# Patient Record
Sex: FEMALE | Race: WHITE | NOT HISPANIC OR LATINO | Employment: OTHER | URBAN - METROPOLITAN AREA
[De-identification: names, ages, dates, MRNs, and addresses within clinical notes are randomized per-mention and may not be internally consistent; named-entity substitution may affect disease eponyms.]

---

## 2017-01-04 ENCOUNTER — ANTI-COAG VISIT (OUTPATIENT)
Dept: CARDIOLOGY | Facility: CLINIC | Age: 62
End: 2017-01-04

## 2017-01-04 DIAGNOSIS — Z95.811 LEFT VENTRICULAR ASSIST DEVICE PRESENT: ICD-10-CM

## 2017-01-04 LAB — INR PPP: 3.2

## 2017-01-04 NOTE — PROGRESS NOTES
Pt confirms no changes. Reports she has been eating stuffed cabbage the last few days. Will hold and lower.

## 2017-01-09 ENCOUNTER — ANTI-COAG VISIT (OUTPATIENT)
Dept: CARDIOLOGY | Facility: CLINIC | Age: 62
End: 2017-01-09

## 2017-01-09 DIAGNOSIS — Z95.811 LEFT VENTRICULAR ASSIST DEVICE PRESENT: ICD-10-CM

## 2017-01-09 LAB — INR PPP: 2.8

## 2017-01-10 NOTE — PROGRESS NOTES
S/w pt, she reports reduction in bleeding. Advised to call LVAD or go to ER for any bleeding that does not cease. INR with LVAD

## 2017-01-11 ENCOUNTER — OFFICE VISIT (OUTPATIENT)
Dept: TRANSPLANT | Facility: CLINIC | Age: 62
End: 2017-01-11
Payer: MEDICARE

## 2017-01-11 ENCOUNTER — CLINICAL SUPPORT (OUTPATIENT)
Dept: TRANSPLANT | Facility: CLINIC | Age: 62
End: 2017-01-11
Payer: MEDICARE

## 2017-01-11 ENCOUNTER — ANTI-COAG VISIT (OUTPATIENT)
Dept: CARDIOLOGY | Facility: CLINIC | Age: 62
End: 2017-01-11

## 2017-01-11 ENCOUNTER — HOSPITAL ENCOUNTER (OUTPATIENT)
Dept: CARDIOLOGY | Facility: CLINIC | Age: 62
Discharge: HOME | End: 2017-01-11
Payer: MEDICARE

## 2017-01-11 VITALS
HEIGHT: 60 IN | BODY MASS INDEX: 26.7 KG/M2 | TEMPERATURE: 99 F | WEIGHT: 136 LBS | HEART RATE: 91 BPM | SYSTOLIC BLOOD PRESSURE: 80 MMHG

## 2017-01-11 DIAGNOSIS — Z95.811 HEART REPLACED BY HEART ASSIST DEVICE: ICD-10-CM

## 2017-01-11 DIAGNOSIS — Z95.810 AUTOMATIC IMPLANTABLE CARDIOVERTER-DEFIBRILLATOR IN SITU: ICD-10-CM

## 2017-01-11 DIAGNOSIS — I10 ESSENTIAL HYPERTENSION: ICD-10-CM

## 2017-01-11 DIAGNOSIS — Z95.811 LEFT VENTRICULAR ASSIST DEVICE PRESENT: ICD-10-CM

## 2017-01-11 DIAGNOSIS — I42.0 DILATED CARDIOMYOPATHY: ICD-10-CM

## 2017-01-11 DIAGNOSIS — I42.9 CARDIOMYOPATHY: ICD-10-CM

## 2017-01-11 DIAGNOSIS — Z95.811 HEART REPLACED BY HEART ASSIST DEVICE: Primary | ICD-10-CM

## 2017-01-11 DIAGNOSIS — R11.0 NAUSEA: ICD-10-CM

## 2017-01-11 LAB
AORTIC VALVE REGURGITATION: ABNORMAL
DIASTOLIC DYSFUNCTION: YES
ESTIMATED PA SYSTOLIC PRESSURE: 36.64
RETIRED EF AND QEF - SEE NOTES: 15 (ref 55–65)
TRICUSPID VALVE REGURGITATION: ABNORMAL

## 2017-01-11 PROCEDURE — 99214 OFFICE O/P EST MOD 30 MIN: CPT | Mod: S$PBB,,, | Performed by: INTERNAL MEDICINE

## 2017-01-11 PROCEDURE — 99999 PR PBB SHADOW E&M-EST. PATIENT-LVL III: CPT | Mod: PBBFAC,,, | Performed by: INTERNAL MEDICINE

## 2017-01-11 PROCEDURE — 99213 OFFICE O/P EST LOW 20 MIN: CPT | Mod: PBBFAC | Performed by: INTERNAL MEDICINE

## 2017-01-11 PROCEDURE — 93306 TTE W/DOPPLER COMPLETE: CPT | Mod: 26,S$PBB,, | Performed by: INTERNAL MEDICINE

## 2017-01-11 PROCEDURE — 93750 INTERROGATION VAD IN PERSON: CPT | Mod: PBBFAC | Performed by: INTERNAL MEDICINE

## 2017-01-11 RX ORDER — ONDANSETRON 4 MG/1
4 TABLET, FILM COATED ORAL EVERY 8 HOURS PRN
Qty: 30 TABLET | Refills: 3 | Status: SHIPPED | OUTPATIENT
Start: 2017-01-11

## 2017-01-11 NOTE — PROGRESS NOTES
"Subjective:   Patient ID:  Randa Devine is a 61 y.o. female who presents for LVAD followup visit.      Implant Date: 5/9/12 and PFO closure  [id number], DT study     HVAD RPM 2700     INR goal: 2-3  NO Bridge with Heparin (4/22/16)  Antiplatelets: D/C 6/25/14, Coumadin only  LDH: 197/207    TXP MARIA L INTERROGATIONS 1/11/2017   Type Heartware   Flow 4.9   Speed 2700   Power (Kim) 4.1   Low Flow Alarm 2.5   High Power Alarm 6.0   Pulsatility Intermittent pulse       HPI   62 yo with chronic combined HF, ICM, s/p Heartware for DT complicated by ESRD who comes to rehab after May 2016 fall causing left arm wound. Here for her regular VAD visit. Doing very well. VAD speed is at 2700 rpm. No VAD alarms noted on interrogation occasional PI events . BP is 80 . DLES is a "1". INR is therapeutic at 3.0 . LDH is at baseline 208.     Review of Systems   Constitution: Negative. Negative for chills, decreased appetite, diaphoresis, fever, weakness, malaise/fatigue, night sweats, weight gain and weight loss.   Eyes: Negative.    Cardiovascular: Negative for chest pain, claudication, cyanosis, dyspnea on exertion, irregular heartbeat, leg swelling, near-syncope, orthopnea, palpitations, paroxysmal nocturnal dyspnea and syncope.   Respiratory: Negative for cough, hemoptysis and shortness of breath.    Endocrine: Negative.    Hematologic/Lymphatic: Negative.    Skin: Negative for color change, dry skin and nail changes.   Musculoskeletal: Negative.    Gastrointestinal: Negative.    Genitourinary: Negative.        Objective:   Blood pressure (!) 80/0, pulse 91, temperature 99.1 °F (37.3 °C), temperature source Oral, height 5' (1.524 m), weight 61.7 kg (136 lb).body mass index is 26.56 kg/(m^2).    Doppler: 80    Physical Exam   Constitutional: She is oriented to person, place, and time. Vital signs are normal. She appears well-developed and well-nourished.   HENT:   Head: Normocephalic.   Eyes: Pupils are equal, round, and reactive to " "light.   Neck: Neck supple. No JVD present.   Cardiovascular: Normal rate, regular rhythm, normal heart sounds and intact distal pulses.  PMI is not displaced.  Exam reveals no gallop and no friction rub.    No murmur heard.  Smooth VAD hum. DLES is "1"   Pulmonary/Chest: Effort normal and breath sounds normal. No respiratory distress. She has no wheezes. She has no rales.   Abdominal: Soft. Bowel sounds are normal. She exhibits no distension. There is no tenderness. There is no rebound.   Musculoskeletal: She exhibits no edema.   Neurological: She is alert and oriented to person, place, and time.   Nursing note and vitals reviewed.      Lab Results   Component Value Date    WBC 4.37 01/11/2017    HGB 10.9 (L) 01/11/2017    HCT 33.2 (L) 01/11/2017    MCV 97 01/11/2017     (L) 01/11/2017    CO2 30 (H) 01/11/2017    CREATININE 7.9 (H) 01/11/2017    CALCIUM 9.2 01/11/2017    ALBUMIN 3.4 (L) 01/11/2017    AST 19 01/11/2017    BNP 1143 (H) 01/11/2017    ALT 15 01/11/2017     01/11/2017       Lab Results   Component Value Date    INR 3.0 (H) 01/11/2017    INR 2.8 01/09/2017    INR 3.2 01/04/2017       BNP   Date Value Ref Range Status   01/11/2017 1143 (H) 0 - 99 pg/mL Final     Comment:     Values of less than 100 pg/ml are consistent with non-CHF populations.   11/16/2016 790 (H) 0 - 99 pg/mL Final     Comment:     Values of less than 100 pg/ml are consistent with non-CHF populations.   09/21/2016 1641 (H) 0 - 99 pg/mL Final     Comment:     Values of less than 100 pg/ml are consistent with non-CHF populations.       LD   Date Value Ref Range Status   01/11/2017 208 110 - 260 U/L Final     Comment:     Results are increased in hemolyzed samples.   11/16/2016 207 110 - 260 U/L Final     Comment:     Results are increased in hemolyzed samples.   09/21/2016 197 110 - 260 U/L Final     Comment:     Results are increased in hemolyzed samples.       Labs were reviewed with the patient.    No results found for " this or any previous visit.  No results found for this or any previous visit.    Assessment:      1. Heart replaced by heart assist device    2. Dilated cardiomyopathy    3. Essential hypertension    4. Nausea    5. Automatic implantable cardioverter-defibrillator in situ        Plan:   Patient is now NYHA class II. BP is controlled.  INR is therapeutic.  VAD interrogation was performed in clinic  Does not need VAD supplies.   Recommend 2 gram sodium restriction and 1500cc fluid restriction.  Encourage physical activity with graded exercise program.  Requested patient to weigh themselves daily, and to notify us if their weight increases by more than 3 lbs in 1 day or 5 lbs in 1 week.      Listed for transplant: No, DT    UNOS Patient Status  Functional Status: 90% - Able to carry on normal activity: minor symptoms of disease  Physical Capacity: No Limitations  Working for Income: No  If no, reason not working: Unknown    Tosin Fajardo MD

## 2017-01-11 NOTE — PROGRESS NOTES
Date of Implant with Heartware LVAD: 5/9/12    PATIENT ARRIVED IN CLINIC:  Ambulatory   Accompanied by: daughter    Vitals  Doppler: 80  Pulsatile: Yes  PAIN: chronic headaches on 0-10 pain scale,   Is patient currently on medications for pain? no   Weight (with controller and 2 batteries): refer to encounter    VAD Interrogation:  BERNARDO MARIA L INTERROGATIONS 1/11/2017   Type Heartware   Flow 4.9   Speed 2700   Power (Kim) 4.1   Low Flow Alarm 2.5   High Power Alarm 6.0   Pulsatility Intermittent pulse       HCT: 33.2  WAVEFORM: 3-8  Suction alarm: Off  Problems / Issues / Alarms with VAD if any: LFA noted 1/10/16 during HD   Any Equipment Issues: None noted  Patient states their batteries are lasting 4-6 hours. Rotating all batteries. Checking connections before and after changing battery.   Emergency Equipment With Patient: yes   VAD Binder With Patient: yes   Reviewed VAD Numbers In Binder: yes  VAD Sounds: Smooth  Manual & Visual Inspection Of Driveline: No kinks or tears noted  Checked Heartware driveline connector housing for separation, none noted.  Also checked for tight connection, which they are.  Educated pt regarding this and instructed  to check on this daily. Pt verbalized understanding and agreement.   Clamshell Repair: no  Patient wearing patient pack with waist strap:yes    LVAD Dressing/DLES:  Appearance Of Driveline: 1  Antibiotics: NO  Frequency of Dressing Changes: twice per week & soap and water dressing   Stabilization Device In Use: yes, presley securement device    Pt In Need Of Management Kits? no   It is medically necessary to have VAD management kits in order to prevent infection or to assist in the healing of an infected DLES.    Assessment:   Complaints/reason for visit today: routine  Complaints Of Nausea / Vomiting: occasional with HD   Appearance and Frequency Of Stools: normal and formed without blood & daily  Color Of Urine: clear/yellow  Patient is: coping okay   Sleep Habits: 4-6 hrs  /night  Sleep Aids: None noted   Showering: no  Activity/Exercise: p reports walking, gardening, being active   Driving: yes, Reminded to pull over should there be an alarm before looking down at controller.     Labs:    Chemistry        Component Value Date/Time     01/11/2017 1009    K 4.3 01/11/2017 1009    CL 95 01/11/2017 1009    CO2 30 (H) 01/11/2017 1009    BUN 56 (H) 01/11/2017 1009    CREATININE 7.9 (H) 01/11/2017 1009     01/11/2017 1009        Component Value Date/Time    CALCIUM 9.2 01/11/2017 1009    ALKPHOS 106 01/11/2017 1009    AST 19 01/11/2017 1009    ALT 15 01/11/2017 1009    BILITOT 0.5 01/11/2017 1009            Magnesium   Date Value Ref Range Status   01/11/2017 2.1 1.6 - 2.6 mg/dL Final       Lab Results   Component Value Date    WBC 4.37 01/11/2017    HGB 10.9 (L) 01/11/2017    HCT 33.2 (L) 01/11/2017    MCV 97 01/11/2017     (L) 01/11/2017       Lab Results   Component Value Date    INR 3.0 (H) 01/11/2017    INR 2.8 01/09/2017    INR 3.2 01/04/2017       BNP   Date Value Ref Range Status   01/11/2017 1143 (H) 0 - 99 pg/mL Final     Comment:     Values of less than 100 pg/ml are consistent with non-CHF populations.   11/16/2016 790 (H) 0 - 99 pg/mL Final     Comment:     Values of less than 100 pg/ml are consistent with non-CHF populations.   09/21/2016 1641 (H) 0 - 99 pg/mL Final     Comment:     Values of less than 100 pg/ml are consistent with non-CHF populations.       LD   Date Value Ref Range Status   01/11/2017 208 110 - 260 U/L Final     Comment:     Results are increased in hemolyzed samples.   11/16/2016 207 110 - 260 U/L Final     Comment:     Results are increased in hemolyzed samples.   09/21/2016 197 110 - 260 U/L Final     Comment:     Results are increased in hemolyzed samples.       Labs reviewed with patient: YES      Patient Satisfaction Survey completed per Patient: yes  (explained about signature and box to check)  Medication reconciliation: per  MA.  Purple card updated today: no  Coumadin Managed by: [facility],     Education: Reviewed driveline care, emergency procedures, how to change the controller, alarms with patient.      Plans/Needs: Pt doing well with no issues.  Pt had echo today which was reviewed by Dr. Fajardo.  No changes.  Pt to RTC in 2 months, please refer to MD.

## 2017-01-11 NOTE — LETTER
January 11, 2017        BABAR Currie  [address]  [address]  [place] [address]  Phone: [phone]  Fax: [phone]             [facility] [address]  [facility] [address]  Phone: [phone]   Patient: Randa Devine   MR Number: [id number]   YOB: 1955   Date of Visit: 1/11/2017       Dear Dr. BABAR Currie    Thank you for referring Randa Devine to me for evaluation. Attached you will find relevant portions of my assessment and plan of care.    If you have questions, please do not hesitate to call me. I look forward to following Randa Devine along with you.    Sincerely,    Tosin Fajardo MD    Enclosure    If you would like to receive this communication electronically, please contact [email or web address] or [phone] to request [employer] access.    [employer] is a tool which provides read-only access to select patient information with whom you have a relationship. Its easy to use and provides real time access to review your patients record including encounter summaries, notes, results, and demographic information.    If you feel you have received this communication in error or would no longer like to receive these types of communications, please e-mail [email or web address]

## 2017-01-11 NOTE — PROCEDURES
TXP MARIA L INTERROGATIONS 1/11/2017 9/21/2016 6/20/2016 3/16/2016 1/13/2016 11/18/2015 9/18/2015   Type Heartware Heartware Heartware Heartware Heartware HeartMate II;Heartware Heartware   Flow 4.9 5.1 6.0 4.2 4.7 5.6 5.4   Speed 2700 2700 2700 2700 2700 2700 2700   Power (Kim) 4.1 3.9 4.1 - 4.3 4.2 4.0   Low Flow Alarm 2.5 2.5 3.5 2.5 2.5 2.5 2.5   High Power Alarm 6.0 6.0 6.0 6 6.0 6 6.0   Pulsatility Intermittent pulse Pulse - - - - -   }

## 2017-01-11 NOTE — PROGRESS NOTES
SW followed up with pt and dtr today in LVAD clinic. Pt alert/oriented x4 with flat affect. Pt continues to go to dialysis 3x/week. Pt's daughter is her primary caregiver. Pt has HH 2x/week for INR and nursing checks. Pt uses a rollator walker for long distances but states she can walk.    Pt did inquire about a letter for the post office to state she needs her mail delivered directly to her door as the mailbox is too far from her apt. Pt daughter stated that pt's son lives with her and that he can get the mail. Also stated that pt is capable and daughter can assist as needed.    Pt coping adequately, no further concerns voiced.    SW provided general support and education. SW remains available and continues to follow.

## 2017-01-16 ENCOUNTER — ANTI-COAG VISIT (OUTPATIENT)
Dept: CARDIOLOGY | Facility: CLINIC | Age: 62
End: 2017-01-16

## 2017-01-16 DIAGNOSIS — Z95.811 LEFT VENTRICULAR ASSIST DEVICE PRESENT: ICD-10-CM

## 2017-01-16 LAB — INR PPP: 2.1

## 2017-01-19 ENCOUNTER — HOSPITAL ENCOUNTER (EMERGENCY)
Facility: HOSPITAL | Age: 62
Discharge: HOME | End: 2017-01-19
Attending: EMERGENCY MEDICINE
Payer: MEDICARE

## 2017-01-19 VITALS
HEART RATE: 71 BPM | RESPIRATION RATE: 17 BRPM | TEMPERATURE: 98 F | WEIGHT: 135.13 LBS | DIASTOLIC BLOOD PRESSURE: 80 MMHG | SYSTOLIC BLOOD PRESSURE: 100 MMHG | HEIGHT: 60 IN | BODY MASS INDEX: 26.53 KG/M2 | OXYGEN SATURATION: 100 %

## 2017-01-19 DIAGNOSIS — N18.6 ESRD (END STAGE RENAL DISEASE): ICD-10-CM

## 2017-01-19 DIAGNOSIS — Z95.811 LVAD (LEFT VENTRICULAR ASSIST DEVICE) PRESENT: ICD-10-CM

## 2017-01-19 DIAGNOSIS — R55 SYNCOPE: Primary | ICD-10-CM

## 2017-01-19 DIAGNOSIS — M25.551 RIGHT HIP PAIN: ICD-10-CM

## 2017-01-19 LAB
ALBUMIN SERPL BCP-MCNC: 3.8 G/DL
ALP SERPL-CCNC: 110 U/L
ALT SERPL W/O P-5'-P-CCNC: 20 U/L
ANION GAP SERPL CALC-SCNC: 15 MMOL/L
APTT BLDCRRT: 38.7 SEC
AST SERPL-CCNC: 31 U/L
BASOPHILS # BLD AUTO: 0.01 K/UL
BASOPHILS NFR BLD: 0.2 %
BILIRUB SERPL-MCNC: 0.5 MG/DL
BNP SERPL-MCNC: 174 PG/ML
BUN SERPL-MCNC: 18 MG/DL
CALCIUM SERPL-MCNC: 9 MG/DL
CHLORIDE SERPL-SCNC: 94 MMOL/L
CK MB SERPL-MCNC: 1.7 NG/ML
CK MB SERPL-RTO: 1.3 %
CK SERPL-CCNC: 133 U/L
CK SERPL-CCNC: 133 U/L
CO2 SERPL-SCNC: 27 MMOL/L
CREAT SERPL-MCNC: 3.5 MG/DL
DIFFERENTIAL METHOD: ABNORMAL
EOSINOPHIL # BLD AUTO: 0.1 K/UL
EOSINOPHIL NFR BLD: 1.8 %
ERYTHROCYTE [DISTWIDTH] IN BLOOD BY AUTOMATED COUNT: 15.8 %
EST. GFR  (AFRICAN AMERICAN): 16 ML/MIN/1.73 M^2
EST. GFR  (NON AFRICAN AMERICAN): 14 ML/MIN/1.73 M^2
GLUCOSE SERPL-MCNC: 74 MG/DL
HCT VFR BLD AUTO: 33.9 %
HGB BLD-MCNC: 11 G/DL
INR PPP: 2.5
LYMPHOCYTES # BLD AUTO: 0.7 K/UL
LYMPHOCYTES NFR BLD: 14 %
MCH RBC QN AUTO: 32.4 PG
MCHC RBC AUTO-ENTMCNC: 32.4 %
MCV RBC AUTO: 100 FL
MONOCYTES # BLD AUTO: 0.6 K/UL
MONOCYTES NFR BLD: 12.2 %
NEUTROPHILS # BLD AUTO: 3.6 K/UL
NEUTROPHILS NFR BLD: 71.8 %
PLATELET # BLD AUTO: 116 K/UL
PMV BLD AUTO: 9.8 FL
POTASSIUM SERPL-SCNC: 3.7 MMOL/L
PROT SERPL-MCNC: 7.8 G/DL
PROTHROMBIN TIME: 24.8 SEC
RBC # BLD AUTO: 3.4 M/UL
SODIUM SERPL-SCNC: 136 MMOL/L
TROPONIN I SERPL DL<=0.01 NG/ML-MCNC: 0.04 NG/ML
TSH SERPL DL<=0.005 MIU/L-ACNC: 2 UIU/ML
WBC # BLD AUTO: 5 K/UL

## 2017-01-19 PROCEDURE — 96374 THER/PROPH/DIAG INJ IV PUSH: CPT

## 2017-01-19 PROCEDURE — 93005 ELECTROCARDIOGRAM TRACING: CPT

## 2017-01-19 PROCEDURE — 63600175 PHARM REV CODE 636 W HCPCS: Performed by: EMERGENCY MEDICINE

## 2017-01-19 PROCEDURE — 25000003 PHARM REV CODE 250: Performed by: EMERGENCY MEDICINE

## 2017-01-19 PROCEDURE — 84443 ASSAY THYROID STIM HORMONE: CPT

## 2017-01-19 PROCEDURE — 85610 PROTHROMBIN TIME: CPT

## 2017-01-19 PROCEDURE — 84484 ASSAY OF TROPONIN QUANT: CPT

## 2017-01-19 PROCEDURE — 80053 COMPREHEN METABOLIC PANEL: CPT

## 2017-01-19 PROCEDURE — 85025 COMPLETE CBC W/AUTO DIFF WBC: CPT

## 2017-01-19 PROCEDURE — 85730 THROMBOPLASTIN TIME PARTIAL: CPT

## 2017-01-19 PROCEDURE — 82553 CREATINE MB FRACTION: CPT

## 2017-01-19 PROCEDURE — 96375 TX/PRO/DX INJ NEW DRUG ADDON: CPT

## 2017-01-19 PROCEDURE — 99285 EMERGENCY DEPT VISIT HI MDM: CPT | Mod: 25

## 2017-01-19 PROCEDURE — 83880 ASSAY OF NATRIURETIC PEPTIDE: CPT

## 2017-01-19 PROCEDURE — 96361 HYDRATE IV INFUSION ADD-ON: CPT

## 2017-01-19 RX ORDER — ONDANSETRON 2 MG/ML
4 INJECTION INTRAMUSCULAR; INTRAVENOUS
Status: COMPLETED | OUTPATIENT
Start: 2017-01-19 | End: 2017-01-19

## 2017-01-19 RX ORDER — KETOROLAC TROMETHAMINE 30 MG/ML
15 INJECTION, SOLUTION INTRAMUSCULAR; INTRAVENOUS
Status: COMPLETED | OUTPATIENT
Start: 2017-01-19 | End: 2017-01-19

## 2017-01-19 RX ADMIN — KETOROLAC TROMETHAMINE 15 MG: 30 INJECTION, SOLUTION INTRAMUSCULAR; INTRAVENOUS at 12:01

## 2017-01-19 RX ADMIN — ONDANSETRON 4 MG: 2 INJECTION INTRAMUSCULAR; INTRAVENOUS at 12:01

## 2017-01-19 RX ADMIN — SODIUM CHLORIDE 250 ML: 0.9 INJECTION, SOLUTION INTRAVENOUS at 12:01

## 2017-01-19 NOTE — ED PROVIDER NOTES
SCRIBE #1 NOTE: I, Beverly Ravi, am scribing for, and in the presence of, Noman Bae MD. I have scribed the entire note.      History      Chief Complaint   Patient presents with    Loss of Consciousness     pt reports syncopal episode after getting up from chair after dialysis       Review of patient's allergies indicates:   Allergen Reactions    Codeine Nausea And Vomiting    Demerol [meperidine] Nausea And Vomiting    Lortab [hydrocodone-acetaminophen] Nausea And Vomiting        HPI   HPI    1/19/2017, 11:13 AM   History obtained from the daughter and patient      History of Present Illness: Randa Devine is a 61 y.o. female patient with CKD and ERSD who presents to the Emergency Department for syncope which onset suddenly after standing up from chair after dialysis today. Symptoms have resolved and moderate in severity. Pt does not report any factors that exacerbate or improve the symptoms. Patient c/o nausea, HA, and R hip pain. Daughter reports that patient does not remember falling. Patient denies head trauma, CP, SOB, dizziness, fatigue, weakness, abd pain, emesis, hematuria, ecchymosis, and all other sxs at this time. Patient is on Coumadin. No further complaints or concerns at this time.       Arrival mode: EMS    PCP: Laura Garvin DO       Past Medical History:  Past Medical History   Diagnosis Date    Anticoagulant long-term use     Anxiety     Atrial tachycardia, paroxysmal 4/21/2013    Blood transfusion     Cardiomyopathy     CHF (congestive heart failure)     Chronic kidney disease     Coronary artery disease     End stage renal disease     Hypertension      pulmonary    ICD (implantable cardioverter-defibrillator) battery depletion 4/25/2014    Myocardial infarction     Presence of left ventricular assist device (LVAD)     Pulmonary hypertension     Stroke        Past Surgical History:  Past Surgical History   Procedure Laterality Date    Left ventricular assist  device  2012    Tonsillectomy      Cardiac surgery      Coronary artery bypass graft      Hip surgery       RTHA    Fracture surgery      Vascular surgery      Cardiac defibrillator placement       section      Colonoscopy N/A 2016     Procedure: COLONOSCOPY;  Surgeon: Mark Currie MD;  Location: [facility] ([address] [place] [address]);  Service: Endoscopy;  Laterality: N/A;         Family History:  Family History   Problem Relation Age of Onset    Arthritis Mother     Heart disease Father     Hypertension Father     Cancer Maternal Grandmother     Breast cancer Neg Hx     Colon cancer Neg Hx     Ovarian cancer Neg Hx        Social History:  Social History     Social History Main Topics    Smoking status: Former Smoker     Packs/day: 2.00     Years: 30.00     Quit date: 10/29/2009    Smokeless tobacco: Unknown    Alcohol use Yes      Comment: 1 glass of wine a month    Drug use: No    Sexual activity: No       ROS   Review of Systems   Constitutional: Negative for chills, diaphoresis and fever.   HENT: Negative for sore throat.    Respiratory: Negative for shortness of breath.    Cardiovascular: Negative for chest pain, palpitations and leg swelling.   Gastrointestinal: Positive for nausea. Negative for abdominal pain, diarrhea and vomiting.   Genitourinary: Negative for dysuria and hematuria.   Musculoskeletal: Negative for back pain.        (+) R hip pain   Skin: Negative for color change and rash.   Neurological: Positive for syncope and headaches. Negative for dizziness, weakness, light-headedness and numbness.   Hematological: Does not bruise/bleed easily.   All other systems reviewed and are negative.      Physical Exam       Initial Vitals   BP Pulse Resp Temp SpO2   17 1105 17 1105 17 1105 17 1105 17 1105   101/65 76 16 97.6 °F (36.4 °C) 96 %     Physical Exam  Nursing Notes and Vital Signs Reviewed.  Constitutional: Patient is in no acute distress.  Awake and alert. Well-developed and well-nourished.  Head: Atraumatic. Normocephalic.  Eyes: PERRL. EOM intact. Conjunctivae are not pale. No scleral icterus.  ENT: Mucous membranes are moist. Oropharynx is clear and symmetric.    Neck: Supple. Full ROM. No lymphadenopathy.  Cardiovascular: Regular rate. Regular rhythm. No murmurs, rubs, or gallops. Distal pulses are 2+ and symmetric. LVAD.  Pulmonary/Chest: No respiratory distress. Clear to auscultation bilaterally. No wheezing, rales, or rhonchi.  Abdominal: Soft and non-distended.  There is no tenderness.  No rebound, guarding, or rigidity.  Good bowel sounds.    Genitourinary: No CVA tenderness  Musculoskeletal: Moves all extremities. No obvious deformities. No edema. No calf tenderness. R hip tenderness to palpation.  Skin: Warm and dry.  Neurological:  Alert, awake, and appropriate.  Normal speech.  No acute focal neurological deficits are appreciated.  Psychiatric: Normal affect. Good eye contact. Appropriate in content.    ED Course    Procedures  ED Vital Signs:  Vitals:    01/19/17 1105 01/19/17 1223 01/19/17 1225 01/19/17 1229   BP: 101/65 (!) 84/62 108/81 111/78   Pulse: 76 70 80 78   Resp: 16 13 18 (!) 22   Temp: 97.6 °F (36.4 °C)      TempSrc: Axillary      SpO2: 96% 100% 100% 100%   Weight: 61.3 kg (135 lb 2.3 oz)      Height: 5' (1.524 m)       01/19/17 1241   BP: 100/80   Pulse: 71   Resp: 17   Temp:    TempSrc:    SpO2: 100%   Weight:    Height:        Abnormal Lab Results:  Labs Reviewed   CBC W/ AUTO DIFFERENTIAL - Abnormal; Notable for the following:        Result Value    RBC 3.40 (*)     Hemoglobin 11.0 (*)     Hematocrit 33.9 (*)      (*)     MCH 32.4 (*)     RDW 15.8 (*)     Platelets 116 (*)     Lymph # 0.7 (*)     Lymph% 14.0 (*)     All other components within normal limits   COMPREHENSIVE METABOLIC PANEL - Abnormal; Notable for the following:     Chloride 94 (*)     Creatinine 3.5 (*)     eGFR if  16 (*)     eGFR  if non  14 (*)     All other components within normal limits   TROPONIN I - Abnormal; Notable for the following:     Troponin I 0.043 (*)     All other components within normal limits   B-TYPE NATRIURETIC PEPTIDE - Abnormal; Notable for the following:      (*)     All other components within normal limits   APTT - Abnormal; Notable for the following:     aPTT 38.7 (*)     All other components within normal limits   PROTIME-INR - Abnormal; Notable for the following:     Prothrombin Time 24.8 (*)     INR 2.5 (*)     All other components within normal limits   TSH   CK-MB   CK        All Lab Results:  Results for orders placed or performed during the hospital encounter of 01/19/17   CBC auto differential   Result Value Ref Range    WBC 5.00 3.90 - 12.70 K/uL    RBC 3.40 (L) 4.00 - 5.40 M/uL    Hemoglobin 11.0 (L) 12.0 - 16.0 g/dL    Hematocrit 33.9 (L) 37.0 - 48.5 %     (H) 82 - 98 fL    MCH 32.4 (H) 27.0 - 31.0 pg    MCHC 32.4 32.0 - 36.0 %    RDW 15.8 (H) 11.5 - 14.5 %    Platelets 116 (L) 150 - 350 K/uL    MPV 9.8 9.2 - 12.9 fL    Gran # 3.6 1.8 - 7.7 K/uL    Lymph # 0.7 (L) 1.0 - 4.8 K/uL    Mono # 0.6 0.3 - 1.0 K/uL    Eos # 0.1 0.0 - 0.5 K/uL    Baso # 0.01 0.00 - 0.20 K/uL    Gran% 71.8 38.0 - 73.0 %    Lymph% 14.0 (L) 18.0 - 48.0 %    Mono% 12.2 4.0 - 15.0 %    Eosinophil% 1.8 0.0 - 8.0 %    Basophil% 0.2 0.0 - 1.9 %    Differential Method Automated    Comprehensive metabolic panel   Result Value Ref Range    Sodium 136 136 - 145 mmol/L    Potassium 3.7 3.5 - 5.1 mmol/L    Chloride 94 (L) 95 - 110 mmol/L    CO2 27 23 - 29 mmol/L    Glucose 74 70 - 110 mg/dL    BUN, Bld 18 6 - 20 mg/dL    Creatinine 3.5 (H) 0.5 - 1.4 mg/dL    Calcium 9.0 8.7 - 10.5 mg/dL    Total Protein 7.8 6.0 - 8.4 g/dL    Albumin 3.8 3.5 - 5.2 g/dL    Total Bilirubin 0.5 0.1 - 1.0 mg/dL    Alkaline Phosphatase 110 55 - 135 U/L    AST 31 10 - 40 U/L    ALT 20 10 - 44 U/L    Anion Gap 15 8 - 16 mmol/L    eGFR if   16 (A) >60 mL/min/1.73 m^2    eGFR if non African American 14 (A) >60 mL/min/1.73 m^2   Troponin I   Result Value Ref Range    Troponin I 0.043 (H) 0.000 - 0.026 ng/mL   TSH   Result Value Ref Range    TSH 1.996 0.400 - 4.000 uIU/mL   CK-MB   Result Value Ref Range     20 - 180 U/L    CPK MB 1.7 0.1 - 6.5 ng/mL    MB% 1.3 0.0 - 5.0 %   CK   Result Value Ref Range     20 - 180 U/L   Brain natriuretic peptide   Result Value Ref Range     (H) 0 - 99 pg/mL   APTT   Result Value Ref Range    aPTT 38.7 (H) 21.0 - 32.0 sec   Protime-INR   Result Value Ref Range    Prothrombin Time 24.8 (H) 9.0 - 12.5 sec    INR 2.5 (H) 0.8 - 1.2     *Note: Due to a large number of results and/or encounters for the requested time period, some results have not been displayed. A complete set of results can be found in Results Review.     Imaging Results:  Imaging Results         X-Ray Hip 2 View Right (Final result) Result time:  01/19/17 12:16:07    Final result by Gonzalez Gilmore III, MD (01/19/17 12:16:07)    Impression:     No acute bony abnormalities suggested.      Electronically signed by: GONZALEZ GILMORE MD  Date:     01/19/17  Time:    12:16     Narrative:    Right hip x-ray, 3 views including AP pelvis.    Clinical indication: Fall. Trauma to the pelvis.    Bony pelvis is grossly intact without fracture or diastases.    Right hip shows evidence of 3 screws within the femoral head/neck. No acute fracture. No dislocation.            X-Ray Chest 1 View (Final result) Result time:  01/19/17 12:12:35    Final result by Gonzalez Gilmore III, MD (01/19/17 12:12:35)    Impression:     Mild cardiomegaly. Slight vascular prominence/congestion without overt failure.      Electronically signed by: GONZALEZ GILMORE MD  Date:     01/19/17  Time:    12:12     Narrative:    Chest x-ray, single view.    Clinical indication: Syncope. Collapse.    Compared to May.    The heart size remains mildly  enlarged with postoperative changes along the sternum. Permanent pacing device/defibrillator/electronic devices again noted on the left. Peripheral lung zones are clear except for what may be a small band of atelectasis on the left. Slight vascular prominence without overt failure.            CT Head Without Contrast (Final result) Result time:  01/19/17 12:09:55    Final result by Gonzalez Gilmore III, MD (01/19/17 12:09:55)    Impression:     Stable CT brain without contrast. Atrophy. White matter changes. No bleed or other acute intracranial event suggested.    All CT scans at this facility use dose modulation, iterative reconstruction, and/or weight based dosing when appropriate to reduce radiation dose to as low as reasonably achievable.      Electronically signed by: GONZALEZ GILMORE MD  Date:     01/19/17  Time:    12:09     Narrative:    CT of the head without contrast.    Clinical indication: .Syncope.. Collapse.      Standard noncontrast CT scan of the brain. Compared to May 2016.    The brain and ventricles again show moderate generalized changes of atrophy, both supra-and infratentorial. There is no hemorrhage, mass lesion, hydrocephalus, or midline shift. There are changes of low attenuation throughout the periventricular and subcortical white matter with scattered lacunae. No acute/depressed skull fracture.. No detrimental change             The EKG was ordered, reviewed, and independently interpreted by the ED provider.  Interpretation time: 11:08  Rate: 73 BPM  Rhythm: Sinus rhythm with short IA with fusion complexes  Interpretation: Rightward axis. LVH with QRS widening. Nonspecific T wave abnormality. No STEMI.    The Emergency Provider reviewed the vital signs and test results, which are outlined above.    ED Discussion     12:48 PM: Dr. Bae discussed the pt's case with Rashida Woodson (LVAD coordinator) who agrees with plan. Does not feel like patient needs to be transferred. Recommends  discharging patient for f/u.    12:50 PM: Reassessed pt at this time.  Pt states her condition has improved at this time. Discussed with pt all pertinent ED information and results. Discussed pt dx and plan of tx. Gave pt all f/u and return to the ED instructions. All questions and concerns were addressed at this time. Pt expresses understanding of information and instructions, and is comfortable with plan to discharge. Pt is stable for discharge.    I discussed with patient and/or family/caretaker that evaluation in the ED does not suggest any emergent or life threatening medical conditions requiring immediate intervention beyond what was provided in the ED, and I believe patient is safe for discharge.  Regardless, an unremarkable evaluation in the ED does not preclude the development or presence of a serious of life threatening condition. As such, patient was instructed to return immediately for any worsening or change in current symptoms.    Patient's headache is either consistent with previous headache and/or lacks features concerning for emergent or life threatening condition.  I do not suspect SAH, meningitis, increased IC pressure, infectious, toxic, vascular, CNS, or other EMC.  I have discussed this at length with patient and/or family/caretaker.      ED Medication(s):  Medications   ondansetron injection 4 mg (4 mg Intravenous Given 1/19/17 1207)   sodium chloride 0.9% bolus 250 mL (0 mLs Intravenous Stopped 1/19/17 1306)   ketorolac injection 15 mg (15 mg Intravenous Given 1/19/17 1200)       Discharge Medication List as of 1/19/2017 12:54 PM          Follow-up Information     Follow up with Laura Garvin DO. Call in 1 day.    Specialty:  Internal Medicine    Contact information:    [id number] [place] [id number] [phone]          Follow up with [facility].    Specialty:  Emergency Medicine    Why:  If symptoms worsen    Contact information:    [address] [facility]  [facility] [id number] [phone]           Medical Decision Making    Medical Decision Making:   Clinical Tests:   Lab Tests: Ordered and Reviewed  Radiological Study: Ordered and Reviewed  Medical Tests: Ordered and Reviewed           Scribe Attestation:   Scribe #1: I performed the above scribed service and the documentation accurately describes the services I performed. I attest to the accuracy of the note.    Attending:   Physician Attestation Statement for Scribe #1: I, Noman Bae MD, personally performed the services described in this documentation, as scribed by Beverly Ravi, in my presence, and it is both accurate and complete.          Clinical Impression       ICD-10-CM ICD-9-CM   1. Syncope R55 780.2   2. Right hip pain M25.551 719.45   3. LVAD (left ventricular assist device) present Z95.811 V43.21   4. ESRD (end stage renal disease) N18.6 585.6       Disposition:   Disposition: Discharged  Condition: Stable         Noman Bae MD  01/19/17 2875

## 2017-01-19 NOTE — ED AVS SNAPSHOT
[facility] -   [id number] [place], [place] [address]on [place]ole SEGURA [address]               Randa Devine   2017 11:02 AM   ED    Description:  Female : 1955   Department:  [facility]           Your Care was Coordinated By:     Provider Role From To    Noman Bae MD Attending Provider 17 1112 --      Reason for Visit     Loss of Consciousness           Diagnoses this Visit        Comments    Syncope    -  Primary     Right hip pain         LVAD (left ventricular assist device) present         ESRD (end stage renal disease)           ED Disposition     None           To Do List           Follow-up Information     Follow up with Laura Garvin DO. Call in 1 day.    Specialty:  Internal Medicine    Contact information:    [address] [place] [address] DR Lisandra [place] [address]  [phone]          Follow up with [facility].    Specialty:  Emergency Medicine    Why:  If symptoms worsen    Contact information:    [address] [place] [id number]  [phone]      [facility]     [facility] -  Assistance  Registered nurses in the [facility] provide clinical advisement, health education, appointment booking, and other advisory services.  Call for this free service at [phone].             Medications           Message regarding Medications     Verify the changes and/or additions to your medication regime listed below are the same as discussed with your clinician today.  If any of these changes or additions are incorrect, please notify your healthcare provider.        These medications were administered today        Dose Freq    ondansetron injection 4 mg 4 mg ED 1 Time    Sig: Inject 4 mg into the vein ED 1 Time.    Class: Normal    Route: Intravenous    sodium chloride 0.9% bolus 250 mL 250 mL ED 1 Time    Sig: Inject 250 mLs into the vein ED 1 Time.    Class: Normal     Route: Intravenous    ketorolac injection 15 mg 15 mg ED 1 Time    Sig: Inject 15 mg into the vein ED 1 Time.    Class: Normal    Route: Intravenous           Verify that the below list of medications is an accurate representation of the medications you are currently taking.  If none reported, the list may be blank. If incorrect, please contact your healthcare provider. Carry this list with you in case of emergency.           Current Medications     acetaminophen (TYLENOL) 325 MG tablet Take 2 tablets (650 mg total) by mouth every 6 (six) hours as needed for Pain (mild pain).    CALCIUM CARBONATE (ANTACID CALCIUM ORAL) Take 1 tablet by mouth 4 (four) times daily.     cetirizine (ZYRTEC) 5 MG tablet Take 1 tablet (5 mg total) by mouth once daily.    escitalopram oxalate (LEXAPRO) 20 MG tablet Take 1 tablet (20 mg total) by mouth every evening.    GABAPENTIN (CMPD PAIN MANAGEMENT COMPOUND OINTMNENT THREE) Apply small amount to painful joints once or twice a day    gabapentin (NEURONTIN) 300 MG capsule Take 1 capsule (300 mg total) by mouth every evening.    hydrALAZINE (APRESOLINE) 25 MG tablet Take 1 tablet (25 mg total) by mouth every 8 (eight) hours.    ondansetron (ZOFRAN) 4 MG tablet Take 1 tablet (4 mg total) by mouth every 8 (eight) hours as needed for Nausea.    pantoprazole (PROTONIX) 40 MG tablet Take 1 tablet (40 mg total) by mouth once daily.    warfarin (COUMADIN) 2 MG tablet Take 1 tablet (2mg) by mouth daily, except 1 1/2 tablet (3mg) on Mondays, Wednesdays, and Fridays, Or as directed by [facility]           Clinical Reference Information           Your Vitals Were     BP Pulse Temp Resp Height Weight    100/80 71 97.6 °F (36.4 °C) (Axillary) 17 5' (1.524 m) 61.3 kg (135 lb 2.3 oz)    Last Period SpO2 BMI          (LMP Unknown) 100% 26.39 kg/m2        Allergies as of 1/19/2017        Reactions    Codeine Nausea And Vomiting    Demerol [Meperidine] Nausea And Vomiting    Lortab  [Hydrocodone-acetaminophen] Nausea And Vomiting      Immunizations Administered on Date of Encounter - 1/19/2017     None      ED Micro, Lab, POCT     Start Ordered       Status Ordering Provider    01/19/17 1126 01/19/17 1126  CBC auto differential  STAT      Final result     01/19/17 1126 01/19/17 1126  Comprehensive metabolic panel  STAT      Final result     01/19/17 1126 01/19/17 1126  Troponin I  STAT      Final result     01/19/17 1126 01/19/17 1126  TSH  STAT      In process     01/19/17 1126 01/19/17 1126  CK-MB  STAT      Final result     01/19/17 1126 01/19/17 1126  CK  STAT      Final result     01/19/17 1126 01/19/17 1126  Brain natriuretic peptide  Once      Final result     01/19/17 1126 01/19/17 1126  APTT  STAT      Final result     01/19/17 1126 01/19/17 1126  Protime-INR  STAT      Final result       ED Imaging Orders     Start Ordered       Status Ordering Provider    01/19/17 1202 01/19/17 1201    1 time imaging,   Status:  Canceled      Canceled     01/19/17 1155 01/19/17 1155  X-Ray Hip 2 View Right  1 time imaging      Final result     01/19/17 1126 01/19/17 1126  X-Ray Chest 1 View  1 time imaging      Final result     01/19/17 1126 01/19/17 1126  CT Head Without Contrast  1 time imaging      Final result         Discharge Instructions         Chronic Kidney Disease (CKD)    The role of the kidneys is to remove waste products and excess water from the blood.  When the kidneys do not function normally and waste products begin to build up in the blood, this is called chronic kidney disease (CKD). CKD is defined as the presence of kidney damage or a decrease in kidney function lasting 3 months or more. CKD allows excess water, waste, and toxic substances to build up in the body. This can eventually become life-threatening, requiring dialysis or a kidney transplant to stay alive. This most severe form is called end stage renal disease or ESRD.  Diabetes is the leading causes of chronic renal  failure. Other causes include high blood pressure, hardening of the arteries (atherosclerosis), lupus, inflammation of the blood vessels (vasculitis), prior viral and bacterial infections, and others. Certain over-the-counter pain medicines can cause renal failure when taken often over a long period of time. These include aspirin, ibuprofen, and related anti-inflammatory medicines called NSAIDs (nonsteroidal anti-inflammatory drugs).  Home care  The following guidelines will help you care for yourself at home:  1. If you have diabetes, talk to your doctor about the quality of your blood sugar control and any adjustments needed to your diet, lifestyle, or medicines.  2. If you have high blood pressure:  ¨ Take prescribed medicine to lower your blood pressure to the recommended goal of less than 130/80.  ¨ Take up a regular exercise program that you enjoy. Check with your doctor to be sure your planned exercise program is right for you.  ¨ Reduce your salt (sodium) intake. Your doctor can tell you how much salt per day is safe for you.  3. If you are overweight, talk to your doctor about a weight loss plan.  4. If you smoke, you must quit. Smoking worsens kidney disease. Talk to your doctor about ways to help you quit.  For more information, visit the following links:  ¨ www.smokefree.gov/sites/default/files/pdf/clearing-the-air-accessible.pdf  ¨ www.smokefree.gov  ¨ www.cancer.org/healthy/stayawayfromtobacco/guidetoquittingsmoking/  5. Most patients with chronic renal failure need to follow a special diet.  Be sure you understand yours. In general, you will need to restrict protein, salt, potassium, and phosphorus. You also need to limit fluid intake. A calcium supplement may be prescribed to protect your bones from osteoporosis.  6. CKD is a risk factor for heart disease. Talk to your doctor about any other risk factors you might have and what you can do to lessen them.  7. Talk to your doctor about any medicines  you are taking to determine if they need to be reduced or stopped.  8. Avoid the following over-the-counter medicines, or consult your doctor before using:  ¨ Aspirin and nonsteroidal anti-inflammatory drugs (NSAIDs) such as ibuprofen or naproxen (short-term use of acetaminophen for fever or pain is okay)  ¨ Laxatives and antacids containing magnesium or aluminum  ¨ Fleet or phospho soda enemas containing phosphorus  ¨ Certain stomach acid-blocking medicine such as cimetidine or ranitidine   ¨ Decongestants containing pseudoephedrine   ¨ Herbal supplements  Follow-up care  Follow up with your doctor or as advised by our staff. Contact one of the following for more information:  · American Association of Kidney Patients (042) 724-5022 www.aakp.org  · National Kidney Foundation (713) 927-8101 www.kidney.org  · American Kidney Fund (722) 205-8111 www.kidneyfund.org  · National Kidney Disease Education Program (153) 4KIDNWW www.nkdep.nih.gov  [NOTE: If an X-ray, EKG (cardiogram), or other diagnostic test was taken, another specialist will review it. You will be notified of any new findings that may affect your care.]  When to seek medical care  Get prompt medical attention or contact your doctor if any of the following occur:  · Nausea or vomiting  · Fever greater than 100.4°F (38°C)  · Severe weakness, dizziness, fainting, drowsiness, or confusion  · Chest pain or shortness of breath  · Unexpected weight gain or swelling in the legs, ankles, or around the eyes  · Heart beating fast, slow, or irregularly  · Decrease or absent urine output  © 6646-1525 [employer]. [address] [place] [address]. All rights reserved. This information is not intended as a substitute for professional medical care. Always follow your healthcare professional's instructions.          Causes of Syncope  Syncope (fainting) has many causes. Sometimes it is not serious. In other cases, syncope is a sign of a heart  problem. But treatment can help    When syncope is not serious  Your healthcare provider may call your problem vasovagal syncope, reflex syncope, or orthostatic hypotension. These types of syncope are generally not serious. They can be caused by:  · Strong feelings, such as anxiety or fear. A nerve signal may briefly change your heart rate and lower your blood pressure too much.  · Standing for too long. Standing may cause blood to pool in your legs. When this happens, your brain may not receive all the blood it needs.  · Standing up too quickly. Your blood pressure may not adjust fast enough to changes in posture and may drop too low. Certain medicines can also cause this problem. Examples of medicines that can cause a drop in blood pressure include diuretics, blood pressure medicines, and medicines for chest pain. Your pharmacist or healthcare provider can discuss these with you.  · Reaction to normal body functions. When you go to the bathroom, have gastrointestinal discomfort, nausea, or pain, your heart may have a natural reflex to slow down and lower blood pressure. This can result in syncope. This may also follow exercise, eating, laughter, weight lifting, or playing musical instruments like the trumpet or trombone.  When heart trouble causes syncope  A heart problem can decrease the amount of oxygen-rich blood that reaches the brain. Heart trouble can be serious and even life threatening if not treated:  · A slow heart rate. Electrical signals tell the chambers of the heart when to pump. But the signals may be slowed or blocked (heart block) as they travel on the hearts electrical pathways. This can be caused by aging, scarred heart tissue, or damage from heart disease. When the heart rate slows, not enough blood is pumped.  · A fast heart rate. Certain problems can make the heart race. For instance, after a heart attack, also known as acute myocardial infarction, or AMI, abnormal electrical signals may be  created. These signals can make the heart suddenly beat very fast. The heart pumps before the chambers can fill with blood. So less blood reaches the brain and other parts of the body. Illegal drugs, certain medicines, heart disease, or an inherited condition can also cause this.  · A heart valve problem. Blood travels through the chambers of the heart as it is pumped. Heart valves open and close to help move blood in the right direction. But a valve may not open or close fully, if its hardened or scarred. As a result, less blood is pumped through the heart to the brain and body. Most often, syncope occurs when a person's aortic valve is critically narrowed and he or she participates in  a strenuous activity.  · A heart muscle problem. Some people develop a thickened heart muscle that blocks blood flow out of the heart to the body. This is called hypertrophic cardiomyopathy. Being dehydrated and having hypertrophic cardiomyopathy can increase the risk for syncope.  Whatever the cause of syncope, it is important to be evaluated by your healthcare provider. You may need to be seen by a cardiologist, neurologist, or an ear, nose, and throat specialist. Do not drive, operate heavy machinery, or participate in activities in which you would be at risk for falls and injury if you have syncope and have not been evaluated.  © 8437-6923 [employer]. [address] [place] [address], [place] [address]. All rights reserved. This information is not intended as a substitute for professional medical care. Always follow your healthcare professional's instructions.          Fainting: Uncertain Cause  Fainting (syncope) is a temporary loss of consciousness. Its also called passing out. It occurs when blood flow to the brain is less than normal. Near-fainting (near-syncope) is very similar to fainting, but you dont fully pass out.  Common minor causes of fainting include:  · Sudden fear  · Pain  · Nausea  · Emotional  stress  · Overexertion  Suddenly standing up after sitting or lying for a long time can also cause fainting.  More serious causes of fainting include:  · Very slow or very fast heartbeat (arrhythmia)  · Other types of heart disease  · Dehydration  · Loss of blood loss  · Seizure  · Stroke  · Ruptured blood vessel in the brain  Taking too much high blood pressure medicine can also cause low blood pressure and fainting.  Your health care provider does not know the exact cause of your fainting. But the tests today did not show any of the serious causes of fainting. Sometimes you may need more tests to find out if you have a serious problem. Thats why its important to follow up with your provider as advised.  Home care  Follow these guidelines when caring for yourself at home:  · Rest today. You may go back to your normal activities when you are feeling back to normal. It is best to stay with someone who can check on you for the next 24 hours to watch for another episode of fainting.  · If you become lightheaded or dizzy, lie down right away. Or sit with your head between your knees.  · Because the provider doesnt know the exact cause of your fainting or near-fainting spell, its possible for you to have another spell without warning. Because of this, dont drive a car or operate dangerous equipment. Dont take a bath alone. Use a shower instead. Dont swim alone until your health care provider says that you are no longer in danger of having another fainting spell.  Follow-up care  Follow up with your health care provider, or as advised.  When to seek medical advice  Call your health care provider right away if any of these occur:  · Another fainting spell thats not explained by the common causes listed above  · Pain in your chest, arm, neck, jaw, back, or abdomen  · Shortness of breath  · Severe headache or seizure  · Blood in vomit or stools (black or red color)  · Unexpected vaginal bleeding  · Your heart beats  very rapidly, very slowly, or irregularly (palpitations)  Also call your provider if you have signs of stroke:  · Weakness in an arm or leg or on one side of the face  · Difficulty speaking or seeing  · Extreme drowsiness, confusion, dizziness, or fainting  © [id number] [employer]. [address] [place] [address]. All rights reserved. This information is not intended as a substitute for professional medical care. Always follow your healthcare professional's instructions.          Your Scheduled Appointments     Feb 23, 2017 12:30 PM CST   EKG with EKG, APPT   [facility] ([facility] )    [address]  [place] [address]   [phone]            Feb 23, 2017  1:00 PM CST   Debrillator Tune Up with PACEMAKER, ICD   [facility] ([facility] )    [address]  [place] [address]   [phone]            Feb 23, 2017  1:40 PM CST   Established Patient Visit with Arash Allen MD   [facility] ([facility] )    [address]  [place] [address]   [phone]            Mar 08, 2017 11:30 AM CST   VAD with Sera Collado MD   [facility] ([facility] )    [address]  [place] [id number] [phone]            Mar 08, 2017 11:30 AM CST   Nurse Visit with HEARTTRANSPLANT, LVADN   [facility] ([facility] )    [address]  [place] [id number] [phone]              Smoking Cessation     If you would like to quit smoking:   You may be eligible for free services if you are a [place] resident and started smoking cigarettes before September 1, 1988.  Call the Smoking Cessation Trust (SCT) toll free at (176) 714-2524 or (383) 317-1675.   Call 0-800-QUIT-NOW if you do not meet the above criteria.             [facility] complies with applicable Federal civil rights laws and does not discriminate on the basis of race, color, national origin, age,  disability, or sex.        Language Assistance Services     ATTENTION: Language assistance services are available, free of charge. Please call [phone].      ATENCIÓN: Si habla gordonañol, tiene a sanchez disposición servicios gratuitos de asistencia lingüística. Llame al [phone].     CHÚ Ý: N?u b?n nói Ti?ng Vi?t, có các d?ch v? h? tr? ngôn ng? mi?n phí dành cho b?n. G?i s? [phone].

## 2017-01-19 NOTE — DISCHARGE INSTRUCTIONS
Chronic Kidney Disease (CKD)    The role of the kidneys is to remove waste products and excess water from the blood.  When the kidneys do not function normally and waste products begin to build up in the blood, this is called chronic kidney disease (CKD). CKD is defined as the presence of kidney damage or a decrease in kidney function lasting 3 months or more. CKD allows excess water, waste, and toxic substances to build up in the body. This can eventually become life-threatening, requiring dialysis or a kidney transplant to stay alive. This most severe form is called end stage renal disease or ESRD.  Diabetes is the leading causes of chronic renal failure. Other causes include high blood pressure, hardening of the arteries (atherosclerosis), lupus, inflammation of the blood vessels (vasculitis), prior viral and bacterial infections, and others. Certain over-the-counter pain medicines can cause renal failure when taken often over a long period of time. These include aspirin, ibuprofen, and related anti-inflammatory medicines called NSAIDs (nonsteroidal anti-inflammatory drugs).  Home care  The following guidelines will help you care for yourself at home:  1. If you have diabetes, talk to your doctor about the quality of your blood sugar control and any adjustments needed to your diet, lifestyle, or medicines.  2. If you have high blood pressure:  ¨ Take prescribed medicine to lower your blood pressure to the recommended goal of less than 130/80.  ¨ Take up a regular exercise program that you enjoy. Check with your doctor to be sure your planned exercise program is right for you.  ¨ Reduce your salt (sodium) intake. Your doctor can tell you how much salt per day is safe for you.  3. If you are overweight, talk to your doctor about a weight loss plan.  4. If you smoke, you must quit. Smoking worsens kidney disease. Talk to your doctor about ways to help you quit.  For more information, visit the following  links:  ¨ www.[employer].gov/sites/default/files/pdf/clearing-the-air-accessible.pdf  ¨ www.smokefree.gov  ¨ www.cancer.org/healthy/stayawayfromtobacco/guidetoquittingsmoking/  5. Most patients with chronic renal failure need to follow a special diet.  Be sure you understand yours. In general, you will need to restrict protein, salt, potassium, and phosphorus. You also need to limit fluid intake. A calcium supplement may be prescribed to protect your bones from osteoporosis.  6. CKD is a risk factor for heart disease. Talk to your doctor about any other risk factors you might have and what you can do to lessen them.  7. Talk to your doctor about any medicines you are taking to determine if they need to be reduced or stopped.  8. Avoid the following over-the-counter medicines, or consult your doctor before using:  ¨ Aspirin and nonsteroidal anti-inflammatory drugs (NSAIDs) such as ibuprofen or naproxen (short-term use of acetaminophen for fever or pain is okay)  ¨ Laxatives and antacids containing magnesium or aluminum  ¨ Fleet or phospho soda enemas containing phosphorus  ¨ Certain stomach acid-blocking medicine such as cimetidine or ranitidine   ¨ Decongestants containing pseudoephedrine   ¨ Herbal supplements  Follow-up care  Follow up with your doctor or as advised by our staff. Contact one of the following for more information:  · American Association of Kidney Patients (745) 123-7100 www.aakp.org  · National Kidney Foundation (147) 631-1077 www.kidney.org  · American Kidney Fund (452) 059-1084 www.kidneyfund.org  · National Kidney Disease Education Program (413) 4EGQQWA www.nkdep.nih.gov  [NOTE: If an X-ray, EKG (cardiogram), or other diagnostic test was taken, another specialist will review it. You will be notified of any new findings that may affect your care.]  When to seek medical care  Get prompt medical attention or contact your doctor if any of the following occur:  · Nausea or vomiting  · Fever greater  than 100.4°F (38°C)  · Severe weakness, dizziness, fainting, drowsiness, or confusion  · Chest pain or shortness of breath  · Unexpected weight gain or swelling in the legs, ankles, or around the eyes  · Heart beating fast, slow, or irregularly  · Decrease or absent urine output  © [id number] [employer]. [address] [place] [address]. All rights reserved. This information is not intended as a substitute for professional medical care. Always follow your healthcare professional's instructions.          Causes of Syncope  Syncope (fainting) has many causes. Sometimes it is not serious. In other cases, syncope is a sign of a heart problem. But treatment can help    When syncope is not serious  Your healthcare provider may call your problem vasovagal syncope, reflex syncope, or orthostatic hypotension. These types of syncope are generally not serious. They can be caused by:  · Strong feelings, such as anxiety or fear. A nerve signal may briefly change your heart rate and lower your blood pressure too much.  · Standing for too long. Standing may cause blood to pool in your legs. When this happens, your brain may not receive all the blood it needs.  · Standing up too quickly. Your blood pressure may not adjust fast enough to changes in posture and may drop too low. Certain medicines can also cause this problem. Examples of medicines that can cause a drop in blood pressure include diuretics, blood pressure medicines, and medicines for chest pain. Your pharmacist or healthcare provider can discuss these with you.  · Reaction to normal body functions. When you go to the bathroom, have gastrointestinal discomfort, nausea, or pain, your heart may have a natural reflex to slow down and lower blood pressure. This can result in syncope. This may also follow exercise, eating, laughter, weight lifting, or playing musical instruments like the trumpet or trombone.  When heart trouble causes syncope  A heart  problem can decrease the amount of oxygen-rich blood that reaches the brain. Heart trouble can be serious and even life threatening if not treated:  · A slow heart rate. Electrical signals tell the chambers of the heart when to pump. But the signals may be slowed or blocked (heart block) as they travel on the hearts electrical pathways. This can be caused by aging, scarred heart tissue, or damage from heart disease. When the heart rate slows, not enough blood is pumped.  · A fast heart rate. Certain problems can make the heart race. For instance, after a heart attack, also known as acute myocardial infarction, or AMI, abnormal electrical signals may be created. These signals can make the heart suddenly beat very fast. The heart pumps before the chambers can fill with blood. So less blood reaches the brain and other parts of the body. Illegal drugs, certain medicines, heart disease, or an inherited condition can also cause this.  · A heart valve problem. Blood travels through the chambers of the heart as it is pumped. Heart valves open and close to help move blood in the right direction. But a valve may not open or close fully, if its hardened or scarred. As a result, less blood is pumped through the heart to the brain and body. Most often, syncope occurs when a person's aortic valve is critically narrowed and he or she participates in  a strenuous activity.  · A heart muscle problem. Some people develop a thickened heart muscle that blocks blood flow out of the heart to the body. This is called hypertrophic cardiomyopathy. Being dehydrated and having hypertrophic cardiomyopathy can increase the risk for syncope.  Whatever the cause of syncope, it is important to be evaluated by your healthcare provider. You may need to be seen by a cardiologist, neurologist, or an ear, nose, and throat specialist. Do not drive, operate heavy machinery, or participate in activities in which you would be at risk for falls and injury  if you have syncope and have not been evaluated.  © 5270-8210 [employer]. [address] [place] [address], [place] [address]. All rights reserved. This information is not intended as a substitute for professional medical care. Always follow your healthcare professional's instructions.          Fainting: Uncertain Cause  Fainting (syncope) is a temporary loss of consciousness. Its also called passing out. It occurs when blood flow to the brain is less than normal. Near-fainting (near-syncope) is very similar to fainting, but you dont fully pass out.  Common minor causes of fainting include:  · Sudden fear  · Pain  · Nausea  · Emotional stress  · Overexertion  Suddenly standing up after sitting or lying for a long time can also cause fainting.  More serious causes of fainting include:  · Very slow or very fast heartbeat (arrhythmia)  · Other types of heart disease  · Dehydration  · Loss of blood loss  · Seizure  · Stroke  · Ruptured blood vessel in the brain  Taking too much high blood pressure medicine can also cause low blood pressure and fainting.  Your health care provider does not know the exact cause of your fainting. But the tests today did not show any of the serious causes of fainting. Sometimes you may need more tests to find out if you have a serious problem. Thats why its important to follow up with your provider as advised.  Home care  Follow these guidelines when caring for yourself at home:  · Rest today. You may go back to your normal activities when you are feeling back to normal. It is best to stay with someone who can check on you for the next 24 hours to watch for another episode of fainting.  · If you become lightheaded or dizzy, lie down right away. Or sit with your head between your knees.  · Because the provider doesnt know the exact cause of your fainting or near-fainting spell, its possible for you to have another spell without warning. Because of this, dont drive a car or  operate dangerous equipment. Dont take a bath alone. Use a shower instead. Dont swim alone until your health care provider says that you are no longer in danger of having another fainting spell.  Follow-up care  Follow up with your health care provider, or as advised.  When to seek medical advice  Call your health care provider right away if any of these occur:  · Another fainting spell thats not explained by the common causes listed above  · Pain in your chest, arm, neck, jaw, back, or abdomen  · Shortness of breath  · Severe headache or seizure  · Blood in vomit or stools (black or red color)  · Unexpected vaginal bleeding  · Your heart beats very rapidly, very slowly, or irregularly (palpitations)  Also call your provider if you have signs of stroke:  · Weakness in an arm or leg or on one side of the face  · Difficulty speaking or seeing  · Extreme drowsiness, confusion, dizziness, or fainting  © 3324-0530 [employer]. [address] [place] [address], [place] [address]. All rights reserved. This information is not intended as a substitute for professional medical care. Always follow your healthcare professional's instructions.

## 2017-01-19 NOTE — ED TRIAGE NOTES
Pt was just finishing dialysis and passed out- denies hitting head. Pt is a [facility] patient- has had it for 5 years. Denies any chest pain or shortness of breath pt states that she fell on her right hip. Pt complaining of right hip pain- has an old fracture and surgery to her right hip. Pt also has bilateral foot drop- braces noted. Pt able to ambulate with the braces intact. Family at bedside.

## 2017-01-20 ENCOUNTER — ANTI-COAG VISIT (OUTPATIENT)
Dept: CARDIOLOGY | Facility: CLINIC | Age: 62
End: 2017-01-20

## 2017-01-20 DIAGNOSIS — Z95.811 LEFT VENTRICULAR ASSIST DEVICE PRESENT: ICD-10-CM

## 2017-01-23 ENCOUNTER — ANTI-COAG VISIT (OUTPATIENT)
Dept: CARDIOLOGY | Facility: CLINIC | Age: 62
End: 2017-01-23

## 2017-01-23 DIAGNOSIS — Z95.811 LEFT VENTRICULAR ASSIST DEVICE PRESENT: ICD-10-CM

## 2017-01-23 LAB — INR PPP: 2.8

## 2017-01-24 NOTE — PROGRESS NOTES
Pt was called and given lab result, reports no changes in meds., no change in diet/appetite, no bleeding/bruising, verified coumadin: 2mg-daily except 3mg.-Mon. & Thurs., Pt was advised to continue same dose of coumadin, have a small salad tonight and will have  nurse draw next INR this Thurs.-1/26/17, order was faxed to [facility]

## 2017-01-26 ENCOUNTER — ANTI-COAG VISIT (OUTPATIENT)
Dept: CARDIOLOGY | Facility: CLINIC | Age: 62
End: 2017-01-26

## 2017-01-26 DIAGNOSIS — Z79.01 LONG TERM CURRENT USE OF ANTICOAGULANT THERAPY: ICD-10-CM

## 2017-01-26 DIAGNOSIS — Z95.811 LEFT VENTRICULAR ASSIST DEVICE PRESENT: ICD-10-CM

## 2017-01-26 LAB — INR PPP: 2.5

## 2017-01-30 ENCOUNTER — HOSPITAL ENCOUNTER (INPATIENT)
Facility: HOSPITAL | Age: 62
LOS: 7 days | Discharge: HOME HEALTH | DRG: 064 | End: 2017-02-06
Attending: INTERNAL MEDICINE | Admitting: INTERNAL MEDICINE
Payer: MEDICARE

## 2017-01-30 ENCOUNTER — HOSPITAL ENCOUNTER (EMERGENCY)
Facility: HOSPITAL | Age: 62
Discharge: OTHER INSTITUTION | End: 2017-01-30
Attending: EMERGENCY MEDICINE
Payer: MEDICARE

## 2017-01-30 ENCOUNTER — TELEPHONE (OUTPATIENT)
Dept: TRANSPLANT | Facility: CLINIC | Age: 62
End: 2017-01-30

## 2017-01-30 VITALS
RESPIRATION RATE: 20 BRPM | TEMPERATURE: 98 F | OXYGEN SATURATION: 100 % | SYSTOLIC BLOOD PRESSURE: 106 MMHG | BODY MASS INDEX: 27.06 KG/M2 | HEART RATE: 82 BPM | DIASTOLIC BLOOD PRESSURE: 84 MMHG | WEIGHT: 137.81 LBS | HEIGHT: 60 IN

## 2017-01-30 DIAGNOSIS — N18.6 END STAGE RENAL DISEASE ON DIALYSIS: ICD-10-CM

## 2017-01-30 DIAGNOSIS — G93.6 CYTOTOXIC CEREBRAL EDEMA: ICD-10-CM

## 2017-01-30 DIAGNOSIS — Z95.811 PRESENCE OF LEFT VENTRICULAR ASSIST DEVICE (LVAD): ICD-10-CM

## 2017-01-30 DIAGNOSIS — I63.432 EMBOLIC STROKE INVOLVING LEFT POSTERIOR CEREBRAL ARTERY: ICD-10-CM

## 2017-01-30 DIAGNOSIS — I63.9 STROKE: ICD-10-CM

## 2017-01-30 DIAGNOSIS — Z79.01 CHRONIC ANTICOAGULATION: ICD-10-CM

## 2017-01-30 DIAGNOSIS — Z99.2 END STAGE RENAL DISEASE ON DIALYSIS: ICD-10-CM

## 2017-01-30 DIAGNOSIS — R79.89 ELEVATED TROPONIN: ICD-10-CM

## 2017-01-30 DIAGNOSIS — I63.9 ACUTE CEREBROVASCULAR ACCIDENT (CVA): Primary | ICD-10-CM

## 2017-01-30 DIAGNOSIS — I63.81: ICD-10-CM

## 2017-01-30 DIAGNOSIS — I48.0 PAROXYSMAL ATRIAL FIBRILLATION: ICD-10-CM

## 2017-01-30 DIAGNOSIS — Z95.811 LEFT VENTRICULAR ASSIST DEVICE PRESENT: ICD-10-CM

## 2017-01-30 DIAGNOSIS — I51.7 CARDIOMEGALY: ICD-10-CM

## 2017-01-30 DIAGNOSIS — Z95.811 HEART REPLACED BY HEART ASSIST DEVICE: Primary | ICD-10-CM

## 2017-01-30 DIAGNOSIS — Z79.01 LONG TERM CURRENT USE OF ANTICOAGULANT THERAPY: ICD-10-CM

## 2017-01-30 PROBLEM — G43.109 MIGRAINE WITH AURA AND WITHOUT STATUS MIGRAINOSUS, NOT INTRACTABLE: Status: ACTIVE | Noted: 2017-01-30

## 2017-01-30 LAB
ALBUMIN SERPL BCP-MCNC: 3.4 G/DL
ALBUMIN SERPL BCP-MCNC: 3.7 G/DL
ALP SERPL-CCNC: 112 U/L
ALP SERPL-CCNC: 117 U/L
ALT SERPL W/O P-5'-P-CCNC: 15 U/L
ALT SERPL W/O P-5'-P-CCNC: 17 U/L
ANION GAP SERPL CALC-SCNC: 18 MMOL/L
ANION GAP SERPL CALC-SCNC: 20 MMOL/L
APTT BLDCRRT: 36.6 SEC
APTT BLDCRRT: 37.2 SEC
AST SERPL-CCNC: 19 U/L
AST SERPL-CCNC: 25 U/L
BASOPHILS # BLD AUTO: 0.01 K/UL
BASOPHILS # BLD AUTO: 0.03 K/UL
BASOPHILS NFR BLD: 0.2 %
BASOPHILS NFR BLD: 0.6 %
BILIRUB SERPL-MCNC: 0.4 MG/DL
BILIRUB SERPL-MCNC: 0.5 MG/DL
BNP SERPL-MCNC: 747 PG/ML
BUN SERPL-MCNC: 58 MG/DL
BUN SERPL-MCNC: 69 MG/DL
CALCIUM SERPL-MCNC: 8.7 MG/DL
CALCIUM SERPL-MCNC: 9.7 MG/DL
CHLORIDE SERPL-SCNC: 94 MMOL/L
CHLORIDE SERPL-SCNC: 95 MMOL/L
CO2 SERPL-SCNC: 24 MMOL/L
CO2 SERPL-SCNC: 25 MMOL/L
CREAT SERPL-MCNC: 8.4 MG/DL
CREAT SERPL-MCNC: 8.5 MG/DL
DIFFERENTIAL METHOD: ABNORMAL
DIFFERENTIAL METHOD: ABNORMAL
EOSINOPHIL # BLD AUTO: 0.1 K/UL
EOSINOPHIL # BLD AUTO: 0.1 K/UL
EOSINOPHIL NFR BLD: 1.5 %
EOSINOPHIL NFR BLD: 2 %
ERYTHROCYTE [DISTWIDTH] IN BLOOD BY AUTOMATED COUNT: 15.3 %
ERYTHROCYTE [DISTWIDTH] IN BLOOD BY AUTOMATED COUNT: 15.6 %
EST. GFR  (AFRICAN AMERICAN): 5 ML/MIN/1.73 M^2
EST. GFR  (AFRICAN AMERICAN): 5.4 ML/MIN/1.73 M^2
EST. GFR  (NON AFRICAN AMERICAN): 4.6 ML/MIN/1.73 M^2
EST. GFR  (NON AFRICAN AMERICAN): 5 ML/MIN/1.73 M^2
GLUCOSE SERPL-MCNC: 125 MG/DL
GLUCOSE SERPL-MCNC: 75 MG/DL
HCT VFR BLD AUTO: 29.7 %
HCT VFR BLD AUTO: 33.1 %
HGB BLD-MCNC: 10.8 G/DL
HGB BLD-MCNC: 9.9 G/DL
INR PPP: 2.4
INR PPP: 2.4
LDH SERPL L TO P-CCNC: 220 U/L
LYMPHOCYTES # BLD AUTO: 0.8 K/UL
LYMPHOCYTES # BLD AUTO: 1.1 K/UL
LYMPHOCYTES NFR BLD: 15.4 %
LYMPHOCYTES NFR BLD: 18.1 %
MAGNESIUM SERPL-MCNC: 2.2 MG/DL
MAGNESIUM SERPL-MCNC: 2.5 MG/DL
MCH RBC QN AUTO: 32.1 PG
MCH RBC QN AUTO: 32.4 PG
MCHC RBC AUTO-ENTMCNC: 32.6 %
MCHC RBC AUTO-ENTMCNC: 33.3 %
MCV RBC AUTO: 96 FL
MCV RBC AUTO: 99 FL
MONOCYTES # BLD AUTO: 0.4 K/UL
MONOCYTES # BLD AUTO: 0.4 K/UL
MONOCYTES NFR BLD: 6.9 %
MONOCYTES NFR BLD: 8.2 %
NEUTROPHILS # BLD AUTO: 3.7 K/UL
NEUTROPHILS # BLD AUTO: 4.3 K/UL
NEUTROPHILS NFR BLD: 73.1 %
NEUTROPHILS NFR BLD: 73.8 %
PLATELET # BLD AUTO: 117 K/UL
PLATELET # BLD AUTO: 128 K/UL
PMV BLD AUTO: 9.8 FL
PMV BLD AUTO: 9.9 FL
POCT GLUCOSE: 102 MG/DL (ref 70–110)
POTASSIUM SERPL-SCNC: 4.1 MMOL/L
POTASSIUM SERPL-SCNC: 4.5 MMOL/L
PROT SERPL-MCNC: 7 G/DL
PROT SERPL-MCNC: 7.7 G/DL
PROTHROMBIN TIME: 23.6 SEC
PROTHROMBIN TIME: 24.5 SEC
RBC # BLD AUTO: 3.08 M/UL
RBC # BLD AUTO: 3.33 M/UL
SODIUM SERPL-SCNC: 137 MMOL/L
SODIUM SERPL-SCNC: 139 MMOL/L
TROPONIN I SERPL DL<=0.01 NG/ML-MCNC: 0.09 NG/ML
WBC # BLD AUTO: 5 K/UL
WBC # BLD AUTO: 5.9 K/UL

## 2017-01-30 PROCEDURE — 85610 PROTHROMBIN TIME: CPT | Mod: 91

## 2017-01-30 PROCEDURE — 85025 COMPLETE CBC W/AUTO DIFF WBC: CPT | Mod: 91

## 2017-01-30 PROCEDURE — 93010 ELECTROCARDIOGRAM REPORT: CPT | Mod: ,,, | Performed by: INTERNAL MEDICINE

## 2017-01-30 PROCEDURE — 20000000 HC ICU ROOM

## 2017-01-30 PROCEDURE — 99223 1ST HOSP IP/OBS HIGH 75: CPT | Mod: GC,,, | Performed by: PSYCHIATRY & NEUROLOGY

## 2017-01-30 PROCEDURE — 85730 THROMBOPLASTIN TIME PARTIAL: CPT | Mod: 91

## 2017-01-30 PROCEDURE — 82962 GLUCOSE BLOOD TEST: CPT

## 2017-01-30 PROCEDURE — 83735 ASSAY OF MAGNESIUM: CPT | Mod: 91

## 2017-01-30 PROCEDURE — 85610 PROTHROMBIN TIME: CPT

## 2017-01-30 PROCEDURE — 84484 ASSAY OF TROPONIN QUANT: CPT

## 2017-01-30 PROCEDURE — 93005 ELECTROCARDIOGRAM TRACING: CPT

## 2017-01-30 PROCEDURE — 80053 COMPREHEN METABOLIC PANEL: CPT | Mod: 91

## 2017-01-30 PROCEDURE — 85730 THROMBOPLASTIN TIME PARTIAL: CPT

## 2017-01-30 PROCEDURE — 85025 COMPLETE CBC W/AUTO DIFF WBC: CPT

## 2017-01-30 PROCEDURE — 80053 COMPREHEN METABOLIC PANEL: CPT

## 2017-01-30 PROCEDURE — 99285 EMERGENCY DEPT VISIT HI MDM: CPT | Mod: 25

## 2017-01-30 PROCEDURE — 27000248 HC VAD-ADDITIONAL DAY

## 2017-01-30 PROCEDURE — 25000003 PHARM REV CODE 250: Performed by: HOSPITALIST

## 2017-01-30 PROCEDURE — 83615 LACTATE (LD) (LDH) ENZYME: CPT

## 2017-01-30 PROCEDURE — 83735 ASSAY OF MAGNESIUM: CPT

## 2017-01-30 PROCEDURE — 83880 ASSAY OF NATRIURETIC PEPTIDE: CPT

## 2017-01-30 PROCEDURE — 25000003 PHARM REV CODE 250: Performed by: EMERGENCY MEDICINE

## 2017-01-30 RX ORDER — ONDANSETRON 4 MG/1
4 TABLET, FILM COATED ORAL EVERY 8 HOURS PRN
Status: DISCONTINUED | OUTPATIENT
Start: 2017-01-30 | End: 2017-02-06 | Stop reason: HOSPADM

## 2017-01-30 RX ORDER — PANTOPRAZOLE SODIUM 40 MG/1
40 TABLET, DELAYED RELEASE ORAL DAILY
Status: DISCONTINUED | OUTPATIENT
Start: 2017-01-31 | End: 2017-02-06 | Stop reason: HOSPADM

## 2017-01-30 RX ORDER — BUTALBITAL, ACETAMINOPHEN AND CAFFEINE 50; 325; 40 MG/1; MG/1; MG/1
1 TABLET ORAL
Status: COMPLETED | OUTPATIENT
Start: 2017-01-30 | End: 2017-01-30

## 2017-01-30 RX ORDER — GABAPENTIN 300 MG/1
300 CAPSULE ORAL NIGHTLY
Status: DISCONTINUED | OUTPATIENT
Start: 2017-01-30 | End: 2017-02-06 | Stop reason: HOSPADM

## 2017-01-30 RX ORDER — WARFARIN 1 MG/1
1 TABLET ORAL
Status: DISCONTINUED | OUTPATIENT
Start: 2017-01-31 | End: 2017-01-31

## 2017-01-30 RX ORDER — ESCITALOPRAM OXALATE 20 MG/1
20 TABLET ORAL NIGHTLY
Status: DISCONTINUED | OUTPATIENT
Start: 2017-01-30 | End: 2017-02-06 | Stop reason: HOSPADM

## 2017-01-30 RX ORDER — HYDRALAZINE HYDROCHLORIDE 25 MG/1
25 TABLET, FILM COATED ORAL EVERY 8 HOURS
Status: DISCONTINUED | OUTPATIENT
Start: 2017-01-30 | End: 2017-01-31

## 2017-01-30 RX ORDER — WARFARIN 1 MG/1
1 TABLET ORAL
Status: DISCONTINUED | OUTPATIENT
Start: 2017-02-05 | End: 2017-01-31

## 2017-01-30 RX ADMIN — ESCITALOPRAM 20 MG: 20 TABLET, FILM COATED ORAL at 10:01

## 2017-01-30 RX ADMIN — GABAPENTIN 300 MG: 300 CAPSULE ORAL at 10:01

## 2017-01-30 RX ADMIN — HYDRALAZINE HYDROCHLORIDE 25 MG: 25 TABLET, FILM COATED ORAL at 10:01

## 2017-01-30 RX ADMIN — BUTALBITAL, ACETAMINOPHEN, AND CAFFEINE 1 TABLET: 50; 325; 40 TABLET ORAL at 09:01

## 2017-01-30 NOTE — ED PROVIDER NOTES
SCRIBE #1 NOTE: I, Patrick Luque, am scribing for, and in the presence of, Zhane Antoine MD. I have scribed the entire note.      History      Chief Complaint   Patient presents with    Aphasia     slurred speech yesterday with right sided weakness       Review of patient's allergies indicates:   Allergen Reactions    Codeine Nausea And Vomiting    Demerol [meperidine] Nausea And Vomiting    Lortab [hydrocodone-acetaminophen] Nausea And Vomiting        HPI   HPI    1/30/2017, 9:49 AM   History obtained from the patient      History of Present Illness: Randa Devine is a 61 y.o. female patient with Hx of CVA, CAD, CHF, HTN, and MI presents to the Emergency Department for aphasia which onset gradually last PM, unknown exact onset time. Symptoms are constant and moderate in severity. Sx are exacerbated by nothing and relieved by nothing. Associated sxs include double vision, R sided weakness, and HA. Patient denies any fever, N/V/D, chills, abd pain, numbness, lightheadedness, dizziness, photophobia, neck stiffness, CP, SOB, palpitations and all other sxs at this time. Pt states he was last dialyzed yesterday with no complications. Pt is wheelchair bound. No further complaints or concerns at this time.     Arrival mode: Personal vehicle    PCP: Laura Garvin DO       Past Medical History:  Past Medical History   Diagnosis Date    Anticoagulant long-term use     Anxiety     Atrial tachycardia, paroxysmal 4/21/2013    Blood transfusion     Cardiomyopathy     CHF (congestive heart failure)     Chronic kidney disease     Coronary artery disease     End stage renal disease     Hypertension      pulmonary    ICD (implantable cardioverter-defibrillator) battery depletion 4/25/2014    Myocardial infarction     Presence of left ventricular assist device (LVAD)     Pulmonary hypertension     Stroke        Past Surgical History:  Past Surgical History   Procedure Laterality Date    Left ventricular  assist device  2012    Tonsillectomy      Cardiac surgery      Coronary artery bypass graft      Hip surgery       RTHA    Fracture surgery      Vascular surgery      Cardiac defibrillator placement       section      Colonoscopy N/A 2016     Procedure: COLONOSCOPY;  Surgeon: Mark Currie MD;  Location: [facility] ([address] [place] [address]);  Service: Endoscopy;  Laterality: N/A;         Family History:  Family History   Problem Relation Age of Onset    Arthritis Mother     Heart disease Father     Hypertension Father     Cancer Maternal Grandmother     Breast cancer Neg Hx     Colon cancer Neg Hx     Ovarian cancer Neg Hx        Social History:  Social History     Social History Main Topics    Smoking status: Former Smoker     Packs/day: 2.00     Years: 30.00     Quit date: 10/29/2009    Smokeless tobacco: Unknown    Alcohol use Yes      Comment: 1 glass of wine a month    Drug use: No    Sexual activity: No       ROS   Review of Systems   Constitutional: Negative for chills and fever.   HENT: Negative for congestion and sore throat.    Eyes: Positive for visual disturbance (double vision).   Respiratory: Negative for chest tightness and shortness of breath.    Cardiovascular: Negative for chest pain.   Gastrointestinal: Negative for abdominal pain, nausea and vomiting.   Musculoskeletal: Negative for back pain and neck pain.   Skin: Negative for rash.   Neurological: Positive for speech difficulty, weakness (R sided) and headaches. Negative for dizziness, light-headedness and numbness.   Psychiatric/Behavioral: Negative for agitation and confusion.   All other systems reviewed and are negative.      Physical Exam    Initial Vitals   BP Pulse Resp Temp SpO2   17 0859 17 0859 17 0859 17 0859 17 0859   107/74 84 18 97.9 °F (36.6 °C) 95 %      Physical Exam  Nursing Notes and Vital Signs Reviewed.  Constitutional: Patient is in no apparent distress. Awake and  alert. Well-developed and well-nourished.  Head: Atraumatic. Normocephalic.  Eyes: PERRL. EOM intact. Conjunctivae are not pale. No scleral icterus.  ENT: Mucous membranes are moist.    Neck: Supple. Full ROM. No JVD.  Cardiovascular: Regular rate. Regular rhythm. No murmurs, rubs, or gallops. Distal pulses are 2+ and symmetric.  Pulmonary/Chest: No respiratory distress. Clear to auscultation bilaterally. No wheezing, rales, or rhonchi.  Abdominal: Soft and non-distended.  There is no tenderness.  No rebound, guarding, or rigidity.  Good bowel sounds.    Musculoskeletal: Moves all extremities. No obvious deformities. No edema. No calf tenderness. Shunt noted to LUE with a good palpable thrill.  Skin: Warm and dry.  Neurological: Patient is awake and alert. Pupils ERRL and EOM normal. Pt has difficulty getting words out, no obvious facial droop appreciated. Strength is full bilaterally; it is equal and 5/5 in bilateral upper and lower extremities. There is no pronator drift of outstretched arms. Light touch sense is intact.   Psychiatric: Normal affect. Good eye contact. Appropriate in content.    ED Course    Critical Care  Date/Time: 1/30/2017 10:59 AM  Performed by: DWAIN SANCHEZ  Authorized by: DWAIN SANCHEZ   Direct patient critical care time: 15 minutes  Additional history critical care time: 10 minutes  Ordering / reviewing critical care time: 10 minutes  Documentation critical care time: 5 minutes  Consulting other physicians critical care time: 5 minutes  Total critical care time (exclusive of procedural time) : 45 minutes  Critical care time was exclusive of separately billable procedures and treating other patients and teaching time.  Critical care was necessary to treat or prevent imminent or life-threatening deterioration of the following conditions: Neuro.  Critical care was time spent personally by me on the following activities: blood draw for specimens, development of treatment plan  with patient or surrogate, discussions with consultants, interpretation of cardiac output measurements, evaluation of patient's response to treatment, examination of patient, obtaining history from patient or surrogate, ordering and performing treatments and interventions, ordering and review of laboratory studies, pulse oximetry, ordering and review of radiographic studies, re-evaluation of patient's condition and review of old charts.  Subsequent provider of critical care: I assumed direction of critical care for this patient from another provider of my specialty.        ED Vital Signs:  Vitals:    01/30/17 0859 01/30/17 1019 01/30/17 1029 01/30/17 1039   BP: 107/74 109/81 114/87 113/76   Pulse: 84 76 79 79   Resp: 18 15 17 17   Temp: 97.9 °F (36.6 °C)      TempSrc: Oral      SpO2: 95% 100% 100% 100%   Weight: 62.5 kg (137 lb 12.6 oz)      Height: 5' (1.524 m)       01/30/17 1049 01/30/17 1054 01/30/17 1234 01/30/17 1324   BP: 114/85 120/85 115/81 123/87   Pulse: 80 78 86 80   Resp: 18 19 20 15   Temp:       TempSrc:       SpO2: 100% 100% 100% 100%   Weight:       Height:        01/30/17 1531   BP: 100/76   Pulse: 85   Resp: 20   Temp:    TempSrc:    SpO2: 100%   Weight:    Height:        Abnormal Lab Results:  Labs Reviewed   CBC W/ AUTO DIFFERENTIAL - Abnormal; Notable for the following:        Result Value    RBC 3.33 (*)     Hemoglobin 10.8 (*)     Hematocrit 33.1 (*)     MCV 99 (*)     MCH 32.4 (*)     RDW 15.6 (*)     Platelets 128 (*)     Lymph # 0.8 (*)     Gran% 73.8 (*)     Lymph% 15.4 (*)     All other components within normal limits   COMPREHENSIVE METABOLIC PANEL - Abnormal; Notable for the following:     Glucose 125 (*)     BUN, Bld 58 (*)     Creatinine 8.4 (*)     Anion Gap 20 (*)     eGFR if  5 (*)     eGFR if non  5 (*)     All other components within normal limits   PROTIME-INR - Abnormal; Notable for the following:     Prothrombin Time 24.5 (*)     INR 2.4 (*)      All other components within normal limits   APTT - Abnormal; Notable for the following:     aPTT 37.2 (*)     All other components within normal limits   TROPONIN I - Abnormal; Notable for the following:     Troponin I 0.091 (*)     All other components within normal limits   MAGNESIUM   POCT GLUCOSE   POCT GLUCOSE MONITORING CONTINUOUS        All Lab Results:  Results for orders placed or performed during the hospital encounter of 01/30/17   CBC auto differential   Result Value Ref Range    WBC 5.00 3.90 - 12.70 K/uL    RBC 3.33 (L) 4.00 - 5.40 M/uL    Hemoglobin 10.8 (L) 12.0 - 16.0 g/dL    Hematocrit 33.1 (L) 37.0 - 48.5 %    MCV 99 (H) 82 - 98 fL    MCH 32.4 (H) 27.0 - 31.0 pg    MCHC 32.6 32.0 - 36.0 %    RDW 15.6 (H) 11.5 - 14.5 %    Platelets 128 (L) 150 - 350 K/uL    MPV 9.9 9.2 - 12.9 fL    Gran # 3.7 1.8 - 7.7 K/uL    Lymph # 0.8 (L) 1.0 - 4.8 K/uL    Mono # 0.4 0.3 - 1.0 K/uL    Eos # 0.1 0.0 - 0.5 K/uL    Baso # 0.03 0.00 - 0.20 K/uL    Gran% 73.8 (H) 38.0 - 73.0 %    Lymph% 15.4 (L) 18.0 - 48.0 %    Mono% 8.2 4.0 - 15.0 %    Eosinophil% 2.0 0.0 - 8.0 %    Basophil% 0.6 0.0 - 1.9 %    Differential Method Automated    Comprehensive metabolic panel   Result Value Ref Range    Sodium 139 136 - 145 mmol/L    Potassium 4.1 3.5 - 5.1 mmol/L    Chloride 95 95 - 110 mmol/L    CO2 24 23 - 29 mmol/L    Glucose 125 (H) 70 - 110 mg/dL    BUN, Bld 58 (H) 6 - 20 mg/dL    Creatinine 8.4 (H) 0.5 - 1.4 mg/dL    Calcium 9.7 8.7 - 10.5 mg/dL    Total Protein 7.7 6.0 - 8.4 g/dL    Albumin 3.7 3.5 - 5.2 g/dL    Total Bilirubin 0.5 0.1 - 1.0 mg/dL    Alkaline Phosphatase 112 55 - 135 U/L    AST 25 10 - 40 U/L    ALT 17 10 - 44 U/L    Anion Gap 20 (H) 8 - 16 mmol/L    eGFR if African American 5 (A) >60 mL/min/1.73 m^2    eGFR if non African American 5 (A) >60 mL/min/1.73 m^2   Protime-INR   Result Value Ref Range    Prothrombin Time 24.5 (H) 9.0 - 12.5 sec    INR 2.4 (H) 0.8 - 1.2   APTT   Result Value Ref Range    aPTT  37.2 (H) 21.0 - 32.0 sec   Magnesium   Result Value Ref Range    Magnesium 2.5 1.6 - 2.6 mg/dL   Troponin I   Result Value Ref Range    Troponin I 0.091 (H) 0.000 - 0.026 ng/mL   POCT glucose   Result Value Ref Range    POCT Glucose 102 70 - 110 mg/dL     *Note: Due to a large number of results and/or encounters for the requested time period, some results have not been displayed. A complete set of results can be found in Results Review.         Imaging Results:  Imaging Results         CT Head Without Contrast (Final result) Result time:  01/30/17 10:11:32    Final result by Ana Kothari MD (01/30/17 10:11:32)    Impression:      11 mm left thalamic lacunar infarct, new as compared to previous CT scan of 01/19/2017.  Otherwise stable.        All CT scans at this facility use dose modulation, iterative reconstruction and/or weight based dosing when appropriate to reduce radiation dose to as low as reasonably achievable.       Electronically signed by: ANA KOTHARI MD  Date:     01/30/17  Time:    10:11     Narrative:    CT HEAD WITHOUT CONTRAST     History:  aphasia.  TIA.    Technique:  Noncontrast CT of the brain. Comparison with 01/19/2017.    Findings:  The ventricles are dilated consistent with generalized atrophy. Associated age-related white matter degeneration is present.     There is a small chronic left basal ganglia lacunar infarct.  As compared to the previous study there has been development of a left thalamic lacunar infarct approximately 11 mm in size.    Intracranial vascular calcification.    No additional findings            X-Ray Chest AP Portable (Final result) Result time:  01/30/17 09:56:25    Final result by July Salomon MD (01/30/17 09:56:25)    Impression:      Persistent cardiomegaly and congestion of pulmonary vascularity.  There is no significant interval change.      Electronically signed by: JULY SALOMON MD  Date:     01/30/17  Time:    09:56     Narrative:    Exam:  XR CHEST AP PORTABLE,    Date:  01/30/17 09:37:19    History: Chest Pain    Comparison:  01/19/2017    Findings: There is a persistent pattern of cardiomegaly with sternal sutures present as well as pacers.  There is some persisting congestion pulmonary vascularity.  However no focal areas of consolidation, effusion, or pneumothorax is demonstrated.  There is atherosclerosis of the aorta.               The EKG was ordered, reviewed, and independently interpreted by the ED provider.  Interpretation time: 0911  Rate: 80 BPM  Rhythm: Electronic ventricular pacemaker  Interpretation: No STEMI.           The Emergency Provider reviewed the vital signs and test results, which are outlined above.    ED Discussion     10:28 AM: Dr. Zhane Antoine discussed the pt's case with the LVAD coordinator who recommends transfer to [facility] in [place] for LVAD services.    10:30 AM: Re-evaluated pt. Informed pt and family that there are no LVAD services available at this time. I have discussed test results, shared treatment plan, and the need for transfer with patient and family at bedside. Pt and family express understanding at this time and agree with all information. All questions answered. Pt and family have no further questions or concerns at this time. Pt is ready for transfer.    10:32 AM: Dr. Zhane Antoine discussed the pt's case with transfer center who will call back with an accepting physician.    10:49 AM: Pt is not a candidate for TPA at this time secondary to sxs being out of the time window , her INR is 2.4, and she is already on chronic anticoagulation    10:54 AM: Consult with transfer center concerning pt. There are no LVAD services which the patient requires, offered at [facility] at this time. Dr. Restrepo and requests transfer and expresses understanding and will accept transfer for neurosurgical services.  Accepting Facility/Service: [facility] [place]  Accepting Physician: Dr. Restrepo      ED  Medication(s):  Medications   butalbital-acetaminophen-caffeine -40 mg per tablet 1 tablet (1 tablet Oral Given 1/30/17 0943)       New Prescriptions    No medications on file             Medical Decision Making    Medical Decision Making:   Clinical Tests:   Lab Tests: Ordered and Reviewed  Radiological Study: Reviewed and Ordered  Medical Tests: Reviewed and Ordered           Scribe Attestation:   Scribe #1: I performed the above scribed service and the documentation accurately describes the services I performed. I attest to the accuracy of the note.    Attending:   Physician Attestation Statement for Scribe #1: I, Zhane Antoine MD, personally performed the services described in this documentation, as scribed by Patrick Luque, in my presence, and it is both accurate and complete.          Clinical Impression       ICD-10-CM ICD-9-CM   1. Acute cerebrovascular accident (CVA) I63.9 434.91   2. Chronic anticoagulation Z79.01 V58.61   3. Presence of left ventricular assist device (LVAD) Z95.811 V43.21   4. End stage renal disease on dialysis N18.6 585.6    Z99.2 V45.11   5. Elevated troponin R79.89 790.6       Disposition:   Disposition: Transferred  Condition: Stable         Zhane Antoine MD  01/30/17 8542

## 2017-01-30 NOTE — IP AVS SNAPSHOT
[facility]  [address]  [facility] [address]  Phone: [phone]           Patient Discharge Instructions     Our goal is to set you up for success. This packet includes information on your condition, medications, and your home care. It will help you to care for yourself so you don't get sicker and need to go back to the hospital.     Please ask your nurse if you have any questions.        There are many details to remember when preparing to leave the hospital. Here is what you will need to do:    1. Take your medicine. If you are prescribed medications, review your Medication List in the following pages. You may have new medications to  at the pharmacy and others that you'll need to stop taking. Review the instructions for how and when to take your medications. Talk with your doctor or nurses if you are unsure of what to do.     2. Go to your follow-up appointments. Specific follow-up information is listed in the following pages. Your may be contacted by a transition nurse or clinical provider about future appointments. Be sure we have all of the phone numbers to reach you, if needed. Please contact your provider's office if you are unable to make an appointment.     3. Watch for warning signs. Your doctor or nurse will give you detailed warning signs to watch for and when to call for assistance. These instructions may also include educational information about your condition. If you experience any of warning signs to your health, call your doctor.               [facility]  Unless otherwise directed by your provider, please contact [facility], our nurse care line that is available for 24/7 assistance.     [phone] (toll-free)    Registered nurses in the [facility] provide clinical advisement, health education, appointment booking, and other advisory services.                    ** Verify the list of medication(s) below is accurate and up  to date. Carry this with you in case of emergency. If your medications have changed, please notify your healthcare provider.             Medication List      START taking these medications        Additional Info    Begin Date AM Noon PM Bedtime    amlodipine 5 MG tablet   Commonly known as:  NORVASC   Quantity:  30 tablet   Refills:  11   Dose:  5 mg    Last time this was given:  5 mg on 2/6/2017  8:52 AM   Instructions:  Take 1 tablet (5 mg total) by mouth once daily.                               atorvastatin 40 MG tablet   Commonly known as:  LIPITOR   Quantity:  90 tablet   Refills:  3   Dose:  40 mg    Last time this was given:  40 mg on 2/6/2017  8:51 AM   Instructions:  Take 1 tablet (40 mg total) by mouth once daily.                               warfarin 2 MG tablet   Commonly known as:  COUMADIN   Quantity:  102 tablet   Refills:  3   Comments:    - Rx request was called in to Jaimee at [facility] ph. # [phone]    -    (90) day supply    - Dr. Charanjit Sawant    Last time this was given:  3 mg on 2/5/2017  5:00 PM   Instructions:  Current Dose ( 3 mg on Mon, Thu; 2 mg all other days) or as directed by coumadin clinic.     2mg everyday except on Monday and Thursday.   Mondays and Thursdays take 3mg                              CHANGE how you take these medications        Additional Info    Begin Date AM Noon PM Bedtime    hydrALAZINE 50 MG tablet   Commonly known as:  APRESOLINE   Quantity:  90 tablet   Refills:  11   Dose:  50 mg   What changed:    - medication strength  - how much to take    Last time this was given:  50 mg on 2/6/2017  5:26 AM   Instructions:  Take 1 tablet (50 mg total) by mouth every 8 (eight) hours.                                       CONTINUE taking these medications        Additional Info    Begin Date AM Noon PM Bedtime    acetaminophen 325 MG tablet   Commonly known as:  TYLENOL   Refills:  0   Dose:  650 mg    Last time this was given:  650 mg on 2/6/2017  8:53 AM    Instructions:  Take 2 tablets (650 mg total) by mouth every 6 (six) hours as needed for Pain (mild pain).                            ANTACID CALCIUM ORAL   Refills:  0   Dose:  1 tablet    Last time this was given:  1,000 mg on 2/6/2017 11:54 AM   Instructions:  Take 1 tablet by mouth 4 (four) times daily.                                        cetirizine 5 MG tablet   Commonly known as:  ZYRTEC   Quantity:  30 tablet   Refills:  0   Dose:  5 mg    Instructions:  Take 1 tablet (5 mg total) by mouth once daily.                               CMPD PAIN MANAGEMENT COMPOUND OINTMNENT THREE   Quantity:  30 g   Refills:  3    Instructions:  Apply small amount to painful joints once or twice a day                            escitalopram oxalate 20 MG tablet   Commonly known as:  LEXAPRO   Quantity:  90 tablet   Refills:  3   Dose:  20 mg    Last time this was given:  20 mg on 2/5/2017  9:11 PM   Instructions:  Take 1 tablet (20 mg total) by mouth every evening.                               gabapentin 300 MG capsule   Commonly known as:  NEURONTIN   Quantity:  120 capsule   Refills:  2   Dose:  300 mg    Last time this was given:  300 mg on 2/5/2017  9:11 PM   Instructions:  Take 1 capsule (300 mg total) by mouth every evening.                               ondansetron 4 MG tablet   Commonly known as:  ZOFRAN   Quantity:  30 tablet   Refills:  3   Dose:  4 mg    Last time this was given:  4 mg on 2/2/2017  2:13 AM   Instructions:  Take 1 tablet (4 mg total) by mouth every 8 (eight) hours as needed for Nausea.                            pantoprazole 40 MG tablet   Commonly known as:  PROTONIX   Quantity:  60 tablet   Refills:  10   Dose:  40 mg    Last time this was given:  40 mg on 2/6/2017  8:52 AM   Instructions:  Take 1 tablet (40 mg total) by mouth once daily.                                 Where to Get Your Medications      These medications were sent to [id number] - [place] - [address] [address]  [place] [address]     Phone:  [phone]     amlodipine 5 MG tablet    atorvastatin 40 MG tablet    escitalopram oxalate 20 MG tablet    hydrALAZINE 50 MG tablet         Information about where to get these medications is not yet available     ! Ask your nurse or doctor about these medications     warfarin 2 MG tablet                  Please bring to all follow up appointments:    1. A copy of your discharge instructions.  2. All medicines you are currently taking in their original bottles.  3. Identification and insurance card.    Please arrive 15 minutes ahead of scheduled appointment time.    Please call 24 hours in advance if you must reschedule your appointment and/or time.        Your Scheduled Appointments     Feb 23, 2017 12:30 PM CST   EKG with EKG, APPT   [facility] ([facility] )    [address]  [place] [address]   [phone]            Feb 23, 2017  1:00 PM CST   Debrillator Tune Up with PACEMAKER, ICD   [facility] - Arrhythmia ([facility] )    [address]  [place] [id number] [phone]            Feb 23, 2017  1:40 PM CST   Established Patient Visit with Arash Allen MD   [facility] - Arrhythmia ([facility] )    [address]  [place] [id number] [phone]            Mar 08, 2017 11:30 AM CST   VAD with Sera Collado MD   [facility] ([facility] )    [address]  [place] [id number] [phone]            Mar 08, 2017 11:30 AM CST   Nurse Visit with HEARTTRANSPLANT, LVADN   [facility] ([facility] )    [address]  [place] [id number] [phone]              Referrals     Future Orders    Ambulatory consult to Psychiatry     Ambulatory referral to Outpatient Case Management     Questions:    Does the patient have a chronic or uncontrolled disease process?:  Yes    Does the patient have a new diagnosis of a catastrophic or life altering illness/treatment?:   Yes    Does the patient have any psycho-social issues that may affect their ability to adhere to treatment plan?:  Yes    Does patient have any behaviors or circumstances that may impede ability to adhere to treatment plan?:  Yes    Is patient at risk for admission/readmission?:  Yes        Discharge Instructions     Future Orders    Activity as tolerated     Call MD for:  difficulty breathing or increased cough     Call MD for:  increased confusion or weakness     Call MD for:  persistent dizziness, light-headedness, or visual disturbances     Call MD for:  persistent nausea and vomiting or diarrhea     Call MD for:  redness, tenderness, or signs of infection (pain, swelling, redness, odor or green/yellow discharge around incision site)     Call MD for:  severe persistent headache     Call MD for:  severe uncontrolled pain     Call MD for:  temperature >100.4     Call MD for:  worsening rash     Diet general     Questions:    Total calories:      Fat restriction, if any:      Protein restriction, if any:      Na restriction, if any:  2gNa    Fluid restriction:  Fluid - 1500mL    Additional restrictions:          Primary Diagnosis     Your primary diagnosis was:  Stroke      Admission Information     Date & Time Provider Department [facility]    1/30/2017  7:56 PM Caitlyn Ackerman MD [facility] [id number]      Care Providers     Provider Role Specialty Primary office phone    Caitlyn Ackerman MD Attending Provider Cardiology [phone]    Sera Collado MD Team Attending  Cardiology [phone]    Tosin Fajardo MD Consulting Physician  Cardiology [phone]    Caitlyn Ackerman MD Team Attending  Cardiology [phone]    Charanjit Sawant MD Team Attending  Cardiology [phone]    Jaylan Menard MD Team Attending  Cardiology [phone]    Scooby Dickson MD Consulting Physician  Neurology [phone]    Matthew Scanlon MD Consulting Physician  Neuro Critical Care  [phone]    Amos Tamayo MD Consulting Physician  Neuro Critical Care [phone]    Daniel Cowan MD Consulting Physician  Interventional Neurology [phone]    Tello Garcia MD Consulting Physician  Nephrology [phone]      Your Vitals Were     BP Pulse Temp Resp Height Weight    72/0 (BP Method: Doppler) 84 98.1 °F (36.7 °C) (Oral) 18 5' (1.524 m) 62.1 kg (136 lb 14.5 oz)    Last Period SpO2 BMI          (LMP Unknown) 97% 26.74 kg/m2        Recent Lab Values        1/20/2012 3/29/2012                        5:20 AM  4:15 AM          A1C 6.4 (H) 6.4 (H)                     Pending Labs     Order Current Status    APTT In process    CBC auto differential In process    Magnesium In process    Phosphorus In process    Protime-INR In process      Allergies as of 2/6/2017        Reactions    Codeine Nausea And Vomiting    Demerol [Meperidine] Nausea And Vomiting    Lortab [Hydrocodone-acetaminophen] Nausea And Vomiting      Advance Directives     An advance directive is a document which, in the event you are no longer able to make decisions for yourself, tells your healthcare team what kind of treatment you do or do not want to receive, or who you would like to make those decisions for you.  If you do not currently have an advance directive, [facility] encourages you to create one.  For more information call:  [phone]), [phone] ([phone]),  or log on to [email or web address].        Smoking Cessation     If you would like to quit smoking:   You may be eligible for free services if you are a [place] resident and started smoking cigarettes before September 1, 1988.  Call the Smoking Cessation Trust (SCT) toll free at (695) 932-3410 or (820) 230-1217.   Call 5-607-QUIT-NOW if you do not meet the above criteria.            Language Assistance Services     ATTENTION: Language assistance services are available, free of charge. Please call [phone].       ATENCIÓN: Si deven akbar, tiene a sanchez disposición servicios gratuitos de asistencia lingüística. Alonso al [phone].     [facility] Ý: N?u b?n nói Ti?ng Vi?t, có các d?ch v? h? tr? ngôn ng? mi?n phí dành cho b?n. G?i s? [phone].        Stroke Education              Heart Failure Education       Heart Failure: Being Active  You have a condition called heart failure. Being active doesnt mean that you have to wear yourself out. Even a little movement each day helps to strengthen your heart. If you cant get out to exercise, you can do simple stretching and strengthening exercises at home. These are good ways to keep you well-conditioned and prevent you and your heart from becoming excessively weak.    Ideas to get you started  · Add a little movement to things you do now. Walk to mail letters. Park your car at the far end of the parking lot and walk to the store. Walk up a flight of stairs instead of taking the elevator.  · Choose activities you enjoy. You might walk, swim, or ride an exercise bike. Things like gardening and washing the car count, too. Other possibilities include: washing dishes, walking the dog, walking around the mall, and doing aerobic activities with friends.  · Join a group exercise program at a [facility] or [facility], a senior center, or a community center. Or look into a hospital cardiac rehabilitation program. Ask your doctor if you qualify.  Tips to keep you going  · Get up and get dressed each day. Go to a coffee shop and read a newspaper or go somewhere that you'll be in the presence of other active people. Youll feel more like being active.  · Make a plan. Choose one or more activities that you enjoy and that you can easily do. Then plan to do at least one each day. You might write your plan on a calendar.  · Go with a friend or a group if you like company. This can help you feel supported and stay motivated, too.  · Plan social events that you enjoy. This will keep you mentally engaged  as well as physically motivated to do things you find pleasure in.  For your safety  · Talk with your healthcare provider before starting an exercise program.  · Exercise indoors when its too hot or too cold outside, or when the air quality is poor. Try walking at a shopping mall.  · Wear socks and sturdy shoes to maintain your balance and prevent falls.  · Start slowly. Do a few minutes several times a day at first. Increase your time and speed little by little.  · Stop and rest whenever you feel tired or get short of breath.  · Dont push yourself on days when you dont feel well.  Date Last Reviewed: 3/20/2016  © 6631-7619 [employer]. [address] [place] [address], [place] [address]. All rights reserved. This information is not intended as a substitute for professional medical care. Always follow your healthcare professional's instructions.              Heart Failure: Evaluating Your Heart  You have a condition called heart failure. To evaluate your condition, your doctor will examine you, ask questions, and do some tests. Along with looking for signs of heart failure, the doctor looks for any other health problems that may have led to heart failure. The results of your evaluation will help your doctor form a treatment plan.  Health history and physical exam  Your visit will start with a health history. Tell the doctor about any symptoms youve noticed and about all medicines you take. Then youll have a physical exam. This includes listening to your heartbeat and breathing. Youll also be checked for swelling (edema) in your legs and neck. When you have fluid buildup or fluid in the lungs, it may be called congestive heart failure.  Diagnosing heart failure     During an echocardiogram, sound waves bounce off the heart. These are converted into a picture on the screen.   The following may be done to help your doctor form a diagnosis:  · X-rays show the size and shape of your heart. These pictures can  also show fluid in your lungs.  · An electrocardiogram (ECG or EKG) shows the pattern of your heartbeat. Small pads (electrodes) are placed on your chest, arms, and legs. Wires connect the pads to the ECG machine, which records your hearts electrical signals. This can give the doctor information about heart function.  · An echocardiogram uses ultrasound waves to show the structure and movement of your heart muscle. This shows how well the heart pumps. It also shows the thickness of the heart walls, and if the heart is enlarged. It is one of the most useful, non-invasive tests as it provides information about the heart's general function. This helps your doctor make treatment decisions.  · Lab tests evaluate small amounts of blood or urine for signs of problems. A BNP lab test can help diagnose and evaluate heart failure. BNP stands for B-type natriuretic peptide. The ventricles secrete more BNP when heart failure worsens. Lab tests can also provide information about metabolic dysfunction or heart dysfunction.  Your treatment plan  Based on the results of your evaluation and tests, your doctor will develop a treatment plan. This plan is designed to relieve some of your heart failure symptoms and help make you more comfortable. Your treatment plan may include:  · Medicine to help your heart work better and improve your quality of life  · Changes in what you eat and drink to help prevent fluid from backing up in your body  · Daily monitoring of your weight and heart failure symptoms to see how well your treatment plan is working  · Exercise to help you stay healthy  · Help with quitting smoking  · Emotional and psychological support to help adjust to the changes  · Referrals to other specialists to make sure you are being treated comprehensively  Date Last Reviewed: 3/21/2016  © 7279-7250 [employer]. [address] [place] [address], [place] [address]. All rights reserved. This information is not intended as a  substitute for professional medical care. Always follow your healthcare professional's instructions.              Heart Failure: Making Changes to Your Diet  You have a condition called heart failure. When you have heart failure, excess fluid is more likely to build up in your body because your heart isn't working well. This makes the heart work harder to pump blood. Fluid buildup causes symptoms such as shortness of breath and swelling (edema). This is often referred to as congestive heart failure or CHF. Controlling the amount of salt (sodium) you eat may help stop fluid from building up. Your doctor may also tell you to reduce the amount of fluid you drink.  Reading food labels    Your healthcare provider will tell you how much sodium you can eat each day. Read food labels to keep track. Keep in mind that certain foods are high in salt. These include canned, frozen, and processed foods. Check the amount of sodium in each serving. Watch out for high-sodium ingredients. These include MSG (monosodium glutamate), baking soda, and sodium phosphate.   Eating less salt  Give yourself time to get used to eating less salt. It may take a little while. Here are some tips to help:  · Take the saltshaker off the table. Replace it with salt-free herb mixes and spices.  · Eat fresh or plain frozen vegetables. These have much less salt than canned vegetables.  · Choose low-sodium snacks like sodium-free pretzels, crackers, or air-popped popcorn.  · Dont add salt to your food when youre cooking. Instead, season your foods with pepper, lemon, garlic, or onion.  · When you eat out, ask that your food be cooked without added salt.  · Avoid eating fried foods as these often have a great deal of salt.  If youre told to limit fluids  You may need to limit how much fluid you have to help prevent swelling. This includes anything that is liquid at room temperature, such as ice cream and soup. If your doctor tells you to limit fluid,  try these tips:  · Measure drinks in a measuring cup before you drink them. This will help you meet daily goals.  · Chill drinks to make them more refreshing.  · Suck on frozen lemon wedges to quench thirst.  · Only drink when youre thirsty.  · Chew sugarless gum or suck on hard candy to keep your mouth moist.  · Weigh yourself daily to know if your body's fluid content is rising.  My sodium goal  Your healthcare provider may give you a sodium goal to meet each day. This includes sodium found in food as well as salt that you add. My goal is to eat no more than ___________ mg of sodium per day.     When to call your doctor  Call your doctor right away if you have any symptoms of worsening heart failure. These can include:  · Sudden weight gain  · Increased swelling of your legs or ankles  · Trouble breathing when youre resting or at night  · Increase in the number of pillows you have to sleep on  · Chest pain, pressure, discomfort, or pain in the jaw, neck, or back   Date Last Reviewed: 3/21/2016  © 9349-0187 [employer]. [address] [place] [address]. All rights reserved. This information is not intended as a substitute for professional medical care. Always follow your healthcare professional's instructions.              Heart Failure: Medicines to Help Your Heart    You have a condition called heart failure (also known as congestive heart failure, or CHF). Your doctor will likely prescribe medicines for heart failure and any underlying health problems you have. Most heart failure patients take one or more types of medicinen. Your healthcare provider will work to find the combination of medicines that works best for you.  Heart failure medicines  Here are the most common heart failure medicines:  · ACE inhibitors lower blood pressure and decrease strain on the heart. This makes it easier for the heart to pump. Angiotensin receptor blockers have similar effects. These are prescribed for  some patients instead of ACE inhibitors.  · Beta-blockers relieve stress on the heart. They also improve symptoms. They may also improve the heart's pumping action over time.  · Diuretics (also called water pills) help rid your body of excess water. This can help rid your body of swelling (edema). Having less fluid to pump means your heart doesnt have to work as hard. Some diuretics make your body lose a mineral called potassium. Your doctor will tell you if you need to take supplements or eat more foods high in potassium.  · Digoxin helps your heart pump with more strength. This helps your heart pump more blood with each beat. So, more oxygen-rich blood travels to the rest of the body.  · Aldosterone antagonists help alter hormones and decrease strain on the heart.  · Hydralazine and nitrates are two separate medicines used together to treat heart failure. They may come in one combination pill. They lower blood pressure and decrease how hard the heart has to pump.  Medicines for related conditions  Controlling other heart problems helps keep heart failure under control, too. Depending on other heart problems you have, medicines may be prescribed to:  · Lower blood pressure (antihypertensives).  · Lower cholesterol levels (statins).  · Prevent blood clots (anticoagulants or aspirin).  · Keep the heartbeat steady (antiarrhythmics).  Date Last Reviewed: 3/5/2016  © 6252-7082 [employer]. [address] [place] [address], [place] [address]. All rights reserved. This information is not intended as a substitute for professional medical care. Always follow your healthcare professional's instructions.              Heart Failure: Procedures That May Help    The heart is a muscle that pumps oxygen-rich blood to all parts of the body. When you have heart failure, the heart is not able to pump as well as it should. Blood and fluid may back up into the lungs (congestive heart failure), and some parts of the body dont  get enough oxygen-rich blood to work normally. These problems lead to the symptoms of heart failure.     Certain procedures may help the heart pump better in some cases of heart failure. Some procedures are done to treat health problems that may have caused the heart failure such as coronary artery disease or heart rhythm problems. For more serious heart failure, other options are available.  Treating artery and valve problems  If you have coronary artery disease or valve disease, procedures may be done to improve blood flow. This helps the heart pump better, which can improve heart failure symptoms. First, your doctor may do a cardiac catheterization to help detect clogged blood vessels or valve damage. During this procedure, a  thin tube (catheter) in inserted into a blood vessel and guided to the heart. There a dye is injected and a special type of X-ray (angiogram) is taken of the blood vessels. Procedures to open a blocked artery or fix damaged valves can also be done using catheterization.  · Angioplasty uses a balloon-tipped instrument at the end of the catheter. The balloon is inflated to widen the narrowed artery. In many cases, a stent is expanded to further support the narrowed artery. A stent is a metal mesh tube.  · Valve surgery repairs or replacement of faulty valves can also be done during catheterization so blood can flow properly through the chambers of the heart.  Bypass surgery is another option to help treat blocked arteries. It uses a healthy blood vessel from elsewhere in the body. The healthy blood vessel is attached above and below the blocked area so that blood can flow around the blocked artery.  Treating heart rhythm problems  A device may be placed in the chest to help a weak heart maintain a healthy, heartbeat so the heart can pump more effectively:  · Pacemaker. A pacemaker is an implanted device that regulates your heartbeat electronically. It monitors your heart's rhythm and  generates a painless electric impulse that helps the heart beat in a regular rhythm. A pacemaker is programmed to meet your specific heart rhythm needs.  · Biventricular pacing/cardiac resynchronization therapy. A type of pacemaker that paces both pumping chambers of the heart at the same time to coordinate contractions and to improve the heart's function. Some people with heart failure are candidates for this therapy.  · Implantable cardioverter defibrillator. A device similar to a pacemaker that senses when the heart is beating too fast and delivers an electrical shock to convert the fast rhythm to a normal rhythm. This can be a life saving device.  In severe cases  In more serious cases of heart failure when other treatments no longer work, other options may include:  · Ventricular assist devices (VADs). These are mechanical devices used to take over the pumping function for one or both of the heart's ventricles, or pumping chambers. A VAD may be necessary when heart failure progresses to the point that medicines and other treatments no longer help. In some cases, a VAD may be used as a bridge to transplant.  · Heart transplant. This is replacing the diseased heart with a healthy one from a donor. This is an option for a few people who are very sick. A heart transplant is very serious and not an option for all patients. Your doctor can tell you more.  Date Last Reviewed: 3/20/2016  © 8339-1757 [employer]. [address] [place] [address], [place] [address]. All rights reserved. This information is not intended as a substitute for professional medical care. Always follow your healthcare professional's instructions.              Heart Failure: Tracking Your Weight  You have a condition called heart failure. When you have heart failure, a sudden weight gain or a steady rise in weight is a warning sign that your body is retaining too much water and salt. This could mean your heart failure is getting worse. If  left untreated, it can cause problems for your lungs and result in shortness of breath. Weighing yourself each day is the best way to know if youre retaining water. If your weight goes up quickly, call your doctor. You will be given instructions on how to get rid of the excess water. You will likely need medicines and to avoid salt. This will help your heart work better.  Call your doctor if you gain more than 2 pounds in 1 day, more than 5 pounds in 1 week, or whatever weight gain you were told to report by your doctor. This is often a sign of worsening heart failure and needs to be evaluated and treated. Your doctor will tell you what to do next.   Tips for weighing yourself    · Weigh yourself at the same time each morning, wearing the same clothes. Weigh yourself after urinating and before eating.  · Use the same scale each day. Make sure the numbers are easy to read. Put the scale on a flat, hard surface -- not on a rug or carpet.  · Do not stop weighing yourself. If you forget one day, weigh again the next morning.  How to use your weight chart  · Keep your weight chart near the scale. Write your weight on the chart as soon as you get off the scale.  · Fill in the month and the start date on the chart. Then write down your weight each day. Your chart will look like this:    · If you miss a day, leave the space blank. Weigh yourself the next day and write your weight in the next space.  · Take your weight chart with you when you go to see your doctor.  Date Last Reviewed: 3/20/2016  © 9723-8622 [employer]. [address] [place] [address], [place] [address]. All rights reserved. This information is not intended as a substitute for professional medical care. Always follow your healthcare professional's instructions.              Heart Failure: Warning Signs of a Flare-Up  You have a condition called heart failure. Once you have heart failure, flare-ups can happen. Below are signs that can mean your heart  failure is getting worse. If you notice any of these warning signs, call your healthcare provider.  Swelling    · Your feet, ankles, or lower legs get puffier.  · You notice skin changes on your lower legs.  · Your shoes feel too tight.  · Your clothes are tighter in the waist.  · You have trouble getting rings on or off your fingers.  Shortness of breath  · You have to breathe harder even when youre doing your normal activities or when youre resting.  · You are short of breath walking up stairs or even short distances.  · You wake up at night short of breath or coughing.  · You need to use more pillows or sit up to sleep.  · You wake up tired or restless.  Other warning signs  · You feel weaker, dizzy, or more tired.  · You have chest pain or changes in your heartbeat.  · You have a cough that wont go away.  · You cant remember things or dont feel like eating.  Tracking your weight  Gaining weight is often the first warning sign that heart failure is getting worse. Gaining even a few pounds can be a sign that your body is retaining excess water and salt. Weighing yourself each day in the morning after you urinate and before you eat, is the best way to know if you're retaining water. Get a scale that is easy to read and make sure you wear the same clothes and use the same scale every time you weigh. Your healthcare provider will show you how to track your weight. Call your doctor if you gain more than 2 pounds in 1 day, 5 pounds in 1 week, or whatever weight gain you were told to report by your doctor. This is often a sign of worsening heart failure and needs to be evaluated and treated before it compromises your breathing. Your doctor will tell you what to do next.    Date Last Reviewed: 3/15/2016  © 4502-4122 [employer]. [address] [place] [address], [place] [address]. All rights reserved. This information is not intended as a substitute for professional medical care. Always follow your healthcare  professional's instructions.              Coumadin Discharge Instructions                         Chronic Kindey Disease Education              [facility] complies with applicable Federal civil rights laws and does not discriminate on the basis of race, color, national origin, age, disability, or sex.

## 2017-01-30 NOTE — TELEPHONE ENCOUNTER
VAD coordinator on call paged by Dr. Zhane Antoine  Of [facility].  She verbalizes pt presented to ER with apahasia and has CT + for stroke, asking if pt can be admitted to [facility].  Explained to her given that she has an LVAD patient, pt will need to be transferred for admission to [facility].  Encouraged her to call transfer center to intiatiate MD to MD conversion for further direction of care.  She verbalizes thanks.  Dr. Restrepo notified and in agreement of plan.

## 2017-01-31 LAB
ANION GAP SERPL CALC-SCNC: 14 MMOL/L
APTT BLDCRRT: 37.4 SEC
BASOPHILS # BLD AUTO: 0.02 K/UL
BASOPHILS # BLD AUTO: 0.02 K/UL
BASOPHILS NFR BLD: 0.3 %
BASOPHILS NFR BLD: 0.4 %
BUN SERPL-MCNC: 70 MG/DL
CALCIUM SERPL-MCNC: 8.9 MG/DL
CHLORIDE SERPL-SCNC: 93 MMOL/L
CO2 SERPL-SCNC: 29 MMOL/L
CREAT SERPL-MCNC: 9.2 MG/DL
DIFFERENTIAL METHOD: ABNORMAL
DIFFERENTIAL METHOD: ABNORMAL
EOSINOPHIL # BLD AUTO: 0.1 K/UL
EOSINOPHIL # BLD AUTO: 0.1 K/UL
EOSINOPHIL NFR BLD: 2.2 %
EOSINOPHIL NFR BLD: 2.4 %
ERYTHROCYTE [DISTWIDTH] IN BLOOD BY AUTOMATED COUNT: 15.1 %
ERYTHROCYTE [DISTWIDTH] IN BLOOD BY AUTOMATED COUNT: 15.1 %
EST. GFR  (AFRICAN AMERICAN): 4.9 ML/MIN/1.73 M^2
EST. GFR  (NON AFRICAN AMERICAN): 4.2 ML/MIN/1.73 M^2
GLUCOSE SERPL-MCNC: 78 MG/DL
HCT VFR BLD AUTO: 29.2 %
HCT VFR BLD AUTO: 30.9 %
HGB BLD-MCNC: 10 G/DL
HGB BLD-MCNC: 10.3 G/DL
INR PPP: 2.5
LYMPHOCYTES # BLD AUTO: 0.7 K/UL
LYMPHOCYTES # BLD AUTO: 1.2 K/UL
LYMPHOCYTES NFR BLD: 14.5 %
LYMPHOCYTES NFR BLD: 21.1 %
MAGNESIUM SERPL-MCNC: 2.2 MG/DL
MCH RBC QN AUTO: 32.7 PG
MCH RBC QN AUTO: 32.8 PG
MCHC RBC AUTO-ENTMCNC: 33.3 %
MCHC RBC AUTO-ENTMCNC: 34.2 %
MCV RBC AUTO: 96 FL
MCV RBC AUTO: 98 FL
MONOCYTES # BLD AUTO: 0.4 K/UL
MONOCYTES # BLD AUTO: 0.4 K/UL
MONOCYTES NFR BLD: 7.2 %
MONOCYTES NFR BLD: 7.5 %
NEUTROPHILS # BLD AUTO: 3.8 K/UL
NEUTROPHILS # BLD AUTO: 4.1 K/UL
NEUTROPHILS NFR BLD: 68.7 %
NEUTROPHILS NFR BLD: 75.3 %
PHOSPHATE SERPL-MCNC: 5.3 MG/DL
PLATELET # BLD AUTO: 122 K/UL
PLATELET # BLD AUTO: 131 K/UL
PMV BLD AUTO: 9.8 FL
PMV BLD AUTO: 9.9 FL
POCT GLUCOSE: 86 MG/DL (ref 70–110)
POTASSIUM SERPL-SCNC: 4.6 MMOL/L
PROTHROMBIN TIME: 24.6 SEC
RBC # BLD AUTO: 3.05 M/UL
RBC # BLD AUTO: 3.15 M/UL
SODIUM SERPL-SCNC: 136 MMOL/L
WBC # BLD AUTO: 5.03 K/UL
WBC # BLD AUTO: 5.89 K/UL

## 2017-01-31 PROCEDURE — 27000248 HC VAD-ADDITIONAL DAY

## 2017-01-31 PROCEDURE — 63600175 PHARM REV CODE 636 W HCPCS: Performed by: HOSPITALIST

## 2017-01-31 PROCEDURE — 85025 COMPLETE CBC W/AUTO DIFF WBC: CPT | Mod: 91

## 2017-01-31 PROCEDURE — 99223 1ST HOSP IP/OBS HIGH 75: CPT | Mod: ,,, | Performed by: INTERNAL MEDICINE

## 2017-01-31 PROCEDURE — 80100014 HC HEMODIALYSIS 1:1

## 2017-01-31 PROCEDURE — 83735 ASSAY OF MAGNESIUM: CPT

## 2017-01-31 PROCEDURE — 85610 PROTHROMBIN TIME: CPT

## 2017-01-31 PROCEDURE — 85730 THROMBOPLASTIN TIME PARTIAL: CPT

## 2017-01-31 PROCEDURE — G8996 SWALLOW CURRENT STATUS: HCPCS | Mod: CH

## 2017-01-31 PROCEDURE — 93750 INTERROGATION VAD IN PERSON: CPT | Mod: ,,, | Performed by: INTERNAL MEDICINE

## 2017-01-31 PROCEDURE — 20000000 HC ICU ROOM

## 2017-01-31 PROCEDURE — 80048 BASIC METABOLIC PNL TOTAL CA: CPT

## 2017-01-31 PROCEDURE — 25000003 PHARM REV CODE 250: Performed by: PHYSICIAN ASSISTANT

## 2017-01-31 PROCEDURE — 63600175 PHARM REV CODE 636 W HCPCS: Performed by: INTERNAL MEDICINE

## 2017-01-31 PROCEDURE — 99233 SBSQ HOSP IP/OBS HIGH 50: CPT | Mod: GC,,, | Performed by: PSYCHIATRY & NEUROLOGY

## 2017-01-31 PROCEDURE — 92610 EVALUATE SWALLOWING FUNCTION: CPT

## 2017-01-31 PROCEDURE — G8997 SWALLOW GOAL STATUS: HCPCS | Mod: CH

## 2017-01-31 PROCEDURE — 84100 ASSAY OF PHOSPHORUS: CPT

## 2017-01-31 PROCEDURE — G8998 SWALLOW D/C STATUS: HCPCS | Mod: CH

## 2017-01-31 PROCEDURE — 25000003 PHARM REV CODE 250: Performed by: HOSPITALIST

## 2017-01-31 PROCEDURE — 25000003 PHARM REV CODE 250: Performed by: INTERNAL MEDICINE

## 2017-01-31 RX ORDER — HYDRALAZINE HYDROCHLORIDE 20 MG/ML
5 INJECTION INTRAMUSCULAR; INTRAVENOUS ONCE
Status: COMPLETED | OUTPATIENT
Start: 2017-01-31 | End: 2017-01-31

## 2017-01-31 RX ORDER — OXYMETAZOLINE HCL 0.05 %
2 SPRAY, NON-AEROSOL (ML) NASAL 2 TIMES DAILY
Status: DISPENSED | OUTPATIENT
Start: 2017-01-31 | End: 2017-02-03

## 2017-01-31 RX ORDER — HYDRALAZINE HYDROCHLORIDE 25 MG/1
25 TABLET, FILM COATED ORAL ONCE
Status: COMPLETED | OUTPATIENT
Start: 2017-01-31 | End: 2017-01-31

## 2017-01-31 RX ORDER — WARFARIN 1 MG/1
2 TABLET ORAL DAILY
Status: DISCONTINUED | OUTPATIENT
Start: 2017-01-31 | End: 2017-02-01

## 2017-01-31 RX ORDER — ATORVASTATIN CALCIUM 20 MG/1
40 TABLET, FILM COATED ORAL DAILY
Status: DISCONTINUED | OUTPATIENT
Start: 2017-01-31 | End: 2017-02-06 | Stop reason: HOSPADM

## 2017-01-31 RX ORDER — HYDRALAZINE HYDROCHLORIDE 25 MG/1
25 TABLET, FILM COATED ORAL EVERY 8 HOURS
Status: DISCONTINUED | OUTPATIENT
Start: 2017-01-31 | End: 2017-02-04

## 2017-01-31 RX ORDER — HYDRALAZINE HYDROCHLORIDE 50 MG/1
50 TABLET, FILM COATED ORAL EVERY 8 HOURS
Status: DISCONTINUED | OUTPATIENT
Start: 2017-01-31 | End: 2017-01-31

## 2017-01-31 RX ADMIN — HYDRALAZINE HYDROCHLORIDE 5 MG: 20 INJECTION INTRAMUSCULAR; INTRAVENOUS at 10:01

## 2017-01-31 RX ADMIN — HYDRALAZINE HYDROCHLORIDE 25 MG: 25 TABLET, FILM COATED ORAL at 01:01

## 2017-01-31 RX ADMIN — OXYMETAZOLINE HYDROCHLORIDE 2 SPRAY: 5 SPRAY NASAL at 02:01

## 2017-01-31 RX ADMIN — PANTOPRAZOLE SODIUM 40 MG: 40 TABLET, DELAYED RELEASE ORAL at 09:01

## 2017-01-31 RX ADMIN — ATORVASTATIN CALCIUM 40 MG: 20 TABLET, FILM COATED ORAL at 04:01

## 2017-01-31 RX ADMIN — HYDRALAZINE HYDROCHLORIDE 5 MG: 20 INJECTION INTRAMUSCULAR; INTRAVENOUS at 04:01

## 2017-01-31 RX ADMIN — HYDRALAZINE HYDROCHLORIDE 25 MG: 25 TABLET ORAL at 09:01

## 2017-01-31 RX ADMIN — GABAPENTIN 300 MG: 300 CAPSULE ORAL at 09:01

## 2017-01-31 RX ADMIN — HYDRALAZINE HYDROCHLORIDE 25 MG: 25 TABLET, FILM COATED ORAL at 06:01

## 2017-01-31 RX ADMIN — ESCITALOPRAM 20 MG: 20 TABLET, FILM COATED ORAL at 09:01

## 2017-01-31 RX ADMIN — ONDANSETRON 4 MG: 4 TABLET, FILM COATED ORAL at 06:01

## 2017-01-31 RX ADMIN — WARFARIN SODIUM 2 MG: 1 TABLET ORAL at 06:01

## 2017-01-31 NOTE — PT/OT/SLP EVAL
Speech Language Pathology  Evaluation/Discharge    Randa Devine   MRN: [id number]   Admitting Diagnosis: photophobia, diplopia, headache-stroke work-up    Diet recommendations: Solid Diet Level: Regular  Liquid Diet Level: Thin Feed only when awake/alert, HOB to 90 degrees, Small bites/sips and Meds whole 1 at a time    SLP Treatment Date: 17  Speech Start Time: 0810     Speech Stop Time: 0819     Speech Total (min): 9 min       TREATMENT BILLABLE MINUTES:  Eval Swallow and Oral Function 9    Diagnosis: stroke work-up  H/o stroke, LVAD    Past Medical History   Diagnosis Date    Anticoagulant long-term use     Anxiety     Atrial tachycardia, paroxysmal 2013    Blood transfusion     Cardiomyopathy     CHF (congestive heart failure)     Chronic kidney disease     Coronary artery disease     End stage renal disease     Hypertension      pulmonary    ICD (implantable cardioverter-defibrillator) battery depletion 2014    Myocardial infarction     Presence of left ventricular assist device (LVAD)     Pulmonary hypertension     Stroke      Past Surgical History   Procedure Laterality Date    Left ventricular assist device  2012    Tonsillectomy      Cardiac surgery      Coronary artery bypass graft      Hip surgery       RTHA    Fracture surgery      Vascular surgery      Cardiac defibrillator placement       section      Colonoscopy N/A 2016     Procedure: COLONOSCOPY;  Surgeon: Mark Currie MD;  Location: [facility] ([address] [place] [address]);  Service: Endoscopy;  Laterality: N/A;       Has the patient been evaluated by SLP for swallowing? : Yes  Keep patient NPO?: No   General Precautions: Standard, aspiration, fall    Current Respiratory Status: nasal cannula     Social Hx: Patient lives with son and his girlfriend, indpt with mobility, retired/disabled.    Prior diet: reg/thin.    Subjective:  Pt alert, sunglasses present 2/2 pain; tissue in L nare 2/2  nosebleed  Patient goals: to decrease pain.    Pain Rating: 10/10 (Pt reports this is baseline)        Location: head  Pain Addressed: Distraction, Cessation of Activity  Pain Rating Post-Intervention: 10/10    Objective:   Patient found with: blood pressure cuff, pulse ox (continuous), peripheral IV, telemetry, oxygen    Oral Musculature Evaluation  Oral Musculature: WFL  Dentition: present and adequate  Mucosal Quality: good  Mandibular Strength and Mobility: WFL  Oral Labial Strength and Mobility: WFL  Lingual Strength and Mobility: WFL  Velar Elevation: WFL  Buccal Strength and Mobility: WFL  Volitional Cough: adquate  Volitional Swallow: timely  Voice Prior to PO Intake: clear     Bedside Swallow Eval:  Consistencies Assessed: thin, puree, solids  Oral Phase: WNL  Pharyngeal Phase: no overt clinical  signs/symptoms of aspiration and no overt clinical signs/symptoms of pharyngeal dysphagia  Swallow response was timely with adequate hyolaryngeal rise.  No change in vocal quality, throat clear or cough noted.  Pt denied globus sensation.  Education provided on general swallow precautions and overt s/s aspiration,  Role of SLP also reviewed.  Pt denied any change in speech, language or cognition.  She was encouraged to request speech, language, cognitive evaluation if new deficits noted.  Pt verbalized understanding and agreement.  White board updated and nursing alerted re: results and recs.       FIM:  Social Interaction: 7 Complete [place]--The patient interacts appropriately with staff, other patients, and family members (e.g., controls temper, accepts criticism, is aware that words and actions have an impact on others), and does not require medication for                            Assessment:  Randa Devine is a 61 y.o. female with a medical diagnosis of stroke and presents with oropharyngeal phases of swallow are WFL.    Do you have any cultural, spiritual, Cheondoism conflicts, given your current  situation?: none reported     Discharge recommendations: Discharge Facility/Level Of Care Needs: home     Goals:   SLP Goals     Not on file      Multidisciplinary Problems (Resolved)        Problem: SLP Goal    Goal Priority Disciplines Outcome   SLP Goal   (Resolved)     SLP Outcome(s) achieved   Description:  Goals not initiated 2/2 no further ST indicated. MARIA L Garcia, ANGELO/SLP  1/31/2017                 Plan:   Patient to be seen     Plan of Care expires:    Plan of Care reviewed with: patient  SLP Follow-up?: No         SLP G-Codes  Functional Assessment Tool Used: noms  Score: 7  Functional Limitations: Swallowing  Swallow Current Status ():   Swallow Goal Status ():   Swallow Discharge Status ():     MARIA L Garcia, ANGELO-SLP  01/31/2017

## 2017-01-31 NOTE — PLAN OF CARE
Problem: SLP Goal  Goal: SLP Goal  Goals not initiated 2/2 no further ST indicated. MARIA L Garcia, CCC/SLP  1/31/2017  Outcome: Outcome(s) achieved Date Met:  01/31/17  Bedside swallow study completed.  Rec regular diet with thin liquids with standard aspiration precautions.  No further ST indicated for swallowing.  Pt reports at baseline for speech, language and cognition.  If concern for change in status, please order cognition eval and treat. MARIA L Garcia, ANGELO/SLP  1/31/2017

## 2017-01-31 NOTE — PROGRESS NOTES
Admit Note     Met with daughter, via phone, to assess patients needs due to pt having gone to a test.  Patient is a 61 y.o.  female, admitted stroke symptoms. Pt has an LVAD.     Patient admitted from ED on 1/30/2017 .  At this time, patient presents as not present.  At this time, patients caregiver presents as alert and oriented x 4, pleasant and communicative.      Household/Family Systems (as reported by patients caregiver)     Patient resides with patient's son, at:     [address]  [place] [address].      Pt cell: [phone]  Dtr Constance Lopez Cell: [phone]    Support system includes daughter and ex-.  Patient does not have dependents that are need of being cared for.     Patients primary caregiver is Ale, amanda daughter, phone number [phone].  Confirmed patients contact information is [phone] (home);   Telephone Information:   Mobile [phone]       During admission, patient's caregiver plans to stay at home.  Confirmed patient and patients caregivers do have access to reliable transportation.    Cognitive Status/Learning     Patients caregiver reports patients reading ability as 12th grade and states patient does have difficulty with seeing and memory. Pt is able to remember to take her medication per her daughter's report. Patients caregiver reports patient learns best by verbal instruction.   Needed: No.   Highest education level: Associate/Bachelor Degree    Vocation/Disability (as reported by patients caregiver)    Working for Income: No  If no, reason not working: Patient Choice - Retired  Patient is retired from working as a nurse and a .    Adherence     Patients caregiver reports patient has a medium level of adherence to patients health care regimen.  Adherence counseling and education provided.  Patient's caregiver verbalizes understanding.    Substance Use    Patients caregiver reports patients  substance usage as the following:    Tobacco: none, patient denies any use.  Alcohol: Pt drinks several glasses of wine ocassionally.  Illicit Drugs/Non-prescribed Medications: none, patient denies any use.  Patients caregiver states clear understanding of the potential impact of substance use.  Substance abstinence/cessation counseling, education and resources provided and reviewed.     Services Utilizing/ADLS (as reported by patients caregiver)    Infusion Service: Prior to admission, patient utilizing? no  Home Health: Prior to admission, patient utilizing? [facility], [phone], fx: [phone]  DME: Prior to admission, Rollator, walker, w/c, oxygen and portable, BSC and cane  Pulmonary/Cardiac Rehab: Prior to admission, no  Dialysis:  Prior to admission, yes, pt goes to [facility] [address] on /Sat  Transplant Specialty Pharmacy:  Prior to admission, no.    Prior to admission, patients caregiver reports patient was independent with ADLS and was not driving.  Patients caregiver reports patient is not able to care for self at this time due to compromised medical condition (as documented in medical record) and physical weakness..  Patients caregiver reports patient indicates a willingness to care for self once medically cleared to do so.    Insurance/Medications    Insured by   Payor/Plan Subscr  Sex Relation Sub. Ins. ID Effective Group Num   1. MEDICARE - MEMAGNO DAVIES 1955 Female  [id number] 10/1/13                                    [address]   2. Joint Loyalty CROSS * MAGNO JOLLY 1955 Female  [id number] 4/1/15 [id number]                                   [address] [address]      Primary Insurance (for UNOS reporting): Public Insurance - Medicare FFS (Fee For Service)  Secondary Insurance (for UNOS reporting): Private Insurance    Patients caregiver reports patient is able to obtain and afford medications at this time and at time of discharge.    Living Will/Healthcare Power of      Patients caregiver reports patient has a LW and/or HCPA.     Coping/Mental Health (as reported by patients caregiver)    Patient is coping adequately with the aid of  family members.  Patients caregiver is coping adequately with the aid of  family members.      Discharge Planning (as reported by patients caregiver)    At time of discharge, patient plans to return to patient's home under the care of self and family.  Patients daughter will transport patient.  Per rounds today, expected discharge date has not been medically determined at this time. Patients caretaker verbalizes understanding and is involved in treatment planning and discharge process.    Additional Concerns    Patient's caretaker denies additional needs and/or concerns at this time. SW continuing to follow for education, support, resources and dc needs as appropriate.

## 2017-01-31 NOTE — ASSESSMENT & PLAN NOTE
R>>L ZAVALETA, nausea, photophobia, blurred vision.    Not a candidate for prophylactic meds or for triptans (abortives).    Consider judicious use of fioricet.      DDx includes RCVS, given concomitant stroke.  Will manage separately for now; could consider cerebral angiogram in the future (gold standard) for diagnosis, but no acute need for intervention at this time.

## 2017-01-31 NOTE — PROGRESS NOTES
Hospital Day: 2    Subjective:  Interval History: C/o HA and nose bleed this am. Still has double vision but is improving    Scheduled Meds:   escitalopram oxalate  20 mg Oral QHS    gabapentin  300 mg Oral QHS    hydrALAZINE  25 mg Oral Q8H    pantoprazole  40 mg Oral Daily    [START ON 2/1/2017] warfarin  1.5 mg Oral Every Mon, Wed, Fri    warfarin  1 mg Oral Every Tues, Thurs, Sat    [START ON 2/5/2017] warfarin  1 mg Oral Every Sun     Continuous Infusions:   PRN Meds:ondansetron    Objective:  Vital signs in last 24 hours:   Temp:  [97.7 °F (36.5 °C)-98.3 °F (36.8 °C)] 97.7 °F (36.5 °C)  Pulse:  [75-86] 76  Resp:  [13-22] 15  SpO2:  [93 %-100 %] 96 %  BP: ()/() 110/79    Intake/Output last 3 shifts:  I/O last 3 completed shifts:  In: 180 [P.O.:180]  Out: 375 [Urine:375]  Intake/Output this shift:       Physical Exam:  Visit Vitals    BP 98/73    Pulse 80    Temp 97.8 °F (36.6 °C) (Oral)    Resp 15    Ht 5' (1.524 m)    Wt 64.5 kg (142 lb 3.2 oz)    LMP  (LMP Unknown)    SpO2 95%    Breastfeeding No    BMI 27.77 kg/m2     General appearance: alert, appears stated age and cooperative  Neck: no JVD and supple, symmetrical, trachea midline  Lungs: clear to auscultation bilaterally  Heart: regular rate and rhythm, S1, S2 normal, no murmur, click, rub or gallop  Abdomen: soft, non-tender; bowel sounds normal; no masses,  no organomegaly  Extremities: extremities normal, atraumatic, no cyanosis or edema  Neurologic: Grossly normal      Lab Review  Lab Results   Component Value Date     01/31/2017    K 4.6 01/31/2017    CL 93 (L) 01/31/2017    CO2 29 01/31/2017    BUN 70 (H) 01/31/2017    CREATININE 9.2 (H) 01/31/2017    CALCIUM 8.9 01/31/2017     Lab Results   Component Value Date    WBC 5.03 01/31/2017    HGB 10.3 (L) 01/31/2017    HCT 30.9 (L) 01/31/2017    HCT 21 (L) 08/10/2013    MCV 98 01/31/2017     (L) 01/31/2017        Assessment/Plan:  60 y/o WF with PMHx of ICM, s/p  Heartware LVAD as destination therapy, pAT, ESRD on T/Th/S, history of GIBs last 4/2016transferred from OSH with Acute CVA.    Acute CVA, ischemic, likely cardioembolic  -CT at OSH showed L thalamic lacunar infarct.   -[facility] Neuro and Neuro Critical care consulted.  -Will repeat CT head without contrast this morning due to HA  -HTN: discussed with [facility] neuro and OK to keep MAP around 80.  -INR theraputic- need to discuss if OK continuing coumadin  -Statin  -PT/OT/ST    S/p HW LVAD, speed 2700  -INR 2.5 today. Will discuss AC in setting of acute CVA as above  -HTN: cont hydralazine. 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

## 2017-01-31 NOTE — ASSESSMENT & PLAN NOTE
Likely cardioembolic in etiology.  LVAD present.  INR 2.4 on admission.    # Would recommend to continue anticoagulation with therapeutic INR   # Continue to manage epistaxis with afrin, if necessary may also consider embolization if significant bleeding    Antithrombotics for secondary stroke prevention: Anticoagulants:  Warfarin INR adjusted target  Statins for secondary stroke prevention and hyperlipidemia, if present: Atorvastatin- 40 mg oral daily.  .8 in 11/2016  Aggressive risk factor modification: Diet, Exercise, Obesity, Atrial Fibrillation and Carotid Artery disease  Rehab Efforts: Physical Therapy, Occupational Therapy, Speech and Language Pathology and Physical Medicine and Rehabilitation  VTE Prphylaxis: Coumadin, BP Parameters: SBP <180 (from a stroke perspective, as she is 48h out)  Nursing Orders: Neuro checks- q4h, Antiembolic stockings, Swallowing evaluation by Nursing, Out of bed BID, Avoid Torres catheter, Pneumatic compression device, Stroke Education, Blood glucose target 100-130, Up with assistance

## 2017-01-31 NOTE — NURSING
Spoke to Dr. Miranda regarding pt's previous imaging and plan of care. Dr. Miranda states that imaging completed at previous hospital shows an acute CVA, but no new CT is needed at this time. ROSA MARIA.

## 2017-01-31 NOTE — PROGRESS NOTES
To 3068 set up dialysis machine and water. tx initiated via left a/v graft with good flow. Target 3 hours with 2 liters UF.

## 2017-01-31 NOTE — PT/OT/SLP PROGRESS
Physical Therapy      Randa Sybil  MRN: [id number]    Patient not seen today secondary to (pt just returning from CT upon PT arrival. Along with RN, it was deemed best for OT/PT to return d/t awaiting to determine if pt's embolic stroke converted to hemorrhage. Im pm, pt on dialysis).   Will follow-up and attempt to complete PT eval as appropriate.    Leticia Day, PT

## 2017-01-31 NOTE — PROGRESS NOTES
[facility]  Vascular Neurology  Comprehensive Stroke Center  Progress Note    Ineterval Hx  NAEON. Pt had epistaxis which was stopped by nasal packing and afrin spray        Past Medical History   Diagnosis Date    Anticoagulant long-term use     Anxiety     Atrial tachycardia, paroxysmal 2013    Blood transfusion     Cardiomyopathy     CHF (congestive heart failure)     Chronic kidney disease     Coronary artery disease     End stage renal disease     Hypertension      pulmonary    ICD (implantable cardioverter-defibrillator) battery depletion 2014    Myocardial infarction     Presence of left ventricular assist device (LVAD)     Pulmonary hypertension     Stroke      Past Surgical History   Procedure Laterality Date    Left ventricular assist device  2012    Tonsillectomy      Cardiac surgery      Coronary artery bypass graft      Hip surgery       RTHA    Fracture surgery      Vascular surgery      Cardiac defibrillator placement       section      Colonoscopy N/A 2016     Procedure: COLONOSCOPY;  Surgeon: Mark Currie MD;  Location: [facility] ([address] [place] [address]);  Service: Endoscopy;  Laterality: N/A;     Family History   Problem Relation Age of Onset    Arthritis Mother     Heart disease Father     Hypertension Father     Cancer Maternal Grandmother     Breast cancer Neg Hx     Colon cancer Neg Hx     Ovarian cancer Neg Hx      Social History   Substance Use Topics    Smoking status: Former Smoker     Packs/day: 2.00     Years: 30.00     Quit date: 10/29/2009    Smokeless tobacco: None    Alcohol use Yes      Comment: 1 glass of wine a month     Review of patient's allergies indicates:   Allergen Reactions    Codeine Nausea And Vomiting    Demerol [meperidine] Nausea And Vomiting    Lortab [hydrocodone-acetaminophen] Nausea And Vomiting     Medications: I have reviewed the current medication administration  record.    Prescriptions Prior to Admission   Medication Sig Dispense Refill Last Dose    acetaminophen (TYLENOL) 325 MG tablet Take 2 tablets (650 mg total) by mouth every 6 (six) hours as needed for Pain (mild pain).  0 1/29/2017    CALCIUM CARBONATE (ANTACID CALCIUM ORAL) Take 1 tablet by mouth 4 (four) times daily.    1/29/2017    cetirizine (ZYRTEC) 5 MG tablet Take 1 tablet (5 mg total) by mouth once daily. 30 tablet 0 Unknown    escitalopram oxalate (LEXAPRO) 20 MG tablet Take 1 tablet (20 mg total) by mouth every evening. 90 tablet 3 1/29/2017    GABAPENTIN (CMPD PAIN MANAGEMENT COMPOUND OINTMNENT THREE) Apply small amount to painful joints once or twice a day 30 g 3 Unknown    gabapentin (NEURONTIN) 300 MG capsule Take 1 capsule (300 mg total) by mouth every evening. 120 capsule 2 1/29/2017    hydrALAZINE (APRESOLINE) 25 MG tablet Take 1 tablet (25 mg total) by mouth every 8 (eight) hours. 90 tablet 6 1/30/2017    ondansetron (ZOFRAN) 4 MG tablet Take 1 tablet (4 mg total) by mouth every 8 (eight) hours as needed for Nausea. 30 tablet 3 Unknown    pantoprazole (PROTONIX) 40 MG tablet Take 1 tablet (40 mg total) by mouth once daily. 60 tablet 10 1/30/2017    warfarin (COUMADIN) 2 MG tablet Take 1 tablet (2mg) by mouth daily, except 1 1/2 tablet (3mg) on Mondays, Wednesdays, and Fridays, Or as directed by [facility]madin Clinic 40 tablet 4 1/29/2017       Review of Systems   Eyes: Positive for photophobia and visual disturbance.   Cardiovascular: Positive for chest pain (occasional) and palpitations (occasional).   Gastrointestinal: Positive for nausea. Negative for constipation and diarrhea.   Genitourinary: Negative for dysuria.   Neurological: Positive for syncope, speech difficulty, weakness, numbness and headaches.   Hematological: Bruises/bleeds easily.   Psychiatric/Behavioral: Negative for confusion. The patient is not nervous/anxious.      Objective:     Vital Signs (Most Recent):  Temp: 97.8 °F  (36.6 °C) (01/31/17 1100)  Pulse: 80 (01/31/17 1230)  Resp: 15 (01/31/17 1230)  BP: 98/73 (01/31/17 1230)  SpO2: 95 % (01/31/17 1230)    Vital Signs Range (Last 24H):  Temp:  [97.6 °F (36.4 °C)-98.3 °F (36.8 °C)]   Pulse:  [71-85]   Resp:  [13-25]   BP: ()/(0-100)   SpO2:  [93 %-100 %]     Physical Exam   Constitutional: She is oriented to person, place, and time. She appears well-developed and well-nourished. No distress.   Hat and sunglasses in place   HENT:   Head: Normocephalic and atraumatic.   Eyes: EOM are normal.   Pulmonary/Chest: Effort normal.   Musculoskeletal: Normal range of motion.   Neurological: She is alert and oriented to person, place, and time. GCS eye subscore is 4 - spontaneous. GCS verbal subscore is 5 - oriented. GCS motor subscore is 6 - obeys commands.   Skin: Skin is warm and dry. She is not diaphoretic.   Psychiatric: She has a normal mood and affect. Her behavior is normal. Judgment and thought content normal.   Nursing note and vitals reviewed.      Neurological Exam:   LOC: alert and follows requests  Language: No aphasia  Speech: No dysarthria  Memory: Recent memory intact, Remote memory intact, Age correct, Month correct  EOM (CN III, IV, VI): Full/intact  Facial Sensation (CN V): Symmetric  Facial Movement (CN VII): symmetric facial expression  Hearing (CN VIII): intact bilaterally  Tongue (CN XII): to midline  Cerebellar*: Tremulous throughout, worse distally, and with movement.  Seen most prominantly in the LUE.    Sensation: mild numbness to distal RLE, which patient states is chronic    NIH Stroke Scale:  Interval: baseline (upon arrival/admit)  Level of Consciousness: 0 - alert  LOC Questions: 0 - answers both correctly  LOC Commands: 0 - performs both correctly  Best Gaze: 0 - normal  Visual: 0 - no visual loss (difficulty seeing fingers, L>R, LUQ>LLQ, but able to accurately state when hand is moving in all four quadrants)  Facial Palsy: 0 - normal  Motor Left Arm: 0 -  no drift  Motor Right Arm: 0 - no drift  Motor Left Le - no drift  Motor Right Le - no drift  Limb Ataxia: 2 - present in two limbs (LUE and RLE -- but tremulousness throughout)  Sensory: 0 - normal  Best Language: 0 - no aphasia  Dysarthria: 0 - normal articulation  Extinction and Inattention: 0 - no neglect  NIH Stroke Scale Total: 2  Sincere Coma Scale:  Best Eye Response: 4 - spontaneous  Best Motor Response: 6 - obeys commands  Best Verbal Response: 5 - oriented  Sincere Coma Scale Total: 15  Modified Hoke Scale:   Timeline: Prior to symptoms onset  Modified Viridiana Score: 4 - moderately severe disability        Laboratory:  CMP:     Recent Labs  Lab 17  2000 17   CALCIUM 8.7 8.9   ALBUMIN 3.4*  --    PROT 7.0  --     136   K 4.5 4.6   CO2 25 29   CL 94* 93*   BUN 69* 70*   CREATININE 8.5* 9.2*   ALKPHOS 117  --    ALT 15  --    AST 19  --    BILITOT 0.4  --      BMP:     Recent Labs  Lab 17      K 4.6   CL 93*   CO2 29   BUN 70*   CREATININE 9.2*   CALCIUM 8.9     CBC:     Recent Labs  Lab 17  1055   WBC 5.03   RBC 3.15*   HGB 10.3*   HCT 30.9*   *   MCV 98   MCH 32.7*   MCHC 33.3     Lipid Panel: No results for input(s): CHOL, LDLCALC, HDL, TRIG in the last 168 hours.  Coagulation:     Recent Labs  Lab 17   INR 2.5*   APTT 37.4*     Hgb A1C: No results for input(s): HGBA1C in the last 168 hours.  TSH: No results for input(s): TSH in the last 168 hours.    Diagnostic Results:  CTH 17   11 mm left thalamic lacunar infarct, new as compared to previous CT scan of 2017.  Otherwise stable.      Cardiac Evaluation:   TTE 15%, LA moderately enlarged, diastolic dysfunction  EKG/Telemetry: ventricularly paced    Assessment/Plan:     60 yo F with h/o CAD, MI, s/p LVAD, CKD, prior stroke (R weakness, R paresthesias, dysarthria), and ESRD on dialysis who presented with dysarthria and LOC. 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  .8 in 11/2016  Aggressive risk factor modification: Diet, Exercise, Obesity, Atrial Fibrillation and Carotid Artery disease  Rehab Efforts: Physical Therapy, Occupational Therapy, Speech and Language Pathology and Physical Medicine and Rehabilitation  VTE Prphylaxis: Coumadin, BP Parameters: SBP <180 (from a stroke perspective, as she is 48h out)  Nursing Orders: Neuro checks- q4h, Antiembolic stockings, Swallowing evaluation by Nursing, Out of bed BID, Avoid Torres catheter, Pneumatic compression device, Stroke Education, Blood glucose target 100-130, Up with assistance      Paroxysmal atrial fibrillation/ LVAD  Recommend to continue Coumadin    Discussed with Dr. Cowan. Staff addendum to follow     SONAL Aguilar  Comprehensive Stroke Center  Department of Vascular Neurology   [facility]

## 2017-01-31 NOTE — PLAN OF CARE
"Problem: Patient Care Overview  Goal: Plan of Care Review  Outcome: Ongoing (interventions implemented as appropriate)    01/31/17 0305   Coping/Psychosocial   Plan Of Care Reviewed With patient       Past Medical History   Diagnosis Date    Anticoagulant long-term use      Anxiety      Atrial tachycardia, paroxysmal 4/21/2013    Blood transfusion      Cardiomyopathy      CHF (congestive heart failure)      Chronic kidney disease      Coronary artery disease      End stage renal disease      Hypertension         pulmonary    ICD (implantable cardioverter-defibrillator) battery depletion 4/25/2014    Myocardial infarction      Presence of left ventricular assist device (LVAD)      Pulmonary hypertension      Stroke          Pt arrived to [facility] 3068 this shift via EMS from . No acute events overnight. VSS. AAOx4. Pt states she has been experiencing photophobia and diplopia since Saturday, pt currently wearing sunglasses for comfort. SR 70-80s, BP elevated and tx with PRN meds. Doppler pressures 90-100s. Afebrile. Heartware in place, dressing changed overnight. Site appears CDI with some pink areas secondary to driveline pulling and twisting. No alarms overnight. 2L NC, O2 sats >92%. Pt states she wears 2L NC at home as well. Pt remains NPO except for medications. No BM overnight. Pt states she completes HD on Tuesday, Thursday, and Saturdays. During dialysis, she states her BP "bottoms out" and she occasionally has LVAD low flow alarms. Pt continues to produce clear, yellow urine. Skin intact. Pain controlled. POC discussed with the patient. All questions and concerns addressed. WCTM.          "

## 2017-01-31 NOTE — NURSING
"1010-left unit for CT in ED - pot tele and port o2 inplace.  This RN reamained at pt's side - transported in unit bed.  Pt tolerated well.  Returned to unit on same manner at 1045.  Pt remained stable.  Lee montejo "same" HA 12/10.  L nares cont to bleed.  Karen LAM, notified and  Stat cbc ordered.  (PRN hydralazine was given prior to leaving for CT and BP was monitored throughout the trip.  BP remained stable.  "

## 2017-01-31 NOTE — PROCEDURES
TXP LVAD INTERROGATIONS 1/31/2017 1/31/2017 1/31/2017 1/31/2017 1/31/2017 1/31/2017 1/31/2017   Type Heartware Heartware Heartware Heartware Heartware Heartware Heartware   Flow 4.9 5.1 4.8 4.1 4.0 4.2 4.9   Speed 2700 2700 2700 2700 2700 2700 2700   Power (Kim) 4.0 4.3 4 3.8 3.8 3.9 4.1   Pulsatility Pulse Pulse Pulse Pulse Pulse Pulse Pulse     HCT= 30.9  Upon entering room, flow and watt waveform flat with negative deflections noted.  HD in progress, removed 1.5 L.  Speed decreased to 2600 with no change, decreased to 2500 with no change and then to 2400 with no change.   Notified Karen, HD stopped.  Pt rinsed back.  Flow waveforms returned to normal, 4-7, no negative deflections. Speed slowly increased to 2600 and remains at 2600 rpms.

## 2017-01-31 NOTE — CONSULTS
[facility]  Vascular Neurology  Comprehensive Stroke Center  Consult Note      Consults  Subjective:     History of Present Illness:  Ms. Devine is a 60 yo F with a h/o CAD, MI, s/p LVAD, CKD, prior stroke (R weakness, R paresthesias, dysarthria), and ESRD on dialysis who presented with dysarthria and LOC.  Patient reports that she was at dinner two nights ago when she developed difficulty speaking and then lost consciousness.  She next remembers waking up when her family was taking her home.  Since she woke up, she has been seeing double (one on top of the other), in addition to chronic photophobia, which she states has been present for about a year. Furthermore, over the last two days, she has been experiencing worsened R sided weakness, numbness, and dysarthria, all of which were associated with her first stroke; she believes that these symptoms are now back to baseline.  Her only complaints at this time are double vision, photophobia, headache (R>>L), and nausea.           Past Medical History   Diagnosis Date    Anticoagulant long-term use     Anxiety     Atrial tachycardia, paroxysmal 2013    Blood transfusion     Cardiomyopathy     CHF (congestive heart failure)     Chronic kidney disease     Coronary artery disease     End stage renal disease     Hypertension      pulmonary    ICD (implantable cardioverter-defibrillator) battery depletion 2014    Myocardial infarction     Presence of left ventricular assist device (LVAD)     Pulmonary hypertension     Stroke      Past Surgical History   Procedure Laterality Date    Left ventricular assist device  2012    Tonsillectomy      Cardiac surgery      Coronary artery bypass graft      Hip surgery       RTHA    Fracture surgery      Vascular surgery      Cardiac defibrillator placement       section      Colonoscopy N/A 2016     Procedure: COLONOSCOPY;  Surgeon: Mark Currie MD;   Location: [facility] ([address] [place] [address]);  Service: Endoscopy;  Laterality: N/A;     Family History   Problem Relation Age of Onset    Arthritis Mother     Heart disease Father     Hypertension Father     Cancer Maternal Grandmother     Breast cancer Neg Hx     Colon cancer Neg Hx     Ovarian cancer Neg Hx      Social History   Substance Use Topics    Smoking status: Former Smoker     Packs/day: 2.00     Years: 30.00     Quit date: 10/29/2009    Smokeless tobacco: None    Alcohol use Yes      Comment: 1 glass of wine a month     Review of patient's allergies indicates:   Allergen Reactions    Codeine Nausea And Vomiting    Demerol [meperidine] Nausea And Vomiting    Lortab [hydrocodone-acetaminophen] Nausea And Vomiting     Medications: I have reviewed the current medication administration record.    Prescriptions Prior to Admission   Medication Sig Dispense Refill Last Dose    acetaminophen (TYLENOL) 325 MG tablet Take 2 tablets (650 mg total) by mouth every 6 (six) hours as needed for Pain (mild pain).  0 1/29/2017    CALCIUM CARBONATE (ANTACID CALCIUM ORAL) Take 1 tablet by mouth 4 (four) times daily.    1/29/2017    cetirizine (ZYRTEC) 5 MG tablet Take 1 tablet (5 mg total) by mouth once daily. 30 tablet 0 Unknown    escitalopram oxalate (LEXAPRO) 20 MG tablet Take 1 tablet (20 mg total) by mouth every evening. 90 tablet 3 1/29/2017    GABAPENTIN (CMPD PAIN MANAGEMENT COMPOUND OINTMNENT THREE) Apply small amount to painful joints once or twice a day 30 g 3 Unknown    gabapentin (NEURONTIN) 300 MG capsule Take 1 capsule (300 mg total) by mouth every evening. 120 capsule 2 1/29/2017    hydrALAZINE (APRESOLINE) 25 MG tablet Take 1 tablet (25 mg total) by mouth every 8 (eight) hours. 90 tablet 6 1/30/2017    ondansetron (ZOFRAN) 4 MG tablet Take 1 tablet (4 mg total) by mouth every 8 (eight) hours as needed for Nausea. 30 tablet 3 Unknown    pantoprazole (PROTONIX) 40 MG tablet Take 1 tablet (40 mg total)  by mouth once daily. 60 tablet 10 1/30/2017    warfarin (COUMADIN) 2 MG tablet Take 1 tablet (2mg) by mouth daily, except 1 1/2 tablet (3mg) on Mondays, Wednesdays, and Fridays, Or as directed by Coumadin Clinic 40 tablet 4 1/29/2017       Review of Systems   Eyes: Positive for photophobia and visual disturbance.   Cardiovascular: Positive for chest pain (occasional) and palpitations (occasional).   Gastrointestinal: Positive for nausea. Negative for constipation and diarrhea.   Genitourinary: Negative for dysuria.   Neurological: Positive for syncope, speech difficulty, weakness, numbness and headaches.   Hematological: Bruises/bleeds easily.   Psychiatric/Behavioral: Negative for confusion. The patient is not nervous/anxious.      Objective:     Vital Signs (Most Recent):  Temp: 98.3 °F (36.8 °C) (01/30/17 2000)  Pulse: 77 (01/30/17 2100)  Resp: (!) 22 (01/30/17 2100)  BP: (!) 137/98 (01/30/17 2100)  SpO2: 100 % (01/30/17 2100)    Vital Signs Range (Last 24H):  Temp:  [97.9 °F (36.6 °C)-98.3 °F (36.8 °C)]   Pulse:  [76-86]   Resp:  [15-22]   BP: ()/(74-98)   SpO2:  [95 %-100 %]     Physical Exam   Constitutional: She is oriented to person, place, and time. She appears well-developed and well-nourished. No distress.   Hat and sunglasses in place   HENT:   Head: Normocephalic and atraumatic.   Eyes: EOM are normal.   Pulmonary/Chest: Effort normal.   Musculoskeletal: Normal range of motion.   Neurological: She is alert and oriented to person, place, and time. GCS eye subscore is 4 - spontaneous. GCS verbal subscore is 5 - oriented. GCS motor subscore is 6 - obeys commands.   Skin: Skin is warm and dry. She is not diaphoretic.   Psychiatric: She has a normal mood and affect. Her behavior is normal. Judgment and thought content normal.   Nursing note and vitals reviewed.      Neurological Exam:   LOC: alert and follows requests  Language: No aphasia  Speech: No dysarthria  Memory: Recent memory intact, Remote  memory intact, Age correct, Month correct  EOM (CN III, IV, VI): Full/intact  Facial Sensation (CN V): Symmetric  Facial Movement (CN VII): symmetric facial expression  Hearing (CN VIII): intact bilaterally  Tongue (CN XII): to midline  Cerebellar*: Tremulous throughout, worse distally, and with movement.  Seen most prominantly in the LUE.    Sensation: mild numbness to distal RLE, which patient states is chronic    NIH Stroke Scale:  Interval: baseline (upon arrival/admit)  Level of Consciousness: 0 - alert  LOC Questions: 0 - answers both correctly  LOC Commands: 0 - performs both correctly  Best Gaze: 0 - normal  Visual: 0 - no visual loss (difficulty seeing fingers, L>R, LUQ>LLQ, but able to accurately state when hand is moving in all four quadrants)  Facial Palsy: 0 - normal  Motor Left Arm: 0 - no drift  Motor Right Arm: 0 - no drift  Motor Left Le - no drift  Motor Right Le - no drift  Limb Ataxia: 2 - present in two limbs (LUE and RLE -- but tremulousness throughout)  Sensory: 0 - normal  Best Language: 0 - no aphasia  Dysarthria: 0 - normal articulation  Extinction and Inattention: 0 - no neglect  NIH Stroke Scale Total: 2  Sincere Coma Scale:  Best Eye Response: 4 - spontaneous  Best Motor Response: 6 - obeys commands  Best Verbal Response: 5 - oriented  Sincere Coma Scale Total: 15  Modified Viridiana Scale:   Timeline: Prior to symptoms onset  Modified Falls Score: 4 - moderately severe disability        Laboratory:  CMP:   Recent Labs  Lab 17   CALCIUM 8.7   ALBUMIN 3.4*   PROT 7.0      K 4.5   CO2 25   CL 94*   BUN 69*   CREATININE 8.5*   ALKPHOS 117   ALT 15   AST 19   BILITOT 0.4     BMP:   Recent Labs  Lab 17      K 4.5   CL 94*   CO2 25   BUN 69*   CREATININE 8.5*   CALCIUM 8.7     CBC:   Recent Labs  Lab 17   WBC 5.90   RBC 3.08*   HGB 9.9*   HCT 29.7*   *   MCV 96   MCH 32.1*   MCHC 33.3     Lipid Panel: No results for input(s): CHOL,  LDLCALC, HDL, TRIG in the last 168 hours.  Coagulation:   Recent Labs  Lab 01/30/17 2000   INR 2.4*   APTT 36.6*     Hgb A1C: No results for input(s): HGBA1C in the last 168 hours.  TSH: No results for input(s): TSH in the last 168 hours.    Diagnostic Results:  CTH 1/20/17   11 mm left thalamic lacunar infarct, new as compared to previous CT scan of 01/19/2017.  Otherwise stable.      Cardiac Evaluation:   TTE 15%, LA moderately enlarged, diastolic dysfunction  EKG/Telemetry: ventricularly paced    Assessment/Plan:     Patient is a 61 y.o. 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  LVAD present.  INR 2.4 on admission.    Antithrombotics for secondary stroke prevention: Anticoagulants:  Warfarin INR adjusted target    Statins for secondary stroke prevention and hyperlipidemia, if present: Atorvastatin- 40 mg oral daily.  .8 in 11/2016    Aggressive risk factor modification: Diet, Exercise, Obesity, Atrial Fibrillation and Carotid Artery disease    Rehab Efforts: Physical Therapy, Occupational Therapy, Speech and Language Pathology and Physical Medicine and Rehabilitation    Diagnostics: Ordered/Pending Trans-thoracic cardiac echo to assess cardiac function/status, TSH to assess thyroid function.  Could consider CTA Head and Neck prior to dialysis (if any residual kidney function) to assess vasculature.    VTE Prophylaxis: Coumadin, BP Parameters: SBP <180 (from a stroke perspective, as she is 48h out)    Nursing Orders: Neuro checks- q4h, Antiembolic stockings, Swallowing evaluation by  Nursing, Out of bed BID, Avoid Torres catheter, Pneumatic compression device, Stroke Education, Blood glucose target 100-130, Up with assistance    Migraine with aura and without status migrainosus, not intractable  R>>L HA, nausea, photophobia, blurred vision.    Not a candidate for prophylactic meds or for triptans (abortives).    Consider judicious use of fioricet.      DDx includes RCVS, given concomitant stroke.  Will manage separately for now; could consider cerebral angiogram in the future (gold standard) for diagnosis, but no acute need for intervention at this time.      Thrombolysis Candidate? No  1. Contraindications: Outside of treament window  2. Warnings: Prior anticoagulant use prior to admission (even if INR < 1.7) (3-4.5 hours contraindication)     Interventional Revascularization Candidate?  No; No large vessel occlusion and No; No significant neurological deficit    Research Candidate? No    Milagros Miranda MD  Comprehensive Stroke Center  Department of Vascular Neurology   [facility]

## 2017-01-31 NOTE — PT/OT/SLP PROGRESS
Occupational Therapy      Randa Sybil  MRN: [id number]    Patient not seen today secondary to  (pt just returning from CT upon OT arrival. Along with RN, it was deemed best for OT/PT to return d/t awaiting to determine if pt's embolic stroke converted to hemorrhage. Im pm, pt on dialysis). Will follow-up 2/1/17.    AMINA Napier  1/31/2017

## 2017-01-31 NOTE — SUBJECTIVE & OBJECTIVE
Past Medical History   Diagnosis Date    Anticoagulant long-term use     Anxiety     Atrial tachycardia, paroxysmal 2013    Blood transfusion     Cardiomyopathy     CHF (congestive heart failure)     Chronic kidney disease     Coronary artery disease     End stage renal disease     Hypertension      pulmonary    ICD (implantable cardioverter-defibrillator) battery depletion 2014    Myocardial infarction     Presence of left ventricular assist device (LVAD)     Pulmonary hypertension     Stroke      Past Surgical History   Procedure Laterality Date    Left ventricular assist device  2012    Tonsillectomy      Cardiac surgery      Coronary artery bypass graft      Hip surgery       RTHA    Fracture surgery      Vascular surgery      Cardiac defibrillator placement       section      Colonoscopy N/A 2016     Procedure: COLONOSCOPY;  Surgeon: Mark Currie MD;  Location: [facility] ([address] [place] [address]);  Service: Endoscopy;  Laterality: N/A;     Family History   Problem Relation Age of Onset    Arthritis Mother     Heart disease Father     Hypertension Father     Cancer Maternal Grandmother     Breast cancer Neg Hx     Colon cancer Neg Hx     Ovarian cancer Neg Hx      Social History   Substance Use Topics    Smoking status: Former Smoker     Packs/day: 2.00     Years: 30.00     Quit date: 10/29/2009    Smokeless tobacco: None    Alcohol use Yes      Comment: 1 glass of wine a month     Review of patient's allergies indicates:   Allergen Reactions    Codeine Nausea And Vomiting    Demerol [meperidine] Nausea And Vomiting    Lortab [hydrocodone-acetaminophen] Nausea And Vomiting     Medications: I have reviewed the current medication administration record.    Prescriptions Prior to Admission   Medication Sig Dispense Refill Last Dose    acetaminophen (TYLENOL) 325 MG tablet Take 2 tablets (650 mg total) by mouth every 6 (six) hours as needed for Pain  (mild pain).  0 1/29/2017    CALCIUM CARBONATE (ANTACID CALCIUM ORAL) Take 1 tablet by mouth 4 (four) times daily.    1/29/2017    cetirizine (ZYRTEC) 5 MG tablet Take 1 tablet (5 mg total) by mouth once daily. 30 tablet 0 Unknown    escitalopram oxalate (LEXAPRO) 20 MG tablet Take 1 tablet (20 mg total) by mouth every evening. 90 tablet 3 1/29/2017    GABAPENTIN (CMPD PAIN MANAGEMENT COMPOUND OINTMNENT THREE) Apply small amount to painful joints once or twice a day 30 g 3 Unknown    gabapentin (NEURONTIN) 300 MG capsule Take 1 capsule (300 mg total) by mouth every evening. 120 capsule 2 1/29/2017    hydrALAZINE (APRESOLINE) 25 MG tablet Take 1 tablet (25 mg total) by mouth every 8 (eight) hours. 90 tablet 6 1/30/2017    ondansetron (ZOFRAN) 4 MG tablet Take 1 tablet (4 mg total) by mouth every 8 (eight) hours as needed for Nausea. 30 tablet 3 Unknown    pantoprazole (PROTONIX) 40 MG tablet Take 1 tablet (40 mg total) by mouth once daily. 60 tablet 10 1/30/2017    warfarin (COUMADIN) 2 MG tablet Take 1 tablet (2mg) by mouth daily, except 1 1/2 tablet (3mg) on Mondays, Wednesdays, and Fridays, Or as directed by Coumadin Clinic 40 tablet 4 1/29/2017       Review of Systems   Eyes: Positive for photophobia and visual disturbance.   Cardiovascular: Positive for chest pain (occasional) and palpitations (occasional).   Gastrointestinal: Positive for nausea. Negative for constipation and diarrhea.   Genitourinary: Negative for dysuria.   Neurological: Positive for syncope, speech difficulty, weakness, numbness and headaches.   Hematological: Bruises/bleeds easily.   Psychiatric/Behavioral: Negative for confusion. The patient is not nervous/anxious.      Objective:     Vital Signs (Most Recent):  Temp: 98.3 °F (36.8 °C) (01/30/17 2000)  Pulse: 77 (01/30/17 2100)  Resp: (!) 22 (01/30/17 2100)  BP: (!) 137/98 (01/30/17 2100)  SpO2: 100 % (01/30/17 2100)    Vital Signs Range (Last 24H):  Temp:  [97.9 °F (36.6 °C)-98.3  °F (36.8 °C)]   Pulse:  [76-86]   Resp:  [15-22]   BP: ()/(74-98)   SpO2:  [95 %-100 %]     Physical Exam   Constitutional: She is oriented to person, place, and time. She appears well-developed and well-nourished. No distress.   Hat and sunglasses in place   HENT:   Head: Normocephalic and atraumatic.   Eyes: EOM are normal.   Pulmonary/Chest: Effort normal.   Musculoskeletal: Normal range of motion.   Neurological: She is alert and oriented to person, place, and time. GCS eye subscore is 4 - spontaneous. GCS verbal subscore is 5 - oriented. GCS motor subscore is 6 - obeys commands.   Skin: Skin is warm and dry. She is not diaphoretic.   Psychiatric: She has a normal mood and affect. Her behavior is normal. Judgment and thought content normal.   Nursing note and vitals reviewed.      Neurological Exam:   LOC: alert and follows requests  Language: No aphasia  Speech: No dysarthria  Memory: Recent memory intact, Remote memory intact, Age correct, Month correct  EOM (CN III, IV, VI): Full/intact  Facial Sensation (CN V): Symmetric  Facial Movement (CN VII): symmetric facial expression  Hearing (CN VIII): intact bilaterally  Tongue (CN XII): to midline  Cerebellar*: Tremulous throughout, worse distally, and with movement.  Seen most prominantly in the LUE.    Sensation: mild numbness to distal RLE, which patient states is chronic    NIH Stroke Scale:  Interval: baseline (upon arrival/admit)  Level of Consciousness: 0 - alert  LOC Questions: 0 - answers both correctly  LOC Commands: 0 - performs both correctly  Best Gaze: 0 - normal  Visual: 0 - no visual loss (difficulty seeing fingers, L>R, LUQ>LLQ, but able to accurately state when hand is moving in all four quadrants)  Facial Palsy: 0 - normal  Motor Left Arm: 0 - no drift  Motor Right Arm: 0 - no drift  Motor Left Le - no drift  Motor Right Le - no drift  Limb Ataxia: 2 - present in two limbs (LUE and RLE -- but tremulousness throughout)  Sensory: 0 -  normal  Best Language: 0 - no aphasia  Dysarthria: 0 - normal articulation  Extinction and Inattention: 0 - no neglect  NIH Stroke Scale Total: 2  Burt Coma Scale:  Best Eye Response: 4 - spontaneous  Best Motor Response: 6 - obeys commands  Best Verbal Response: 5 - oriented  Burt Coma Scale Total: 15  Modified [place] Scale:   Timeline: Prior to symptoms onset  Modified [place] Score: 4 - moderately severe disability        Laboratory:  CMP:   Recent Labs  Lab 01/30/17 2000   CALCIUM 8.7   ALBUMIN 3.4*   PROT 7.0      K 4.5   CO2 25   CL 94*   BUN 69*   CREATININE 8.5*   ALKPHOS 117   ALT 15   AST 19   BILITOT 0.4     BMP:   Recent Labs  Lab 01/30/17 2000      K 4.5   CL 94*   CO2 25   BUN 69*   CREATININE 8.5*   CALCIUM 8.7     CBC:   Recent Labs  Lab 01/30/17 2000   WBC 5.90   RBC 3.08*   HGB 9.9*   HCT 29.7*   *   MCV 96   MCH 32.1*   MCHC 33.3     Lipid Panel: No results for input(s): CHOL, LDLCALC, HDL, TRIG in the last 168 hours.  Coagulation:   Recent Labs  Lab 01/30/17 2000   INR 2.4*   APTT 36.6*     Hgb A1C: No results for input(s): HGBA1C in the last 168 hours.  TSH: No results for input(s): TSH in the last 168 hours.    Diagnostic Results:  CTH 1/20/17   11 mm left thalamic lacunar infarct, new as compared to previous CT scan of 01/19/2017.  Otherwise stable.      Cardiac Evaluation:   TTE 15%, LA moderately enlarged, diastolic dysfunction  EKG/Telemetry: ventricularly paced

## 2017-01-31 NOTE — PROGRESS NOTES
New admit to MICU.  Chart, notes, labs reviewed from eICU.  Dx: acute CVA not a tpa candiadate ( out of range timing), LVAD/CABG/AICD status on anticoagulation (INR 2.4).  Consult from Neuro requested from ED.  CT no hemorrhage, new left thalamic infarct.  Vitals stable.

## 2017-01-31 NOTE — CONSULTS
Consult Note  Nephrology    Consult Requested By: Miller Restrepo Jr.,*  Reason for Consult: ESRD (TTS)    SUBJECTIVE:     History of Present Illness:  Patient is a 61 y.o. female with Hx of ICM with EF of 5% now s/p LVAD, ESRD (TTS), MI, multiple strokes.  On , patient reports she developed an acute onset of double vision and photophobia that was associated with dysarthria and loss on consciousness while she was at dinner with her family.  On Monday, she reported that she was having an increase in R side weakness, numbness, and dysarthria. CT scan revealed L thalamic lacunar infarct with no edema or bleeding present, possibly cardioembolic in etiology. She continues with photophobia, headache, and nausea.  Her last HD treatment was on Saturday, which   She reports 2.3L were removed without problems. She is oxygenating well on 2L NC, but reports some symptoms of SOB.      Past Medical History   Diagnosis Date    Anticoagulant long-term use     Anxiety     Atrial tachycardia, paroxysmal 2013    Blood transfusion     Cardiomyopathy     CHF (congestive heart failure)     Chronic kidney disease     Coronary artery disease     End stage renal disease     Hypertension      pulmonary    ICD (implantable cardioverter-defibrillator) battery depletion 2014    Myocardial infarction     Presence of left ventricular assist device (LVAD)     Pulmonary hypertension     Stroke      Past Surgical History   Procedure Laterality Date    Left ventricular assist device  2012    Tonsillectomy      Cardiac surgery      Coronary artery bypass graft      Hip surgery       RTHA    Fracture surgery      Vascular surgery      Cardiac defibrillator placement       section      Colonoscopy N/A 2016     Procedure: COLONOSCOPY;  Surgeon: Mark Currie MD;  Location: [facility] ([address] [place] [address]);  Service: Endoscopy;  Laterality: N/A;     Family History   Problem Relation Age of Onset     Arthritis Mother     Heart disease Father     Hypertension Father     Cancer Maternal Grandmother     Breast cancer Neg Hx     Colon cancer Neg Hx     Ovarian cancer Neg Hx      Social History   Substance Use Topics    Smoking status: Former Smoker     Packs/day: 2.00     Years: 30.00     Quit date: 10/29/2009    Smokeless tobacco: None    Alcohol use Yes      Comment: 1 glass of wine a month       Review of patient's allergies indicates:   Allergen Reactions    Codeine Nausea And Vomiting    Demerol [meperidine] Nausea And Vomiting    Lortab [hydrocodone-acetaminophen] Nausea And Vomiting        Review of Systems:  Constitutional: Negative for fever, + appetite change  Eyes: + photophobia and visual disturbance  Respiratory: Negative for cough, chest tightness, + SOB  Cardiovascular: Negative for chest pain, palpitations and leg swelling.  Gastrointestinal: + nausea, negative vomiting, negative constipation  Genitourinary: Negative for dysuria, + decreased urinary volume  Skin: Negative for pallor, rash and wound.  Neurological: + headaches, + R sided weakness  Psychiatric/Behavioral: Negative for confusion, sleep disturbance and dysphoric mood. The patient is not nervous/anxious.        OBJECTIVE:     Vital Signs (Most Recent)  Temp: 97.8 °F (36.6 °C) (01/31/17 1100)  Pulse: 82 (01/31/17 1300)  Resp: 18 (01/31/17 1300)  BP: (!) 80/0 (01/31/17 1330)  SpO2: 97 % (01/31/17 1300)    Vital Signs Range (Last 24H):  Temp:  [97.6 °F (36.4 °C)-98.3 °F (36.8 °C)]   Pulse:  [71-85]   Resp:  [13-25]   BP: ()/(0-100)   SpO2:  [93 %-100 %]     Physical Exam:  General: WF appears ill.  AOx4.  NAD.  Respiratory: Clear to auscultation bilaterally, respirations unlabored, no rales/rhonchi/wheezing  Cardiovacular: LVAD hum  Gastrointestinal: Soft, non-tender, bowel sounds normal  Extremities: No clubbing or cyanosis of bilateral upper extremities; no lower extremity edema bilaterally  Skin: warm and dry; no rash on  exposed skin        Laboratory:  CBC:   Recent Labs  Lab 01/31/17  1055   WBC 5.03   RBC 3.15*   HGB 10.3*   HCT 30.9*   *   MCV 98   MCH 32.7*   MCHC 33.3     BMP:   Recent Labs  Lab 01/31/17  0214   GLU 78   CL 93*   CO2 29   BUN 70*   CREATININE 9.2*   CALCIUM 8.9   MG 2.2           ASSESSMENT/PLAN:     60 Y/O F with PMHx of ICM with EF of 5% s/p Heartware as destination therapy, pAT, and ESRD on T/R/S, history of GIB last 4/2016 requiring blood transfusion, colonoscopy revealed AVM in caecum S/P APC with no recurrent GIB. Presenting with acute ischemic thalamic stroke manifested with double vision and photophobia.    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         Plan:   ESRD TTS  -Dialyzes in [place] at [facility].  -Last HD on Saturday without problems  -electrolytes stable today, oxygenating well on 2L NC.  Reports slight increase in SOB  -will provide 3 hour HD treatment for metabolic clearance and volume management  -UF 2L as tolerated  -will keep patient on her TTS schedule.    Anemia of ESRD  -H/H 10.3/30.9  -goal in range for ESRD patient  -will continue to monitor closely.      Bone Mineral Metabolism  -Phos 5.3 pre-dialysis  -normally takes tums 1 with meals TID  -CA WNL  -will monitor phos closely, may need to start Tums if remains hyperphosphatemic.      JESSENIA Donaldson, JUAN MP-BC  Nephrology  Pager:  [phone]

## 2017-01-31 NOTE — ASSESSMENT & PLAN NOTE
Likely cardioembolic in etiology.  LVAD present.  INR 2.4 on admission.    Antithrombotics for secondary stroke prevention: Anticoagulants:  Warfarin INR adjusted target    Statins for secondary stroke prevention and hyperlipidemia, if present: Atorvastatin- 40 mg oral daily.  .8 in 11/2016    Aggressive risk factor modification: Diet, Exercise, Obesity, Atrial Fibrillation and Carotid Artery disease    Rehab Efforts: Physical Therapy, Occupational Therapy, Speech and Language Pathology and Physical Medicine and Rehabilitation    Diagnostics: Ordered/Pending Trans-thoracic cardiac echo to assess cardiac function/status, TSH to assess thyroid function    VTE Prophylaxis: Coumadin, BP Parameters: SBP <180 (from a stroke perspective, as she is 48h out)    Nursing Orders: Neuro checks- q4h, Antiembolic stockings, Swallowing evaluation by Nursing, Out of bed BID, Avoid Torres catheter, Pneumatic compression device, Stroke Education, Blood glucose target 100-130, Up with assistance

## 2017-01-31 NOTE — PROGRESS NOTES
HD tx d/c'ed early.net UF 1200 ml. . Pt asymptomatic but unable to get b/p. LVAD wave form non pulatile. Ruthy at bedside HTS aware d/c tx. Blood returned needles pulled hemostasis obtained sites dressed.  LVAD wave form returned b/p obtained.

## 2017-01-31 NOTE — NURSING
Dr. Hernandez called and informed that doppler pressure is 105. MAP currently 114 via cuff. Ordered hydralazine PO 25mg once. WCTM.

## 2017-01-31 NOTE — NURSING
Pt found to be bleeding from nose, humidification added to oxygen. Pt states this happens at home. Kleenex given to pt. Bleeding controlled with Kleenex. WCTM.

## 2017-01-31 NOTE — H&P
History & Physical  Cardiology    Chief Complaint: Double     History of Present Illness:   60 Y/O F with PMHx of ICM with EF of 5% s/p Heartware as destination therapy, pAT, and ESRD on T/R/S, history of GIB last 2016 requiring blood transfusion, colonoscopy revealed AVM in caecum S/P APC with no recurrent GIB, uses oxygen at night. Yesterday patient developed acute onset of double vision and photophobia associated with transient aphasia and transient loss of conciousness. She didn't seek medical attention until today, she went to [facility] where her CT showed thalamic infarct that is new compared to prior imaging in . Her INR was 2.4 so she was not a candidate for tpa. Patient was transferred for higher level of care. On presentation    EKG: pending    Cardiac Marker:     Recent Labs  Lab 17  0923   TROPONINI 0.091*       Review of patient's allergies indicates:   Allergen Reactions    Codeine Nausea And Vomiting    Demerol [meperidine] Nausea And Vomiting    Lortab [hydrocodone-acetaminophen] Nausea And Vomiting     Past Medical History   Diagnosis Date    Anticoagulant long-term use     Anxiety     Atrial tachycardia, paroxysmal 2013    Blood transfusion     Cardiomyopathy     CHF (congestive heart failure)     Chronic kidney disease     Coronary artery disease     End stage renal disease     Hypertension      pulmonary    ICD (implantable cardioverter-defibrillator) battery depletion 2014    Myocardial infarction     Presence of left ventricular assist device (LVAD)     Pulmonary hypertension     Stroke    .  Past Surgical History   Procedure Laterality Date    Left ventricular assist device  2012    Tonsillectomy      Cardiac surgery      Coronary artery bypass graft      Hip surgery       RTHA    Fracture surgery      Vascular surgery      Cardiac defibrillator placement       section      Colonoscopy N/A 2016     Procedure:  COLONOSCOPY;  Surgeon: Mark Currie MD;  Location: [address]  Service: Endoscopy;  Laterality: N/A;     Family History   Problem Relation Age of Onset    Arthritis Mother     Heart disease Father     Hypertension Father     Cancer Maternal Grandmother     Breast cancer Neg Hx     Colon cancer Neg Hx     Ovarian cancer Neg Hx      Social History   Substance Use Topics    Smoking status: Former Smoker     Packs/day: 2.00     Years: 30.00     Quit date: 10/29/2009    Smokeless tobacco: Not on file    Alcohol use Yes      Comment: 1 glass of wine a month      PTA Medications   Medication Sig    acetaminophen (TYLENOL) 325 MG tablet Take 2 tablets (650 mg total) by mouth every 6 (six) hours as needed for Pain (mild pain).    CALCIUM CARBONATE (ANTACID CALCIUM ORAL) Take 1 tablet by mouth 4 (four) times daily.     cetirizine (ZYRTEC) 5 MG tablet Take 1 tablet (5 mg total) by mouth once daily.    escitalopram oxalate (LEXAPRO) 20 MG tablet Take 1 tablet (20 mg total) by mouth every evening.    GABAPENTIN (CMPD PAIN MANAGEMENT COMPOUND OINTMNENT THREE) Apply small amount to painful joints once or twice a day    gabapentin (NEURONTIN) 300 MG capsule Take 1 capsule (300 mg total) by mouth every evening.    hydrALAZINE (APRESOLINE) 25 MG tablet Take 1 tablet (25 mg total) by mouth every 8 (eight) hours.    ondansetron (ZOFRAN) 4 MG tablet Take 1 tablet (4 mg total) by mouth every 8 (eight) hours as needed for Nausea.    pantoprazole (PROTONIX) 40 MG tablet Take 1 tablet (40 mg total) by mouth once daily.    warfarin (COUMADIN) 2 MG tablet Take 1 tablet (2mg) by mouth daily, except 1 1/2 tablet (3mg) on Mondays, Wednesdays, and Fridays, Or as directed by Coumadin Clinic     Review of Systems:  Constitutional: negative for chills, fevers and night sweats  Ears, nose, mouth, throat, and face: negative for nasal congestion, sore throat and tinnitus  Respiratory: negative for cough, dyspnea on  exertion, pleurisy/chest pain, sputum and wheezing  Cardiovascular: See HPI  Gastrointestinal: negative  Genitourinary:negative for dysuria, frequency, hematuria, hesitancy and nocturia  Hematologic/lymphatic: negative for bleeding and easy bruising  Musculoskeletal:negative for muscle weakness and myalgias  Neurological: see HPI  Behavioral/Psych: negative for anxiety and bad mood      Vital Signs (Most Recent)       Vital Signs Range (Last 24H):  Temp:  [97.9 °F (36.6 °C)-98 °F (36.7 °C)]   Pulse:  [76-86]   Resp:  [15-21]   BP: ()/(74-87)   SpO2:  [95 %-100 %]     I&O: No intake or output data in the 24 hours ending 01/30/17 1958    Physical Exam:  General: Patient in no acute distress or discomfort  HEENT: No JVD, moist mucous membranes  Cardiac: LVAD Hum  Chest: CTABL, no wheezing or rales  Abd:Soft NTND  Ext: No Edema No swelling  Neuro: A and O X 3, non focal      Laboratory:  Cardiac Biomarker:     Recent Labs  Lab 01/30/17  0923   TROPONINI 0.091*       CBC with Diff:     Recent Labs  Lab 01/30/17  0923   WBC 5.00   HGB 10.8*   HCT 33.1*   *   LYMPH 15.4*  0.8*   MONO 8.2  0.4   EOSINOPHIL 2.0       COAG:    Recent Labs  Lab 01/26/17 01/30/17  0923   APTT  --  37.2*   INR 2.5 2.4*       CMP:   Recent Labs  Lab 01/30/17  0923   *   CALCIUM 9.7   ALBUMIN 3.7   PROT 7.7      K 4.1   CO2 24   CL 95   BUN 58*   CREATININE 8.4*   ALKPHOS 112   ALT 17   AST 25   BILITOT 0.5   MG 2.5     CrCl cannot be calculated (Unknown ideal weight.).    Diagnostic Results:   CT   11 mm left thalamic lacunar infarct, new as compared to previous CT scan of 01/19/2017.  Otherwise stable.        All CT scans at this facility use dose modulation, iterative reconstruction and/or weight based dosing when appropriate to reduce radiation dose to as low as reasonably achievable.   Active Problems:   Present on Admission:   Stroke      ASSESSMENT/PLAN:   62 Y/O F with PMHx of ICM with EF of 5% s/p Heartware as  destination therapy, pAT, and ESRD on T/R/S, history of GIB last 4/2016 requiring blood transfusion, colonoscopy revealed AVM in caecum S/P APC with no recurrent GIB. Presenting with acute ischemic thalamic stroke manifested with double vision and photophobia.    -Acute CVA-  - Appreciate Vascular Neurology recs. Will continue with anticoagulation. No need to repeat CT scan tonight.  - Neurovascular checks every hour.  - Consult neuro critical care in the AM.     -S/p Heartware (2700)  - LVAD order set  - INR Daily  - Coumadin to keep INR 2-3   - MAP/Doppler goal of 60-80     -ESRD - T/R/Sat dialysis  - Nephrology consult for dialysis as needed    S and S  PT/OT  Full Code    Gabbi Hernandez MD

## 2017-01-31 NOTE — SUBJECTIVE & OBJECTIVE
Past Medical History   Diagnosis Date    Anticoagulant long-term use     Anxiety     Atrial tachycardia, paroxysmal 2013    Blood transfusion     Cardiomyopathy     CHF (congestive heart failure)     Chronic kidney disease     Coronary artery disease     End stage renal disease     Hypertension      pulmonary    ICD (implantable cardioverter-defibrillator) battery depletion 2014    Myocardial infarction     Presence of left ventricular assist device (LVAD)     Pulmonary hypertension     Stroke      Past Surgical History   Procedure Laterality Date    Left ventricular assist device  2012    Tonsillectomy      Cardiac surgery      Coronary artery bypass graft      Hip surgery       RTHA    Fracture surgery      Vascular surgery      Cardiac defibrillator placement       section      Colonoscopy N/A 2016     Procedure: COLONOSCOPY;  Surgeon: Mark Currie MD;  Location: [facility] ([address] [place] [address]);  Service: Endoscopy;  Laterality: N/A;     Family History   Problem Relation Age of Onset    Arthritis Mother     Heart disease Father     Hypertension Father     Cancer Maternal Grandmother     Breast cancer Neg Hx     Colon cancer Neg Hx     Ovarian cancer Neg Hx      Social History   Substance Use Topics    Smoking status: Former Smoker     Packs/day: 2.00     Years: 30.00     Quit date: 10/29/2009    Smokeless tobacco: None    Alcohol use Yes      Comment: 1 glass of wine a month     Review of patient's allergies indicates:   Allergen Reactions    Codeine Nausea And Vomiting    Demerol [meperidine] Nausea And Vomiting    Lortab [hydrocodone-acetaminophen] Nausea And Vomiting     Medications: I have reviewed the current medication administration record.    Prescriptions Prior to Admission   Medication Sig Dispense Refill Last Dose    acetaminophen (TYLENOL) 325 MG tablet Take 2 tablets (650 mg total) by mouth every 6 (six) hours as needed for Pain  (mild pain).  0 1/29/2017    CALCIUM CARBONATE (ANTACID CALCIUM ORAL) Take 1 tablet by mouth 4 (four) times daily.    1/29/2017    cetirizine (ZYRTEC) 5 MG tablet Take 1 tablet (5 mg total) by mouth once daily. 30 tablet 0 Unknown    escitalopram oxalate (LEXAPRO) 20 MG tablet Take 1 tablet (20 mg total) by mouth every evening. 90 tablet 3 1/29/2017    GABAPENTIN (CMPD PAIN MANAGEMENT COMPOUND OINTMNENT THREE) Apply small amount to painful joints once or twice a day 30 g 3 Unknown    gabapentin (NEURONTIN) 300 MG capsule Take 1 capsule (300 mg total) by mouth every evening. 120 capsule 2 1/29/2017    hydrALAZINE (APRESOLINE) 25 MG tablet Take 1 tablet (25 mg total) by mouth every 8 (eight) hours. 90 tablet 6 1/30/2017    ondansetron (ZOFRAN) 4 MG tablet Take 1 tablet (4 mg total) by mouth every 8 (eight) hours as needed for Nausea. 30 tablet 3 Unknown    pantoprazole (PROTONIX) 40 MG tablet Take 1 tablet (40 mg total) by mouth once daily. 60 tablet 10 1/30/2017    warfarin (COUMADIN) 2 MG tablet Take 1 tablet (2mg) by mouth daily, except 1 1/2 tablet (3mg) on Mondays, Wednesdays, and Fridays, Or as directed by [facility] 40 tablet 4 1/29/2017       Review of Systems   Eyes: Positive for photophobia and visual disturbance.   Cardiovascular: Positive for chest pain (occasional) and palpitations (occasional).   Gastrointestinal: Positive for nausea. Negative for constipation and diarrhea.   Genitourinary: Negative for dysuria.   Neurological: Positive for syncope, speech difficulty, weakness, numbness and headaches.   Hematological: Bruises/bleeds easily.   Psychiatric/Behavioral: Negative for confusion. The patient is not nervous/anxious.      Objective:     Vital Signs (Most Recent):  Temp: 97.8 °F (36.6 °C) (01/31/17 1100)  Pulse: 80 (01/31/17 1230)  Resp: 15 (01/31/17 1230)  BP: 98/73 (01/31/17 1230)  SpO2: 95 % (01/31/17 1230)    Vital Signs Range (Last 24H):  Temp:  [97.6 °F (36.4 °C)-98.3 °F (36.8  °C)]   Pulse:  [71-85]   Resp:  [13-25]   BP: ()/(0-100)   SpO2:  [93 %-100 %]     Physical Exam   Constitutional: She is oriented to person, place, and time. She appears well-developed and well-nourished. No distress.   Hat and sunglasses in place   HENT:   Head: Normocephalic and atraumatic.   Eyes: EOM are normal.   Pulmonary/Chest: Effort normal.   Musculoskeletal: Normal range of motion.   Neurological: She is alert and oriented to person, place, and time. GCS eye subscore is 4 - spontaneous. GCS verbal subscore is 5 - oriented. GCS motor subscore is 6 - obeys commands.   Skin: Skin is warm and dry. She is not diaphoretic.   Psychiatric: She has a normal mood and affect. Her behavior is normal. Judgment and thought content normal.   Nursing note and vitals reviewed.      Neurological Exam:   LOC: alert and follows requests  Language: No aphasia  Speech: No dysarthria  Memory: Recent memory intact, Remote memory intact, Age correct, Month correct  EOM (CN III, IV, VI): Full/intact  Facial Sensation (CN V): Symmetric  Facial Movement (CN VII): symmetric facial expression  Hearing (CN VIII): intact bilaterally  Tongue (CN XII): to midline  Cerebellar*: Tremulous throughout, worse distally, and with movement.  Seen most prominantly in the LUE.    Sensation: mild numbness to distal RLE, which patient states is chronic    NIH Stroke Scale:  Interval: baseline (upon arrival/admit)  Level of Consciousness: 0 - alert  LOC Questions: 0 - answers both correctly  LOC Commands: 0 - performs both correctly  Best Gaze: 0 - normal  Visual: 0 - no visual loss (difficulty seeing fingers, L>R, LUQ>LLQ, but able to accurately state when hand is moving in all four quadrants)  Facial Palsy: 0 - normal  Motor Left Arm: 0 - no drift  Motor Right Arm: 0 - no drift  Motor Left Le - no drift  Motor Right Le - no drift  Limb Ataxia: 2 - present in two limbs (LUE and RLE -- but tremulousness throughout)  Sensory: 0 -  normal  Best Language: 0 - no aphasia  Dysarthria: 0 - normal articulation  Extinction and Inattention: 0 - no neglect  NIH Stroke Scale Total: 2  [place] Coma Scale:  Best Eye Response: 4 - spontaneous  Best Motor Response: 6 - obeys commands  Best Verbal Response: 5 - oriented  [place] Coma Scale Total: 15  Modified [place] Scale:   Timeline: Prior to symptoms onset  Modified [place] Score: 4 - moderately severe disability        Laboratory:  CMP:     Recent Labs  Lab 01/30/17 2000 01/31/17 0214   CALCIUM 8.7 8.9   ALBUMIN 3.4*  --    PROT 7.0  --     136   K 4.5 4.6   CO2 25 29   CL 94* 93*   BUN 69* 70*   CREATININE 8.5* 9.2*   ALKPHOS 117  --    ALT 15  --    AST 19  --    BILITOT 0.4  --      BMP:     Recent Labs  Lab 01/31/17 0214      K 4.6   CL 93*   CO2 29   BUN 70*   CREATININE 9.2*   CALCIUM 8.9     CBC:     Recent Labs  Lab 01/31/17  1055   WBC 5.03   RBC 3.15*   HGB 10.3*   HCT 30.9*   *   MCV 98   MCH 32.7*   MCHC 33.3     Lipid Panel: No results for input(s): CHOL, LDLCALC, HDL, TRIG in the last 168 hours.  Coagulation:     Recent Labs  Lab 01/31/17 0214   INR 2.5*   APTT 37.4*     Hgb A1C: No results for input(s): HGBA1C in the last 168 hours.  TSH: No results for input(s): TSH in the last 168 hours.    Diagnostic Results:  CTH 1/20/17   11 mm left thalamic lacunar infarct, new as compared to previous CT scan of 01/19/2017.  Otherwise stable.      Cardiac Evaluation:   TTE 15%, LA moderately enlarged, diastolic dysfunction  EKG/Telemetry: ventricularly paced

## 2017-01-31 NOTE — NURSING
"Pt arrived to ICU via EMS. Pt AA. Pt connected to monitor, VSS. Pt currently wearing beaver and sunglasses stating "my eyes are very sensitive." Lighting decreased for comfort. VAD dressing dated 1/26, pt states that she changes her dressing daily using soap and water technique. [place] is not attached to pt and edges of dressing are lifted, secured until dressing change can be completed tonight. Charge nurse and team notified of pt's arrival. WCTM.  "

## 2017-01-31 NOTE — CONSULTS
Please see my consult note from today at 10:08pm.    Milagros Miranda MD  [facility]  PGY-2

## 2017-02-01 PROBLEM — G93.6 CYTOTOXIC CEREBRAL EDEMA: Status: ACTIVE | Noted: 2017-02-01

## 2017-02-01 PROBLEM — I63.432 EMBOLIC STROKE INVOLVING LEFT POSTERIOR CEREBRAL ARTERY: Status: ACTIVE | Noted: 2017-01-30

## 2017-02-01 LAB
ALBUMIN SERPL BCP-MCNC: 3.2 G/DL
ALBUMIN SERPL BCP-MCNC: 3.3 G/DL
ALP SERPL-CCNC: 119 U/L
ALT SERPL W/O P-5'-P-CCNC: 12 U/L
ANION GAP SERPL CALC-SCNC: 12 MMOL/L
ANION GAP SERPL CALC-SCNC: 14 MMOL/L
AORTIC VALVE REGURGITATION: ABNORMAL
APTT BLDCRRT: 28.9 SEC
AST SERPL-CCNC: 19 U/L
BASOPHILS # BLD AUTO: 0.02 K/UL
BASOPHILS NFR BLD: 0.4 %
BILIRUB DIRECT SERPL-MCNC: 0.1 MG/DL
BILIRUB SERPL-MCNC: 0.3 MG/DL
BUN SERPL-MCNC: 55 MG/DL
BUN SERPL-MCNC: 67 MG/DL
CALCIUM SERPL-MCNC: 8.7 MG/DL
CALCIUM SERPL-MCNC: 9 MG/DL
CHLORIDE SERPL-SCNC: 103 MMOL/L
CHLORIDE SERPL-SCNC: 104 MMOL/L
CO2 SERPL-SCNC: 19 MMOL/L
CO2 SERPL-SCNC: 21 MMOL/L
CREAT SERPL-MCNC: 7.1 MG/DL
CREAT SERPL-MCNC: 7.9 MG/DL
CRP SERPL-MCNC: 12.7 MG/L
DIFFERENTIAL METHOD: ABNORMAL
EOSINOPHIL # BLD AUTO: 0.1 K/UL
EOSINOPHIL NFR BLD: 1.8 %
ERYTHROCYTE [DISTWIDTH] IN BLOOD BY AUTOMATED COUNT: 15.4 %
EST. GFR  (AFRICAN AMERICAN): 5.9 ML/MIN/1.73 M^2
EST. GFR  (AFRICAN AMERICAN): 6.7 ML/MIN/1.73 M^2
EST. GFR  (NON AFRICAN AMERICAN): 5.1 ML/MIN/1.73 M^2
EST. GFR  (NON AFRICAN AMERICAN): 5.8 ML/MIN/1.73 M^2
GLUCOSE SERPL-MCNC: 100 MG/DL
GLUCOSE SERPL-MCNC: 102 MG/DL
HCT VFR BLD AUTO: 30.7 %
HGB BLD-MCNC: 9.8 G/DL
INR PPP: 2.9
LDH SERPL L TO P-CCNC: 202 U/L
LYMPHOCYTES # BLD AUTO: 0.9 K/UL
LYMPHOCYTES NFR BLD: 16.4 %
MAGNESIUM SERPL-MCNC: 2.4 MG/DL
MCH RBC QN AUTO: 32.8 PG
MCHC RBC AUTO-ENTMCNC: 31.9 %
MCV RBC AUTO: 103 FL
MONOCYTES # BLD AUTO: 0.4 K/UL
MONOCYTES NFR BLD: 7.4 %
NEUTROPHILS # BLD AUTO: 4.2 K/UL
NEUTROPHILS NFR BLD: 73.8 %
PHOSPHATE SERPL-MCNC: 5.2 MG/DL
PHOSPHATE SERPL-MCNC: 6 MG/DL
PLATELET # BLD AUTO: 121 K/UL
PMV BLD AUTO: 9.9 FL
POTASSIUM SERPL-SCNC: 4.3 MMOL/L
POTASSIUM SERPL-SCNC: 5.7 MMOL/L
PREALB SERPL-MCNC: 28 MG/DL
PROT SERPL-MCNC: 7.2 G/DL
PROTHROMBIN TIME: 28.6 SEC
RBC # BLD AUTO: 2.99 M/UL
RETIRED EF AND QEF - SEE NOTES: 10 (ref 55–65)
SODIUM SERPL-SCNC: 135 MMOL/L
SODIUM SERPL-SCNC: 138 MMOL/L
WBC # BLD AUTO: 5.68 K/UL

## 2017-02-01 PROCEDURE — 97116 GAIT TRAINING THERAPY: CPT

## 2017-02-01 PROCEDURE — 25000003 PHARM REV CODE 250: Performed by: HOSPITALIST

## 2017-02-01 PROCEDURE — 99233 SBSQ HOSP IP/OBS HIGH 50: CPT | Mod: ,,, | Performed by: PSYCHIATRY & NEUROLOGY

## 2017-02-01 PROCEDURE — 84134 ASSAY OF PREALBUMIN: CPT

## 2017-02-01 PROCEDURE — 99233 SBSQ HOSP IP/OBS HIGH 50: CPT | Mod: ,,, | Performed by: INTERNAL MEDICINE

## 2017-02-01 PROCEDURE — 86140 C-REACTIVE PROTEIN: CPT

## 2017-02-01 PROCEDURE — 27000221 HC OXYGEN, UP TO 24 HOURS

## 2017-02-01 PROCEDURE — 93306 TTE W/DOPPLER COMPLETE: CPT | Mod: 26,,, | Performed by: INTERNAL MEDICINE

## 2017-02-01 PROCEDURE — 25000003 PHARM REV CODE 250: Performed by: INTERNAL MEDICINE

## 2017-02-01 PROCEDURE — 93750 INTERROGATION VAD IN PERSON: CPT | Mod: ,,, | Performed by: INTERNAL MEDICINE

## 2017-02-01 PROCEDURE — 85730 THROMBOPLASTIN TIME PARTIAL: CPT

## 2017-02-01 PROCEDURE — 80076 HEPATIC FUNCTION PANEL: CPT

## 2017-02-01 PROCEDURE — 97165 OT EVAL LOW COMPLEX 30 MIN: CPT

## 2017-02-01 PROCEDURE — 97530 THERAPEUTIC ACTIVITIES: CPT

## 2017-02-01 PROCEDURE — 20000000 HC ICU ROOM

## 2017-02-01 PROCEDURE — 83735 ASSAY OF MAGNESIUM: CPT

## 2017-02-01 PROCEDURE — 84100 ASSAY OF PHOSPHORUS: CPT

## 2017-02-01 PROCEDURE — 25000003 PHARM REV CODE 250: Performed by: NURSE PRACTITIONER

## 2017-02-01 PROCEDURE — 63600175 PHARM REV CODE 636 W HCPCS: Performed by: INTERNAL MEDICINE

## 2017-02-01 PROCEDURE — 85025 COMPLETE CBC W/AUTO DIFF WBC: CPT

## 2017-02-01 PROCEDURE — 93750 INTERROGATION VAD IN PERSON: CPT | Performed by: HOSPITALIST

## 2017-02-01 PROCEDURE — 97162 PT EVAL MOD COMPLEX 30 MIN: CPT

## 2017-02-01 PROCEDURE — 99233 SBSQ HOSP IP/OBS HIGH 50: CPT | Mod: ,,, | Performed by: NURSE PRACTITIONER

## 2017-02-01 PROCEDURE — 93306 TTE W/DOPPLER COMPLETE: CPT

## 2017-02-01 PROCEDURE — 27000248 HC VAD-ADDITIONAL DAY

## 2017-02-01 PROCEDURE — 80069 RENAL FUNCTION PANEL: CPT

## 2017-02-01 PROCEDURE — 97535 SELF CARE MNGMENT TRAINING: CPT

## 2017-02-01 PROCEDURE — 80048 BASIC METABOLIC PNL TOTAL CA: CPT

## 2017-02-01 PROCEDURE — 25000003 PHARM REV CODE 250: Performed by: PHYSICIAN ASSISTANT

## 2017-02-01 PROCEDURE — 83615 LACTATE (LD) (LDH) ENZYME: CPT

## 2017-02-01 PROCEDURE — 85610 PROTHROMBIN TIME: CPT

## 2017-02-01 RX ORDER — ACETAMINOPHEN 325 MG/1
650 TABLET ORAL EVERY 8 HOURS PRN
Status: DISCONTINUED | OUTPATIENT
Start: 2017-02-01 | End: 2017-02-03

## 2017-02-01 RX ORDER — HYDRALAZINE HYDROCHLORIDE 20 MG/ML
5 INJECTION INTRAMUSCULAR; INTRAVENOUS ONCE
Status: COMPLETED | OUTPATIENT
Start: 2017-02-01 | End: 2017-02-01

## 2017-02-01 RX ORDER — WARFARIN 1 MG/1
1 TABLET ORAL DAILY
Status: DISCONTINUED | OUTPATIENT
Start: 2017-02-01 | End: 2017-02-03

## 2017-02-01 RX ORDER — CALCIUM CARBONATE 200(500)MG
500 TABLET,CHEWABLE ORAL
Status: DISCONTINUED | OUTPATIENT
Start: 2017-02-01 | End: 2017-02-02

## 2017-02-01 RX ADMIN — ATORVASTATIN CALCIUM 40 MG: 20 TABLET, FILM COATED ORAL at 10:02

## 2017-02-01 RX ADMIN — PANTOPRAZOLE SODIUM 40 MG: 40 TABLET, DELAYED RELEASE ORAL at 10:02

## 2017-02-01 RX ADMIN — HYDRALAZINE HYDROCHLORIDE 25 MG: 25 TABLET ORAL at 09:02

## 2017-02-01 RX ADMIN — GABAPENTIN 300 MG: 300 CAPSULE ORAL at 09:02

## 2017-02-01 RX ADMIN — HYDRALAZINE HYDROCHLORIDE 5 MG: 20 INJECTION INTRAMUSCULAR; INTRAVENOUS at 02:02

## 2017-02-01 RX ADMIN — Medication 1 SPRAY: at 09:02

## 2017-02-01 RX ADMIN — Medication 1 SPRAY: at 10:02

## 2017-02-01 RX ADMIN — CALCIUM CARBONATE (ANTACID) CHEW TAB 500 MG 500 MG: 500 CHEW TAB at 12:02

## 2017-02-01 RX ADMIN — ACETAMINOPHEN 650 MG: 325 TABLET, FILM COATED ORAL at 10:02

## 2017-02-01 RX ADMIN — SODIUM POLYSTYRENE SULFONATE 30 G: 15 SUSPENSION ORAL; RECTAL at 04:02

## 2017-02-01 RX ADMIN — HYDRALAZINE HYDROCHLORIDE 25 MG: 25 TABLET ORAL at 06:02

## 2017-02-01 RX ADMIN — CALCIUM CARBONATE (ANTACID) CHEW TAB 500 MG 500 MG: 500 CHEW TAB at 05:02

## 2017-02-01 RX ADMIN — HYDRALAZINE HYDROCHLORIDE 25 MG: 25 TABLET ORAL at 04:02

## 2017-02-01 RX ADMIN — WARFARIN SODIUM 1 MG: 1 TABLET ORAL at 05:02

## 2017-02-01 RX ADMIN — ESCITALOPRAM 20 MG: 20 TABLET, FILM COATED ORAL at 09:02

## 2017-02-01 NOTE — PT/OT/SLP EVAL
Occupational Therapy  Evaluation    Randa Devine   MRN: [id number]   Admitting Diagnosis: Embolic stroke involving left posterior cerebral artery    OT Date of Treatment: 17   OT Start Time: 09  OT Stop Time: 8  OT Total Time (min): 31 min    Billable Minutes:  Evaluation 15  Self Care/Home Management 16    Diagnosis: Embolic stroke involving left posterior cerebral artery     Past Medical History   Diagnosis Date    Anticoagulant long-term use     Anxiety     Atrial tachycardia, paroxysmal 2013    Blood transfusion     Cardiomyopathy     CHF (congestive heart failure)     Chronic kidney disease     Coronary artery disease     End stage renal disease     Hypertension      pulmonary    ICD (implantable cardioverter-defibrillator) battery depletion 2014    Myocardial infarction     Presence of left ventricular assist device (LVAD)     Pulmonary hypertension     Stroke       Past Surgical History   Procedure Laterality Date    Left ventricular assist device  2012    Tonsillectomy      Cardiac surgery      Coronary artery bypass graft      Hip surgery       RTHA    Fracture surgery      Vascular surgery      Cardiac defibrillator placement       section      Colonoscopy N/A 2016     Procedure: COLONOSCOPY;  Surgeon: Mark Currie MD;  Location: [address]);  Service: Endoscopy;  Laterality: N/A;       Referring physician: MD Regan  Date referred to OT: 2017    General Precautions: Standard, fall, aspiration    Do you have any cultural, spiritual, Hinduism conflicts, given your current situation?: none     Patient History:  Living Environment  Lives With: alone  Living Arrangements: apartment  Transportation Available: family or friend will provide  Living Environment Comment:  (Pt's son and his girlfriend live with her in a H with no PAPITO. PTA pt was independent with all ADLs and functional mobility using SPC.  DME: Rollator, SPC,  "w/c, BSC.  Pt has tub shower. )  Equipment Currently Used at Home: rollator, cane, straight    Prior level of function:   Bed Mobility/Transfers: independent  Grooming: independent  Bathing: independent  Upper Body Dressing: independent  Lower Body Dressing: independent  Toileting: independent  Home Management Skills: needs device  Homemaking Responsibilities: Yes  Driving License: No  Mode of Transportation: Family     Dominant hand: right    Subjective:  Communicated with RN prior to session.  Pt agreeable to therapy  Chief Complaint: double vision (images stacked vertically)  Patient/Family stated goals: }"To get out of here so I can see my cat!"    Pain Ratin/10 (initially pt reported no pain.  When asked about her headache pt reported /10)           Pain Addressed: Distraction  Pain Rating Post-Intervention: 7/10    Objective:  Patient found with: blood pressure cuff, pulse ox (continuous), peripheral IV, telemetry, oxygen    Cognitive Exam:  Oriented to: Person and Situation  Follows Commands/attention: Follows one-step commands  Communication: clear/fluent  Memory:  No Deficits noted  Safety awareness/insight to disability: intact  Coping skills/emotional control: Appropriate to situation    Visual/perceptual:  Impaired  diplopia resolved with covering R eye    Physical Exam:  Postural examination/scapula alignment: Rounded shoulder  Skin integrity: Visible skin intact  Edema: None noted     Sensation:   Intact    Upper Extremity Range of Motion:  Right Upper Extremity: WFL  Left Upper Extremity: WFL    Upper Extremity Strength:  Right Upper Extremity: WFL grossly 4/5  Left Upper Extremity: WFL grossly 4/5   Strength: WFL   strength feels equal bilaterally     Fine motor coordination:   Intact    Gross motor coordination: WFL    Functional Mobility:  Bed Mobility:  Rolling/Turning Right: Stand by assistance  Scooting/Bridging: Contact Guard Assistance  Supine to Sit: Minimum " Assistance    Transfers:  Sit <> Stand Assistance: Stand By Assistance  Sit <> Stand Assistive Device: No Assistive Device  Toilet Transfer Technique: Other (see comments) (ambulated to commode)  Toilet Transfer Assistance: Contact Guard Assistance  Toilet Transfer Assistive Device: Rolling Walker    Functional Ambulation: min A with RW, pt had a few LOB within room and with turning around.    Activities of Daily Living:  Feeding Level of Assistance: Activity did not occur  Feeding adaptive equipment: none  UE Dressing Level of Assistance: Maximum assistance (for gown however lines limiting)  UE adaptive equipment: none  LE Dressing Level of Assistance: Moderate assistance (set up for socks sitting up in bed.  Mod A for donning R shoe with AFOs in them and for tying shoes)  LE adaptive equipment: none however discussed the benefits of a long handled shoe horn with pt to prevent fall forward when she is struggling with the back of her shoe.   Grooming Position: Standing at sink  Grooming Level of Assistance: Stand by assistance  Toileting Where Assessed: Toilet  Toileting Level of Assistance: Contact guard (for transfer and clothing managment)    Self care management training: Pt donned socks sitting up in bed.  She sat EOB and performed LB dressing.  Pt donned pants with SBA for balance/safety secondary to high position of bed.  Educated on compensatory strategies to prevent fall and make task more efficient such as crossing legs and/or utilizing long handled shoe horn. Pt transferred her VAD to battery with independence and not evidence of confusion or fine motor incoordination. Pt ambulated to sink using RW with min A-CGA.  She had 1 LOB from bed to sink in which she was able to self correct but required assist of therapist for safety.  Pt washed hands with good standing balance and was able to functionally reach for soap and to the side for paper towels without LOB.  Pt ambulated in the hallway with nursing  "present.  She was able to ambulate back to room and to the bathroom.  Pt was able to manage clothing without need for assist.  Pt left with PT and nursing to complete toileting tasks.    Balance:   Static Sit: GOOD-: Takes MODERATE challenges from all directions but inconsistently  Dynamic Sit: GOOD-: Maintains balance through MODERATE excursions of active trunk movement,     Static Stand: 0: Needs MAXIMAL assist to maintain   Dynamic stand: 0: N/A    AM-PAC 6 CLICK ADL  How much help from another person does this patient currently need?  1 = Unable, Total/Dependent Assistance  2 = A lot, Maximum/Moderate Assistance  3 = A little, Minimum/Contact Guard/Supervision  4 = None, Modified [place]/Independent    Putting on and taking off regular lower body clothing? : 2  Bathing (including washing, rinsing, drying)?: 2  Toileting, which includes using toilet, bedpan, or urinal? : 3  Putting on and taking off regular upper body clothing?: 2  Taking care of personal grooming such as brushing teeth?: 3  Eating meals?: 3  Total Score: 15    AM-PAC Raw Score CMS "G-Code Modifier Level of Impairment Assistance   6 % Total / Unable   7 - 9 CM 80 - 100% Maximal Assist   10 - 14 CL 60 - 80% Moderate Assist   15 - 19 CK 40 - 60% Moderate Assist   20 - 22 CJ 20 - 40% Minimal Assist   23 CI 1-20% SBA / CGA   24 CH 0% Independent/ Mod I       Patient left on toilet with PT and nurse present.    Assessment:  Randa Devine is a 61 y.o. female with a medical diagnosis of Embolic stroke involving left posterior cerebral artery and presents with decline in ADLs and functional mobility. Pt would benefit from skilled OT services in order to maximize independence with ADLs and facilitate safe discharge.    Pt presented with a low complexity OT evaluation.  Pt required a brief an expanded review of medical history and occupational profile.  Pt demod 3-5 performance deficits (balance, mobility, endurance as well as interruption of " ability to engage in routines such as homemaking and cooking) resulting in limitations and engagement restrictions.  Clinical decision making required low analytical complexity with multiple treatment options.  Pt with possible comorbidities and required minimal to moderate modification of task/assistance with assessment.      Rehab identified problem list/impairments: Rehab identified problem list/impairments: weakness, impaired sensation, impaired endurance, impaired self care skills, impaired functional mobilty, gait instability, impaired balance, pain, decreased safety awareness    Rehab potential is good.    Activity tolerance: Good    Discharge recommendations: Discharge Facility/Level Of Care Needs: home health OT     Barriers to discharge: Barriers to Discharge: None (other than pt is not at her baseline for ADLs and mobility.)    Equipment recommendations: other (see comments) (TBD in next level of care)     GOALS:   Occupational Therapy Goals        Problem: Occupational Therapy Goal    Goal Priority Disciplines Outcome Interventions   Occupational Therapy Goal     OT, PT/OT Ongoing (interventions implemented as appropriate)    Description:  Goals to be met by: 2/11/2017    Patient will increase functional independence with ADLs by performing:    Feeding with [place].  UE Dressing with Modified [place].  LE Dressing with Modified [place].  Grooming while standing with Modified [place].  Toileting from bedside commode with Modified [place] for hygiene and clothing management.   Bathing from edge of bed with Minimal Assistance.                PLAN:  Patient to be seen 4 x/week to address the above listed problems via self-care/home management, therapeutic activities, therapeutic exercises, neuromuscular re-education  Plan of Care expires: 3/1/2017  Plan of Care reviewed with: patient         AMINA Gunderson  02/01/2017

## 2017-02-01 NOTE — PT/OT/SLP EVAL
Physical Therapy  Evaluation    Randa Devine   MRN: [id number]   Admitting Diagnosis: Embolic stroke involving left posterior cerebral artery    PT Received On: 17  PT Start Time: 0957     PT Stop Time: 1037    PT Total Time (min): 40 min       Billable Minutes:  Evaluation 20, Gait [id number] and Therapeutic Activity 10    Diagnosis: Embolic stroke involving left posterior cerebral artery  Hx of LVAD, previous CVA with R hemiparesis    Past Medical History   Diagnosis Date    Anticoagulant long-term use     Anxiety     Atrial tachycardia, paroxysmal 2013    Blood transfusion     Cardiomyopathy     CHF (congestive heart failure)     Chronic kidney disease     Coronary artery disease     End stage renal disease     Hypertension      pulmonary    ICD (implantable cardioverter-defibrillator) battery depletion 2014    Myocardial infarction     Presence of left ventricular assist device (LVAD)     Pulmonary hypertension     Stroke       Past Surgical History   Procedure Laterality Date    Left ventricular assist device  2012    Tonsillectomy      Cardiac surgery      Coronary artery bypass graft      Hip surgery       RTHA    Fracture surgery      Vascular surgery      Cardiac defibrillator placement       section      Colonoscopy N/A 2016     Procedure: COLONOSCOPY;  Surgeon: Mark Currie MD;  Location: [facility] ([address] [place] [address]);  Service: Endoscopy;  Laterality: N/A;       Referring physician: Mary  Date referred to PT: 17    General Precautions: Standard, fall, aspiration  Orthopedic Precautions: N/A   Braces: N/A       Do you have any cultural, spiritual, Mu-ism conflicts, given your current situation?: none stated    Patient History:  Lives With: child(edilson), adult  Living Arrangements: house  Home Layout: Able to live on 1st floor  Transportation Available: family or friend will provide  Living Environment Comment: Pt lives with son and his  girlfriend in a 1 story house, no steps to enter. Prior to admission, pt utilized SPC for mobility, Ind with ADLs. Pt does not drive. Independent with LVAD management.  Equipment Currently Used at Home: cane, straight, rollator, wheelchair, bedside commode      Previous Level of Function:  Ambulation Skills: needs device  Transfer Skills: needs device  ADL Skills: needs device    Subjective:  Communicated with RN prior to session.  Pt willing to participate with PT. Eager to D/C home. Reports double vision and photophobia, wearing sunglasses in a dark room.   Chief Complaint: Headache  Patient goals: go home    Pain Ratin/10   Location: head  Pain Addressed: Distraction       Objective:   Patient found with: blood pressure cuff, pulse ox (continuous), peripheral IV, telemetry, oxygen     Cognitive Exam:  Oriented to: Person, Place, Time and Situation    Follows Commands/attention: Follows multistep  commands  Communication: clear/fluent  Safety awareness/insight to disability: intact    Physical Exam:  Postural examination/scapula alignment: Rounded shoulder    Skin integrity: Visible skin intact  Edema: None noted     Sensation:   Intact      Lower Extremity Range of Motion:  Right Lower Extremity: WFL  Left Lower Extremity: WFL    Lower Extremity Strength:  Right Lower Extremity: Grossly assessed: 3+/5; DF 1+/5  Left Lower Extremity: WFL except DF 2+/5      Gross motor coordination: Impaired    Functional Mobility:  Bed Mobility:  Supine to Sit: Stand by Assistance  Sit to Supine:  (Not tested - pt up in chair)    Transfers:  Sit <> Stand Assistance: Stand By Assistance  Sit <> Stand Assistive Device: Rolling Walker  Toilet Transfer Technique: Stand Pivot  Toilet Transfer Assistance: Contact Guard Assistance  Toilet Transfer Assistive Device: Rolling Walker    Gait:   Gait Distance: ~70 ft x 2 bouts with prolonged standing rest break between trials. + ~10 ft to/from bathroom.  Assistance 1: Contact Guard  Assistance  Gait Assistive Device: Rolling walker  Gait Pattern: swing-through gait  Gait Deviation(s): decreased shanti, increased time in double stance, decreased step length, increased stride width, decreased toe-to-floor clearance, decreased weight-shifting ability (Pt on portable monitor with RN to follow. B AFOs and shoes donned. )      Balance:   Static Sit: FAIR+: Able to take MINIMAL challenges from all directions  Dynamic Sit: FAIR+: Maintains balance through MINIMAL excursions of active trunk motion  Static Stand: FAIR: Maintains without assist but unable to take challenges  Dynamic stand: FAIR: Needs CONTACT GUARD during gait    Therapeutic Activities and Exercises:  Pt sat EOB x ~10 min for assessment and donning pants, B AFOs and shoes. Pt Independently transferred LVAD from wall power to batteries.  Pt with +LOB posteriorly in standing without UE support, attempting to adjust pants.  Pt ambulated in hallway x ~70 ft x 2 with RW, CGA, one standing rest break.   Post gait, pt requesting to use commode. Amb to restroom, CGA for toilet transfer, mod I for clothing management and pericare.   Pt left up in chair post-tx. Assisted with doffing pants per RN request.     AM-PAC 6 CLICK MOBILITY  How much help from another person does this patient currently need?   1 = Unable, Total/Dependent Assistance  2 = A lot, Maximum/Moderate Assistance  3 = A little, Minimum/Contact Guard/Supervision  4 = None, Modified [place]/Independent    Turning over in bed (including adjusting bedclothes, sheets and blankets)?: 3  Sitting down on and standing up from a chair with arms (e.g., wheelchair, bedside commode, etc.): 3  Moving from lying on back to sitting on the side of the bed?: 3  Moving to and from a bed to a chair (including a wheelchair)?: 3  Need to walk in hospital room?: 3  Climbing 3-5 steps with a railing?: 1  Total Score: 16     AM-PAC Raw Score CMS G-Code Modifier Level of Impairment Assistance   6 CN  100% Total / Unable   7 - 9 CM 80 - 100% Maximal Assist   10 - 14 CL 60 - 80% Moderate Assist   15 - 19 CK 40 - 60% Moderate Assist   20 - 22 CJ 20 - 40% Minimal Assist   23 CI 1-20% SBA / CGA   24 CH 0% Independent/ Mod I     Patient left up in chair with all lines intact, call button in reach and RN notified.    Assessment:   Randa Devine is a 61 y.o. female with a medical diagnosis of Embolic stroke involving left posterior cerebral artery and presents with deficits in functional mobility, balance and gait. Pt with hx of CVA with residual R hemiparesis, ambulating with SPC at baseline. Pt would benefit from PT services to address deficits in function. Will continue to progress as tolerated.    Rehab identified problem list/impairments: Rehab identified problem list/impairments: weakness, impaired endurance, impaired self care skills, impaired functional mobilty, gait instability, impaired balance, decreased coordination, decreased upper extremity function, decreased lower extremity function, pain    Rehab potential is good.    Activity tolerance: Fair    Discharge recommendations: Discharge Facility/Level Of Care Needs: home health PT     Barriers to discharge: Barriers to Discharge: None    Equipment recommendations: Equipment Needed After Discharge:  (Pt owns appropriate DME)     GOALS:   Physical Therapy Goals        Problem: Physical Therapy Goal    Goal Priority Disciplines Outcome Goal Variances Interventions   Physical Therapy Goal     PT/OT, PT Ongoing (interventions implemented as appropriate)     Description:  Goals to be met by: 17     Patient will increase functional independence with mobility by performin. Supine to sit with Modified [place]  2. Sit to supine with Modified [place]  3. Sit to stand transfer with Modified [place]  4. Gait  x 150 feet with Modified [place] using Rolling Walker.   5. Lower extremity exercise program x15 reps, with independence                 PLAN:    Patient to be seen 5 x/week to address the above listed problems via gait training, therapeutic activities, therapeutic exercises, neuromuscular re-education  Plan of Care expires: 03/01/17  Plan of Care reviewed with: patient          Leticia A Barb, PT  02/01/2017

## 2017-02-01 NOTE — SUBJECTIVE & OBJECTIVE
Interval Hx  NAEON. Epistaxis controlled with afrin. No new episodes of bleeding. Was started on warfarin with current INR 2.9    Past Medical History   Diagnosis Date    Anticoagulant long-term use     Anxiety     Atrial tachycardia, paroxysmal 2013    Blood transfusion     Cardiomyopathy     CHF (congestive heart failure)     Chronic kidney disease     Coronary artery disease     End stage renal disease     Hypertension      pulmonary    ICD (implantable cardioverter-defibrillator) battery depletion 2014    Myocardial infarction     Presence of left ventricular assist device (LVAD)     Pulmonary hypertension     Stroke      Past Surgical History   Procedure Laterality Date    Left ventricular assist device  2012    Tonsillectomy      Cardiac surgery      Coronary artery bypass graft      Hip surgery       RTHA    Fracture surgery      Vascular surgery      Cardiac defibrillator placement       section      Colonoscopy N/A 2016     Procedure: COLONOSCOPY;  Surgeon: Mark Currie MD;  Location: [facility] ([address] [place] [address]);  Service: Endoscopy;  Laterality: N/A;     Family History   Problem Relation Age of Onset    Arthritis Mother     Heart disease Father     Hypertension Father     Cancer Maternal Grandmother     Breast cancer Neg Hx     Colon cancer Neg Hx     Ovarian cancer Neg Hx      Social History   Substance Use Topics    Smoking status: Former Smoker     Packs/day: 2.00     Years: 30.00     Quit date: 10/29/2009    Smokeless tobacco: None    Alcohol use Yes      Comment: 1 glass of wine a month     Review of patient's allergies indicates:   Allergen Reactions    Codeine Nausea And Vomiting    Demerol [meperidine] Nausea And Vomiting    Lortab [hydrocodone-acetaminophen] Nausea And Vomiting     Medications: I have reviewed the current medication administration record.    Prescriptions Prior to Admission   Medication Sig Dispense Refill Last  Dose    acetaminophen (TYLENOL) 325 MG tablet Take 2 tablets (650 mg total) by mouth every 6 (six) hours as needed for Pain (mild pain).  0 1/29/2017    CALCIUM CARBONATE (ANTACID CALCIUM ORAL) Take 1 tablet by mouth 4 (four) times daily.    1/29/2017    cetirizine (ZYRTEC) 5 MG tablet Take 1 tablet (5 mg total) by mouth once daily. 30 tablet 0 Unknown    escitalopram oxalate (LEXAPRO) 20 MG tablet Take 1 tablet (20 mg total) by mouth every evening. 90 tablet 3 1/29/2017    GABAPENTIN (CMPD PAIN MANAGEMENT COMPOUND OINTMNENT THREE) Apply small amount to painful joints once or twice a day 30 g 3 Unknown    gabapentin (NEURONTIN) 300 MG capsule Take 1 capsule (300 mg total) by mouth every evening. 120 capsule 2 1/29/2017    hydrALAZINE (APRESOLINE) 25 MG tablet Take 1 tablet (25 mg total) by mouth every 8 (eight) hours. 90 tablet 6 1/30/2017    ondansetron (ZOFRAN) 4 MG tablet Take 1 tablet (4 mg total) by mouth every 8 (eight) hours as needed for Nausea. 30 tablet 3 Unknown    pantoprazole (PROTONIX) 40 MG tablet Take 1 tablet (40 mg total) by mouth once daily. 60 tablet 10 1/30/2017    warfarin (COUMADIN) 2 MG tablet Take 1 tablet (2mg) by mouth daily, except 1 1/2 tablet (3mg) on Mondays, Wednesdays, and Fridays, Or as directed by [facility] 40 tablet 4 1/29/2017       Review of Systems   Eyes: Positive for photophobia and visual disturbance.   Cardiovascular: Positive for palpitations (occasional). Negative for chest pain (occasional).   Gastrointestinal: Positive for nausea. Negative for constipation and diarrhea.   Genitourinary: Negative for dysuria.   Neurological: Positive for weakness and headaches. Negative for syncope, speech difficulty and numbness.   Hematological: Bruises/bleeds easily.   Psychiatric/Behavioral: Negative for confusion. The patient is not nervous/anxious.      Objective:     Vital Signs (Most Recent):  Temp: 98.2 °F (36.8 °C) (02/01/17 1100)  Pulse: 73 (02/01/17  1300)  Resp: 12 (02/01/17 1300)  BP: (!) 96/51 (02/01/17 1300)  SpO2: 98 % (02/01/17 1300)    Vital Signs Range (Last 24H):  Temp:  [98 °F (36.7 °C)-99.1 °F (37.3 °C)]   Pulse:  [70-91]   Resp:  [12-27]   BP: ()/(0-76)   SpO2:  [91 %-100 %]     Physical Exam   Constitutional: She is oriented to person, place, and time. She appears well-developed and well-nourished. No distress.   Hat and sunglasses in place   HENT:   Head: Normocephalic and atraumatic.   Eyes: EOM are normal.   Pulmonary/Chest: Effort normal.   Musculoskeletal: Normal range of motion.   Neurological: She is alert and oriented to person, place, and time. GCS eye subscore is 4 - spontaneous. GCS verbal subscore is 5 - oriented. GCS motor subscore is 6 - obeys commands.   Skin: Skin is warm and dry. She is not diaphoretic.   Psychiatric: She has a normal mood and affect. Her behavior is normal. Judgment and thought content normal.   Nursing note and vitals reviewed.      Neurological Exam:   LOC: alert and follows requests  Language: No aphasia  Speech: No dysarthria  Memory: Recent memory intact, Remote memory intact, Age correct, Month correct  EOM (CN III, IV, VI): Full/intact  Facial Sensation (CN V): Symmetric  Facial Movement (CN VII): symmetric facial expression  Hearing (CN VIII): intact bilaterally  Tongue (CN XII): to midline  Cerebellar*: Tremulous throughout, worse distally, and with movement.  Seen most prominantly in the LUE.    Sensation: mild numbness to distal RLE, which patient states is chronic    NIH Stroke Scale:  Interval: baseline (upon arrival/admit)  Level of Consciousness: 0 - alert  LOC Questions: 0 - answers both correctly  LOC Commands: 0 - performs both correctly  Best Gaze: 0 - normal  Visual: 0 - no visual loss (difficulty seeing fingers, L>R, LUQ>LLQ, but able to accurately state when hand is moving in all four quadrants)  Facial Palsy: 0 - normal  Motor Left Arm: 0 - no drift  Motor Right Arm: 0 - no drift  Motor  Left Le - no drift  Motor Right Le - no drift  Limb Ataxia: 2 - present in two limbs (LUE and RLE -- but tremulousness throughout)  Sensory: 0 - normal  Best Language: 0 - no aphasia  Dysarthria: 0 - normal articulation  Extinction and Inattention: 0 - no neglect  NIH Stroke Scale Total: 2  Vassar Coma Scale:  Best Eye Response: 4 - spontaneous  Best Motor Response: 6 - obeys commands  Best Verbal Response: 5 - oriented  Sincere Coma Scale Total: 15  Modified [place] Scale:   Timeline: Prior to symptoms onset  Modified [place] Score: 4 - moderately severe disability        Laboratory:  CMP:     Recent Labs  Lab 17  0208 17  1351   CALCIUM 9.0 8.7   ALBUMIN 3.3* 3.2*   PROT 7.2  --    * 138   K 5.7* 4.3   CO2 19* 21*    103   BUN 55* 67*   CREATININE 7.1* 7.9*   ALKPHOS 119  --    ALT 12  --    AST 19  --    BILITOT 0.3  --      BMP:     Recent Labs  Lab 17  1351      K 4.3      CO2 21*   BUN 67*   CREATININE 7.9*   CALCIUM 8.7     CBC:     Recent Labs  Lab 17  0208   WBC 5.68   RBC 2.99*   HGB 9.8*   HCT 30.7*   *   *   MCH 32.8*   MCHC 31.9*     Lipid Panel: No results for input(s): CHOL, LDLCALC, HDL, TRIG in the last 168 hours.  Coagulation:     Recent Labs  Lab 17  0208   INR 2.9*   APTT 28.9     Hgb A1C: No results for input(s): HGBA1C in the last 168 hours.  TSH: No results for input(s): TSH in the last 168 hours.    Diagnostic Results:  CTH 17   11 mm left thalamic lacunar infarct, new as compared to previous CT scan of 2017.  Otherwise stable.      Cardiac Evaluation:   TTE 15%, LA moderately enlarged, diastolic dysfunction  EKG/Telemetry: ventricularly paced

## 2017-02-01 NOTE — PROGRESS NOTES
Hospital Day: 3    Subjective:  Interval History: Pt still c/o HA this am. Vision continues to improve    Scheduled Meds:   atorvastatin  40 mg Oral Daily    escitalopram oxalate  20 mg Oral QHS    gabapentin  300 mg Oral QHS    hydrALAZINE  25 mg Oral Q8H    oxymetazoline  2 spray Each Nare BID    pantoprazole  40 mg Oral Daily    warfarin  2 mg Oral Daily     Continuous Infusions:   PRN Meds:ondansetron, sodium chloride    Objective:  Vital signs in last 24 hours:   Temp:  [97.8 °F (36.6 °C)-99.1 °F (37.3 °C)] 98.2 °F (36.8 °C)  Pulse:  [70-88] 73  Resp:  [13-27] 14  SpO2:  [91 %-100 %] 97 %  BP: ()/(0-83) 108/65    Intake/Output last 3 shifts:  I/O last 3 completed shifts:  In: 1040 [P.O.:540; Other:500]  Out: 2290 [Urine:550; Other:1740]  Intake/Output this shift:       Physical Exam:  Visit Vitals    /65 (BP Location: Right arm, Patient Position: Lying, BP Method: Automatic)    Pulse 73    Temp 98.2 °F (36.8 °C) (Oral)    Resp 14    Ht 5' (1.524 m)    Wt 66.9 kg (147 lb 7.8 oz)    LMP  (LMP Unknown)    SpO2 97%    Breastfeeding No    BMI 28.8 kg/m2     General appearance: alert, appears stated age and cooperative  Neck: no JVD and supple, symmetrical, trachea midline  Lungs: clear to auscultation bilaterally  Heart: regular rate and rhythm, normal VAD hum  Abdomen: soft, non-tender; bowel sounds normal; no masses,  no organomegaly  Extremities: extremities normal, atraumatic, no cyanosis or edema  Neurologic: Grossly normal      Lab Review  Lab Results   Component Value Date     (L) 02/01/2017    K 5.7 (H) 02/01/2017     02/01/2017    CO2 19 (L) 02/01/2017    BUN 55 (H) 02/01/2017    CREATININE 7.1 (H) 02/01/2017    CALCIUM 9.0 02/01/2017     Lab Results   Component Value Date    WBC 5.68 02/01/2017    HGB 9.8 (L) 02/01/2017    HCT 30.7 (L) 02/01/2017    HCT 21 (L) 08/10/2013     (H) 02/01/2017     (L) 02/01/2017        Assessment/Plan:  62 y/o WF with PMx  of ICM, s/p Heartware LVAD as destination therapy, pAT, ESRD on T/Th/S, history of GIBs last 4/2016 transferred from OSH with Acute CVA.    Acute CVA, ischemic, likely cardioembolic  -CT at OSH 1/30/17 showed L thalamic lacunar infarct. Repeat CT head yesterday stable  -[facility] following  -HTN: discussed with [facility] and OK to keep MAP around 80. Cont hydralazine  -INR theraputic- cont coumadin  -Statin  -PT/OT/ST    S/p HW LVAD, speed decreased to 2600 yesterday due to low flows/suck down during HD  -INR 2.9 today. Goal INR 2-3. Cont coumadin  -HTN: cont hydralazine. Goal MAP around 80  -LDH stable, 200s    ESRD on HD T/TH/Sat  -Nephrology consult  -Pt with low flows/suck down yesterday during HD so had to stop early    Epistaxis- resolved  -Spoke with ENT yesterday- recommended Afrin and apply pressure for 10 minutes and if bleeding did not resolve then call them but bleeding resolved    Active Hospital Problems    Diagnosis  POA    *Acute ischemic vertebrobasilar artery thalamic stroke involving left-sided vessel [I63.212, I63.22]  Yes    Migraine with aura and without status migrainosus, not intractable [G43.109]  Unknown    Paroxysmal atrial fibrillation [I48.0]  Yes    Chronic anticoagulation [Z79.01]  Not Applicable    Left ventricular assist device present [Z95.811]  Not Applicable    End stage renal disease on dialysis [N18.6, Z99.2]  Not Applicable      Resolved Hospital Problems    Diagnosis Date Resolved POA   No resolved problems to display.

## 2017-02-01 NOTE — PLAN OF CARE
Problem: Patient Care Overview  Goal: Plan of Care Review  Outcome: Ongoing (interventions implemented as appropriate)  [facility] No acute events overnight. VSS. AAOx4. SR/Paced rhythm 70-80s, BP stable. x1 dose hydralazine PRN overnight for MAP >80. Afebrile. Heartware to R abdomen, dressing changed overnight. No alarms overnight. 2L NC, O2 sats > 92%. Pt wears 2L O2 at home. Cardiac diet, tolerating well. No BM this shift. Oliguric, voids per bedpan, HD pt on Tu, Th, Sat. Skin intact. Pain controlled. PIVs. POC discussed with pt. All questions and concerns addressed. WCTM.

## 2017-02-01 NOTE — PLAN OF CARE
Problem: Occupational Therapy Goal  Goal: Occupational Therapy Goal  Goals to be met by: 2/11/2017    Patient will increase functional independence with ADLs by performing:    Feeding with [place].  UE Dressing with Modified [place].  LE Dressing with Modified [place].  Grooming while standing with Modified [place].  Toileting from bedside commode with Modified [place] for hygiene and clothing management.   Bathing from edge of bed with Minimal Assistance.  Outcome: Ongoing (interventions implemented as appropriate)  Goals remain appropriate, continue with OT POC.

## 2017-02-01 NOTE — PROCEDURES
TXP LVAD INTERROGATIONS 2/1/2017 2/1/2017 2/1/2017 2/1/2017 2/1/2017 2/1/2017 2/1/2017   Type Heartware Heartware Heartware Heartware Heartware Heartware Heartware   Flow 4.7 4.9 4.9 4.8 5.1 4.9 4.7   Speed 2600 2600 2600 2600 2600 2600 2600   Power (Kim) 3.7 3.7 3.8 3.7 3.9 3.8 3.7   Low Flow Alarm - - - - - 2.5 -   High Power Alarm - - - - - 6 -   Pulsatility - - Pulse Pulse Pulse Pulse Pulse       10:10 pt was walking schaffer with Pt so came back at 1420  HCT 30.7  Waveform 2-6, no negative deflections noted  No alarms

## 2017-02-01 NOTE — PROGRESS NOTES
Progress Note  Nephrology    Admit Date: 1/30/2017   LOS: 2 days     SUBJECTIVE:     Follow-up For:  ESRD (MWF)  Interval hx:  HD treatment yesterday had to be discontinued early due to drops in LVAD pressures and inability to obtain doppler blood pressures.  Net fluid removal of 1.2L obtained.  This morning, K elevated at 5.7, treated with kayexalate by primary team.  Repeat K this afternoon of 4.3.      Scheduled Meds:   atorvastatin  40 mg Oral Daily    calcium carbonate  500 mg Oral TID WM    escitalopram oxalate  20 mg Oral QHS    gabapentin  300 mg Oral QHS    hydrALAZINE  25 mg Oral Q8H    oxymetazoline  2 spray Each Nare BID    pantoprazole  40 mg Oral Daily    warfarin  1 mg Oral Daily     Continuous Infusions:   PRN Meds:acetaminophen, ondansetron, sodium chloride    Review of patient's allergies indicates:   Allergen Reactions    Codeine Nausea And Vomiting    Demerol [meperidine] Nausea And Vomiting    Lortab [hydrocodone-acetaminophen] Nausea And Vomiting       Review of Systems  Constitutional: Negative for fever, + appetite change  Eyes: + photophobia and visual disturbance  Respiratory: Negative for cough, chest tightness, + SOB  Cardiovascular: Negative for chest pain, palpitations and leg swelling.  Gastrointestinal: + nausea, negative vomiting, negative constipation  Genitourinary: Negative for dysuria, + decreased urinary volume  Skin: Negative for pallor, rash and wound.  Neurological: + headaches, + R sided weakness  Psychiatric/Behavioral: Negative for confusion, sleep disturbance and dysphoric mood. The patient is not nervous/anxious    OBJECTIVE:     Vital Signs (Most Recent)  Temp: 98.2 °F (36.8 °C) (02/01/17 1100)  Pulse: 73 (02/01/17 1300)  Resp: 12 (02/01/17 1300)  BP: (!) 96/51 (02/01/17 1300)  SpO2: 98 % (02/01/17 1300)    Vital Signs Range (Last 24H):  Temp:  [98 °F (36.7 °C)-99.1 °F (37.3 °C)]   Pulse:  [70-91]   Resp:  [12-27]   BP: ()/(0-76)   SpO2:  [91 %-100 %]      I & O (Last 24H):  Intake/Output Summary (Last 24 hours) at 02/01/17 1520  Last data filed at 02/01/17 1200   Gross per 24 hour   Intake              360 ml   Output              226 ml   Net              134 ml     Physical Exam:  General: WF appears ill. AOx4. NAD.  Respiratory: Clear to auscultation bilaterally, respirations unlabored, no rales/rhonchi/wheezing  Cardiovacular: LVAD hum  Gastrointestinal: Soft, non-tender, bowel sounds normal  Extremities: No clubbing or cyanosis of bilateral upper extremities; no lower extremity edema bilaterally  Skin: warm and dry; no rash on exposed skin    Laboratory:  CBC:   Recent Labs  Lab 02/01/17  0208   WBC 5.68   RBC 2.99*   HGB 9.8*   HCT 30.7*   *   *   MCH 32.8*   MCHC 31.9*     BMP:   Recent Labs  Lab 02/01/17  0208 02/01/17  1351    102   * 138   K 5.7* 4.3    103   CO2 19* 21*   BUN 55* 67*   CREATININE 7.1* 7.9*   CALCIUM 9.0 8.7   MG 2.4  --          ASSESSMENT/PLAN:     60 Y/O F with PMHx of ICM with EF of 5% s/p Heartware as destination therapy, pAT, and ESRD on T/R/S, history of GIB last 4/2016 requiring blood transfusion, colonoscopy revealed AVM in caecum S/P APC with no recurrent GIB. Presenting with acute ischemic thalamic stroke manifested with double vision and photophobia.    Plan:  ESRD MWF  -HD treatment yesterday terminated early due to drops in LVAD flows and inability to obtain doppler BPs  -hyperkalemic this morning, treated with kayexalate.  Repeat K this afternoon of 4.3  -no need for dialysis today.  -will plan for HD treatment tomorrow.    Anemia of ESRD  -H/H 9.8/30 (decreased)  -will start epo 3000 units IV q TTS with HD    Bone mineral metabolism  -hyperphosphatemia of 6.0  -will start calcium carbonate 1 tab TID with meals    JESSENIA Donaldson, JUAN MP-BC  Nephrology  Pager:  [phone]

## 2017-02-01 NOTE — ASSESSMENT & PLAN NOTE
Cardioembolic in nature     #Agree with anti-coagulation   Will follow along and continue to monitor for any changes     Antithrombotics for secondary stroke prevention: Anticoagulants:  Warfarin 2 mg   Statins for secondary stroke prevention and hyperlipidemia, if present: Atorvastatin- 40 mg oral daily.  .8 in 11/2016  Aggressive risk factor modification: Diet, Exercise, Obesity, Atrial Fibrillation and Carotid Artery disease  Rehab Efforts: Physical Therapy, Occupational Therapy, Speech and Language Pathology and Physical Medicine and Rehabilitation  VTE Prphylaxis: Coumadin, BP Parameters: SBP <180 (from a stroke perspective, as she is 48h out)  Nursing Orders: Neuro checks- q4h, Antiembolic stockings, Swallowing evaluation by Nursing, Out of bed BID, Avoid Torres catheter, Pneumatic compression device, Stroke Education, Blood glucose target 100-130, Up with assistance

## 2017-02-01 NOTE — PROGRESS NOTES
[facility]  Vascular Neurology  Comprehensive Stroke Center  Progress Note          Interval Hx  NAEON. Epistaxis controlled with afrin. No new episodes of bleeding. Was started on warfarin with current INR 2.9    Past Medical History   Diagnosis Date    Anticoagulant long-term use     Anxiety     Atrial tachycardia, paroxysmal 2013    Blood transfusion     Cardiomyopathy     CHF (congestive heart failure)     Chronic kidney disease     Coronary artery disease     End stage renal disease     Hypertension      pulmonary    ICD (implantable cardioverter-defibrillator) battery depletion 2014    Myocardial infarction     Presence of left ventricular assist device (LVAD)     Pulmonary hypertension     Stroke      Past Surgical History   Procedure Laterality Date    Left ventricular assist device  2012    Tonsillectomy      Cardiac surgery      Coronary artery bypass graft      Hip surgery       RTHA    Fracture surgery      Vascular surgery      Cardiac defibrillator placement       section      Colonoscopy N/A 2016     Procedure: COLONOSCOPY;  Surgeon: Mark Currie MD;  Location: [facility] ([address] [place] [address]);  Service: Endoscopy;  Laterality: N/A;     Family History   Problem Relation Age of Onset    Arthritis Mother     Heart disease Father     Hypertension Father     Cancer Maternal Grandmother     Breast cancer Neg Hx     Colon cancer Neg Hx     Ovarian cancer Neg Hx      Social History   Substance Use Topics    Smoking status: Former Smoker     Packs/day: 2.00     Years: 30.00     Quit date: 10/29/2009    Smokeless tobacco: None    Alcohol use Yes      Comment: 1 glass of wine a month     Review of patient's allergies indicates:   Allergen Reactions    Codeine Nausea And Vomiting    Demerol [meperidine] Nausea And Vomiting    Lortab [hydrocodone-acetaminophen] Nausea And Vomiting     Medications: I have reviewed the current  medication administration record.    Prescriptions Prior to Admission   Medication Sig Dispense Refill Last Dose    acetaminophen (TYLENOL) 325 MG tablet Take 2 tablets (650 mg total) by mouth every 6 (six) hours as needed for Pain (mild pain).  0 1/29/2017    CALCIUM CARBONATE (ANTACID CALCIUM ORAL) Take 1 tablet by mouth 4 (four) times daily.    1/29/2017    cetirizine (ZYRTEC) 5 MG tablet Take 1 tablet (5 mg total) by mouth once daily. 30 tablet 0 Unknown    escitalopram oxalate (LEXAPRO) 20 MG tablet Take 1 tablet (20 mg total) by mouth every evening. 90 tablet 3 1/29/2017    GABAPENTIN (CMPD PAIN MANAGEMENT COMPOUND OINTMNENT THREE) Apply small amount to painful joints once or twice a day 30 g 3 Unknown    gabapentin (NEURONTIN) 300 MG capsule Take 1 capsule (300 mg total) by mouth every evening. 120 capsule 2 1/29/2017    hydrALAZINE (APRESOLINE) 25 MG tablet Take 1 tablet (25 mg total) by mouth every 8 (eight) hours. 90 tablet 6 1/30/2017    ondansetron (ZOFRAN) 4 MG tablet Take 1 tablet (4 mg total) by mouth every 8 (eight) hours as needed for Nausea. 30 tablet 3 Unknown    pantoprazole (PROTONIX) 40 MG tablet Take 1 tablet (40 mg total) by mouth once daily. 60 tablet 10 1/30/2017    warfarin (COUMADIN) 2 MG tablet Take 1 tablet (2mg) by mouth daily, except 1 1/2 tablet (3mg) on Mondays, Wednesdays, and Fridays, Or as directed by [facility] 40 tablet 4 1/29/2017       Review of Systems   Eyes: Positive for photophobia and visual disturbance.   Cardiovascular: Positive for palpitations (occasional). Negative for chest pain (occasional).   Gastrointestinal: Positive for nausea. Negative for constipation and diarrhea.   Genitourinary: Negative for dysuria.   Neurological: Positive for weakness and headaches. Negative for syncope, speech difficulty and numbness.   Hematological: Bruises/bleeds easily.   Psychiatric/Behavioral: Negative for confusion. The patient is not nervous/anxious.       Objective:     Vital Signs (Most Recent):  Temp: 98.2 °F (36.8 °C) (02/01/17 1100)  Pulse: 73 (02/01/17 1300)  Resp: 12 (02/01/17 1300)  BP: (!) 96/51 (02/01/17 1300)  SpO2: 98 % (02/01/17 1300)    Vital Signs Range (Last 24H):  Temp:  [98 °F (36.7 °C)-99.1 °F (37.3 °C)]   Pulse:  [70-91]   Resp:  [12-27]   BP: ()/(0-76)   SpO2:  [91 %-100 %]     Physical Exam   Constitutional: She is oriented to person, place, and time. She appears well-developed and well-nourished. No distress.   Hat and sunglasses in place   HENT:   Head: Normocephalic and atraumatic.   Eyes: EOM are normal.   Pulmonary/Chest: Effort normal.   Musculoskeletal: Normal range of motion.   Neurological: She is alert and oriented to person, place, and time. GCS eye subscore is 4 - spontaneous. GCS verbal subscore is 5 - oriented. GCS motor subscore is 6 - obeys commands.   Skin: Skin is warm and dry. She is not diaphoretic.   Psychiatric: She has a normal mood and affect. Her behavior is normal. Judgment and thought content normal.   Nursing note and vitals reviewed.      Neurological Exam:   LOC: alert and follows requests  Language: No aphasia  Speech: No dysarthria  Memory: Recent memory intact, Remote memory intact, Age correct, Month correct  EOM (CN III, IV, VI): Full/intact  Facial Sensation (CN V): Symmetric  Facial Movement (CN VII): symmetric facial expression  Hearing (CN VIII): intact bilaterally  Tongue (CN XII): to midline  Cerebellar*: Tremulous throughout, worse distally, and with movement.  Seen most prominantly in the LUE.    Sensation: mild numbness to distal RLE, which patient states is chronic    NIH Stroke Scale:  Interval: baseline (upon arrival/admit)  Level of Consciousness: 0 - alert  LOC Questions: 0 - answers both correctly  LOC Commands: 0 - performs both correctly  Best Gaze: 0 - normal  Visual: 0 - no visual loss (difficulty seeing fingers, L>R, LUQ>LLQ, but able to accurately state when hand is moving in all  four quadrants)  Facial Palsy: 0 - normal  Motor Left Arm: 0 - no drift  Motor Right Arm: 0 - no drift  Motor Left Le - no drift  Motor Right Le - no drift  Limb Ataxia: 2 - present in two limbs (LUE and RLE -- but tremulousness throughout)  Sensory: 0 - normal  Best Language: 0 - no aphasia  Dysarthria: 0 - normal articulation  Extinction and Inattention: 0 - no neglect  NIH Stroke Scale Total: 2  Sincere Coma Scale:  Best Eye Response: 4 - spontaneous  Best Motor Response: 6 - obeys commands  Best Verbal Response: 5 - oriented  Ronald Coma Scale Total: 15  Modified Craig Scale:   Timeline: Prior to symptoms onset  Modified [place]iana Score: 4 - moderately severe disability        Laboratory:  CMP:     Recent Labs  Lab 17  1351   CALCIUM 9.0 8.7   ALBUMIN 3.3* 3.2*   PROT 7.2  --    * 138   K 5.7* 4.3   CO2 19* 21*    103   BUN 55* 67*   CREATININE 7.1* 7.9*   ALKPHOS 119  --    ALT 12  --    AST 19  --    BILITOT 0.3  --      BMP:     Recent Labs  Lab 17  1351      K 4.3      CO2 21*   BUN 67*   CREATININE 7.9*   CALCIUM 8.7     CBC:     Recent Labs  Lab 17   WBC 5.68   RBC 2.99*   HGB 9.8*   HCT 30.7*   *   *   MCH 32.8*   MCHC 31.9*     Lipid Panel: No results for input(s): CHOL, LDLCALC, HDL, TRIG in the last 168 hours.  Coagulation:     Recent Labs  Lab 17   INR 2.9*   APTT 28.9     Hgb A1C: No results for input(s): HGBA1C in the last 168 hours.  TSH: No results for input(s): TSH in the last 168 hours.    Diagnostic Results:  CTH 17   11 mm left thalamic lacunar infarct, new as compared to previous CT scan of 2017.  Otherwise stable.      Cardiac Evaluation:   TTE 15%, LA moderately enlarged, diastolic dysfunction  EKG/Telemetry: ventricularly paced    Assessment/Plan:     NAEON. Patient had some nasal bleeds and high blood pressure which was treated with PRN antihypertensives     * Embolic stroke  involving left posterior cerebral artery    -- Cardioembolic in nature considering hx of pAf  -- Agree with anti-coagulation   -- Will follow along and continue to monitor for any changes     Antithrombotics for secondary stroke prevention: Anticoagulants:  Warfarin 2 mg   Statins for secondary stroke prevention and hyperlipidemia, if present: Atorvastatin- 40 mg oral daily.  .8 in 11/2016  Aggressive risk factor modification: Diet, Exercise, Obesity, Atrial Fibrillation and Carotid Artery disease  Rehab Efforts: Physical Therapy, Occupational Therapy, Speech and Language Pathology and Physical Medicine and Rehabilitation  VTE Prphylaxis: Coumadin, BP Parameters: SBP <180 (from a stroke perspective, as she is 48h out)  Nursing Orders: Neuro checks- q4h, Antiembolic stockings, Swallowing evaluation by Nursing, Out of bed BID, Avoid Torres catheter, Pneumatic compression device, Stroke Education, Blood glucose target 100-130, Up with assistance      Paroxysmal atrial fibrillation  Agree with Warfarin     Discussed with Dr. Cowan. Staff addendum to follow     SONAL Aguilar  Comprehensive Stroke Center  Department of Vascular Neurology   [facility]

## 2017-02-02 LAB
ANION GAP SERPL CALC-SCNC: 14 MMOL/L
APTT BLDCRRT: 33.2 SEC
BASOPHILS # BLD AUTO: 0.01 K/UL
BASOPHILS NFR BLD: 0.2 %
BUN SERPL-MCNC: 81 MG/DL
CALCIUM SERPL-MCNC: 8.6 MG/DL
CHLORIDE SERPL-SCNC: 105 MMOL/L
CO2 SERPL-SCNC: 19 MMOL/L
CREAT SERPL-MCNC: 9.1 MG/DL
DIFFERENTIAL METHOD: ABNORMAL
EOSINOPHIL # BLD AUTO: 0.1 K/UL
EOSINOPHIL NFR BLD: 1.9 %
ERYTHROCYTE [DISTWIDTH] IN BLOOD BY AUTOMATED COUNT: 15.3 %
EST. GFR  (AFRICAN AMERICAN): 4.9 ML/MIN/1.73 M^2
EST. GFR  (NON AFRICAN AMERICAN): 4.3 ML/MIN/1.73 M^2
GLUCOSE SERPL-MCNC: 83 MG/DL
HCT VFR BLD AUTO: 28.6 %
HGB BLD-MCNC: 9.3 G/DL
INR PPP: 2.9
LDH SERPL L TO P-CCNC: 197 U/L
LYMPHOCYTES # BLD AUTO: 0.6 K/UL
LYMPHOCYTES NFR BLD: 14.1 %
MAGNESIUM SERPL-MCNC: 2.3 MG/DL
MCH RBC QN AUTO: 32.5 PG
MCHC RBC AUTO-ENTMCNC: 32.5 %
MCV RBC AUTO: 100 FL
MONOCYTES # BLD AUTO: 0.4 K/UL
MONOCYTES NFR BLD: 9.3 %
NEUTROPHILS # BLD AUTO: 3.1 K/UL
NEUTROPHILS NFR BLD: 74.3 %
PHOSPHATE SERPL-MCNC: 7.1 MG/DL
PLATELET # BLD AUTO: 104 K/UL
PMV BLD AUTO: 9.9 FL
POTASSIUM SERPL-SCNC: 4.9 MMOL/L
PROTHROMBIN TIME: 29.1 SEC
RBC # BLD AUTO: 2.86 M/UL
SODIUM SERPL-SCNC: 138 MMOL/L
WBC # BLD AUTO: 4.19 K/UL

## 2017-02-02 PROCEDURE — 27000248 HC VAD-ADDITIONAL DAY

## 2017-02-02 PROCEDURE — 83735 ASSAY OF MAGNESIUM: CPT

## 2017-02-02 PROCEDURE — 93750 INTERROGATION VAD IN PERSON: CPT | Mod: ,,, | Performed by: INTERNAL MEDICINE

## 2017-02-02 PROCEDURE — 99233 SBSQ HOSP IP/OBS HIGH 50: CPT | Mod: ,,, | Performed by: NURSE PRACTITIONER

## 2017-02-02 PROCEDURE — 80100014 HC HEMODIALYSIS 1:1

## 2017-02-02 PROCEDURE — 85610 PROTHROMBIN TIME: CPT

## 2017-02-02 PROCEDURE — 63600175 PHARM REV CODE 636 W HCPCS: Performed by: INTERNAL MEDICINE

## 2017-02-02 PROCEDURE — 85025 COMPLETE CBC W/AUTO DIFF WBC: CPT

## 2017-02-02 PROCEDURE — 25000003 PHARM REV CODE 250: Performed by: INTERNAL MEDICINE

## 2017-02-02 PROCEDURE — 83615 LACTATE (LD) (LDH) ENZYME: CPT

## 2017-02-02 PROCEDURE — 85730 THROMBOPLASTIN TIME PARTIAL: CPT

## 2017-02-02 PROCEDURE — 20000000 HC ICU ROOM

## 2017-02-02 PROCEDURE — 97116 GAIT TRAINING THERAPY: CPT

## 2017-02-02 PROCEDURE — 99233 SBSQ HOSP IP/OBS HIGH 50: CPT | Mod: GC,,, | Performed by: PSYCHIATRY & NEUROLOGY

## 2017-02-02 PROCEDURE — 25000003 PHARM REV CODE 250: Performed by: NURSE PRACTITIONER

## 2017-02-02 PROCEDURE — 97535 SELF CARE MNGMENT TRAINING: CPT

## 2017-02-02 PROCEDURE — 97110 THERAPEUTIC EXERCISES: CPT

## 2017-02-02 PROCEDURE — 99233 SBSQ HOSP IP/OBS HIGH 50: CPT | Mod: ,,, | Performed by: INTERNAL MEDICINE

## 2017-02-02 PROCEDURE — 25000003 PHARM REV CODE 250: Performed by: HOSPITALIST

## 2017-02-02 PROCEDURE — 93750 INTERROGATION VAD IN PERSON: CPT | Performed by: HOSPITALIST

## 2017-02-02 PROCEDURE — 27000221 HC OXYGEN, UP TO 24 HOURS

## 2017-02-02 PROCEDURE — 97530 THERAPEUTIC ACTIVITIES: CPT

## 2017-02-02 PROCEDURE — 25000003 PHARM REV CODE 250: Performed by: PHYSICIAN ASSISTANT

## 2017-02-02 PROCEDURE — 84100 ASSAY OF PHOSPHORUS: CPT

## 2017-02-02 PROCEDURE — 80048 BASIC METABOLIC PNL TOTAL CA: CPT

## 2017-02-02 RX ORDER — WARFARIN 2 MG/1
2 TABLET ORAL DAILY
Qty: 102 TABLET | Refills: 3 | Status: SHIPPED | OUTPATIENT
Start: 2017-02-02 | End: 2017-02-06

## 2017-02-02 RX ORDER — CALCIUM CARBONATE 200(500)MG
1000 TABLET,CHEWABLE ORAL
Status: DISCONTINUED | OUTPATIENT
Start: 2017-02-02 | End: 2017-02-06 | Stop reason: HOSPADM

## 2017-02-02 RX ORDER — HYDRALAZINE HYDROCHLORIDE 20 MG/ML
5 INJECTION INTRAMUSCULAR; INTRAVENOUS ONCE
Status: COMPLETED | OUTPATIENT
Start: 2017-02-02 | End: 2017-02-02

## 2017-02-02 RX ADMIN — ACETAMINOPHEN 650 MG: 325 TABLET, FILM COATED ORAL at 07:02

## 2017-02-02 RX ADMIN — ONDANSETRON 4 MG: 4 TABLET, FILM COATED ORAL at 02:02

## 2017-02-02 RX ADMIN — ACETAMINOPHEN 650 MG: 325 TABLET, FILM COATED ORAL at 02:02

## 2017-02-02 RX ADMIN — CALCIUM CARBONATE (ANTACID) CHEW TAB 500 MG 1000 MG: 500 CHEW TAB at 02:02

## 2017-02-02 RX ADMIN — ACETAMINOPHEN 650 MG: 325 TABLET, FILM COATED ORAL at 10:02

## 2017-02-02 RX ADMIN — HYDRALAZINE HYDROCHLORIDE 5 MG: 20 INJECTION INTRAMUSCULAR; INTRAVENOUS at 04:02

## 2017-02-02 RX ADMIN — HYDRALAZINE HYDROCHLORIDE 25 MG: 25 TABLET ORAL at 10:02

## 2017-02-02 RX ADMIN — CALCIUM CARBONATE (ANTACID) CHEW TAB 500 MG 500 MG: 500 CHEW TAB at 09:02

## 2017-02-02 RX ADMIN — OXYMETAZOLINE HYDROCHLORIDE 2 SPRAY: 5 SPRAY NASAL at 10:02

## 2017-02-02 RX ADMIN — WARFARIN SODIUM 1 MG: 1 TABLET ORAL at 05:02

## 2017-02-02 RX ADMIN — GABAPENTIN 300 MG: 300 CAPSULE ORAL at 09:02

## 2017-02-02 RX ADMIN — HYDRALAZINE HYDROCHLORIDE 25 MG: 25 TABLET ORAL at 02:02

## 2017-02-02 RX ADMIN — ATORVASTATIN CALCIUM 40 MG: 20 TABLET, FILM COATED ORAL at 10:02

## 2017-02-02 RX ADMIN — HYDRALAZINE HYDROCHLORIDE 25 MG: 25 TABLET ORAL at 06:02

## 2017-02-02 RX ADMIN — OXYMETAZOLINE HYDROCHLORIDE 2 SPRAY: 5 SPRAY NASAL at 08:02

## 2017-02-02 RX ADMIN — OXYMETAZOLINE HYDROCHLORIDE 2 SPRAY: 5 SPRAY NASAL at 06:02

## 2017-02-02 RX ADMIN — PANTOPRAZOLE SODIUM 40 MG: 40 TABLET, DELAYED RELEASE ORAL at 10:02

## 2017-02-02 RX ADMIN — CALCIUM CARBONATE (ANTACID) CHEW TAB 500 MG 1000 MG: 500 CHEW TAB at 05:02

## 2017-02-02 RX ADMIN — ESCITALOPRAM 20 MG: 20 TABLET, FILM COATED ORAL at 08:02

## 2017-02-02 NOTE — PLAN OF CARE
Problem: Patient Care Overview  Goal: Plan of Care Review  Outcome: Ongoing (interventions implemented as appropriate)  Pt remains in ICU 3068. No acute events overnight. VSS. AAOx4, continues to have intermittent diplopia and photophobia. Pt wearing sunglasses for comfort. Paced rhythm 60-80, BP stable with x1 PRN dose of hydralazine for MAP >80. Afebrile. Heartware in place to R abdomen, no alarms overnight. Dressing changed this shift. 2L NC, which is also what pt wears at home. O2 sats > 92%. Cardiac diet, tolerating well. No BM overnight. Voids per bedpan. HD due today. Skin intact with bruising. Pain controlled. POC discussed with pt. All questions and concerns addressed. WCTM.

## 2017-02-02 NOTE — PT/OT/SLP PROGRESS
"Occupational Therapy  Treatment    Randa Devine   MRN: [id number]   Admitting Diagnosis: Embolic stroke involving left posterior cerebral artery    OT Date of Treatment: 17   OT Start Time: 1024  OT Stop Time: 1056  OT Total Time (min): 32 min    Billable Minutes:  Self Care/Home Management 12 and Therapeutic Exercise 8    General Precautions: Standard, fall  Orthopedic Precautions:    Braces:      Do you have any cultural, spiritual, Buddhist conflicts, given your current situation?: none    Subjective:  Communicated with RN prior to session.  No complaints; "I want to walk more."    Pain Ratin/10              Pain Rating Post-Intervention: 0/10    Objective:  Patient found with: blood pressure cuff, telemetry, pulse ox (continuous) (LVAD)     Functional Mobility:  Bed Mobility:  Rolling/Turning Right: Stand by assistance  Scooting/Bridging: Stand by Assistance  Supine to Sit: Stand by Assistance    Transfers:   Sit <> Stand Assistance: Stand By Assistance  Sit <> Stand Assistive Device: No Assistive Device  Bed <> Chair Technique: Stand Pivot  Bed <> Chair Transfer Assistance: Contact Guard Assistance  Bed <> Chair Assistive Device: Rolling Walker  Toilet Transfer Assistance: Contact Guard Assistance  Toilet Transfer Assistive Device: Rolling Walker    Functional Ambulation: SBA to/from sink using RW    Activities of Daily Living:  Feeding Level of Assistance: Set-up Assistance  Feeding adaptive equipment:   UE Dressing Level of Assistance: Set-up Assistance  UE adaptive equipment:   LE Dressing Level of Assistance: Minimum assistance (donning/doffing socks and B shoes with AFO)  LE adaptive equipment:   Grooming Position: Standing at sink  Grooming Level of Assistance: Stand by assistance  Toileting Where Assessed: Toilet  Toileting Level of Assistance: Stand by assistance          Therapeutic Activities and Exercises:  Pt performed B UE AROM ex's x 10 reps in all planes.     AM-PAC 6 CLICK ADL   How " much help from another person does this patient currently need?   1 = Unable, Total/Dependent Assistance  2 = A lot, Maximum/Moderate Assistance  3 = A little, Minimum/Contact Guard/Supervision  4 = None, Modified [place]/Independent    Putting on and taking off regular lower body clothing? : 3  Bathing (including washing, rinsing, drying)?: 3  Toileting, which includes using toilet, bedpan, or urinal? : 3  Putting on and taking off regular upper body clothing?: 3  Taking care of personal grooming such as brushing teeth?: 3  Eating meals?: 4  Total Score: 19     AM-PAC Raw Score CMS G-Code Modifier Level of Impairment Assistance   6 % Total / Unable   7 - 9 CM 80 - 100% Maximal Assist   10 - 14 CL 60 - 80% Moderate Assist   15 - 19 CK 40 - 60% Moderate Assist   20 - 22 CJ 20 - 40% Minimal Assist   23 CI 1-20% SBA / CGA   24 CH 0% Independent/ Mod I     Patient left up in chair with all lines intact, call button in reach and RN notified    ASSESSMENT:  Randa Devine is a 61 y.o. female with a medical diagnosis of Embolic stroke involving left posterior cerebral artery and presents with decreased vision and generalized weakness affecting ADL (I).    Rehab identified problem list/impairments: Rehab identified problem list/impairments: weakness, impaired endurance, impaired self care skills, impaired functional mobilty, visual deficits, impaired balance, gait instability, impaired cardiopulmonary response to activity    Rehab potential is good.    Activity tolerance: Good    Discharge recommendations: Discharge Facility/Level Of Care Needs: home health OT     Barriers to discharge: Barriers to Discharge: None    Equipment recommendations:  (DME needs in place)     GOALS:   Occupational Therapy Goals        Problem: Occupational Therapy Goal    Goal Priority Disciplines Outcome Interventions   Occupational Therapy Goal     OT, PT/OT Ongoing (interventions implemented as appropriate)    Description:  Goals  to be met by: 2/11/2017    Patient will increase functional independence with ADLs by performing:    Feeding with [place].  UE Dressing with Modified [place].  LE Dressing with Modified [place].  Grooming while standing with Modified [place].  Toileting from bedside commode with Modified [place] for hygiene and clothing management.   Bathing from edge of bed with Minimal Assistance.                Plan:  Patient to be seen 5 x/week to address the above listed problems via self-care/home management, therapeutic activities, therapeutic exercises, neuromuscular re-education  Plan of Care expires:    Plan of Care reviewed with: patient         AMINA Napier  02/02/2017

## 2017-02-02 NOTE — ASSESSMENT & PLAN NOTE
Cardioembolic in nature     Antithrombotics for secondary stroke prevention: Anticoagulants:  Warfarin 2 mg   Statins for secondary stroke prevention and hyperlipidemia, if present: Atorvastatin- 40 mg oral daily.  .8 in 11/2016  Aggressive risk factor modification: Diet, Exercise, Obesity, Atrial Fibrillation and Carotid Artery disease  Rehab Efforts: Physical Therapy, Occupational Therapy, Speech and Language Pathology and Physical Medicine and Rehabilitation  VTE Prphylaxis: Coumadin, BP Parameters: SBP <180 (from a stroke perspective, as she is 48h out)  Nursing Orders: Neuro checks- q4h, Antiembolic stockings, Swallowing evaluation by Nursing, Out of bed BID, Avoid Torres catheter, Pneumatic compression device, Stroke Education, Blood glucose target 100-130, Up with assistance

## 2017-02-02 NOTE — PLAN OF CARE
Problem: Occupational Therapy Goal  Goal: Occupational Therapy Goal  Goals to be met by: 2/11/2017    Patient will increase functional independence with ADLs by performing:    Feeding with [place].  UE Dressing with Modified [place].  LE Dressing with Modified [place].  Grooming while standing with Modified [place].  Toileting from bedside commode with Modified [place] for hygiene and clothing management.   Bathing from edge of bed with Minimal Assistance.   Outcome: Ongoing (interventions implemented as appropriate)  The above goals remain appropriate. AMINA Napier 2/2/2017 1

## 2017-02-02 NOTE — PROGRESS NOTES
[facility]  Vascular Neurology  Comprehensive Stroke Center  Progress Note          Interval Hx  NAEON.  Patient neurologically stable and going for dialysis today.      Past Medical History   Diagnosis Date    Anticoagulant long-term use     Anxiety     Atrial tachycardia, paroxysmal 2013    Blood transfusion     Cardiomyopathy     CHF (congestive heart failure)     Chronic kidney disease     Coronary artery disease     End stage renal disease     Hypertension      pulmonary    ICD (implantable cardioverter-defibrillator) battery depletion 2014    Myocardial infarction     Presence of left ventricular assist device (LVAD)     Pulmonary hypertension     Stroke      Past Surgical History   Procedure Laterality Date    Left ventricular assist device  2012    Tonsillectomy      Cardiac surgery      Coronary artery bypass graft      Hip surgery       RTHA    Fracture surgery      Vascular surgery      Cardiac defibrillator placement       section      Colonoscopy N/A 2016     Procedure: COLONOSCOPY;  Surgeon: Mark Currie MD;  Location: [facility] ([address] [place] [address]);  Service: Endoscopy;  Laterality: N/A;     Family History   Problem Relation Age of Onset    Arthritis Mother     Heart disease Father     Hypertension Father     Cancer Maternal Grandmother     Breast cancer Neg Hx     Colon cancer Neg Hx     Ovarian cancer Neg Hx      Social History   Substance Use Topics    Smoking status: Former Smoker     Packs/day: 2.00     Years: 30.00     Quit date: 10/29/2009    Smokeless tobacco: None    Alcohol use Yes      Comment: 1 glass of wine a month     Review of patient's allergies indicates:   Allergen Reactions    Codeine Nausea And Vomiting    Demerol [meperidine] Nausea And Vomiting    Lortab [hydrocodone-acetaminophen] Nausea And Vomiting     Medications: I have reviewed the current medication administration record.    Prescriptions  Prior to Admission   Medication Sig Dispense Refill Last Dose    acetaminophen (TYLENOL) 325 MG tablet Take 2 tablets (650 mg total) by mouth every 6 (six) hours as needed for Pain (mild pain).  0 1/29/2017    CALCIUM CARBONATE (ANTACID CALCIUM ORAL) Take 1 tablet by mouth 4 (four) times daily.    1/29/2017    cetirizine (ZYRTEC) 5 MG tablet Take 1 tablet (5 mg total) by mouth once daily. 30 tablet 0 Unknown    escitalopram oxalate (LEXAPRO) 20 MG tablet Take 1 tablet (20 mg total) by mouth every evening. 90 tablet 3 1/29/2017    GABAPENTIN (CMPD PAIN MANAGEMENT COMPOUND OINTMNENT THREE) Apply small amount to painful joints once or twice a day 30 g 3 Unknown    gabapentin (NEURONTIN) 300 MG capsule Take 1 capsule (300 mg total) by mouth every evening. 120 capsule 2 1/29/2017    hydrALAZINE (APRESOLINE) 25 MG tablet Take 1 tablet (25 mg total) by mouth every 8 (eight) hours. 90 tablet 6 1/30/2017    ondansetron (ZOFRAN) 4 MG tablet Take 1 tablet (4 mg total) by mouth every 8 (eight) hours as needed for Nausea. 30 tablet 3 Unknown    pantoprazole (PROTONIX) 40 MG tablet Take 1 tablet (40 mg total) by mouth once daily. 60 tablet 10 1/30/2017    warfarin (COUMADIN) 2 MG tablet Take 1 tablet (2mg) by mouth daily, except 1 1/2 tablet (3mg) on Mondays, Wednesdays, and Fridays, Or as directed by Coumadin Clinic 40 tablet 4 1/29/2017       Review of Systems   Eyes: Positive for photophobia and visual disturbance.   Cardiovascular: Positive for palpitations (occasional).   Gastrointestinal: Negative for diarrhea and vomiting.   Genitourinary: Negative for dysuria.   Neurological: Positive for weakness and headaches. Negative for syncope, speech difficulty and numbness.   Hematological: Bruises/bleeds easily.     Objective:     Vital Signs (Most Recent):  Temp: 97.8 °F (36.6 °C) (02/02/17 1014)  Pulse: 74 (02/02/17 1000)  Resp: (!) 21 (02/02/17 1000)  BP: 134/85 (02/02/17 1000)  SpO2: 96 % (02/02/17 1000)    Vital  Signs Range (Last 24H):  Temp:  [97.7 °F (36.5 °C)-98.3 °F (36.8 °C)]   Pulse:  [69-88]   Resp:  [11-29]   BP: ()/(51-85)   SpO2:  [96 %-100 %]     Physical Exam   Constitutional: She appears well-developed and well-nourished. No distress.   Hat and sunglasses in place   Eyes: EOM are normal.   Pulmonary/Chest: Effort normal. No respiratory distress.   Musculoskeletal: Normal range of motion.   Neurological: GCS eye subscore is 4 - spontaneous. GCS verbal subscore is 5 - oriented. GCS motor subscore is 6 - obeys commands.   Skin: She is not diaphoretic.   Nursing note and vitals reviewed.      Neurological Exam:   LOC: alert and follows requests  Language: No aphasia  Speech: No dysarthria  Memory: Recent memory intact, Remote memory intact, Age correct, Month correct  EOM (CN III, IV, VI): Full/intact  Facial Sensation (CN V): Symmetric  Facial Movement (CN VII): symmetric facial expression  Hearing (CN VIII): intact bilaterally  Tongue (CN XII): to midline  Cerebellar*: Tremulous throughout, worse distally, and with movement.  Seen most prominantly in the LUE.    Sensation: mild numbness to distal RLE, which patient states is chronic    NIH Stroke Scale:  Interval: baseline (upon arrival/admit)  Level of Consciousness: 0 - alert  LOC Questions: 0 - answers both correctly  LOC Commands: 0 - performs both correctly  Best Gaze: 0 - normal  Visual: 0 - no visual loss (difficulty seeing fingers, L>R, LUQ>LLQ, but able to accurately state when hand is moving in all four quadrants)  Facial Palsy: 0 - normal  Motor Left Arm: 0 - no drift  Motor Right Arm: 0 - no drift  Motor Left Le - no drift  Motor Right Le - no drift  Limb Ataxia: 1 - present in one limb (RLE, tremor in LUE )  Sensory: 0 - normal  Best Language: 0 - no aphasia  Dysarthria: 0 - normal articulation  Extinction and Inattention: 0 - no neglect  NIH Stroke Scale Total: 1  [place] Coma Scale:  Best Eye Response: 4 - spontaneous  Best Motor  Response: 6 - obeys commands  Best Verbal Response: 5 - oriented  [place] Coma Scale Total: 15      Laboratory:  CMP:     Recent Labs  Lab 02/01/17  1351 02/02/17  0421   CALCIUM 8.7 8.6*   ALBUMIN 3.2*  --     138   K 4.3 4.9   CO2 21* 19*    105   BUN 67* 81*   CREATININE 7.9* 9.1*     BMP:     Recent Labs  Lab 02/02/17  0421      K 4.9      CO2 19*   BUN 81*   CREATININE 9.1*   CALCIUM 8.6*     CBC:     Recent Labs  Lab 02/02/17 0421   WBC 4.19   RBC 2.86*   HGB 9.3*   HCT 28.6*   *   *   MCH 32.5*   MCHC 32.5     Lipid Panel: No results for input(s): CHOL, LDLCALC, HDL, TRIG in the last 168 hours.  Coagulation:     Recent Labs  Lab 02/02/17 0421   INR 2.9*   APTT 33.2*     Hgb A1C: No results for input(s): HGBA1C in the last 168 hours.  TSH: No results for input(s): TSH in the last 168 hours.    Diagnostic Results:  CT Head 01/30/17  - Hypodensity in left thalamus   -cytotoxic cerebral edema         Assessment/Plan:     Patient is a 62 yo F with a h/o paroxysmal atrial fibrillation, CAD, MI, s/p LVAD, prior stroke (R weakness, R paresthesias, dysarthria), and ESRD on dialysis who presented with dysarthria and LOC.  Found to have left thalamic stroke. Etiology cardioembolic.  Now on coumadin 2mg qd.      Episode of epistaxis treated with afrin          * Embolic stroke involving left posterior cerebral artery  Cardioembolic in nature     Antithrombotics for secondary stroke prevention: Anticoagulants:  Warfarin 2 mg     Statins for secondary stroke prevention and hyperlipidemia, if present: Atorvastatin- 40 mg oral daily.  .8 in 11/2016    Aggressive risk factor modification: Diet, Exercise, Obesity, Atrial Fibrillation and Carotid Artery disease    Rehab Efforts: Physical Therapy, Occupational Therapy, Speech and Language Pathology and Physical Medicine and Rehabilitation    VTE Prphylaxis: Coumadin    BP Parameters: SBP <160 (from a stroke perspective, as she is  48h out)    Disposition: home health PT         Cytotoxic cerebral edema  Evident on images     End stage renal disease on dialysis  Risk factor for stroke   On epoetin   Management per primary team/nephrology    Left ventricular assist device present  On Coumadin  As above    Chronic anticoagulation  On Coumadin for LVAD  INR 2.4 on admit  Not a candidate for tPA (also, outside of time window)      Paroxysmal atrial fibrillation  Risk factor for stroke   Continue coumadin 2mg      Migraine with aura and without status migrainosus, not intractable  R>>L HA, nausea, photophobia, blurred vision.  Follow up in headache clinic           Chantel Seymour PA-C  Comprehensive Stroke Center  Department of Vascular Neurology   [facility]

## 2017-02-02 NOTE — PROGRESS NOTES
Hospital Day: 4    Subjective:  Interval History: Still has a HA but better with Tylenol    Scheduled Meds:   atorvastatin  40 mg Oral Daily    calcium carbonate  500 mg Oral TID WM    epoetin adelina (PROCRIT) injection  3,000 Units Intravenous Every Tues, Thurs, Sat    escitalopram oxalate  20 mg Oral QHS    gabapentin  300 mg Oral QHS    hydrALAZINE  25 mg Oral Q8H    oxymetazoline  2 spray Each Nare BID    pantoprazole  40 mg Oral Daily    warfarin  1 mg Oral Daily     Continuous Infusions:   PRN Meds:acetaminophen, ondansetron, sodium chloride    Objective:  Vital signs in last 24 hours:   Temp:  [97.7 °F (36.5 °C)-98.3 °F (36.8 °C)] 97.7 °F (36.5 °C)  Pulse:  [69-81] 71  Resp:  [11-29] 11  SpO2:  [98 %-100 %] 100 %  BP: ()/(51-79) 104/74    Intake/Output last 3 shifts:  I/O last 3 completed shifts:  In: 540 [P.O.:540]  Out: 251 [Urine:250; Stool:1]  Intake/Output this shift:       Physical Exam:  Visit Vitals    /74 (BP Location: Right arm, Patient Position: Lying, BP Method: Automatic)    Pulse 71    Temp 97.7 °F (36.5 °C) (Axillary)    Resp 11    Ht 5' (1.524 m)    Wt 65.7 kg (144 lb 13.5 oz)    LMP  (LMP Unknown)    SpO2 100%    Breastfeeding No    BMI 28.29 kg/m2     General appearance: alert, appears stated age and cooperative  Neck: no JVD and supple, symmetrical, trachea midline  Lungs: clear to auscultation bilaterally  Heart: regular rate and rhythm, normal VAD hum  Abdomen: soft, non-tender; bowel sounds normal; no masses,  no organomegaly  Extremities: extremities normal, atraumatic, no cyanosis or edema  Neurologic: Grossly normal      Lab Review  Lab Results   Component Value Date     02/02/2017    K 4.9 02/02/2017     02/02/2017    CO2 19 (L) 02/02/2017    BUN 81 (H) 02/02/2017    CREATININE 9.1 (H) 02/02/2017    CALCIUM 8.6 (L) 02/02/2017     Lab Results   Component Value Date    WBC 4.19 02/02/2017    HGB 9.3 (L) 02/02/2017    HCT 28.6 (L) 02/02/2017     HCT 21 (L) 08/10/2013     (H) 02/02/2017     (L) 02/02/2017        Assessment/Plan:  62 y/o WF with PMHx of ICM, s/p Heartware LVAD as destination therapy, pAT, ESRD on T/Th/S, history of GIBs last 4/2016 transferred from OSH with Acute CVA.    Acute CVA, ischemic, likely cardioembolic  -CT at OSH 1/30/17 showed L thalamic lacunar infarct. Repeat CT head 1/31/17 stable  -[facility] Neuro following  -HTN: discussed with [facility] neuro and OK to keep MAP around 80. Cont hydralazine  -INR theraputic- cont coumadin  -Statin  -PT/OT/ST    S/p HW LVAD, speed decreased to 2600 yesterday due to low flows/suck down during HD  -INR 2.9 today. Goal INR 2-3. Cont coumadin  -HTN: cont hydralazine. Goal MAP around 80  -LDH stable, 200s    ESRD on HD T/TH/Sat  -Nephrology consult  -Pt with hypotension/low flows/suck down yesterday during HD Tuesday so had to stop early    Epistaxis- resolved  -Spoke with ENT- recommended Afrin PRN and apply pressure for 10 minutes     Active Hospital Problems    Diagnosis  POA    *Embolic stroke involving left posterior cerebral artery [I63.432]  Yes    Cytotoxic cerebral edema [G93.6]  Yes    Migraine with aura and without status migrainosus, not intractable [G43.109]  Yes    Paroxysmal atrial fibrillation [I48.0]  Yes    Chronic anticoagulation [Z79.01]  Not Applicable    Left ventricular assist device present [Z95.811]  Not Applicable    End stage renal disease on dialysis [N18.6, Z99.2]  Not Applicable      Resolved Hospital Problems    Diagnosis Date Resolved POA   No resolved problems to display.

## 2017-02-02 NOTE — ASSESSMENT & PLAN NOTE
Cardioembolic in nature     Antithrombotics for secondary stroke prevention: Anticoagulants:  Warfarin 2 mg     Statins for secondary stroke prevention and hyperlipidemia, if present: Atorvastatin- 40 mg oral daily.  .8 in 11/2016    Aggressive risk factor modification: Diet, Exercise, Obesity, Atrial Fibrillation and Carotid Artery disease    Rehab Efforts: Physical Therapy, Occupational Therapy, Speech and Language Pathology and Physical Medicine and Rehabilitation    VTE Prphylaxis: Coumadin, BP Parameters: SBP <180 (from a stroke perspective, as she is 48h out)    Disposition: home health PT

## 2017-02-02 NOTE — PROGRESS NOTES
Progress Note  Nephrology    Admit Date: 1/30/2017   LOS: 3 days     SUBJECTIVE:     Follow-up For:  ESRD (MWF)  Interval hx:  ORTEGA.  She voices no complaints this morning and oxygenating well on RA.  HDS.  Last HD on Tuesday.      Scheduled Meds:   atorvastatin  40 mg Oral Daily    calcium carbonate  500 mg Oral TID WM    epoetin adelina (PROCRIT) injection  3,000 Units Intravenous Every Tues, Thurs, Sat    escitalopram oxalate  20 mg Oral QHS    gabapentin  300 mg Oral QHS    hydrALAZINE  25 mg Oral Q8H    oxymetazoline  2 spray Each Nare BID    pantoprazole  40 mg Oral Daily    warfarin  1 mg Oral Daily     Continuous Infusions:   PRN Meds:acetaminophen, ondansetron, sodium chloride    Review of patient's allergies indicates:   Allergen Reactions    Codeine Nausea And Vomiting    Demerol [meperidine] Nausea And Vomiting    Lortab [hydrocodone-acetaminophen] Nausea And Vomiting       Review of Systems  Constitutional: Negative for fever, + appetite change  Eyes: + photophobia and visual disturbance  Respiratory: Negative for cough, chest tightness, SOB  Cardiovascular: Negative for chest pain, palpitations and leg swelling.  Gastrointestinal: + nausea, negative vomiting, negative constipation  Genitourinary: Negative for dysuria, + decreased urinary volume  Skin: Negative for pallor, rash and wound.  Neurological: + headaches, + R sided weakness  Psychiatric/Behavioral: Negative for confusion, sleep disturbance and dysphoric mood. The patient is not nervous/anxious    OBJECTIVE:     Vital Signs (Most Recent)  Temp: 97.7 °F (36.5 °C) (02/02/17 1100)  Pulse: 73 (02/02/17 1200)  Resp: 20 (02/02/17 1200)  BP: 111/75 (02/02/17 1200)  SpO2: 97 % (02/02/17 1200)    Vital Signs Range (Last 24H):  Temp:  [97.7 °F (36.5 °C)-98.3 °F (36.8 °C)]   Pulse:  [69-88]   Resp:  [11-29]   BP: ()/(57-88)   SpO2:  [94 %-100 %]     I & O (Last 24H):    Intake/Output Summary (Last 24 hours) at 02/02/17 1346  Last data  filed at 02/02/17 1200   Gross per 24 hour   Intake              440 ml   Output              300 ml   Net              140 ml     Physical Exam:  General: WF appears ill. AOx4. NAD.  Respiratory: Clear to auscultation bilaterally, respirations unlabored, no rales/rhonchi/wheezing  Cardiovacular: LVAD hum  Gastrointestinal: Soft, non-tender, bowel sounds normal  Extremities: No clubbing or cyanosis of bilateral upper extremities; no lower extremity edema bilaterally  Skin: warm and dry; no rash on exposed skin    Laboratory:  CBC:     Recent Labs  Lab 02/02/17  0421   WBC 4.19   RBC 2.86*   HGB 9.3*   HCT 28.6*   *   *   MCH 32.5*   MCHC 32.5     BMP:     Recent Labs  Lab 02/02/17 0421   GLU 83      K 4.9      CO2 19*   BUN 81*   CREATININE 9.1*   CALCIUM 8.6*   MG 2.3         ASSESSMENT/PLAN:     60 Y/O F with PMHx of ICM with EF of 5% s/p Heartware as destination therapy, pAT, and ESRD on T/R/S, history of GIB last 4/2016 requiring blood transfusion, colonoscopy revealed AVM in caecum S/P APC with no recurrent GIB. Presenting with acute ischemic thalamic stroke manifested with double vision and photophobia.    Plan:  ESRD MWF  -Last HD on Tuesday, cut short due to hypotension  -electrolytes stable this morning  -will provide 3 hour HD treatment today for metabolic clearance  - ml as tolerated  -will increase BFR to 400 today for better clearance.    Anemia of ESRD  -H/H9.3/28.6 (decreased)  -will start epo 3000 units IV q TTS with HD    Bone mineral metabolism  -hyperphosphatemia of 7.1 (worsening)  -will increase calcium carbonate 2 tab TID with meals    JESSENIA Donaldson, JUAN MP-BC  Nephrology  Pager:  [phone]

## 2017-02-02 NOTE — PROCEDURES
TXP LVAD INTERROGATIONS 2/2/2017 2/2/2017 2/2/2017 2/2/2017 2/2/2017 2/2/2017 2/2/2017   Type Heartware Heartware Heartware Heartware Heartware Heartware Heartware   Flow 5.1 5.3 5.2 5.4 4.8 4.9 4.7   Speed 2600 2600 2600 2600 2600 2600 2600   Power (Kim) 3.7 3.7 3.7 3.7 3.7 3.7 3.7   Low Flow Alarm 2.5 - - - - - -   High Power Alarm 6.0 - - - - - -   Pulsatility Pulse - - - - - -     VAD sounds smooth  HCT= 28.6  Waveform 3-8, no negative deflections noted  Pulsatile  No alarms

## 2017-02-02 NOTE — PLAN OF CARE
Problem: Physical Therapy Goal  Goal: Physical Therapy Goal  Goals to be met by: 17     Patient will increase functional independence with mobility by performin. Supine to sit with Modified [place]  2. Sit to supine with Modified [place]  3. Sit to stand transfer with Modified [place]  4. Gait x 150 feet with Modified [place] using Rolling Walker.   5. Lower extremity exercise program x15 reps, with independence   Outcome: Ongoing (interventions implemented as appropriate)  Pt progressing well with PT. Current goals remain appropriate. Will continue to progress as tolerated.

## 2017-02-02 NOTE — PT/OT/SLP PROGRESS
Physical Therapy  Treatment    Randa Devine   MRN: [id number]   Admitting Diagnosis: Embolic stroke involving left posterior cerebral artery    PT Received On: 17  PT Start Time: 1024     PT Stop Time: 1059    PT Total Time (min): 35 min       Billable Minutes:  Gait Training8, Therapeutic Activity 8 and Therapeutic Exercise 8   Co-tx with OT    Treatment Type: Treatment  PT/PTA: PT             General Precautions: Standard, fall  Orthopedic Precautions: N/A   Braces: N/A    Do you have any cultural, spiritual, Sikh conflicts, given your current situation?: none stated    Subjective:  Communicated with RN prior to session.  Pt willing to participate with PT. Eager to ambulate in hallway. Reports improved sensitivity to light.     Pain Ratin/10  Pain Rating Post-Intervention: 0/10    Objective:   Patient found with: blood pressure cuff, pulse ox (continuous), peripheral IV, telemetry, oxygen; LVAD    Functional Mobility:  Bed Mobility:   Supine to Sit: Stand by Assistance  Sit to Supine:  (Not tested - pt up in chair)    Transfers:  Sit <> Stand Assistance: Stand By Assistance  Sit <> Stand Assistive Device: Rolling Walker    Gait:   Gait Distance: ~70 ft + 100 ft, standing rest break between bouts.  Assistance 1: Contact Guard Assistance  Gait Assistive Device: Rolling walker  Gait Pattern: swing-through gait  Gait Deviation(s): decreased shanti, decreased velocity of limb motion, decreased step length, increased stride width, decreased weight-shifting ability (Pt on portable monitor throughout with RN to follow. )      Balance:   Static Sit: GOOD-: Takes MODERATE challenges from all directions but inconsistently  Dynamic Sit: GOOD-: Maintains balance through MODERATE excursions of active trunk movement,     Static Stand: FAIR: Maintains without assist but unable to take challenges  Dynamic stand: FAIR: Needs CONTACT GUARD during gait     Therapeutic Activities and Exercises:  Pt Independently  transferred LVAD to batteries.  Pt donned B socks, AFOs and shoes sitting EOB.   Pt amb ~70 ft +100 ft with RW, CGA, one standing rest break.   Sitting LE therex consisting of: resisted hip add, hip flex, LAQs x 10 reps.       AM-PAC 6 CLICK MOBILITY  How much help from another person does this patient currently need?   1 = Unable, Total/Dependent Assistance  2 = A lot, Maximum/Moderate Assistance  3 = A little, Minimum/Contact Guard/Supervision  4 = None, Modified [place]/Independent    Turning over in bed (including adjusting bedclothes, sheets and blankets)?: 3  Sitting down on and standing up from a chair with arms (e.g., wheelchair, bedside commode, etc.): 3  Moving from lying on back to sitting on the side of the bed?: 3  Moving to and from a bed to a chair (including a wheelchair)?: 3  Need to walk in hospital room?: 3  Climbing 3-5 steps with a railing?: 2  Total Score: 17    AM-PAC Raw Score CMS G-Code Modifier Level of Impairment Assistance   6 % Total / Unable   7 - 9 CM 80 - 100% Maximal Assist   10 - 14 CL 60 - 80% Moderate Assist   15 - 19 CK 40 - 60% Moderate Assist   20 - 22 CJ 20 - 40% Minimal Assist   23 CI 1-20% SBA / CGA   24 CH 0% Independent/ Mod I     Patient left up in chair with all lines intact, call button in reach and RN notified.    Assessment:  Randa Devine is a 61 y.o. female with a medical diagnosis of Embolic stroke involving left posterior cerebral artery . Pt progressing well with PT. Pt continues to be motivated for participation with PT and return to prior level of function. Pt would benefit from continued PT services to address deficits in function. Will continue to progress as tolerated.     Rehab identified problem list/impairments: Rehab identified problem list/impairments: weakness, impaired endurance, impaired self care skills, impaired functional mobilty, gait instability, impaired balance, decreased coordination, decreased lower extremity function    Rehab  potential is good.    Activity tolerance: Fair    Discharge recommendations: Discharge Facility/Level Of Care Needs: home health PT     Barriers to discharge: Barriers to Discharge: None    Equipment recommendations: Equipment Needed After Discharge: none (Pt owns appropriate DME.)     GOALS:   Physical Therapy Goals        Problem: Physical Therapy Goal    Goal Priority Disciplines Outcome Goal Variances Interventions   Physical Therapy Goal     PT/OT, PT Ongoing (interventions implemented as appropriate)     Description:  Goals to be met by: 17     Patient will increase functional independence with mobility by performin. Supine to sit with Modified [place]  2. Sit to supine with Modified [place]  3. Sit to stand transfer with Modified [place]  4. Gait  x 150 feet with Modified [place] using Rolling Walker.   5. Lower extremity exercise program x15 reps, with independence                PLAN:    Patient to be seen 5 x/week  to address the above listed problems via gait training, therapeutic activities, therapeutic exercises, neuromuscular re-education  Plan of Care expires: 17  Plan of Care reviewed with: patient         Leticia JOSE Day, PT  2017

## 2017-02-03 ENCOUNTER — OUTPATIENT CASE MANAGEMENT (OUTPATIENT)
Dept: ADMINISTRATIVE | Facility: OTHER | Age: 62
End: 2017-02-03

## 2017-02-03 LAB
ALBUMIN SERPL BCP-MCNC: 3.3 G/DL
ALP SERPL-CCNC: 111 U/L
ALT SERPL W/O P-5'-P-CCNC: 13 U/L
ANION GAP SERPL CALC-SCNC: 11 MMOL/L
APTT BLDCRRT: 35.9 SEC
AST SERPL-CCNC: 19 U/L
BASOPHILS # BLD AUTO: 0.02 K/UL
BASOPHILS NFR BLD: 0.5 %
BILIRUB DIRECT SERPL-MCNC: 0.2 MG/DL
BILIRUB SERPL-MCNC: 0.4 MG/DL
BNP SERPL-MCNC: 768 PG/ML
BUN SERPL-MCNC: 36 MG/DL
CALCIUM SERPL-MCNC: 9 MG/DL
CHLORIDE SERPL-SCNC: 102 MMOL/L
CO2 SERPL-SCNC: 25 MMOL/L
CREAT SERPL-MCNC: 5 MG/DL
CRP SERPL-MCNC: 5.8 MG/L
DIFFERENTIAL METHOD: ABNORMAL
EOSINOPHIL # BLD AUTO: 0.1 K/UL
EOSINOPHIL NFR BLD: 2.4 %
ERYTHROCYTE [DISTWIDTH] IN BLOOD BY AUTOMATED COUNT: 15 %
EST. GFR  (AFRICAN AMERICAN): 10.2 ML/MIN/1.73 M^2
EST. GFR  (NON AFRICAN AMERICAN): 8.8 ML/MIN/1.73 M^2
GLUCOSE SERPL-MCNC: 79 MG/DL
HCT VFR BLD AUTO: 28 %
HGB BLD-MCNC: 9.2 G/DL
INR PPP: 2.4
LDH SERPL L TO P-CCNC: 190 U/L
LYMPHOCYTES # BLD AUTO: 0.6 K/UL
LYMPHOCYTES NFR BLD: 14.9 %
MAGNESIUM SERPL-MCNC: 1.9 MG/DL
MCH RBC QN AUTO: 32.3 PG
MCHC RBC AUTO-ENTMCNC: 32.9 %
MCV RBC AUTO: 98 FL
MONOCYTES # BLD AUTO: 0.4 K/UL
MONOCYTES NFR BLD: 10 %
NEUTROPHILS # BLD AUTO: 3 K/UL
NEUTROPHILS NFR BLD: 72 %
PHOSPHATE SERPL-MCNC: 3.9 MG/DL
PLATELET # BLD AUTO: 101 K/UL
PMV BLD AUTO: 10.2 FL
POTASSIUM SERPL-SCNC: 4.1 MMOL/L
PREALB SERPL-MCNC: 28 MG/DL
PROT SERPL-MCNC: 6.9 G/DL
PROTHROMBIN TIME: 24.1 SEC
RBC # BLD AUTO: 2.85 M/UL
SODIUM SERPL-SCNC: 138 MMOL/L
WBC # BLD AUTO: 4.1 K/UL

## 2017-02-03 PROCEDURE — 25000003 PHARM REV CODE 250: Performed by: PHYSICIAN ASSISTANT

## 2017-02-03 PROCEDURE — 83880 ASSAY OF NATRIURETIC PEPTIDE: CPT

## 2017-02-03 PROCEDURE — 80076 HEPATIC FUNCTION PANEL: CPT

## 2017-02-03 PROCEDURE — 25000003 PHARM REV CODE 250: Performed by: NURSE PRACTITIONER

## 2017-02-03 PROCEDURE — 84134 ASSAY OF PREALBUMIN: CPT

## 2017-02-03 PROCEDURE — 97530 THERAPEUTIC ACTIVITIES: CPT

## 2017-02-03 PROCEDURE — 84100 ASSAY OF PHOSPHORUS: CPT

## 2017-02-03 PROCEDURE — 85610 PROTHROMBIN TIME: CPT

## 2017-02-03 PROCEDURE — 83735 ASSAY OF MAGNESIUM: CPT

## 2017-02-03 PROCEDURE — 25000003 PHARM REV CODE 250: Performed by: HOSPITALIST

## 2017-02-03 PROCEDURE — 27000248 HC VAD-ADDITIONAL DAY

## 2017-02-03 PROCEDURE — 86140 C-REACTIVE PROTEIN: CPT

## 2017-02-03 PROCEDURE — 97116 GAIT TRAINING THERAPY: CPT

## 2017-02-03 PROCEDURE — 93750 INTERROGATION VAD IN PERSON: CPT | Performed by: HOSPITALIST

## 2017-02-03 PROCEDURE — 85025 COMPLETE CBC W/AUTO DIFF WBC: CPT

## 2017-02-03 PROCEDURE — 83615 LACTATE (LD) (LDH) ENZYME: CPT

## 2017-02-03 PROCEDURE — 97112 NEUROMUSCULAR REEDUCATION: CPT

## 2017-02-03 PROCEDURE — 93750 INTERROGATION VAD IN PERSON: CPT | Mod: ,,, | Performed by: INTERNAL MEDICINE

## 2017-02-03 PROCEDURE — 80048 BASIC METABOLIC PNL TOTAL CA: CPT

## 2017-02-03 PROCEDURE — 99233 SBSQ HOSP IP/OBS HIGH 50: CPT | Mod: GC,,, | Performed by: NURSE PRACTITIONER

## 2017-02-03 PROCEDURE — 20600001 HC STEP DOWN PRIVATE ROOM

## 2017-02-03 PROCEDURE — 85730 THROMBOPLASTIN TIME PARTIAL: CPT

## 2017-02-03 PROCEDURE — 97110 THERAPEUTIC EXERCISES: CPT

## 2017-02-03 PROCEDURE — 25000003 PHARM REV CODE 250: Performed by: INTERNAL MEDICINE

## 2017-02-03 RX ORDER — WARFARIN 2 MG/1
2 TABLET ORAL DAILY
Status: DISCONTINUED | OUTPATIENT
Start: 2017-02-03 | End: 2017-02-04

## 2017-02-03 RX ORDER — ACETAMINOPHEN 325 MG/1
650 TABLET ORAL EVERY 6 HOURS PRN
Status: DISCONTINUED | OUTPATIENT
Start: 2017-02-03 | End: 2017-02-06 | Stop reason: HOSPADM

## 2017-02-03 RX ADMIN — CALCIUM CARBONATE (ANTACID) CHEW TAB 500 MG 1000 MG: 500 CHEW TAB at 12:02

## 2017-02-03 RX ADMIN — HYDRALAZINE HYDROCHLORIDE 25 MG: 25 TABLET ORAL at 08:02

## 2017-02-03 RX ADMIN — CALCIUM CARBONATE (ANTACID) CHEW TAB 500 MG 1000 MG: 500 CHEW TAB at 05:02

## 2017-02-03 RX ADMIN — WARFARIN SODIUM 2 MG: 2 TABLET ORAL at 05:02

## 2017-02-03 RX ADMIN — HYDRALAZINE HYDROCHLORIDE 25 MG: 25 TABLET ORAL at 05:02

## 2017-02-03 RX ADMIN — GABAPENTIN 300 MG: 300 CAPSULE ORAL at 08:02

## 2017-02-03 RX ADMIN — ATORVASTATIN CALCIUM 40 MG: 20 TABLET, FILM COATED ORAL at 08:02

## 2017-02-03 RX ADMIN — ACETAMINOPHEN 650 MG: 325 TABLET, FILM COATED ORAL at 06:02

## 2017-02-03 RX ADMIN — ESCITALOPRAM 20 MG: 20 TABLET, FILM COATED ORAL at 08:02

## 2017-02-03 RX ADMIN — HYDRALAZINE HYDROCHLORIDE 25 MG: 25 TABLET ORAL at 01:02

## 2017-02-03 RX ADMIN — CALCIUM CARBONATE (ANTACID) CHEW TAB 500 MG 1000 MG: 500 CHEW TAB at 08:02

## 2017-02-03 RX ADMIN — ACETAMINOPHEN 650 MG: 325 TABLET, FILM COATED ORAL at 09:02

## 2017-02-03 RX ADMIN — PANTOPRAZOLE SODIUM 40 MG: 40 TABLET, DELAYED RELEASE ORAL at 08:02

## 2017-02-03 RX ADMIN — ACETAMINOPHEN 650 MG: 325 TABLET, FILM COATED ORAL at 01:02

## 2017-02-03 NOTE — PLAN OF CARE
Problem: Physical Therapy Goal  Goal: Physical Therapy Goal  Goals to be met by: 17     Patient will increase functional independence with mobility by performin. Supine to sit with Modified [place]  2. Sit to supine with Modified [place]  3. Sit to stand transfer with Modified [place]  4. Gait x 150 feet with Modified [place] using Rolling Walker.   5. Lower extremity exercise program x15 reps, with independence   Outcome: Ongoing (interventions implemented as appropriate)  Pt progressing well with PT. Current goals remain appropriate. Will continue to progress as tolerated.

## 2017-02-03 NOTE — PROCEDURES
Patient AAO with no family at bedside. VAD interrogation completed this AM in the event changes needed to be made. Will continue to monitor for further issues.     Pulsatile: Yes   VAD Sounds: Smooth  Problems / Issues / Alarms with VAD if any: None noted  HCT: 28  Waveforms: 4-8 with no negative deflections noted.      VAD Interrogation:  TXP LVAD INTERROGATIONS 2/3/2017 2/3/2017 2/3/2017 2/3/2017 2/3/2017 2/3/2017 2/3/2017   Type Heartware Heartware Heartware Heartware Heartware Heartware Heartware   Flow 4.7 5 4.8 4.7 5.2 4.9 4.7   Speed 2600 2600 2600 2600 2600 2600 2600   Power (Kim) 3.6 3.5 3.6 3.6 3.7 3.5 3.6   Low Flow Alarm 2.5 - - - - - -   High Power Alarm 6.0 - - - - - -   Pulsatility Pulse Pulse Pulse Pulse Pulse Pulse Pulse   }

## 2017-02-03 NOTE — PROGRESS NOTES
Tolerated 3 hour dialysis tx well. VS stable throughout. Ultrafiltrated 0.5 liter of fluid. Hemostasis achieved within 5 mins for each needle stick. Light pr dsg applied to sticks. Left upper arm graft with good thrill and bruit.

## 2017-02-03 NOTE — PLAN OF CARE
Problem: Patient Care Overview  Goal: Plan of Care Review  Outcome: Ongoing (interventions implemented as appropriate)  No acute events occurred throughout shift.  Pt received dialysis which about 500cc's were taken off.  Pt tolerated well.  Heartware in place with no alarms overnight.  Dressing changed.  Pt's doppler 78-82.  Pt continues to have photophobia and diplopia.  VSS throughout shift.  Plan of care discussed with pt and all questions and concerns answered.  Full assessment under the flowsheet.  Will continue to monitor.  Pt continues

## 2017-02-03 NOTE — PHYSICIAN QUERY
"PT Name: Randa Devine  MR #: [id number]  Physician Query Form - Heart  Condition Clarification   Reviewer  Shantel Yap RN CDS  Ext.  [phone]     This form is a permanent document in the medical record.     Query Date: February 3, 2017  By submitting this query, we are merely seeking further clarification of documentation. Please utilize your independent clinical judgment when addressing the question(s) below.  (The Medical record reflects the following:)   Indicators     Supporting Clinical Findings Location in Medical Record   X BNP = 747- 768 Labs 1/30 - 2/3   x EF = Eccentric LVH with severely depressed left ventricular systolic function (EF 10-15%).    Mildly depressed right ventricular systolic function .   2D Echo 2/1    CXR findings:      "Ascites" documented     x "SOB" or "BOST[place]" documented Reports slight increase in SOB Neph. MD CN 1/31    "Hypoxia" documented     x CHF, HFpEF documented CHF (congestive heart failure H & P 1/30    "Edema" documented      Diuretics/Meds      Treatment:      Other:        Provider, please specify diagnosis or diagnoses associated with above clinical findings.    [  ] Acute Systolic Heart Failure ( EF < 40)*  [  ] Acute on Chronic Systolic Heart Failure ( EF < 40)*  [ x ] Chronic Systolic Heart Failure ( EF < 40)*  [  ] Acute Diastolic Heart Failure ( EF > 40)*  [  ] Acute on Chronic Diastolic Heart Failure( EF > 40)*  [  ] Chronic Diastolic Heart Failure ( EF > 40)*  [  ] Acute Combined Systolic and Diastolic Heart Failure  [  ] Acute on Chronic Combined Systolic and Diastolic Heart Failure  [  ] Chronic Combined Systolic and Diastolic Heart Failure  *American Heart Association  [  ] Other Cardiac Diagnosis (Specify) ___________________________________  [  ] Clinically undetermined    Please document in your progress notes daily for the duration of treatment, until resolved, and include in your discharge summary.                                                           "

## 2017-02-03 NOTE — PT/OT/SLP PROGRESS
"Physical Therapy  Treatment    Randa Devine   MRN: [id number]   Admitting Diagnosis: Embolic stroke involving left posterior cerebral artery    PT Received On: 17  PT Start Time: 1040     PT Stop Time: 1125    PT Total Time (min): 45 min       Billable Minutes:  Gait [id number], Therapeutic Activity 10, Therapeutic Exercise 10 and Neuromuscular Re-education 10    Treatment Type: Treatment  PT/PTA: PT             General Precautions: Standard, fall, LVAD  Orthopedic Precautions: N/A   Braces: N/A    Do you have any cultural, spiritual, Taoist conflicts, given your current situation?: none stated    Subjective:  Communicated with RN prior to session.  Pt eager to participate with PT. "Can we walk today". "I'm ready to go home"    Pain Ratin/10   Pain Rating Post-Intervention: 0/10    Objective:   Patient found with: blood pressure cuff, peripheral IV, pulse ox (continuous), telemetry    Functional Mobility:  Bed Mobility:   Supine to Sit: Supervision  Sit to Supine:  (Not tested - pt up in chair)    Transfers:  Sit <> Stand Assistance: Stand By Assistance  Sit <> Stand Assistive Device: Rolling Walker  Bed <> Chair Technique: Stand Pivot  Bed <> Chair Assistance: Contact Guard Assistance  Bed <> Chair Assistive Device: No Assistive Device    Gait:   Gait Distance: ~270 ft  Assistance 1: Stand by Assistance, Contact Guard Assistance  Gait Assistive Device: No device, Rolling walker  Gait Pattern: reciprocal  Gait Deviation(s): decreased shanti, decreased velocity of limb motion, decreased step length, decreased weight-shifting ability (Pt on portable monitor throughout. ~last 30 ft with no AD, CGA/HHA. D/C'd AD due to instability of RW. SBA with use of RW.  )      Balance:   Static Sit: GOOD-: Takes MODERATE challenges from all directions but inconsistently  Dynamic Sit: GOOD-: Maintains balance through MODERATE excursions of active trunk movement,     Static Stand: FAIR: Maintains without assist but " unable to take challenges  Dynamic stand: FAIR: Needs CONTACT GUARD during gait     Therapeutic Activities and Exercises:  Pt Independently transferred LVAD to batteries.   Stand pivot transfer performed to chair for lower surface height to perform LB dressing. Pt donned B socks, AFOs, and shoes seated in chair with set up assistance.   Pt amb ~270 ft with RW and without AD.   Standing therex consisting of: marching, squats, hip abd x 10 reps with UE support on RW.   Standing balance tasks consisting of min perturbations at trunk and hips with shoulder width TIFFANIE, no UE support. Pt requiring CGA/min A due to impaired balance. Pt amb ~4 ft fwd/bkwd without UE support, min A.       AM-PAC 6 CLICK MOBILITY  How much help from another person does this patient currently need?   1 = Unable, Total/Dependent Assistance  2 = A lot, Maximum/Moderate Assistance  3 = A little, Minimum/Contact Guard/Supervision  4 = None, Modified [place]/Independent    Turning over in bed (including adjusting bedclothes, sheets and blankets)?: 3  Sitting down on and standing up from a chair with arms (e.g., wheelchair, bedside commode, etc.): 3  Moving from lying on back to sitting on the side of the bed?: 3  Moving to and from a bed to a chair (including a wheelchair)?: 3  Need to walk in hospital room?: 3  Climbing 3-5 steps with a railing?: 2  Total Score: 17    AM-PAC Raw Score CMS G-Code Modifier Level of Impairment Assistance   6 % Total / Unable   7 - 9 CM 80 - 100% Maximal Assist   10 - 14 CL 60 - 80% Moderate Assist   15 - 19 CK 40 - 60% Moderate Assist   20 - 22 CJ 20 - 40% Minimal Assist   23 CI 1-20% SBA / CGA   24 CH 0% Independent/ Mod I     Patient left up in chair with all lines intact, call button in reach and RN notified.    Assessment:  Randa Devine is a 61 y.o. female with a medical diagnosis of Embolic stroke involving left posterior cerebral artery . Pt progressing well with PT. Motivated for participation  with PT and return to PLOF. Pt demonstrating mild gait instability. Recommend continued use of RW. Pt would benefit from PT services to address deficits in function. Will continue to progress as tolerated. Initiate stair training as appropriate.    Rehab identified problem list/impairments: Rehab identified problem list/impairments: weakness, impaired endurance, impaired self care skills, impaired functional mobilty, gait instability, impaired balance, decreased coordination, decreased lower extremity function    Rehab potential is good.    Activity tolerance: Good    Discharge recommendations: Discharge Facility/Level Of Care Needs: home health PT     Barriers to discharge: Barriers to Discharge: None    Equipment recommendations: Equipment Needed After Discharge:  (Pt owns appropriate DME)     GOALS:   Physical Therapy Goals        Problem: Physical Therapy Goal    Goal Priority Disciplines Outcome Goal Variances Interventions   Physical Therapy Goal     PT/OT, PT Ongoing (interventions implemented as appropriate)     Description:  Goals to be met by: 17     Patient will increase functional independence with mobility by performin. Supine to sit with Modified [place]  2. Sit to supine with Modified [place]  3. Sit to stand transfer with Modified [place]  4. Gait  x 150 feet with Modified [place] using Rolling Walker.   5. Lower extremity exercise program x15 reps, with independence                PLAN:    Patient to be seen 5 x/week  to address the above listed problems via gait training, therapeutic activities, therapeutic exercises, neuromuscular re-education  Plan of Care expires: 17  Plan of Care reviewed with: patient         Leticia JOSE Day, PT  2017

## 2017-02-03 NOTE — PHYSICIAN QUERY
"PT Name: Randa Devine  MR #: [id number]  Physician Query Form - Heart  Condition Clarification   Reviewer  Ext     This form is a permanent document in the medical record.     Query Date: February 3, 2017  By submitting this query, we are merely seeking further clarification of documentation. Please utilize your independent clinical judgment when addressing the question(s) below.  (The Medical record reflects the following:)   Indicators     Supporting Clinical Findings Location in Medical Record   X BNP = 747- 768 Labs 1/30 - 2/3   x EF = Eccentric LVH with severely depressed left ventricular systolic function (EF 10-15%).    Mildly depressed right ventricular systolic function .   2D Echo 2/1    CXR findings:      "Ascites" documented      "SOB" or "BOSTON" documented Reports slight increase in SOB Neph. MD CN 1/31    "Hypoxia" documented      CHF, HFpEF documented CHF (congestive heart failure H & P 1/30    "Edema" documented      Diuretics/Meds      Treatment:      Other:        Provider, please specify diagnosis or diagnoses associated with above clinical findings.    [  ] Acute Systolic Heart Failure ( EF < 40)*  [  ] Acute on Chronic Systolic Heart Failure ( EF < 40)*  [  ] Chronic Systolic Heart Failure ( EF < 40)*  [  ] Acute Diastolic Heart Failure ( EF > 40)*  [  ] Acute on Chronic Diastolic Heart Failure( EF > 40)*  [  ] Chronic Diastolic Heart Failure ( EF > 40)*  [  ] Acute Combined Systolic and Diastolic Heart Failure  [  ] Acute on Chronic Combined Systolic and Diastolic Heart Failure  [  ] Chronic Combined Systolic and Diastolic Heart Failure  *American Heart Association  [  ] Other Cardiac Diagnosis (Specify) ___________________________________  [  ] Clinically undetermined    Please document in your progress notes daily for the duration of treatment, until resolved, and include in your discharge summary.                                                                                                "

## 2017-02-03 NOTE — PROGRESS NOTES
Update:    SW to pt's room for update. Pt aaox4, calm, and pleasant. Pt reports in agreement with plan to D/C on Monday with [facility]. Pt reports coping adequately at this time and looking forward to returning home soon. SW providing ongoing psychosocial counseling and support, education, assistance, resources, and discharge planning as indicated. SW continuing to follow and remains available.

## 2017-02-03 NOTE — PROGRESS NOTES
Hospital Day: 5    Subjective:  Interval History: HA is better. Still has photophobia    Scheduled Meds:   atorvastatin  40 mg Oral Daily    calcium carbonate  1,000 mg Oral TID WM    escitalopram oxalate  20 mg Oral QHS    gabapentin  300 mg Oral QHS    hydrALAZINE  25 mg Oral Q8H    oxymetazoline  2 spray Each Nare BID    pantoprazole  40 mg Oral Daily    warfarin  1 mg Oral Daily     Continuous Infusions:   PRN Meds:acetaminophen, ondansetron, sodium chloride    Objective:  Vital signs in last 24 hours:   Temp:  [97.7 °F (36.5 °C)-97.9 °F (36.6 °C)] 97.9 °F (36.6 °C)  Pulse:  [69-95] 72  Resp:  [11-31] 19  SpO2:  [91 %-100 %] 99 %  BP: ()/(0-88) 115/74    Intake/Output last 3 shifts:  I/O last 3 completed shifts:  In: 970 [P.O.:470; Other:500]  Out: 1300 [Urine:300; Other:1000]  Intake/Output this shift:       Physical Exam:  Visit Vitals    /74 (BP Location: Right arm, Patient Position: Lying, BP Method: Automatic)    Pulse 72    Temp 97.9 °F (36.6 °C) (Oral)    Resp 19    Ht 5' (1.524 m)    Wt 65.7 kg (144 lb 13.5 oz)    LMP  (LMP Unknown)    SpO2 99%    Breastfeeding No    BMI 28.29 kg/m2     General appearance: alert, appears stated age and cooperative  Neck: no JVD and supple, symmetrical, trachea midline  Lungs: clear to auscultation bilaterally  Heart: regular rate and rhythm, normal VAD hum  Abdomen: soft, non-tender; bowel sounds normal; no masses,  no organomegaly  Extremities: extremities normal, atraumatic, no cyanosis or edema  Neurologic: Grossly normal      Lab Review  Lab Results   Component Value Date     02/03/2017    K 4.1 02/03/2017     02/03/2017    CO2 25 02/03/2017    BUN 36 (H) 02/03/2017    CREATININE 5.0 (H) 02/03/2017    CALCIUM 9.0 02/03/2017     Lab Results   Component Value Date    WBC 4.10 02/03/2017    HGB 9.2 (L) 02/03/2017    HCT 28.0 (L) 02/03/2017    HCT 21 (L) 08/10/2013    MCV 98 02/03/2017     (L) 02/03/2017         Assessment/Plan:  60 y/o WF with PMHx of ICM, s/p Heartware LVAD as destination therapy, pAT, ESRD on T/Th/S, history of GIBs last 4/2016 transferred from OSH with Acute CVA.    Acute CVA, ischemic, likely cardioembolic involving L posterior cerebral artery  -CT at OSH 1/30/17 showed L thalamic lacunar infarct. Repeat CT head 1/31/17 stable  -[facility] Neuro following  -HTN: discussed with [facility] neuro and OK to keep MAP around 80. Cont hydralazine  -INR theraputic- cont coumadin  -Statin  -PT/OT/ST    S/p HW LVAD, speed decreased to 2600 1/31/17 due to low flows/suck down during HD  -INR 2.4 today. Goal INR 2-3. Cont coumadin  -HTN: cont hydralazine. Goal MAP around 80  -LDH stable, 200s    ESRD on HD T/TH/Sat  -Nephrology following  -Pt with hypotension/low flows/suck down during HD 1/31 so had to stop early. Tolerated HD yesterday at 2600.    Epistaxis- resolved  -Spoke with ENT- recommended Afrin PRN and apply pressure for 10 minutes     Transfer to floor today.    Active Hospital Problems    Diagnosis  POA    *Embolic stroke involving left posterior cerebral artery [I63.432]  Yes    Cytotoxic cerebral edema [G93.6]  Yes    Migraine with aura and without status migrainosus, not intractable [G43.109]  Yes    Paroxysmal atrial fibrillation [I48.0]  Yes    Chronic anticoagulation [Z79.01]  Not Applicable    Left ventricular assist device present [Z95.811]  Not Applicable    End stage renal disease on dialysis [N18.6, Z99.2]  Not Applicable      Resolved Hospital Problems    Diagnosis Date Resolved POA   No resolved problems to display.

## 2017-02-03 NOTE — PROGRESS NOTES
Progress Note  Nephrology    Admit Date: 1/30/2017   LOS: 4 days     SUBJECTIVE:     Follow-up For:  ESRD (MWF)  Interval hx:  3 hour HD treatment completed overnight and tolerated well.  This morning she has no complaints and oxygenating well on 2L NC.  Achieved good metabolic clearance and increased blood flows during HD.  Phos level improved down to 3.9.    Scheduled Meds:   atorvastatin  40 mg Oral Daily    calcium carbonate  1,000 mg Oral TID WM    escitalopram oxalate  20 mg Oral QHS    gabapentin  300 mg Oral QHS    hydrALAZINE  25 mg Oral Q8H    pantoprazole  40 mg Oral Daily    warfarin  1 mg Oral Daily     Continuous Infusions:   PRN Meds:acetaminophen, ondansetron, sodium chloride    Review of patient's allergies indicates:   Allergen Reactions    Codeine Nausea And Vomiting    Demerol [meperidine] Nausea And Vomiting    Lortab [hydrocodone-acetaminophen] Nausea And Vomiting       Review of Systems  Constitutional: Negative for fever, negative appetite change  Eyes: + photophobia and visual disturbance  Respiratory: Negative for cough, chest tightness, SOB  Cardiovascular: Negative for chest pain, palpitations and leg swelling.  Gastrointestinal: negative nausea, negative vomiting, negative constipation  Genitourinary: Negative for dysuria, + decreased urinary volume  Skin: Negative for pallor, rash and wound.  Neurological: negative headaches, + R sided weakness (around baseline)  Psychiatric/Behavioral: Negative for confusion, sleep disturbance and dysphoric mood. The patient is not nervous/anxious    OBJECTIVE:     Vital Signs (Most Recent)  Temp: 97.8 °F (36.6 °C) (02/03/17 0701)  Pulse: 69 (02/03/17 0900)  Resp: 18 (02/03/17 0900)  BP: 113/73 (02/03/17 0900)  SpO2: 99 % (02/03/17 0900)    Vital Signs Range (Last 24H):  Temp:  [97.7 °F (36.5 °C)-97.9 °F (36.6 °C)]   Pulse:  [69-95]   Resp:  [11-31]   BP: ()/(0-85)   SpO2:  [91 %-100 %]     I & O (Last 24H):    Intake/Output Summary  (Last 24 hours) at 02/03/17 0948  Last data filed at 02/03/17 0900   Gross per 24 hour   Intake              980 ml   Output             1250 ml   Net             -270 ml     Physical Exam:  General: WF in NAD. AOx4.  Respiratory: Clear to auscultation bilaterally, respirations unlabored, no rales/rhonchi/wheezing  Cardiovacular: LVAD hum  Gastrointestinal: Soft, non-tender, bowel sounds normal  Extremities: No clubbing or cyanosis of bilateral upper extremities; no lower extremity edema bilaterally  Skin: warm and dry; no rash on exposed skin    Laboratory:  CBC:     Recent Labs  Lab 02/03/17  0358   WBC 4.10   RBC 2.85*   HGB 9.2*   HCT 28.0*   *   MCV 98   MCH 32.3*   MCHC 32.9     BMP:     Recent Labs  Lab 02/03/17  0358   GLU 79      K 4.1      CO2 25   BUN 36*   CREATININE 5.0*   CALCIUM 9.0   MG 1.9         ASSESSMENT/PLAN:     62 Y/O F with PMHx of ICM with EF of 5% s/p Heartware as destination therapy, pAT, and ESRD on T/R/S, history of GIB last 4/2016 requiring blood transfusion, colonoscopy revealed AVM in caecum S/P APC with no recurrent GIB. Presenting with acute ischemic thalamic stroke manifested with double vision and photophobia.    Plan:  ESRD MWF  -Tolerated HD overnight without complications  -Net UF of 500 ml.    -achieved good metabolic clearance with increased BFRs  -no urgent need for RRT today  -will plan for HD treatment tomorrow.    Anemia of ESRD  -H/H 9.2/25(decreased)  -primary team concerned over starting procrit  -will hold procrit at this time.      Bone mineral metabolism  -hyperphosphatemia improved with clearance from HD  -will continue calcium carbonate 2 TID with meals.  -will monitor phos levels closely.    JESSENIA Donaldson, MARGI-BC  Nephrology  Pager:  [phone]

## 2017-02-03 NOTE — PROGRESS NOTES
Left arm fistula with good thrill and bruit. Cannulated fistula X2 without difficulty. Will attempt to remove 0.5 liters of fluid.

## 2017-02-04 LAB
ANION GAP SERPL CALC-SCNC: 11 MMOL/L
APTT BLDCRRT: 34.7 SEC
BASOPHILS # BLD AUTO: 0.02 K/UL
BASOPHILS NFR BLD: 0.5 %
BUN SERPL-MCNC: 59 MG/DL
CALCIUM SERPL-MCNC: 8.6 MG/DL
CHLORIDE SERPL-SCNC: 103 MMOL/L
CO2 SERPL-SCNC: 25 MMOL/L
CREAT SERPL-MCNC: 7.2 MG/DL
DIFFERENTIAL METHOD: ABNORMAL
EOSINOPHIL # BLD AUTO: 0.1 K/UL
EOSINOPHIL NFR BLD: 2.6 %
ERYTHROCYTE [DISTWIDTH] IN BLOOD BY AUTOMATED COUNT: 15.1 %
EST. GFR  (AFRICAN AMERICAN): 6.6 ML/MIN/1.73 M^2
EST. GFR  (NON AFRICAN AMERICAN): 5.7 ML/MIN/1.73 M^2
GLUCOSE SERPL-MCNC: 87 MG/DL
HCT VFR BLD AUTO: 27.8 %
HGB BLD-MCNC: 9.1 G/DL
INR PPP: 1.9
LDH SERPL L TO P-CCNC: 192 U/L
LYMPHOCYTES # BLD AUTO: 0.8 K/UL
LYMPHOCYTES NFR BLD: 18.7 %
MAGNESIUM SERPL-MCNC: 2.1 MG/DL
MCH RBC QN AUTO: 32.6 PG
MCHC RBC AUTO-ENTMCNC: 32.7 %
MCV RBC AUTO: 100 FL
MONOCYTES # BLD AUTO: 0.4 K/UL
MONOCYTES NFR BLD: 10.4 %
NEUTROPHILS # BLD AUTO: 2.9 K/UL
NEUTROPHILS NFR BLD: 67.6 %
PHOSPHATE SERPL-MCNC: 5.4 MG/DL
PLATELET # BLD AUTO: 93 K/UL
PMV BLD AUTO: 10.1 FL
POTASSIUM SERPL-SCNC: 4.4 MMOL/L
PROTHROMBIN TIME: 18.7 SEC
RBC # BLD AUTO: 2.79 M/UL
SODIUM SERPL-SCNC: 139 MMOL/L
WBC # BLD AUTO: 4.23 K/UL

## 2017-02-04 PROCEDURE — 99232 SBSQ HOSP IP/OBS MODERATE 35: CPT | Mod: ,,, | Performed by: INTERNAL MEDICINE

## 2017-02-04 PROCEDURE — 99231 SBSQ HOSP IP/OBS SF/LOW 25: CPT | Mod: ,,, | Performed by: INTERNAL MEDICINE

## 2017-02-04 PROCEDURE — 25000003 PHARM REV CODE 250: Performed by: NURSE PRACTITIONER

## 2017-02-04 PROCEDURE — 83615 LACTATE (LD) (LDH) ENZYME: CPT

## 2017-02-04 PROCEDURE — 25000003 PHARM REV CODE 250: Performed by: PHYSICIAN ASSISTANT

## 2017-02-04 PROCEDURE — 80048 BASIC METABOLIC PNL TOTAL CA: CPT

## 2017-02-04 PROCEDURE — 83735 ASSAY OF MAGNESIUM: CPT

## 2017-02-04 PROCEDURE — 85730 THROMBOPLASTIN TIME PARTIAL: CPT

## 2017-02-04 PROCEDURE — 85610 PROTHROMBIN TIME: CPT

## 2017-02-04 PROCEDURE — 36415 COLL VENOUS BLD VENIPUNCTURE: CPT

## 2017-02-04 PROCEDURE — 85025 COMPLETE CBC W/AUTO DIFF WBC: CPT

## 2017-02-04 PROCEDURE — 25000003 PHARM REV CODE 250: Performed by: INTERNAL MEDICINE

## 2017-02-04 PROCEDURE — 20600001 HC STEP DOWN PRIVATE ROOM

## 2017-02-04 PROCEDURE — 80100014 HC HEMODIALYSIS 1:1

## 2017-02-04 PROCEDURE — 84100 ASSAY OF PHOSPHORUS: CPT

## 2017-02-04 PROCEDURE — 25000003 PHARM REV CODE 250: Performed by: HOSPITALIST

## 2017-02-04 PROCEDURE — 27000248 HC VAD-ADDITIONAL DAY

## 2017-02-04 PROCEDURE — 63600175 PHARM REV CODE 636 W HCPCS: Performed by: INTERNAL MEDICINE

## 2017-02-04 RX ORDER — WARFARIN 2.5 MG/1
2.5 TABLET ORAL DAILY
Status: DISCONTINUED | OUTPATIENT
Start: 2017-02-04 | End: 2017-02-05

## 2017-02-04 RX ORDER — HYDRALAZINE HYDROCHLORIDE 50 MG/1
50 TABLET, FILM COATED ORAL EVERY 8 HOURS
Status: DISCONTINUED | OUTPATIENT
Start: 2017-02-04 | End: 2017-02-06 | Stop reason: HOSPADM

## 2017-02-04 RX ORDER — HYDRALAZINE HYDROCHLORIDE 50 MG/1
50 TABLET, FILM COATED ORAL ONCE
Status: COMPLETED | OUTPATIENT
Start: 2017-02-04 | End: 2017-02-04

## 2017-02-04 RX ORDER — HYDRALAZINE HYDROCHLORIDE 20 MG/ML
5 INJECTION INTRAMUSCULAR; INTRAVENOUS ONCE
Status: COMPLETED | OUTPATIENT
Start: 2017-02-04 | End: 2017-02-04

## 2017-02-04 RX ADMIN — GABAPENTIN 300 MG: 300 CAPSULE ORAL at 10:02

## 2017-02-04 RX ADMIN — WARFARIN SODIUM 2.5 MG: 2.5 TABLET ORAL at 04:02

## 2017-02-04 RX ADMIN — CALCIUM CARBONATE (ANTACID) CHEW TAB 500 MG 1000 MG: 500 CHEW TAB at 09:02

## 2017-02-04 RX ADMIN — PANTOPRAZOLE SODIUM 40 MG: 40 TABLET, DELAYED RELEASE ORAL at 09:02

## 2017-02-04 RX ADMIN — CALCIUM CARBONATE (ANTACID) CHEW TAB 500 MG 1000 MG: 500 CHEW TAB at 11:02

## 2017-02-04 RX ADMIN — Medication 1 SPRAY: at 01:02

## 2017-02-04 RX ADMIN — HYDRALAZINE HYDROCHLORIDE 50 MG: 50 TABLET ORAL at 09:02

## 2017-02-04 RX ADMIN — ESCITALOPRAM 20 MG: 20 TABLET, FILM COATED ORAL at 10:02

## 2017-02-04 RX ADMIN — ACETAMINOPHEN 650 MG: 325 TABLET, FILM COATED ORAL at 10:02

## 2017-02-04 RX ADMIN — CALCIUM CARBONATE (ANTACID) CHEW TAB 500 MG 1000 MG: 500 CHEW TAB at 04:02

## 2017-02-04 RX ADMIN — ATORVASTATIN CALCIUM 40 MG: 20 TABLET, FILM COATED ORAL at 09:02

## 2017-02-04 RX ADMIN — HYDRALAZINE HYDROCHLORIDE 5 MG: 20 INJECTION INTRAMUSCULAR; INTRAVENOUS at 12:02

## 2017-02-04 RX ADMIN — HYDRALAZINE HYDROCHLORIDE 50 MG: 50 TABLET ORAL at 10:02

## 2017-02-04 RX ADMIN — HYDRALAZINE HYDROCHLORIDE 25 MG: 25 TABLET ORAL at 04:02

## 2017-02-04 RX ADMIN — HYDRALAZINE HYDROCHLORIDE 50 MG: 50 TABLET ORAL at 01:02

## 2017-02-04 NOTE — PROGRESS NOTES
Received report from Casie RUTH  1:1   3 hour hemodialysis treatment started at bedside.  Patient is LVAD  15 gauge needles x 2 placed in upper left arm graft.  Obtained ordered BFR without difficulty  Net UF goal at 1 L

## 2017-02-04 NOTE — PROGRESS NOTES
Pt experienced very small nose bleed to left nostril. Packed pt's nostril and will order normal saline spray. Dr. Garcia notified and verbalized understanding. No new orders at this time, VSS and will continue to monitor.

## 2017-02-04 NOTE — PROGRESS NOTES
Patient back to room from [facility] head CT with RN. Patient in no acute distress at this time. Will continue to monitor.    MD notified of results, OK to administer prn tylenol. Will do so and continue to monitor.

## 2017-02-04 NOTE — PLAN OF CARE
Problem: Patient Care Overview  Goal: Plan of Care Review  Outcome: Ongoing (interventions implemented as appropriate)  Pt free of falls, trauma or injury. Pt's headaches have subsided with the use of Tylenol. Have not had to give anything for headaches today. INR=1.9 so coumadin increased to 2.5mg. Plan of care reviewed with pt and pt verbalized understanding. Pt denies any CP, SOB, headaches, nausea or dizziness. Pt's VSS and voices no complaints. Will continue to monitor.

## 2017-02-04 NOTE — PROGRESS NOTES
Progress Note  Cardiology    Admit Date: 1/30/2017   LOS: 5 days   2/4/2017    SUBJECTIVE:     Patient seen and examined. Denies chest pain or shortness of breath. Pt denies any other complaints.     Scheduled Meds:   atorvastatin  40 mg Oral Daily    calcium carbonate  1,000 mg Oral TID WM    escitalopram oxalate  20 mg Oral QHS    gabapentin  300 mg Oral QHS    hydrALAZINE  50 mg Oral Q8H    pantoprazole  40 mg Oral Daily    warfarin  2.5 mg Oral Daily     Continuous Infusions:   PRN Meds:acetaminophen, ondansetron, sodium chloride    Review of patient's allergies indicates:   Allergen Reactions    Codeine Nausea And Vomiting    Demerol [meperidine] Nausea And Vomiting    Lortab [hydrocodone-acetaminophen] Nausea And Vomiting       OBJECTIVE:     Vital Signs (Most Recent)  Temp: 98.3 °F (36.8 °C) (02/04/17 1143)  Pulse: 74 (02/04/17 1143)  Resp: 18 (02/04/17 1143)  BP: (!) 80/0 (02/04/17 1144)  SpO2: 95 % (02/04/17 1143)    Vital Signs Range (Last 24H):  Temp:  [97.4 °F (36.3 °C)-98.7 °F (37.1 °C)]   Pulse:  [69-79]   Resp:  [14-20]   BP: ()/(0-78)   SpO2:  [95 %-100 %]     I & O (Last 24H):  Intake/Output Summary (Last 24 hours) at 02/04/17 1257  Last data filed at 02/04/17 0600   Gross per 24 hour   Intake              300 ml   Output                0 ml   Net              300 ml       Tele: no events    Physical Exam:   General appearance: alert, appears stated age and cooperative  Neck: no JVD and supple, symmetrical, trachea midline  Lungs: clear to auscultation bilaterally  Heart: regular rate and rhythm, normal VAD hum  Abdomen: soft, non-tender; bowel sounds normal; no masses, no organomegaly  Extremities: extremities normal, atraumatic, no cyanosis or edema  Neurologic: Grossly normal    LABS  CBC with Diff:     Recent Labs  Lab 02/02/17  0421 02/03/17  0358 02/04/17  0600   WBC 4.19 4.10 4.23   HGB 9.3* 9.2* 9.1*   HCT 28.6* 28.0* 27.8*   * 101* 93*   LYMPH 14.1*  0.6* 14.9*   0.6* 18.7  0.8*   MONO 9.3  0.4 10.0  0.4 10.4  0.4   EOSINOPHIL 1.9 2.4 2.6       COAG:    Recent Labs  Lab 02/02/17 0421 02/03/17 0358 02/04/17  0600   APTT 33.2* 35.9* 34.7*   INR 2.9* 2.4* 1.9*       CMP:   Recent Labs  Lab 01/30/17  2000  02/01/17  0208 02/01/17  1351 02/02/17 0421 02/03/17 0358 02/04/17  0600   GLU 75  < > 100 102 83 79 87   CALCIUM 8.7  < > 9.0 8.7 8.6* 9.0 8.6*   ALBUMIN 3.4*  --  3.3* 3.2*  --  3.3*  --    PROT 7.0  --  7.2  --   --  6.9  --      < > 135* 138 138 138 139   K 4.5  < > 5.7* 4.3 4.9 4.1 4.4   CO2 25  < > 19* 21* 19* 25 25   CL 94*  < > 104 103 105 102 103   BUN 69*  < > 55* 67* 81* 36* 59*   CREATININE 8.5*  < > 7.1* 7.9* 9.1* 5.0* 7.2*   ALKPHOS 117  --  119  --   --  111  --    ALT 15  --  12  --   --  13  --    AST 19  --  19  --   --  19  --    BILITOT 0.4  --  0.3  --   --  0.4  --    MG 2.2  < > 2.4  --  2.3 1.9 2.1   PHOS  --   < > 5.2* 6.0* 7.1* 3.9 5.4*   < > = values in this interval not displayed.  Estimated Creatinine Clearance: 6.9 mL/min (based on Cr of 7.2).    .  Recent Labs  Lab 01/30/17  0923 02/03/17 0358   TROPONINI 0.091*  --   --    BNP  --   < > 768*   < > = values in this interval not displayed.      Diagnostic Results:  Labs reviewed    Patient Active Problem List   Diagnosis    Heart replaced by heart assist device    End stage renal disease on dialysis    Chronic anticoagulation    Left ventricular assist device present    Femoral neck fracture    Anemia    Chronic combined systolic and diastolic heart failure    Fracture of lumbar spine without cord injury    TIA (transient ischemic attack)    Automatic implantable cardioverter-defibrillator in situ    Paroxysmal atrial fibrillation    Atrial tachycardia, paroxysmal    HIT (heparin-induced thrombocytopenia)    Gait disorder    Chronic LBP    Right leg numbness    Foot drop, bilateral    Physical deconditioning    Secondary hyperparathyroidism (of renal origin)     History of stroke without residual deficits    Syncope    Faintness    LVAD (left ventricular assist device) present    HBP (high blood pressure)    Nausea    Essential hypertension    Chronic low back pain    Cognitive impairment    Cellulitis    Fall    Erythema    Spontaneous hematoma of forearm    Hallucinations    Embolic stroke involving left posterior cerebral artery    Migraine with aura and without status migrainosus, not intractable    Cytotoxic cerebral edema    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     Acute CVA, ischemic, likely cardioembolic involving L posterior cerebral artery  -CT at OSH 1/30/17 showed L thalamic lacunar infarct. Repeat CT head 1/31/17 stable  -[facility] Neuro following  -HTN: discussed with [facility] neuro and OK to keep MAP around 80. increased hydralazine to 50 tid today  -INR 1.9 today and so increased coumadin to 2.5  -Statin  -PT/OT/ST     S/p HW LVAD, speed decreased to 2600 1/31/17 due to low flows/suck down during HD  -INR 1.9 today. Goal INR 2-3. Cont coumadin  -HTN: cont hydralazine. Goal MAP around 80  -LDH stable, 200s     ESRD on HD T/TH/Sat  -Nephrology following  -Pt with hypotension/low flows/suck down during HD 1/31 so had to stop early. Tolerated HD yesterday at 2600.     Epistaxis- resolved  -Spoke with ENT- recommended Afrin PRN and apply pressure for 10 minutes      Optimal bp control and inr and then likely dc Monday    Jimmie Garcia MD

## 2017-02-04 NOTE — PROGRESS NOTES
"Patient complaining of headache rating a "7" on a 0-10 pain scale that she has been having for "a couple of hours". Patient stated that when the headache came on, it was sudden and the intensity has not changed. Patient /77, MAP 91. All other vital signs stable. Patient does report photophobia and double vision but states that these are symptoms from her recent thrombolic stroke. Notified Dr. Norman on call for HTS. Orders to administe 2200 25mg PO hydralazine early and also orders placed for STAT head CT without contrast. Will bring patient and continue to monitor.  "

## 2017-02-04 NOTE — PROGRESS NOTES
02/03/17 2356 02/03/17 2359   Vital Signs   /77 (!) 86/0   MAP (mmHg) 91 --    BP Location Right arm Right arm   BP Method Automatic Doppler   Patient Position Lying Lying     Notified Dr. Norman of patient's MAP. Telephone order with read back verification to administer 5mg IV push hydralazine once, now. Will administer and continue to monitor.

## 2017-02-04 NOTE — PROGRESS NOTES
02/04/17 0400 02/04/17 0406   Vital Signs   /76 (!) 92/0   MAP (mmHg) 90 --    BP Location Right arm Right arm   BP Method Automatic Doppler   Patient Position Lying Lying     Notified Dr. Norman of patient's MAP and DP. Patient asymptomatic. OK to administer 0600 dose of 25mg PO hydralazine early per MD. Will do so and continue to monitor.

## 2017-02-04 NOTE — PLAN OF CARE
Problem: Patient Care Overview  Goal: Plan of Care Review  Outcome: Ongoing (interventions implemented as appropriate)  Patient complained of a headache overnight. Head ct was done, and is negative for a brain bleed. Headache resolved with PRN tylenol. Denies chest pain, SOB, or other pain discomfort. LVAD hum smooth and numbers within normal limits. LVAD dressing change schedule is every other day, next change is due 2/4/17. Patient remains free of falls or injury. No significant events. Patient verbalizes complete understanding of plan of care. Will continue to monitor.

## 2017-02-04 NOTE — PROGRESS NOTES
Patient escorted off of unit via wheelchair for STAT head CT without contrast by charge RN. No acute distress noted. Patient denies pain at this time. Transferred with minimal  assistance. Peripheral IVs intact. Patient transported on telemetry. Matthew in monitor room notified.  Patient is on battery and has her emergency bag with her. Awaiting patient's return.

## 2017-02-04 NOTE — PROGRESS NOTES
Progress Note  Nephrology    Admit Date: 1/30/2017   LOS: 5 days     SUBJECTIVE:     Follow-up For:  ESRD (MWF)  Interval hx:  3 hour HD treatment completed overnight and tolerated well.  This morning she has no complaints and oxygenating well on 2L NC.  Achieved good metabolic clearance and increased blood flows during HD.  Phos level improved down to 3.9.    Scheduled Meds:   atorvastatin  40 mg Oral Daily    calcium carbonate  1,000 mg Oral TID WM    escitalopram oxalate  20 mg Oral QHS    gabapentin  300 mg Oral QHS    hydrALAZINE  50 mg Oral Q8H    pantoprazole  40 mg Oral Daily    warfarin  2.5 mg Oral Daily     Continuous Infusions:   PRN Meds:acetaminophen, ondansetron, sodium chloride    Review of patient's allergies indicates:   Allergen Reactions    Codeine Nausea And Vomiting    Demerol [meperidine] Nausea And Vomiting    Lortab [hydrocodone-acetaminophen] Nausea And Vomiting       Review of Systems  Constitutional: Negative for fever, negative appetite change  Eyes: + photophobia and visual disturbance  Respiratory: Negative for cough, chest tightness, SOB  Cardiovascular: Negative for chest pain, palpitations and leg swelling.  Gastrointestinal: negative nausea, negative vomiting, negative constipation  Genitourinary: Negative for dysuria, + decreased urinary volume  Skin: Negative for pallor, rash and wound.  Neurological: negative headaches, + R sided weakness (around baseline)  Psychiatric/Behavioral: Negative for confusion, sleep disturbance and dysphoric mood. The patient is not nervous/anxious    OBJECTIVE:     Vital Signs (Most Recent)  Temp: 98.3 °F (36.8 °C) (02/04/17 1143)  Pulse: 74 (02/04/17 1143)  Resp: 18 (02/04/17 1143)  BP: (!) 80/0 (02/04/17 1144)  SpO2: 95 % (02/04/17 1143)    Vital Signs Range (Last 24H):  Temp:  [97.4 °F (36.3 °C)-98.7 °F (37.1 °C)]   Pulse:  [69-79]   Resp:  [14-20]   BP: ()/(0-78)   SpO2:  [95 %-100 %]     I & O (Last 24H):    Intake/Output Summary  (Last 24 hours) at 02/04/17 1302  Last data filed at 02/04/17 0600   Gross per 24 hour   Intake              300 ml   Output                0 ml   Net              300 ml     Physical Exam:  General: WF in NAD. AOx4.  Respiratory: Clear to auscultation bilaterally, respirations unlabored, no rales/rhonchi/wheezing  Cardiovacular: LVAD hum  Gastrointestinal: Soft, non-tender, bowel sounds normal  Extremities: No clubbing or cyanosis of bilateral upper extremities; no lower extremity edema bilaterally  Skin: warm and dry; no rash on exposed skin    Laboratory:  CBC:     Recent Labs  Lab 02/04/17  0600   WBC 4.23   RBC 2.79*   HGB 9.1*   HCT 27.8*   PLT 93*   *   MCH 32.6*   MCHC 32.7     BMP:     Recent Labs  Lab 02/04/17  0600   GLU 87      K 4.4      CO2 25   BUN 59*   CREATININE 7.2*   CALCIUM 8.6*   MG 2.1         ASSESSMENT/PLAN:     60 Y/O F with PMHx of ICM with EF of 5% s/p Heartware as destination therapy, pAT, and ESRD on T/R/S, history of GIB last 4/2016 requiring blood transfusion, colonoscopy revealed AVM in caecum S/P APC with no recurrent GIB. Presenting with acute ischemic thalamic stroke manifested with double vision and photophobia.    Plan:  ESRD MWF  -last RRT on Thursday  -no major electrolyte abnormalities  -will provide 3 hour HD treatment today  - ml-1L as tolerated    Anemia of ESRD  -H/H 9.1/27.8(decreased)  -primary team concerned over starting procrit  -will hold procrit at this time.      Bone mineral metabolism  -hyperphosphatemia at 5.4  -will continue calcium carbonate 2 TID with meals.  -will monitor phos levels closely.    JESSENIA Donaldson, JUAN MP-BC  Nephrology  Pager:  [phone]    ATTENDING PHYSICIAN ATTESTATION  I have personally interviewed and examined the patient. I thoroughly reviewed the demographic, clinical, laboratorial and imaging information available in medical records. I agree with the assessment and recommendations provided by the MAAME Vera who  was under my supervision.

## 2017-02-05 LAB
ANION GAP SERPL CALC-SCNC: 10 MMOL/L
APTT BLDCRRT: 32.8 SEC
BASOPHILS # BLD AUTO: 0.01 K/UL
BASOPHILS NFR BLD: 0.2 %
BUN SERPL-MCNC: 30 MG/DL
CALCIUM SERPL-MCNC: 9.2 MG/DL
CHLORIDE SERPL-SCNC: 107 MMOL/L
CO2 SERPL-SCNC: 25 MMOL/L
CREAT SERPL-MCNC: 4.3 MG/DL
DIFFERENTIAL METHOD: ABNORMAL
EOSINOPHIL # BLD AUTO: 0.1 K/UL
EOSINOPHIL NFR BLD: 1.7 %
ERYTHROCYTE [DISTWIDTH] IN BLOOD BY AUTOMATED COUNT: 15 %
EST. GFR  (AFRICAN AMERICAN): 12.2 ML/MIN/1.73 M^2
EST. GFR  (NON AFRICAN AMERICAN): 10.6 ML/MIN/1.73 M^2
GLUCOSE SERPL-MCNC: 87 MG/DL
HCT VFR BLD AUTO: 28.6 %
HGB BLD-MCNC: 9.4 G/DL
INR PPP: 1.9
LDH SERPL L TO P-CCNC: 205 U/L
LYMPHOCYTES # BLD AUTO: 0.6 K/UL
LYMPHOCYTES NFR BLD: 12.2 %
MAGNESIUM SERPL-MCNC: 1.9 MG/DL
MCH RBC QN AUTO: 32.4 PG
MCHC RBC AUTO-ENTMCNC: 32.9 %
MCV RBC AUTO: 99 FL
MONOCYTES # BLD AUTO: 0.6 K/UL
MONOCYTES NFR BLD: 12.7 %
NEUTROPHILS # BLD AUTO: 3.5 K/UL
NEUTROPHILS NFR BLD: 73 %
PHOSPHATE SERPL-MCNC: 3.7 MG/DL
PLATELET # BLD AUTO: 98 K/UL
PMV BLD AUTO: 9.7 FL
POTASSIUM SERPL-SCNC: 4.4 MMOL/L
PROTHROMBIN TIME: 18.8 SEC
RBC # BLD AUTO: 2.9 M/UL
SODIUM SERPL-SCNC: 142 MMOL/L
WBC # BLD AUTO: 4.74 K/UL

## 2017-02-05 PROCEDURE — 85730 THROMBOPLASTIN TIME PARTIAL: CPT

## 2017-02-05 PROCEDURE — 25000003 PHARM REV CODE 250: Performed by: INTERNAL MEDICINE

## 2017-02-05 PROCEDURE — 83615 LACTATE (LD) (LDH) ENZYME: CPT

## 2017-02-05 PROCEDURE — 27000248 HC VAD-ADDITIONAL DAY

## 2017-02-05 PROCEDURE — 84100 ASSAY OF PHOSPHORUS: CPT

## 2017-02-05 PROCEDURE — 20600001 HC STEP DOWN PRIVATE ROOM

## 2017-02-05 PROCEDURE — 36415 COLL VENOUS BLD VENIPUNCTURE: CPT

## 2017-02-05 PROCEDURE — 25000003 PHARM REV CODE 250: Performed by: HOSPITALIST

## 2017-02-05 PROCEDURE — 83735 ASSAY OF MAGNESIUM: CPT

## 2017-02-05 PROCEDURE — 25000003 PHARM REV CODE 250: Performed by: PHYSICIAN ASSISTANT

## 2017-02-05 PROCEDURE — 80048 BASIC METABOLIC PNL TOTAL CA: CPT

## 2017-02-05 PROCEDURE — 25000003 PHARM REV CODE 250: Performed by: NURSE PRACTITIONER

## 2017-02-05 PROCEDURE — 85025 COMPLETE CBC W/AUTO DIFF WBC: CPT

## 2017-02-05 PROCEDURE — 85610 PROTHROMBIN TIME: CPT

## 2017-02-05 PROCEDURE — 99232 SBSQ HOSP IP/OBS MODERATE 35: CPT | Mod: ,,, | Performed by: INTERNAL MEDICINE

## 2017-02-05 RX ORDER — WARFARIN 3 MG/1
3 TABLET ORAL DAILY
Status: DISCONTINUED | OUTPATIENT
Start: 2017-02-05 | End: 2017-02-06 | Stop reason: HOSPADM

## 2017-02-05 RX ORDER — AMLODIPINE BESYLATE 5 MG/1
5 TABLET ORAL DAILY
Status: DISCONTINUED | OUTPATIENT
Start: 2017-02-05 | End: 2017-02-06 | Stop reason: HOSPADM

## 2017-02-05 RX ADMIN — PANTOPRAZOLE SODIUM 40 MG: 40 TABLET, DELAYED RELEASE ORAL at 08:02

## 2017-02-05 RX ADMIN — ACETAMINOPHEN 650 MG: 325 TABLET, FILM COATED ORAL at 10:02

## 2017-02-05 RX ADMIN — HYDRALAZINE HYDROCHLORIDE 50 MG: 50 TABLET ORAL at 09:02

## 2017-02-05 RX ADMIN — ACETAMINOPHEN 650 MG: 325 TABLET, FILM COATED ORAL at 12:02

## 2017-02-05 RX ADMIN — ESCITALOPRAM 20 MG: 20 TABLET, FILM COATED ORAL at 09:02

## 2017-02-05 RX ADMIN — HYDRALAZINE HYDROCHLORIDE 50 MG: 50 TABLET ORAL at 02:02

## 2017-02-05 RX ADMIN — WARFARIN SODIUM 3 MG: 3 TABLET ORAL at 05:02

## 2017-02-05 RX ADMIN — CALCIUM CARBONATE (ANTACID) CHEW TAB 500 MG 1000 MG: 500 CHEW TAB at 05:02

## 2017-02-05 RX ADMIN — AMLODIPINE BESYLATE 5 MG: 5 TABLET ORAL at 09:02

## 2017-02-05 RX ADMIN — CALCIUM CARBONATE (ANTACID) CHEW TAB 500 MG 1000 MG: 500 CHEW TAB at 12:02

## 2017-02-05 RX ADMIN — ATORVASTATIN CALCIUM 40 MG: 20 TABLET, FILM COATED ORAL at 08:02

## 2017-02-05 RX ADMIN — GABAPENTIN 300 MG: 300 CAPSULE ORAL at 09:02

## 2017-02-05 RX ADMIN — HYDRALAZINE HYDROCHLORIDE 50 MG: 50 TABLET ORAL at 04:02

## 2017-02-05 RX ADMIN — CALCIUM CARBONATE (ANTACID) CHEW TAB 500 MG 1000 MG: 500 CHEW TAB at 08:02

## 2017-02-05 NOTE — PLAN OF CARE
Problem: Patient Care Overview  Goal: Plan of Care Review  Outcome: Ongoing (interventions implemented as appropriate)  Patient complained of a headache overnight, relieved by prn tylenol. STAT heat CT negative for ICH. Denies chest pain, SOB, or other pain discomfort. HD completed at the bedside this shift. LVAD sounds and numbers within normal limits, dressing change due 2/6/17. Patient remains free of falls or injury. D/c pending stable BP and INR. Patient verbalizes complete understanding of plan of care. Will continue to monitor.

## 2017-02-05 NOTE — PROGRESS NOTES
Dialysis complete.  Blood returned at rate of 50cc/.   Hemostasis complete.   Held pressure to   Left arm   A/v site for   10 Minutes to each site.   Guaze and tape applied to needle site.   No active bleeding noted.   + thrill and bruit.    Pt tolerated hemodialysis without diff.   Pt ran  3 Hrs on hemodialysis machine.   Took off    848 Net volume.   Used   [id number]    Dialyzer.

## 2017-02-05 NOTE — PLAN OF CARE
Problem: Patient Care Overview  Goal: Plan of Care Review  Outcome: Ongoing (interventions implemented as appropriate)  Pt free of falls, trauma or injury. Pt's headaches have subsided with the use of Tylenol. Have not had to give anything for headaches today. INR=1.9 so coumadin increased to 3.0mg. Plan of care reviewed with pt and pt verbalized understanding. If INR increases and pressures decrease there is probable discharge. Pt denies any CP, SOB, headaches, nausea or dizziness. Pt's VSS and voices no complaints. Will continue to monitor.

## 2017-02-05 NOTE — PROGRESS NOTES
Patient escorted back to unit via wheelchair from STAT head CT transported by RN.  No acute distress noted. PIV intact, patient transported on telemetry. Patient back in room on LVAD power module. Patient states that headache is resolving. Notified Dr. Jaramillo of CT results in [facility]. No new orders @ this time, will continue to monitor.

## 2017-02-05 NOTE — PROGRESS NOTES
"Patient complaining of a headache "7" on a 0-10 pain scale. Patient just completed dialysis and states that this causes headaches sometimes. Notified Dr. Jaramillo with HTS. MD @ bedside assessing patient. MD placed orders for STAT head CT without contrast and OK to administer prn tylenol. Paged CT and notified them, CT states they will call back when they are ready for the patient. Will continue to monitor.    Patients vitals:      02/04/17 2215 02/04/17 2219   Vital Signs   /69 (!) 80/0   MAP (mmHg) 84 --    BP Location Right arm Right arm   BP Method Automatic Doppler   Patient Position Lying Lying     "

## 2017-02-05 NOTE — PROGRESS NOTES
Patient escorted off of unit via wheelchair for STAT head CT without contrast by charge RN. No acute distress noted. Transferred with minimal assistance. Peripheral IVs intact. Patient transported on telemetry. Nora in monitor room notified. Patient is on battery and has her emergency bag with her. Awaiting patient's return.

## 2017-02-05 NOTE — PROGRESS NOTES
02/05/17 0400 02/05/17 0405   Vital Signs   /66 (!) 80/0   MAP (mmHg) 89 --    BP Location Right arm Right arm   BP Method Automatic Doppler   Patient Position Lying Lying     Notified Dr. Jaramillo of patient MAP and DP. Order to administer 0600 dose of PO hydralazine now. Will do so and continue to monitor.

## 2017-02-05 NOTE — PROGRESS NOTES
Progress Note  Cardiology    Admit Date: 1/30/2017   LOS: 6 days   2/5/2017    SUBJECTIVE:     Patient seen and examined. Denies chest pain or shortness of breath. Pt denies any other complaints.     Scheduled Meds:   amlodipine  5 mg Oral Daily    atorvastatin  40 mg Oral Daily    calcium carbonate  1,000 mg Oral TID WM    escitalopram oxalate  20 mg Oral QHS    gabapentin  300 mg Oral QHS    hydrALAZINE  50 mg Oral Q8H    pantoprazole  40 mg Oral Daily    warfarin  3 mg Oral Daily     Continuous Infusions:   PRN Meds:acetaminophen, ondansetron, sodium chloride    Review of patient's allergies indicates:   Allergen Reactions    Codeine Nausea And Vomiting    Demerol [meperidine] Nausea And Vomiting    Lortab [hydrocodone-acetaminophen] Nausea And Vomiting       OBJECTIVE:     Vital Signs (Most Recent)  Temp: 98.4 °F (36.9 °C) (02/05/17 0832)  Pulse: 81 (02/05/17 0900)  Resp: 18 (02/05/17 0832)  BP: (!) 92/0 (02/05/17 0833)  SpO2: 96 % (02/05/17 0832)    Vital Signs Range (Last 24H):  Temp:  [97.8 °F (36.6 °C)-98.4 °F (36.9 °C)]   Pulse:  []   Resp:  [16-20]   BP: ()/(0-84)   SpO2:  [95 %-98 %]     I & O (Last 24H):  Intake/Output Summary (Last 24 hours) at 02/05/17 1056  Last data filed at 02/05/17 0600   Gross per 24 hour   Intake              890 ml   Output             1748 ml   Net             -858 ml       Tele: no events    Physical Exam:   General appearance: alert, appears stated age and cooperative  Neck: no JVD and supple, symmetrical, trachea midline  Lungs: clear to auscultation bilaterally  Heart: regular rate and rhythm, normal VAD hum  Abdomen: soft, non-tender; bowel sounds normal; no masses, no organomegaly  Extremities: extremities normal, atraumatic, no cyanosis or edema  Neurologic: Grossly normal       LABS  CBC with Diff:     Recent Labs  Lab 02/03/17  0358 02/04/17  0600 02/05/17  0608   WBC 4.10 4.23 4.74   HGB 9.2* 9.1* 9.4*   HCT 28.0* 27.8* 28.6*   * 93* 98*    LYMPH 14.9*  0.6* 18.7  0.8* 12.2*  0.6*   MONO 10.0  0.4 10.4  0.4 12.7  0.6   EOSINOPHIL 2.4 2.6 1.7       COAG:    Recent Labs  Lab 02/03/17 0358 02/04/17 0600 02/05/17 0608   APTT 35.9* 34.7* 32.8*   INR 2.4* 1.9* 1.9*       CMP:   Recent Labs  Lab 01/30/17 2000 02/01/17  0208 02/01/17  1351 02/03/17 0358 02/04/17 0600 02/05/17 0608   GLU 75  < > 100 102  < > 79 87 87   CALCIUM 8.7  < > 9.0 8.7  < > 9.0 8.6* 9.2   ALBUMIN 3.4*  --  3.3* 3.2*  --  3.3*  --   --    PROT 7.0  --  7.2  --   --  6.9  --   --      < > 135* 138  < > 138 139 142   K 4.5  < > 5.7* 4.3  < > 4.1 4.4 4.4   CO2 25  < > 19* 21*  < > 25 25 25   CL 94*  < > 104 103  < > 102 103 107   BUN 69*  < > 55* 67*  < > 36* 59* 30*   CREATININE 8.5*  < > 7.1* 7.9*  < > 5.0* 7.2* 4.3*   ALKPHOS 117  --  119  --   --  111  --   --    ALT 15  --  12  --   --  13  --   --    AST 19  --  19  --   --  19  --   --    BILITOT 0.4  --  0.3  --   --  0.4  --   --    MG 2.2  < > 2.4  --   < > 1.9 2.1 1.9   PHOS  --   < > 5.2* 6.0*  < > 3.9 5.4* 3.7   < > = values in this interval not displayed.  Estimated Creatinine Clearance: 11.5 mL/min (based on Cr of 4.3).    .  Recent Labs  Lab 01/30/17  0923  02/03/17  0358   TROPONINI 0.091*  --   --    BNP  --   < > 768*   < > = values in this interval not displayed.      Diagnostic Results:  Labs reviewed    Patient Active Problem List   Diagnosis    Heart replaced by heart assist device    End stage renal disease on dialysis    Chronic anticoagulation    Left ventricular assist device present    Femoral neck fracture    Anemia    Chronic combined systolic and diastolic heart failure    Fracture of lumbar spine without cord injury    TIA (transient ischemic attack)    Automatic implantable cardioverter-defibrillator in situ    Paroxysmal atrial fibrillation    Atrial tachycardia, paroxysmal    HIT (heparin-induced thrombocytopenia)    Gait disorder    Chronic LBP    Right leg numbness     Foot drop, bilateral    Physical deconditioning    Secondary hyperparathyroidism (of renal origin)    History of stroke without residual deficits    Syncope    Faintness    LVAD (left ventricular assist device) present    HBP (high blood pressure)    Nausea    Essential hypertension    Chronic low back pain    Cognitive impairment    Cellulitis    Fall    Erythema    Spontaneous hematoma of forearm    Hallucinations    Embolic stroke involving left posterior cerebral artery    Migraine with aura and without status migrainosus, not intractable    Cytotoxic cerebral edema    Acute ischemic vertebrobasilar artery thalamic stroke involving left-sided vessel    Cardiomegaly     ASSESSMENT / PLAN:     62 y/o WF with PMHx of ICM, s/p Heartware LVAD as destination therapy, pAT, ESRD on T/Th/S, history of GIBs last 4/2016 transferred from OSH with Acute CVA.      Acute CVA, ischemic, likely cardioembolic involving L posterior cerebral artery  -CT at OSH 1/30/17 showed L thalamic lacunar infarct. Repeat CT head 1/31/17 stable  -[facility] Neuro following  -HTN: discussed with [facility] neuro and OK to keep MAP around 80. increased hydralazine to 50 tid today  -INR 1.9 today and so increased coumadin to 3.0  -Statin  -PT/OT/ST      S/p HW LVAD, speed decreased to 2600 1/31/17 due to low flows/suck down during HD  -INR 1.9 today. Goal INR 2-3. Cont coumadin  -HTN: cont hydralazine. Goal MAP around 80  -LDH stable, 200s      ESRD on HD T/TH/Sat  -Nephrology following  -Pt with hypotension/low flows/suck down during HD 1/31 so had to stop early. Tolerated HD yesterday at 2600.      Epistaxis- resolved  -Spoke with ENT- recommended Afrin PRN and apply pressure for 10 minutes       Optimal bp control and inr and then likely dc Monday    Jimmie Garcia MD

## 2017-02-06 VITALS
HEART RATE: 84 BPM | WEIGHT: 136.88 LBS | OXYGEN SATURATION: 97 % | RESPIRATION RATE: 18 BRPM | SYSTOLIC BLOOD PRESSURE: 72 MMHG | TEMPERATURE: 98 F | HEIGHT: 60 IN | BODY MASS INDEX: 26.87 KG/M2

## 2017-02-06 DIAGNOSIS — Z95.810 ICD (IMPLANTABLE CARDIOVERTER-DEFIBRILLATOR) IN PLACE: Primary | ICD-10-CM

## 2017-02-06 DIAGNOSIS — I50.9 HEART FAILURE, UNSPECIFIED HEART FAILURE CHRONICITY, UNSPECIFIED HEART FAILURE TYPE: ICD-10-CM

## 2017-02-06 PROBLEM — I51.7 CARDIOMEGALY: Status: ACTIVE | Noted: 2017-02-06

## 2017-02-06 PROBLEM — I63.81 ACUTE ISCHEMIC VERTEBROBASILAR ARTERY THALAMIC STROKE INVOLVING LEFT-SIDED VESSEL: Status: ACTIVE | Noted: 2017-02-06

## 2017-02-06 LAB
ALBUMIN SERPL BCP-MCNC: 3.4 G/DL
ALP SERPL-CCNC: 109 U/L
ALT SERPL W/O P-5'-P-CCNC: 15 U/L
ANION GAP SERPL CALC-SCNC: 10 MMOL/L
APTT BLDCRRT: 34.5 SEC
AST SERPL-CCNC: 21 U/L
BASOPHILS # BLD AUTO: 0.02 K/UL
BASOPHILS NFR BLD: 0.4 %
BILIRUB DIRECT SERPL-MCNC: 0.2 MG/DL
BILIRUB SERPL-MCNC: 0.5 MG/DL
BNP SERPL-MCNC: 796 PG/ML
BUN SERPL-MCNC: 49 MG/DL
CALCIUM SERPL-MCNC: 9.1 MG/DL
CHLORIDE SERPL-SCNC: 103 MMOL/L
CO2 SERPL-SCNC: 24 MMOL/L
CREAT SERPL-MCNC: 6.5 MG/DL
CRP SERPL-MCNC: 12.3 MG/L
DIFFERENTIAL METHOD: ABNORMAL
EOSINOPHIL # BLD AUTO: 0.1 K/UL
EOSINOPHIL NFR BLD: 2.1 %
ERYTHROCYTE [DISTWIDTH] IN BLOOD BY AUTOMATED COUNT: 15 %
EST. GFR  (AFRICAN AMERICAN): 7.4 ML/MIN/1.73 M^2
EST. GFR  (NON AFRICAN AMERICAN): 6.4 ML/MIN/1.73 M^2
GLUCOSE SERPL-MCNC: 82 MG/DL
HCT VFR BLD AUTO: 26.7 %
HGB BLD-MCNC: 8.7 G/DL
INR PPP: 2
LDH SERPL L TO P-CCNC: 200 U/L
LYMPHOCYTES # BLD AUTO: 0.7 K/UL
LYMPHOCYTES NFR BLD: 14.4 %
MAGNESIUM SERPL-MCNC: 2.1 MG/DL
MCH RBC QN AUTO: 32.7 PG
MCHC RBC AUTO-ENTMCNC: 32.6 %
MCV RBC AUTO: 100 FL
MONOCYTES # BLD AUTO: 0.6 K/UL
MONOCYTES NFR BLD: 12.6 %
NEUTROPHILS # BLD AUTO: 3.4 K/UL
NEUTROPHILS NFR BLD: 70.3 %
PHOSPHATE SERPL-MCNC: 3.7 MG/DL
PLATELET # BLD AUTO: 103 K/UL
PMV BLD AUTO: 9.9 FL
POTASSIUM SERPL-SCNC: 4.4 MMOL/L
PREALB SERPL-MCNC: 29 MG/DL
PROT SERPL-MCNC: 6.9 G/DL
PROTHROMBIN TIME: 19.6 SEC
RBC # BLD AUTO: 2.66 M/UL
SODIUM SERPL-SCNC: 137 MMOL/L
WBC # BLD AUTO: 4.78 K/UL

## 2017-02-06 PROCEDURE — 25000003 PHARM REV CODE 250: Performed by: PHYSICIAN ASSISTANT

## 2017-02-06 PROCEDURE — 25000003 PHARM REV CODE 250: Performed by: INTERNAL MEDICINE

## 2017-02-06 PROCEDURE — 25000003 PHARM REV CODE 250: Performed by: NURSE PRACTITIONER

## 2017-02-06 PROCEDURE — 83735 ASSAY OF MAGNESIUM: CPT

## 2017-02-06 PROCEDURE — 27000248 HC VAD-ADDITIONAL DAY

## 2017-02-06 PROCEDURE — 85610 PROTHROMBIN TIME: CPT

## 2017-02-06 PROCEDURE — 83615 LACTATE (LD) (LDH) ENZYME: CPT

## 2017-02-06 PROCEDURE — 85730 THROMBOPLASTIN TIME PARTIAL: CPT

## 2017-02-06 PROCEDURE — 80076 HEPATIC FUNCTION PANEL: CPT

## 2017-02-06 PROCEDURE — 93750 INTERROGATION VAD IN PERSON: CPT | Performed by: HOSPITALIST

## 2017-02-06 PROCEDURE — 83880 ASSAY OF NATRIURETIC PEPTIDE: CPT

## 2017-02-06 PROCEDURE — 80048 BASIC METABOLIC PNL TOTAL CA: CPT

## 2017-02-06 PROCEDURE — 84134 ASSAY OF PREALBUMIN: CPT

## 2017-02-06 PROCEDURE — 93750 INTERROGATION VAD IN PERSON: CPT | Mod: ,,, | Performed by: INTERNAL MEDICINE

## 2017-02-06 PROCEDURE — 86140 C-REACTIVE PROTEIN: CPT

## 2017-02-06 PROCEDURE — 25000003 PHARM REV CODE 250: Performed by: HOSPITALIST

## 2017-02-06 PROCEDURE — 99232 SBSQ HOSP IP/OBS MODERATE 35: CPT | Mod: ,,, | Performed by: INTERNAL MEDICINE

## 2017-02-06 PROCEDURE — 85025 COMPLETE CBC W/AUTO DIFF WBC: CPT

## 2017-02-06 PROCEDURE — 84100 ASSAY OF PHOSPHORUS: CPT

## 2017-02-06 RX ORDER — HYDRALAZINE HYDROCHLORIDE 50 MG/1
50 TABLET, FILM COATED ORAL EVERY 8 HOURS
Qty: 90 TABLET | Refills: 11 | Status: ON HOLD | OUTPATIENT
Start: 2017-02-06 | End: 2017-07-26 | Stop reason: ALTCHOICE

## 2017-02-06 RX ORDER — WARFARIN 2 MG/1
TABLET ORAL
Qty: 102 TABLET | Refills: 3 | Status: ON HOLD
Start: 2017-02-06 | End: 2017-04-06

## 2017-02-06 RX ORDER — ESCITALOPRAM OXALATE 20 MG/1
20 TABLET ORAL NIGHTLY
Qty: 90 TABLET | Refills: 3 | Status: SHIPPED | OUTPATIENT
Start: 2017-02-06

## 2017-02-06 RX ORDER — ATORVASTATIN CALCIUM 40 MG/1
40 TABLET, FILM COATED ORAL DAILY
Qty: 90 TABLET | Refills: 3 | Status: SHIPPED | OUTPATIENT
Start: 2017-02-06 | End: 2018-02-06

## 2017-02-06 RX ORDER — AMLODIPINE BESYLATE 5 MG/1
5 TABLET ORAL DAILY
Qty: 30 TABLET | Refills: 11 | Status: SHIPPED | OUTPATIENT
Start: 2017-02-06 | End: 2017-03-08 | Stop reason: SDUPTHER

## 2017-02-06 RX ADMIN — HYDRALAZINE HYDROCHLORIDE 50 MG: 50 TABLET ORAL at 01:02

## 2017-02-06 RX ADMIN — HYDRALAZINE HYDROCHLORIDE 50 MG: 50 TABLET ORAL at 05:02

## 2017-02-06 RX ADMIN — AMLODIPINE BESYLATE 5 MG: 5 TABLET ORAL at 08:02

## 2017-02-06 RX ADMIN — PANTOPRAZOLE SODIUM 40 MG: 40 TABLET, DELAYED RELEASE ORAL at 08:02

## 2017-02-06 RX ADMIN — CALCIUM CARBONATE (ANTACID) CHEW TAB 500 MG 1000 MG: 500 CHEW TAB at 11:02

## 2017-02-06 RX ADMIN — ATORVASTATIN CALCIUM 40 MG: 20 TABLET, FILM COATED ORAL at 08:02

## 2017-02-06 RX ADMIN — CALCIUM CARBONATE (ANTACID) CHEW TAB 500 MG 1000 MG: 500 CHEW TAB at 08:02

## 2017-02-06 RX ADMIN — ACETAMINOPHEN 650 MG: 325 TABLET, FILM COATED ORAL at 08:02

## 2017-02-06 NOTE — PT/OT/SLP DISCHARGE
Occupational Therapy Discharge Summary    Randa Devine  MRN: [id number]   Embolic stroke involving left posterior cerebral artery   Patient Discharged from acute Occupational Therapy on 2/6/17.  Please refer to prior OT note dated on 2/2/17 for functional status.     Assessment:   Patient was discharge unexpectedly.  Information required to complete and accurate discharge summary is unknown.  Refer to therapy initial evaluation and last progress note for initial and most recent functional status and goal achievement.  Recommendations made may be found in medical record. Patient appropriate for care in another setting.  GOALS:   Occupational Therapy Goals     Not on file      Multidisciplinary Problems (Resolved)        Problem: Occupational Therapy Goal    Goal Priority Disciplines Outcome Interventions   Occupational Therapy Goal   (Resolved)     OT, PT/OT Outcome(s) achieved    Description:  Goals to be met by: 2/11/2017    Patient will increase functional independence with ADLs by performing:    Feeding with [place].  UE Dressing with Modified [place].  LE Dressing with Modified [place].  Grooming while standing with Modified [place].  Toileting from bedside commode with Modified [place] for hygiene and clothing management.   Bathing from edge of bed with Minimal Assistance.              Reasons for Discontinuation of Therapy Services  Transfer to alternate level of care.      Plan:  Patient Discharged to: Home with Home Health Service     AMINA Perez  2/6/2017

## 2017-02-06 NOTE — PROGRESS NOTES
Ms. Randa Devine is being discharged today following transfer for loss of conciousness and thalamic lacunar infarct noted on CT scan. Her PMH is significant for HW lvad, ESRD on HD, and several GI bleeds.     During her hospital stay her blood pressure regimen was adjusted. She was newly started on atorvastatin. Her inr goal remains 2-3, with no aspirin. She was administered 2.5mg, then 3mg and inr today is 2. She was instructed to resume her home dose of 3mg on Monday and Thursday and 2mg on other days. Follow up inr requested for Thursday with .     Ms. Devine was provided a new medication administration card prior to discharge. She requested a refill of her lexapro, which was sent in to her local pharmacy. Thank you.

## 2017-02-06 NOTE — PROGRESS NOTES
D/C note:    SW to pt's room for d/c. Pt not in room. SW called pt ([phone]) who reports was anxious to D/C and already returned home. Pt reports dtr provided transport. Pt reports coping well and happy to be out of hospital. Pt reports in agreement with [facility] ([phone] ph, [phone] fax) for SN, PT, and OT. SW faxed HH referral to [facility] and confirmed receipt. Pt reports forgetting to tell MD that she needs a refill on protonix. SW notified WAQAS Lopez MA. Pt reports no other needs at his time and none identified. SW providing psychosocial counseling and support, education, assistance, resources, and D/C planning as indicated. SW remains available.

## 2017-02-06 NOTE — PROCEDURES
TXP LVAD INTERROGATIONS 2/6/2017 2/6/2017 2/6/2017 2/5/2017 2/5/2017 2/5/2017 2/5/2017   Type Heartware Heartware Heartware Heartware Heartware Heartware Heartware   Flow 4.9 4.7 4.8 4.7 4.7 5.5 4.8   Speed 2600 2600 2600 2600 2600 2600 2600   Power (Kim) 3.6 3.5 3.5 3.5 3.5 3.6 3.6   Low Flow Alarm - - - - - - -   High Power Alarm - - - - - - -   Pulsatility - - - - - - -     HCT = 26.7  Waveform 3-8, no negative deflections  VAD sounds smooth  Non pulsatile

## 2017-02-06 NOTE — PROGRESS NOTES
Progress Note  Heart Transplant Service    Admit Date: 1/30/2017   LOS: 7 days     SUBJECTIVE:     Follow up for: Embolic stroke involving left posterior cerebral artery    Interval History: Patient has no complaints overnight. States she is ready to go home. Denies chest pain, SOB, NVD. States she is ready to go home. Has been having some worsening depression over the past month or so and would like to see someone in psych in [place], will place outpatient consult.     Scheduled Meds:   amlodipine  5 mg Oral Daily    atorvastatin  40 mg Oral Daily    calcium carbonate  1,000 mg Oral TID WM    escitalopram oxalate  20 mg Oral QHS    gabapentin  300 mg Oral QHS    hydrALAZINE  50 mg Oral Q8H    pantoprazole  40 mg Oral Daily    warfarin  3 mg Oral Daily     Continuous Infusions:   PRN Meds:acetaminophen, ondansetron, sodium chloride    Review of patient's allergies indicates:   Allergen Reactions    Codeine Nausea And Vomiting    Demerol [meperidine] Nausea And Vomiting    Lortab [hydrocodone-acetaminophen] Nausea And Vomiting       OBJECTIVE:     Vital Signs (Most Recent)  Temp: 98.1 °F (36.7 °C) (02/06/17 0759)  Pulse: 80 (02/06/17 0759)  Resp: 20 (02/06/17 0759)  BP: (!) 78/0 (02/06/17 0800)  SpO2: 95 % (02/06/17 0759)    Vital Signs Range (Last 24H):  Temp:  [98 °F (36.7 °C)-98.6 °F (37 °C)]   Pulse:  [73-87]   Resp:  [18-20]   BP: ()/(0-74)   SpO2:  [94 %-98 %]     I & O (Last 24H):  Intake/Output Summary (Last 24 hours) at 02/06/17 0801  Last data filed at 02/06/17 0701   Gross per 24 hour   Intake              875 ml   Output              350 ml   Net              525 ml     Telemetry: no events overnight    Physical Exam  General appearance: alert, appears stated age and cooperative  Neck: no JVD and supple, symmetrical, trachea midline  Lungs: clear to auscultation bilaterally  Heart: regular rate and rhythm, VAD hum smooth  Abdomen: soft, non-tender; bowel sounds normal; no masses, no  organomegaly  Extremities: extremities normal, atraumatic, no cyanosis or edema  Neurologic: Grossly normal    Labs:    Recent Labs  Lab 02/04/17  0600 02/05/17  0608 02/06/17  0647   WBC 4.23 4.74 4.78   HGB 9.1* 9.4* 8.7*   HCT 27.8* 28.6* 26.7*   PLT 93* 98* 103*   LYMPH 18.7  0.8* 12.2*  0.6* 14.4*  0.7*   MONO 10.4  0.4 12.7  0.6 12.6  0.6   EOSINOPHIL 2.6 1.7 2.1       Recent Labs  Lab 02/04/17  0600 02/05/17  0608 02/06/17  0647   APTT 34.7* 32.8* 34.5*   INR 1.9* 1.9* 2.0*        Recent Labs  Lab 02/01/17  0208 02/01/17  1351  02/03/17  0358 02/04/17  0600 02/05/17  0608 02/06/17  0647    102  < > 79 87 87 82   CALCIUM 9.0 8.7  < > 9.0 8.6* 9.2 9.1   ALBUMIN 3.3* 3.2*  --  3.3*  --   --  3.4*   PROT 7.2  --   --  6.9  --   --  6.9   * 138  < > 138 139 142 137   K 5.7* 4.3  < > 4.1 4.4 4.4 4.4   CO2 19* 21*  < > 25 25 25 24    103  < > 102 103 107 103   BUN 55* 67*  < > 36* 59* 30* 49*   CREATININE 7.1* 7.9*  < > 5.0* 7.2* 4.3* 6.5*   ALKPHOS 119  --   --  111  --   --  109   ALT 12  --   --  13  --   --  15   AST 19  --   --  19  --   --  21   BILITOT 0.3  --   --  0.4  --   --  0.5   MG 2.4  --   < > 1.9 2.1 1.9 2.1   PHOS 5.2* 6.0*  < > 3.9 5.4* 3.7 3.7   < > = values in this interval not displayed.  Estimated Creatinine Clearance: 7.7 mL/min (based on Cr of 6.5).      Recent Labs  Lab 02/03/17  0358   *       Recent Labs  Lab 01/30/17  2000 02/01/17  0208 02/02/17  0421 02/03/17  0358 02/04/17  0600 02/05/17  0608 02/06/17  0647    202 197 190 192 205 200       Microbiology Results (last 7 days)     ** No results found for the last 168 hours. **        ASSESSMENT:     60 y/o WF with PMHx of ICM, s/p Heartware LVAD as destination therapy, pAT, ESRD on T/Th/S, history of GIBs last 4/2016 transferred from OSH with Acute CVA.    PLAN:       Acute CVA  - Ischemic, likely cardioembolic involving L posterior cerebral artery  - CT at OSH 1/30/17 showed L thalamic lacunar  infarct. Repeat CT head 1/31/17 stable  - Vasc Neuro following  - HTN: discussed with [facility] neuro and OK to keep MAP around 80. increased hydralazine to 50 tid today  - INR 1.9 today and so increased coumadin to 3.0  - Statin  - PT/OT/ST      S/p HW LVAD  - Speed decreased to 2600 1/31/17 due to low flows/suck down during HD  - INR 2.0 today. Goal INR 2-3. Cont coumadin at home dose   - HTN: cont hydralazine. Goal MAP around 80  - LDH stable, 200s      ESRD  - HD T/TH/Sat  - Nephrology following  - Pt with hypotension/low flows/suck down during HD 1/31 so had to stop early      Epistaxis- resolved  - Spoke with ENT- recommended Afrin PRN and apply pressure for 10 minutes       Optimal bp control and INR, D/C today    Monica Mensah PA-C  [facility] #[id number]

## 2017-02-06 NOTE — PT/OT/SLP DISCHARGE
Physical Therapy Discharge Summary    Randa Devine  MRN: [id number]   Embolic stroke involving left posterior cerebral artery   Patient Discharged from acute Physical Therapy on 17.  Please refer to prior PT noted date on 2/3/17 for functional status.     Assessment:   Patient was discharge unexpectedly.  Information required to complete and accurate discharge summary is unknown.  Refer to therapy initial evaluation and last progress note for initial and most recent functional status and goal achievement.  Recommendations made may be found in medical record.  GOALS:   Physical Therapy Goals     Not on file      Multidisciplinary Problems (Resolved)        Problem: Physical Therapy Goal    Goal Priority Disciplines Outcome Goal Variances Interventions   Physical Therapy Goal   (Resolved)     PT/OT, PT Outcome(s) achieved     Description:  Goals to be met by: 17     Patient will increase functional independence with mobility by performin. Supine to sit with Modified [place]  2. Sit to supine with Modified [place]  3. Sit to stand transfer with Modified [place]  4. Gait  x 150 feet with Modified [place] using Rolling Walker.   5. Lower extremity exercise program x15 reps, with independence              Reasons for Discontinuation of Therapy Services  Transfer to alternate level of care.      Plan:  Patient Discharged to: Home with Home Health Service.    Tasneem Gabriel, PT, DPT   2017  [phone]

## 2017-02-06 NOTE — PLAN OF CARE
Problem: Patient Care Overview  Goal: Plan of Care Review  Outcome: Ongoing (interventions implemented as appropriate)  Patient complained of pain to her R hand overnight which was relieved by PRN tylenol. Denies chest pain, SOB, or other pain discomfort. LVAD numbers within normal limits and hum is smooth. Next dressing change is due 2-6-17.  Patient remains free of falls or injury. No significant events. Possible d/c in AM. Patient verbalizes complete understanding of plan of care. Will continue to monitor.

## 2017-02-06 NOTE — PROGRESS NOTES
02/05/17 2343 02/05/17 2344   Vital Signs   /73 (!) 82/0   MAP (mmHg) 87 --    BP Location Right arm Right arm   BP Method Automatic Doppler   Patient Position Lying Lying     Notified Dr. Hernandez of patient MAP 87. Patient is complaining of pain to her R hand where an IV infiltrated and she thinks her elevated MAP is due to the pain, patient does not want any extra BP medication at this time. PRN Tylenol was administered for the pain and patient states the pain is subsiding. OK per MD to continue to monitor patient's BP. No new orders placed @ this time, will continue to monitor.

## 2017-02-06 NOTE — PLAN OF CARE
[facility]   Heart Transplant Clinic  [address] [place] [address]   [phone] after hours        HOME  HEALTH ORDERS      Admit to Home Health    Diagnosis:   Patient Active Problem List   Diagnosis    Heart replaced by heart assist device    End stage renal disease on dialysis    Chronic anticoagulation    Left ventricular assist device present    Femoral neck fracture    Anemia    Chronic combined systolic and diastolic heart failure    Fracture of lumbar spine without cord injury    TIA (transient ischemic attack)    Automatic implantable cardioverter-defibrillator in situ    Paroxysmal atrial fibrillation    Atrial tachycardia, paroxysmal    HIT (heparin-induced thrombocytopenia)    Gait disorder    Chronic LBP    Right leg numbness    Foot drop, bilateral    Physical deconditioning    Secondary hyperparathyroidism (of renal origin)    History of stroke without residual deficits    Syncope    Faintness    LVAD (left ventricular assist device) present    HBP (high blood pressure)    Nausea    Essential hypertension    Chronic low back pain    Cognitive impairment    Cellulitis    Fall    Erythema    Spontaneous hematoma of forearm    Hallucinations    Embolic stroke involving left posterior cerebral artery    Migraine with aura and without status migrainosus, not intractable    Cytotoxic cerebral edema    Acute ischemic vertebrobasilar artery thalamic stroke involving left-sided vessel    Cardiomegaly       Patient is homebound due to:  Resolution of sub therapeutic INR    Diet: Cardiac    Acitivities: As tolerated    Nursing:   SN to complete comprehensive assessment including routine vital signs. Instruct on disease process and s/s of complications to report to MD. Review/verify medication list sent home with the patient at time of discharge  and instruct patient/caregiver as needed. Frequency may be adjusted  depending on start of care date.    Notify MD if SBP > 160 or < 90; DBP > 90 or < 50; HR > 120 or < 50; Temp > 101; Weight gain >3lbs in 1 day or 5lbs in 1 week.    CONSULTS:     Physical Therapy to evaluate and treat. Evaluate for home safety and equipment needs; Establish/upgrade home exercise program. Perform / instruct on therapeutic exercises, gait training, transfer training, and Range of Motion.    Occupational Therapy to evaluate and treat. Evaluate home environment for safety and equipment needs. Perform/Instruct on transfers, ADL training, ROM, and therapeutic exercises.      Send initial Home Health orders to HTS attending physician on call.  Send follow up questions to [phone] or fax:                   Heart Failure:      [phone]

## 2017-02-06 NOTE — DISCHARGE SUMMARY
[facility]  Discharge Summary     Patient ID:  Randa Devine  [id number]  61 y.o.  1955    Admit date: 1/30/2017    Discharge Date and Time:  02/06/2017 12:52 PM    Admitting Physician: Miller Restrepo Jr., MD     Discharge Provider: Monica Mensah    Reason for Admission: Stroke    Hospital Course:   62 Y/O F with PMHx of ICM with EF of 5% s/p Heartware as destination therapy, pAT, and ESRD on HD T/TH/S, history of GIB last 4/2016 requiring blood transfusion, colonoscopy revealed AVM in caecum S/P APC with no recurrent GIB, uses oxygen at night. Yesterday patient developed acute onset of double vision and photophobia associated with transient aphasia and transient loss of conciousness. She didn't seek medical attention until today, she went to [facility] where her CT showed thalamic infarct that is new compared to prior imaging in 1/19 with no new hemorrhage. Her INR was 2.4 so she was not a candidate for tpa.  Patient was transferred for higher level of care. Vascular neurology was consulted, and Nephrology was consulted to help with HD. Per neurology stroke is believed to be cardio embolic in nature considering hx of pAf. She was started on atorvastatin while inpatient to reduce subsequent stroke risk. Also suggested careful blood pressure control, VAD speed was lowered to 2600 due to hypotension (want to keep MAP around 80). Had some low flows and suck downs during HD. Patients INR dropped below 2 so she was bridged with heparin for 2 days before becoming therapeutic. During her hospital stay her blood pressure regimen was adjusted (Hydralazine 50 Q8H was increased and amlodipine 5 was added). Her INR goal remains 2-3, with no aspirin. She was administered 2.5mg, then 3mg and INR today is 2. She was instructed to resume her home dose of 3mg on Monday and Thursday and 2mg on other days. Follow up inr requested for Thursday with . Patient is being discharged home with home  health. She will follow up in VAD clinic in one month. She was given an outpatient referral to psychiatry for her ongoing depression.     Consults: Nephrology, Vascular Neurology    Significant Diagnostic Studies:  2D echo with color flow doppler:   Results for orders placed or performed during the hospital encounter of 01/30/17   2D echo with color flow doppler   Result Value Ref Range    EF 10 (A) 55 - 65    Aortic Valve Regurgitation TRIVIAL      Final Diagnoses:    Principal Problem: Embolic stroke involving left posterior cerebral artery   Secondary Diagnoses:   Active Hospital Problems    Diagnosis  POA    *Embolic stroke involving left posterior cerebral artery [I63.432]  Yes    Acute ischemic vertebrobasilar artery thalamic stroke involving left-sided vessel [I63.212, I63.22]  Yes    Cardiomegaly [I51.7]  Yes    Cytotoxic cerebral edema [G93.6]  Yes    Migraine with aura and without status migrainosus, not intractable [G43.109]  Yes    Paroxysmal atrial fibrillation [I48.0]  Yes    Chronic anticoagulation [Z79.01]  Not Applicable    Left ventricular assist device present [Z95.811]  Not Applicable    End stage renal disease on dialysis [N18.6, Z99.2]  Not Applicable      Resolved Hospital Problems    Diagnosis Date Resolved POA   No resolved problems to display.     Discharged Condition: stable    Disposition: Home with home health    Follow Up/Patient Instructions: Patient will follow up in VAD clinic as scheduled. Patient was given referral to outpatient psych for ongoing depression management.     Medications:  Reconciled Home Medications:   Current Discharge Medication List      START taking these medications    Details   amlodipine (NORVASC) 5 MG tablet Take 1 tablet (5 mg total) by mouth once daily.  Qty: 30 tablet, Refills: 11      atorvastatin (LIPITOR) 40 MG tablet Take 1 tablet (40 mg total) by mouth once daily.  Qty: 90 tablet, Refills: 3      warfarin (COUMADIN) 2 MG tablet Current Dose (  3 mg on Mon, Thu; 2 mg all other days) or as directed by coumadin clinic.  Qty: 102 tablet, Refills: 3    Comments: Rx request was called in to Jaimee at [facility] ph. # [phone]     (90) day supply  Dr. Charanjit Sawant  Associated Diagnoses: Left ventricular assist device present; Long term current use of anticoagulant therapy         CONTINUE these medications which have CHANGED    Details   escitalopram oxalate (LEXAPRO) 20 MG tablet Take 1 tablet (20 mg total) by mouth every evening.  Qty: 90 tablet, Refills: 3    Associated Diagnoses: Heart replaced by heart assist device      hydrALAZINE (APRESOLINE) 50 MG tablet Take 1 tablet (50 mg total) by mouth every 8 (eight) hours.  Qty: 90 tablet, Refills: 11         CONTINUE these medications which have NOT CHANGED    Details   acetaminophen (TYLENOL) 325 MG tablet Take 2 tablets (650 mg total) by mouth every 6 (six) hours as needed for Pain (mild pain).  Refills: 0      CALCIUM CARBONATE (ANTACID CALCIUM ORAL) Take 1 tablet by mouth 4 (four) times daily.       cetirizine (ZYRTEC) 5 MG tablet Take 1 tablet (5 mg total) by mouth once daily.  Qty: 30 tablet, Refills: 0      GABAPENTIN (CMPD PAIN MANAGEMENT COMPOUND OINTMNENT THREE) Apply small amount to painful joints once or twice a day  Qty: 30 g, Refills: 3      gabapentin (NEURONTIN) 300 MG capsule Take 1 capsule (300 mg total) by mouth every evening.  Qty: 120 capsule, Refills: 2    Associated Diagnoses: Chronic low back pain; Right leg numbness      ondansetron (ZOFRAN) 4 MG tablet Take 1 tablet (4 mg total) by mouth every 8 (eight) hours as needed for Nausea.  Qty: 30 tablet, Refills: 3    Associated Diagnoses: Nausea      pantoprazole (PROTONIX) 40 MG tablet Take 1 tablet (40 mg total) by mouth once daily.  Qty: 60 tablet, Refills: 10             Discharge Procedure Orders  Ambulatory referral to Outpatient Case Management   Referral Priority: Routine Referral Type: Consultation   Referral Reason:  Specialty Services Required    Number of Visits Requested: 1      Ambulatory consult to Psychiatry   Referral Priority: Routine Referral Type: Psychiatric   Referral Reason: Specialty Services Required    Requested Specialty: Psychiatry    Number of Visits Requested: 1      Diet general   Order Specific Question Answer Comments   Na restriction, if any: 2gNa    Fluid restriction: Fluid - 1500mL      Activity as tolerated     Call MD for:  temperature >100.4     Call MD for:  persistent nausea and vomiting or diarrhea     Call MD for:  severe uncontrolled pain     Call MD for:  redness, tenderness, or signs of infection (pain, swelling, redness, odor or green/yellow discharge around incision site)     Call MD for:  difficulty breathing or increased cough     Call MD for:  severe persistent headache     Call MD for:  worsening rash     Call MD for:  persistent dizziness, light-headedness, or visual disturbances     Call MD for:  increased confusion or weakness       Activity: activity as tolerated  Diet: cardiac diet    Signed:  Monica Mensah  2/6/2017  12:52 PM

## 2017-02-07 ENCOUNTER — OUTPATIENT CASE MANAGEMENT (OUTPATIENT)
Dept: ADMINISTRATIVE | Facility: OTHER | Age: 62
End: 2017-02-07

## 2017-02-07 ENCOUNTER — ANTI-COAG VISIT (OUTPATIENT)
Dept: CARDIOLOGY | Facility: CLINIC | Age: 62
End: 2017-02-07

## 2017-02-07 DIAGNOSIS — Z95.811 LEFT VENTRICULAR ASSIST DEVICE PRESENT: ICD-10-CM

## 2017-02-07 LAB — INR PPP: 3

## 2017-02-07 NOTE — PROGRESS NOTES
2/7/17 - Attempted phone contact with pt on home and mobile numbers with no answer. Voice mail left on home number requesting return call and reminding pt of appt with Dr JOSE Garvin today at 1:20 PM. Will attempt to follow up at a later time. Ina Lovell RN

## 2017-02-07 NOTE — PROGRESS NOTES
Per note: Ms. Randa Devine is being discharged today following transfer for loss of conciousness and thalamic lacunar infarct noted on CT scan. Her PMH is significant for HW lvad, ESRD on HD, and several GI bleeds.      During her hospital stay her blood pressure regimen was adjusted. She was newly started on atorvastatin. Her inr goal remains 2-3, with no aspirin. She was administered 2.5mg, then 3mg and inr today is 2. She was instructed to resume her home dose of 3mg on Monday and Thursday and 2mg on other days. Follow up inr requested for Thursday with HH. Per Azar, pt manager will call after meeting. LVM to confirms dose.

## 2017-02-08 ENCOUNTER — TELEPHONE (OUTPATIENT)
Dept: NEUROSURGERY | Facility: HOSPITAL | Age: 62
End: 2017-02-08

## 2017-02-08 NOTE — TELEPHONE ENCOUNTER
Called number on file.  Spoke with patient.  Risk factors specific to patient for stroke discussed with teach back implemented.  Patient verbalized understanding of discharge instructions and medications.  Patient was asked about discharge appointments and follow up care.  No follow appointment scheduled thus far.  Message sent to Neuro Clinic on patient's behalf to schedule follow up appointment.  Warning sings discussed with teach back discussion method implemented.  Notified to seek immediate medical help via 911 if new or worsening stroke symptoms occur.  Patient relayed no new questions or comments at this time.  Instructed to call [facility] with any further questions.

## 2017-02-09 ENCOUNTER — ANTI-COAG VISIT (OUTPATIENT)
Dept: CARDIOLOGY | Facility: CLINIC | Age: 62
End: 2017-02-09

## 2017-02-09 ENCOUNTER — OUTPATIENT CASE MANAGEMENT (OUTPATIENT)
Dept: ADMINISTRATIVE | Facility: OTHER | Age: 62
End: 2017-02-09

## 2017-02-09 DIAGNOSIS — Z95.811 LEFT VENTRICULAR ASSIST DEVICE PRESENT: ICD-10-CM

## 2017-02-09 LAB — INR PPP: 3.1

## 2017-02-09 NOTE — PROGRESS NOTES
2/9/17 - Attempted phone contact with pt at home and mobile numbers. Mobile is no longer a functioning number. Also attempted contact with Emergency Contact, Constance Dave, all with no answer. Voice mail left on pt's home number and on Constance's number requesting return call upon receipt of message. Will attempt to contact at a later time. Ina Lovell RN

## 2017-02-10 ENCOUNTER — TELEPHONE (OUTPATIENT)
Dept: NEUROLOGY | Facility: CLINIC | Age: 62
End: 2017-02-10

## 2017-02-10 RX ORDER — PANTOPRAZOLE SODIUM 40 MG/1
40 TABLET, DELAYED RELEASE ORAL DAILY
Qty: 60 TABLET | Refills: 10 | Status: SHIPPED | OUTPATIENT
Start: 2017-02-10

## 2017-02-10 NOTE — TELEPHONE ENCOUNTER
----- Message from Lincoln Garcia RN sent at 2/8/2017  2:02 PM CST -----  Martita Morales/Mrs. Mujica,    I was wondering if we could schedule a follow up appointment in 4-6 weeks for Mrs. Devine. Thanks so much for all you do!    Sincerely,  FERNANDA VargasN-RN

## 2017-02-13 ENCOUNTER — ANTI-COAG VISIT (OUTPATIENT)
Dept: CARDIOLOGY | Facility: CLINIC | Age: 62
End: 2017-02-13

## 2017-02-13 DIAGNOSIS — Z95.811 LEFT VENTRICULAR ASSIST DEVICE PRESENT: ICD-10-CM

## 2017-02-13 LAB — INR PPP: 2.5

## 2017-02-14 ENCOUNTER — OUTPATIENT CASE MANAGEMENT (OUTPATIENT)
Dept: ADMINISTRATIVE | Facility: OTHER | Age: 62
End: 2017-02-14

## 2017-02-14 NOTE — PROGRESS NOTES
2/14/17 - Phone contact with pt today to complete Initial Screen, PHQ 2 and Medication Reconciliation for OPCM. She is difficult to hear and understand as her voice is weak and shaky. She reports she has been in pain and has been tired since her most recent hospital d/c. She is currently dialyzing on T, Th and S rotation and is compliant with going. She is also compliant with taking meds as prescribed, but has a hard time affording them. She has a cane and a walker, and usually uses the cane most often. She has had several falls in the past year. She is independent with most self care activities, but her son and his GF are living with her and he frequently helps her with things around the house she is unable to do independently.  She reports she has had a hard time financially recently paying all her bills. CM will follow up with her next week to complete Nursing Assessment for OPCM and will refer her to the Patient Pharmacy Assistance Program and to Sahil Hackett LCSW, for help in applying for any financial assistance programs for which she may qualify. Ina Lovell RN

## 2017-02-15 ENCOUNTER — OUTPATIENT CASE MANAGEMENT (OUTPATIENT)
Dept: ADMINISTRATIVE | Facility: OTHER | Age: 62
End: 2017-02-15

## 2017-02-15 NOTE — PROGRESS NOTES
An OPCM welcome Packet and consent form was created and mailed to the patient on 2/15/17.        Yasmeen Rosa, [facility]  Outpatient Complex Care Mgmt  Ext [phone]

## 2017-02-15 NOTE — LETTER
February 15, 2017    Randa Devine  [address]  [place] [address]             Outpatient Case Management  [address]  [facility] [address] Dear Randa Devine:    We understand that receiving many services from different doctors and healthcare providers is overwhelming. There are appointments to make, transportation to arrange, dietary instructions to understand, and new medications to obtain.    This is where [facility] can help.     You are eligible to receive Outpatient Case Management services when you have healthcare needs that require the coordination of many providers, treatments, and services. You also qualify if you need assistance with a new treatment plan.     There is no charge for this support. You may have been referred to this program from your doctor(s), hospital staff member(s), or insurance company but you always have a choice to participate or not participate. To participate, you must give us your permission to be enrolled.     Depending on your needs and wishes, your  may speak with you by phone, visit you at your place of living (for example your home, skilled nursing facility, or rehabilitation facility), or meet you at your doctors office.     Your  will tell you why you have been selected to participate in the program and will complete an assessment of your needs. Then a personalized plan of care will be developed with you and or your caregiver.             Here are examples of the services your [facility]  provides.     Coordinate communication among multiple providers.   Arrange for transportation, doctors visits, durable medical equipment, home care services, and special clinics.    Provide coaching on how to manage your health condition.    Answer questions about your health condition.   Help you understand your doctors treatment plan.    Provide additional instruction about your health  condition, treatments, and medications.    Help you obtain information about your insurance coverage.    Advocate for your individual needs.    Request a Licensed Clinical  (LCSW) to visit you if you need their services. LCSWs help with long term planning (discussing placement options, advanced planning directives), financial planning, and assistance (for example rent, utilities, medication funding).     Your  will coordinate their activities with other outpatient services you are receiving. All services provided by [facility]  are coordinated with and communicated to your primary care physician.    Our goal is to help you manage your health condition(s) safely within your living environment, whether that is your home or a medical facility. We want to help you function at the healthiest and highest level possible.     Sincerely,      Nadir Sifuentes MD  Medical Director    Enclosures:    Frequently Asked Questions  Patient Rights and Responsibilities   Reporting a Grievance or Complaint  Consent/Release of Information  Stamped Addressed Envelope                  Frequently Asked Questions about [facility]    What is [facility]?  Outpatient Case management is not Home healthcare services. [facility]  do not provide hands-on care. [facility]  will work with your doctor to arrange for home health services, if needed. Home health services have a limited duration and there are some restrictions as to who can get these services. There is no prescribed limit to the amount of time you receive [facility] services. [facility]  are not agents of your insurance company. However, [facility]  can help you obtain information from your insurance company.     Who are the [facility] ?  [facility]  Managers are Registered Nurses and Social Workers. It is important to remember that you and your  are a team that works together with your primary care physician to create your individualized plan of care. The ultimate goal of your care plan is to help you implement your doctors treatment plan and to help you function at the highest level of health possible.     What are my rights as a patient?  It is important for you to know and understand your rights and responsibilities while receiving services from the [facility] program. We have enclosed a complete description of your rights and responsibilities. You can help to make your care more effective when you understand your right and responsibilities.     What is needed to be enrolled in the program?  You are only enrolled in the [facility] Program when you give us your consent to participate. You will find enclosed a consent form. You are receiving this letter because you or your caregivers have given us a verbal consent to enroll you in [facility] Program. We ask that you sign and return the enclosed written consent in the stamped self-addressed envelope.                           Patient Rights and Responsibilities    We consider you a partner in your care. When you are well informed, participate in treatment decisions and communicate openly with your doctor and other healthcare professionals, you help make your care more effective.     While you are in the Outpatient Case Management Program, your rights include the following:     You have a right to be provided services in a non-discriminatory manner in accordance with the provisions of Title VI of the Civil Rights Act of 1964, Section 504 of the Rehabilitation Act of 1973, the Age Discrimination Act of 1975, the Americans with Disabilities Act as well as any other applicable Federal and State laws and regulations.     You  have the right to a reasonable, timely response to your request or need for care, as well as the right to considerate and respectful care including an environment that preserves dignity and contributes to a positive self-image. You are responsible for being considerate and respectful of our staff.     You have a right to information regarding patient rights, advocacy services and complaint mechanisms, and the right to prompt resolution of any complaint. You or a designee has the right to participate in the resolution of ethical issues surrounding your care. You have a right to file a complaint if you feel that your rights have been infringed, without fear or penalty from [facility] or the federal government. You may file a complaint with the Director of Outpatient Case Management by calling [phone]. At any time, you may lodge a grievance with the [facility] by calling [phone], or the Joint Commission on Accreditation of Healthcare Organizations at [phone].     You, or someone acting on your behalf, have the right to understandable information on your health status, treatment and progress in order to make decisions. You have the right to know the nature, risks and alternatives to treatment. You have the right to be informed, when appropriate, regarding the outcome of the care that has been provided. You have the right to refuse treatment to the extent permitted by law, and the right to be informed of the alternatives and consequences of refusing treatment.     You, in collaboration with your physician, have the right to make decisions regarding care and the right to participate in the development and implementation of the plan of care and effective pain management. You have the right to know the name and professional status of those responsible for the delivery of your care and treatment.       You have a right, within legal guidelines, to have a guardian, next-of-kin  or legal designee exercises your patient rights when you are unable to do so. You have the right for your wishes regarding end-of-life decisions to be addressed by the healthcare team through advance directives. You have the right to personal privacy and confidentiality and to expect confidentiality of all records and communications pertaining to your care.      You have the right to receive communications about your health information confidentially. You have the right to request restrictions on the uses and disclosures of your health information. You have the right to inspect, copy, request amendments and receive an accounting of to whom we have disclosed your health information.     You have the right to be provided with interpretation services if you do not speak English; to alternative communication techniques if you are hearing or vision impaired; and to have any other resources needed on your behalf to ensure effective communication. These services are provided free of charge.     You have a right to personal safety (free from mental, physical, sexual and verbal abuse, neglect and exploitation). You have the right to access protective and advocacy services.     Advance Directives  A Patient Advocate is available to meet with patients to answer questions regarding advance directives.    Living Will  A document that outlines what medical treatment the patient does or does not want in the event the patient becomes unable to make those decisions at the appropriate time.    Durable Medical Power of   A document by which the patient designates an individual to be responsible for making medical decisions in the event the patient becomes unable to do so.    HIPAA Notice of Privacy Practices  Your medical information is governed by federal privacy laws. HIPAA protects private medical information and how that information is disclosed. If you have a question regarding the HIPAA Notice of Privacy Practices, or  if you believe your privacy rights have been violated, you may call our designated hotline at [phone].            Quality Improvement  Because we consistently strive to improve the care and service provided to our patients, we welcome your feedback. Your comments are an important part of our quality improvement process, as we like to know what we are doing right and which areas are in need of improvement. Our policy is to listen, be responsive and provide you with an appropriate and timely follow-up to your questions or concerns. Our goal is active patient and family involvement in all aspects of the care process.                                                                                  Reporting a Grievance or Complaint    During your time with the [facility] team you may have a grievance or complaint with our services. Your Patients Bill of Rights gives patients, families, and caregivers the right to express concerns and grievances and the right to expect a reasonable and timely response.     Your presentation of your concerns is not viewed negatively. It is an opportunity for us to improve the quality of our care and services we provide to you.     You may report your concerns directly to your , or you can phone in a complaint to:     Director of Outpatient Case Management  [phone]    You may also send a complaint letter to:    Director of Outpatient Case Management Services  [address] [place] [id number]    Tell us the details of your complaint and provide us with a contact phone number so we can contact you to obtain additional information. We will return a call to you within two business days of our receipt of your complaint, and to request additional information as needed. If you choose to mail a letter, your complaint may take a few days longer to reach us.     All grievances will be addressed as quickly as possible. A grievance or  complaint that involves situations or practices which place patients in immediate danger will be addressed as an urgent matter. We will work to resolve all other complaints within seven days of receipt. By that time, you will receive a phone call with either the resolution of your complaint, or a plan for corrective action. A formal written response will be sent to you within 30 days of receipt of your grievance.     If a resolution cannot be completed within 30 days, a letter will be sent to you or your family member with an estimated time for the final response.    Additionally, all patients have the right to file complaints with external agencies, without exception. Complaints/grievances can be addressed to the following agencies:            Patient Safety or Quality of Care Concerns  Office of Quality Monitoring   [facility] [place] [address]  [phone] Toll Free    HIPPA Privacy/Security Concerns  Office for Civil Rights Region IV  [place] Department of Health & Human Services  [address], [address]  [place] [address] [phone] Phone  [phone] TDD  [phone] Toll Free    Medicare/Medicaid Billing Concerns  [place] for Medicare & Medicaid Services  Region [address]  [address], [address]  [place] [address] [phone] Phone  [phone] Toll Free    General Concerns  [facility])  [phone] Toll Free Complaint Hotline                                                                          Consent Form/Release of Information    By signing--     (1) I agree I have read the Outpatient Case Management information provided to me;     (2) I agree to voluntarily participate in the Outpatient Case Management program;     (3) I understand I must consent to participation in the Outpatient Case Management program during my first interview with my ;    (4) I consent to the discussion and  release of my personal health information to my healthcare team (including my personal physician, my medical home care team, any specialty physician(s), and my [facility] team);     (5) I agree my consent is valid for the length of time I am receiving Outpatient Case Management;    (6) I agree to referrals to community resources which my Case Management team recommends for me. I agree to the release of my personal information and personal health information as necessary to referral sources.    ___________________________________________________________________  Patients Printed Name     ___________________________________________________________________  Patients Signature       Date    If patient is in being cared for, please complete this section:     ___________________________________________________________________  Printed Name of Person Caring For Patient   Relationship To Patient    ___________________________________________________________________   Signature of Person Caring For Patient     Date    PLEASE SIGN AND RETURN IN THE ENCLOSED PRE-ADDRESSED ENVELOPE.

## 2017-02-17 ENCOUNTER — ANTI-COAG VISIT (OUTPATIENT)
Dept: CARDIOLOGY | Facility: CLINIC | Age: 62
End: 2017-02-17

## 2017-02-17 DIAGNOSIS — Z95.811 LEFT VENTRICULAR ASSIST DEVICE PRESENT: ICD-10-CM

## 2017-02-17 LAB — INR PPP: 3.2

## 2017-02-17 NOTE — PROGRESS NOTES
Pt confirms dose. Denies any new medications but states she may start a new abx with dialysis 2/18, advised to inform CC 2/20. No futher issues. Will lower and resume. Advised small portion of greens 2/17.

## 2017-02-20 ENCOUNTER — OUTPATIENT CASE MANAGEMENT (OUTPATIENT)
Dept: ADMINISTRATIVE | Facility: OTHER | Age: 62
End: 2017-02-20

## 2017-02-20 ENCOUNTER — ANTI-COAG VISIT (OUTPATIENT)
Dept: CARDIOLOGY | Facility: CLINIC | Age: 62
End: 2017-02-20

## 2017-02-20 DIAGNOSIS — Z95.811 LEFT VENTRICULAR ASSIST DEVICE PRESENT: ICD-10-CM

## 2017-02-20 LAB — INR PPP: 3

## 2017-02-20 NOTE — PROGRESS NOTES
2/20/17 - Phone contact with pt this afternoon. She sounds stronger and reports she feels better than last week. She has been attending all dialysis treatments. She is taking all meds as ordered and reports she has no questions about any. She follows a Mediter[place]ian diet with a 1 L daily fluid restriction. She follows up with heart transplant MD at all scheduled appts and sees Dr JOSE Garvin here in  as her PCP. She currently has a home health nurse, but reports she told therapy she did not want to participate because she just wasn't up to it. CM will refer her to Patient Pharmacy Assistance Program to see if there are any meds she can receive assistance with and will see if LCSW thinks she qualifies for any other assistive programs that will not interfere with her assisted. Will send educational brochures about health maintenance and benefits of exercise and will follow up next week. Ina Lovell RN

## 2017-02-20 NOTE — PATIENT INSTRUCTIONS
Fall Due to Dizziness, Weakness, or Loss of Balance  The symptoms that led to your fall have been evaluated. Your doctor feels it is safe for you to return home.  Many things can cause you to become dizzy. Everyone means a little something different by the word dizzy. People may describe their symptoms using these words:  · It doesnt feel right in my head  · It feels like spinning in my head  · It seems like the room is spinning  · My balance feels off  · I feel lightheaded, like I am going to pass out  All these descriptions can have real causes.     Your balance mechanism is in your inner ear. Anything disturbing it can make you feel dizzy, whether it is from a cold, an injury, or many other things. Anything that causes your blood pressure to drop suddenly can make you feel lightheaded, or like you are going to faint. This is because at that moment there might not be enough blood flowing to your brain. Causes include:  · Medicines  · Dehydration  · Standing up or bending over too quickly  · Becoming overheated  · Taking a hot shower or bath  · Straining hard while lifting something or using the toilet  · Strokes, heart attack, heart valve disease, very slow or very fast heart rate  · Low blood sugar  · Ear infection  · Hyperventilation  · Anemia  · Trauma  · Infection  · Panic attack  · Pregnancy  You may be at risk of repeat falls. Take precautions described below to prevent another fall.  Home care  · If you become lightheaded or dizzy, lie down immediately or sit and lean forward with your head down. It is better to do this than fall and seriously hurt or injure yourself.  · Rest today. When changing position, take a moment to be sure any dizziness goes away before standing and walking.  · If you have been prescribed a walker, be sure to use it whenever you walk, even if it is a short distance.  · If you were injured during the fall, follow the advice from your doctor regarding care of your injury.  · Be  sure your doctor knows all the medicines, herbs, and supplements you take. Some medicines can cause dizziness.  Follow-up care  Unless given other advice, call your doctor on the next office day to advise of your fall and to schedule an appointment. You may require further treatment for the underlying condition that caused todays fall.  If X-ray or a CT scan were done, you will be notified of the results, especially if it affects treatment.  Call 911  Call 911 if any of these occur:  · Trouble breathing  · Confused or difficulty arousing  · Fainting or loss of consciousness  · Rapid or very slow heart rate  · Seizure  · Difficulty with speech or vision, weakness of an arm or leg  · Difficulty walking or talking, loss of balance  · Numbness or weakness in one side of your body, facial droop  When to seek medical advice  Call your healthcare provider right away if any of the following occur:  · Another fall  · Continued dizzy spells  · Severe headache  · Blood in vomit or stools (black or red color)  Date Last Reviewed: 11/5/2015 © 2000-2016 [employer]. [address] [place] [address]. All rights reserved. This information is not intended as a substitute for professional medical care. Always follow your healthcare professional's instructions.        Exercises to Prevent Falls  Certain types of exercises may help make you less likely to fall. Try the ones below. Or do other exercises that your health care provider suggests. Depending on your health, you may need to start slowly. Don't let that stop you. Even small amounts of exercise can help you. Be sure to talk to your health care provider before starting any exercise program.    Improve balance  Many types of exercise can help improve balance. Pop chi and yoga are good examples. Here's another one to try. You can do it anytime and almost anywhere.  · Stand next to a counter or solid support.  · Push yourself up onto your tiptoes.  · Hold  "for 5 seconds. If you start to lose your balance, hold on to the counter.  · Rest and repeat 5 times. Work up to holding for 20 to 30 seconds, if you can.    Increase flexibility  Being more flexible makes it easier for you to move around safely. Try exercises like the seated hamstring stretch.  · Sit in a chair and put one foot on a stool.  · Straighten your leg and reach with both hands down either side of your leg. Reach as far down your leg as you can.  · Hold for about 20 seconds.  · Go back to the starting position. Then repeat 5 times. Switch legs.    Build strength  "Resistance" exercises help build strength. You can do them without equipment. Or you can use weights, elastic bands, or special machines. One such exercise is called the biceps curl. You can hold a 1-pound weight or even a can of soup. Do this exercise at least 3 times a week. Strive for every day.  · Sit up straight in a chair.  · Keep your elbow close to your body and your wrist straight.  · Bend your arm, moving your hand up to your shoulder. Then slowly lower your arm.  · Repeat 5 times. Switch to the other arm.    Build your staying power  Aerobic exercises make your heart and lungs stronger so you can keep moving longer. Walking and swimming are two of the best types of exercises you can do. Using a stationary bike is great, too. Find an aerobic exercise that you enjoy. Start slowly and build up. Even 5 minutes is helpful. Aim for a goal of 30 minutes, at least 3 times a week. You don't have to do 30 minutes in 1 session. Break it up and walk a little throughout the day.     More helpful tips  · Start easy. Slowly work up to doing more.  · Talk with your health care provider about the best exercises for you.  · Call senior centers or health clubs about exercise programs.  · If needed, have a family member watch you walk every so often to check your stability.  · Exercise with a friend. Choose an activity you both enjoy.  · Consider dong chi " or yoga to strengthen your balance.  · Try exercises that you can do anytime, anywhere. Here are 2 examples. Have someone with you when you first try these:  ¨ Practice walking by placing 1 foot right in front of the other.  ¨ Stand up and sit down 10 times. Repeat this throughout the day.   Date Last Reviewed: 6/13/2015  © 3560-9164 [employer]. [address] [place] [address]. All rights reserved. This information is not intended as a substitute for professional medical care. Always follow your healthcare professional's instructions.        Exercise After Bypass Surgery: Walking    If your doctor gives you a home walking program, be sure to follow it. If not, follow these tips:  · Start by walking 4 to 6 times a day. Walk for 5 to 6 minutes each time.  · Do this 5 days a week. Rest the other 2 days.  · As you feel stronger, you can walk around your neighborhood, at a shopping mall, or on a track.  · Walk inside when the weather is very hot or very cold.  · Wear loose-fitting clothes and sturdy, comfortable shoes with non-skid soles.  · If neighbors stop you to talk, tell them you need to walk without stopping. You can talk later. Or invite them to walk with you.   · Learn to take your pulse while you walk. Learn what your baseline heart rate is. Your pulse will go up somewhat with walking. If it becomes too fast or you become dizzy, stop and rest. If your heart rate stays high, call for help.  Date Last Reviewed: 10/1/2016  © 3724-8640 [employer]. [address] [place] [address]. All rights reserved. This information is not intended as a substitute for professional medical care. Always follow your healthcare professional's instructions.        Discharge Instructions for Ventricular Assist Device (VAD)  You had a procedure to insert a ventricular assist device (VAD). This device replaces the pumping action of your heart. Usually, a VAD is inserted as a bridge to a  later heart transplant. But healthcare providers have also found that a VAD gives the heart a chance to rest and recover. In some cases, the heart is able to resume some normal activity, which may eliminate the need for a heart transplant. For some people who are not candidates for a heart transplant, the VAD is considered permanent. This is referred to as destination therapy.  There are different styles and brands of VADs. Caring for your VAD will depend on the type you get. Some VADs work with a pump that uses air (pneumatic). Most newer devices are not pneumatic operated. They have a power source and a small pump.  Here's what you need to know about home care.  Activity  · Don't lift, pull, or push anything heavier than 10 pounds during the first 6 weeks after your surgery.  · Shower with care. Your VAD has an air vent and a filter. Keep fluid away from these AT ALL TIMES.  · Don't swim or play any water sports. No boating, hot tubs, or baths.  · Don't drive.  Special precautions  · Your VAD is a very special device. It needs a special team to help you with care. Always know who this team is and how to reach the coordinator.   · Keep the following near you at all times:  ¨ A hand pump (to use if the power supply of your device fails)  ¨ Hospital's paging number for the VAD coordinator heart transplant coordinator  ¨ Backup power pack with charged batteries  · Test your system every day.  · Make sure your family or someone in your home knows how to change the power supply and care for your device.  · Notify the power company that you have a VAD. The power company will place you on a priority list to have your power restored first in case of a power outage. Your VAD coordinator can assist you with this. It should be done prior to your discharge from the hospital.  · Carry an ID card that identifies your device.  · Take your temperature every day. Call your healthcare provider or your VAD coordinator if it is above  100.4°F (38°C).  · Make sure you understand how to monitor your blood pressure with the VAD. Monitoring your blood pressure will be different. A normal blood pressure cuff will not measure it properly.   Other home care  · Change the device filter according to the directions you were given before you left the hospital. If you did not receive directions, ask for them.  · Take your medicines exactly as directed. Don't skip doses.  · Monitor your blood carefully to prevent it from being too thick or too thin, which can cause bleeding. You will have to be on blood thinners to prevent blood clots from forming in the device. Blood clots can cause a stroke or other arterial blockage.   · Eat a healthy diet. Ask your healthcare provider for menus and other diet information. Generally, you should avoid drinking more than 2 liters of water in a day or eating more than 2,000 mg of salt.     When to call your healthcare provider  Call your healthcare provider right away if you have any of the following:  · Fever of 100.4°F (38°C) or higher, or as directed by your healthcare provider  · Signs of infection at your device's exit site (redness, swelling, drainage, or warmth)  · Device alarm sounds  · Black soot in the air filter of the device  · Fatigue that doesn't get better  · Dizziness that doesn't go away  · Shortness of breath  · Chest pain  · Swollen hands, feet, or ankles   Date Last Reviewed: 10/1/2016  © 1125-3107 [employer]. [address] [place] [address], [place] [id number]. All rights reserved. This information is not intended as a substitute for professional medical care. Always follow your healthcare professional's instructions.

## 2017-02-21 ENCOUNTER — TELEPHONE (OUTPATIENT)
Dept: PHARMACY | Facility: CLINIC | Age: 62
End: 2017-02-21

## 2017-02-21 NOTE — PROGRESS NOTES
Pt returned call and she was given lab result, verified correct dose of coumadin but states she may have taken a double dose last night, reports that 2/16 she started vancomycin, is very short winded and also last night had a nose bleed lasting a few hours, cb # [phone]

## 2017-02-21 NOTE — PROGRESS NOTES
S/w pt. She is unsure if she did take an extra dose or not. As INR is on the higher end of range, will advise a small portion of greens today. She will continue usual dose with INR 2/23. She is to call LVAD if any SOB reoccurs.

## 2017-02-23 ENCOUNTER — OFFICE VISIT (OUTPATIENT)
Dept: ELECTROPHYSIOLOGY | Facility: CLINIC | Age: 62
End: 2017-02-23
Payer: MEDICARE

## 2017-02-23 ENCOUNTER — CLINICAL SUPPORT (OUTPATIENT)
Dept: ELECTROPHYSIOLOGY | Facility: CLINIC | Age: 62
End: 2017-02-23
Payer: MEDICARE

## 2017-02-23 ENCOUNTER — HOSPITAL ENCOUNTER (OUTPATIENT)
Dept: CARDIOLOGY | Facility: CLINIC | Age: 62
Discharge: HOME | End: 2017-02-23
Payer: MEDICARE

## 2017-02-23 ENCOUNTER — ANTI-COAG VISIT (OUTPATIENT)
Dept: CARDIOLOGY | Facility: CLINIC | Age: 62
End: 2017-02-23

## 2017-02-23 VITALS
BODY MASS INDEX: 26.84 KG/M2 | HEIGHT: 60 IN | HEART RATE: 87 BPM | DIASTOLIC BLOOD PRESSURE: 68 MMHG | SYSTOLIC BLOOD PRESSURE: 106 MMHG | WEIGHT: 136.69 LBS

## 2017-02-23 DIAGNOSIS — I42.9 CARDIOMYOPATHY: ICD-10-CM

## 2017-02-23 DIAGNOSIS — Z86.73 HISTORY OF STROKE WITHOUT RESIDUAL DEFICITS: ICD-10-CM

## 2017-02-23 DIAGNOSIS — Z95.810 AUTOMATIC IMPLANTABLE CARDIAC DEFIBRILLATOR IN SITU: ICD-10-CM

## 2017-02-23 DIAGNOSIS — I48.91 A-FIB: ICD-10-CM

## 2017-02-23 DIAGNOSIS — Z95.811 LEFT VENTRICULAR ASSIST DEVICE PRESENT: Primary | ICD-10-CM

## 2017-02-23 DIAGNOSIS — I48.0 PAROXYSMAL ATRIAL FIBRILLATION: ICD-10-CM

## 2017-02-23 DIAGNOSIS — G45.9 TRANSIENT CEREBRAL ISCHEMIA, UNSPECIFIED TYPE: ICD-10-CM

## 2017-02-23 DIAGNOSIS — Z95.811 LEFT VENTRICULAR ASSIST DEVICE PRESENT: ICD-10-CM

## 2017-02-23 DIAGNOSIS — I63.81: ICD-10-CM

## 2017-02-23 PROCEDURE — 99214 OFFICE O/P EST MOD 30 MIN: CPT | Mod: S$PBB,,, | Performed by: INTERNAL MEDICINE

## 2017-02-23 PROCEDURE — 93010 ELECTROCARDIOGRAM REPORT: CPT | Mod: S$PBB,,, | Performed by: INTERNAL MEDICINE

## 2017-02-23 PROCEDURE — 99999 PR PBB SHADOW E&M-EST. PATIENT-LVL III: CPT | Mod: PBBFAC,,, | Performed by: INTERNAL MEDICINE

## 2017-02-23 PROCEDURE — 99213 OFFICE O/P EST LOW 20 MIN: CPT | Mod: PBBFAC,25 | Performed by: INTERNAL MEDICINE

## 2017-02-23 PROCEDURE — 93284 PRGRMG EVAL IMPLANTABLE DFB: CPT | Mod: 26,S$PBB,, | Performed by: INTERNAL MEDICINE

## 2017-02-23 NOTE — PROGRESS NOTES
"  Subjective:   Patient ID:  Randa Devine is a 61 y.o. female     Chief complaint:Cardiomyopathy      HPI  Background:  62 yo with chronic combined HF, ICM, s/p Heartware for DT complicated by ESRD.   Is getting rehab after May 2016 fall causing left arm wound.   Doing very well. VAD speed is at 2700 rpm. No VAD alarms noted on interrogation occasional PI events . BP is 80 . DLES is a "1". INR is therapeutic at 3.0 . LDH is at baseline 208.      This visit:  She has an ICD with CRT and she is here for yearly maintenance. Pacing thresholds eval'd and her LV lead outputs were turned down, thus extending the expected battery life from 8 to 20 months.  Minimal AMS -- occasional only and secs at a time only.  I have reviewed the actual image of the ECG tracing obtained today and it shows NSR with PBiV pacing and a QRSd of <120 msec.     Current Outpatient Prescriptions   Medication Sig    acetaminophen (TYLENOL) 325 MG tablet Take 2 tablets (650 mg total) by mouth every 6 (six) hours as needed for Pain (mild pain).    amlodipine (NORVASC) 5 MG tablet Take 1 tablet (5 mg total) by mouth once daily.    atorvastatin (LIPITOR) 40 MG tablet Take 1 tablet (40 mg total) by mouth once daily.    CALCIUM CARBONATE (ANTACID CALCIUM ORAL) Take 1 tablet by mouth 4 (four) times daily.     cetirizine (ZYRTEC) 5 MG tablet Take 1 tablet (5 mg total) by mouth once daily.    escitalopram oxalate (LEXAPRO) 20 MG tablet Take 1 tablet (20 mg total) by mouth every evening.    GABAPENTIN (CMPD PAIN MANAGEMENT COMPOUND OINTMNENT THREE) Apply small amount to painful joints once or twice a day    gabapentin (NEURONTIN) 300 MG capsule Take 1 capsule (300 mg total) by mouth every evening.    hydrALAZINE (APRESOLINE) 50 MG tablet Take 1 tablet (50 mg total) by mouth every 8 (eight) hours.    ondansetron (ZOFRAN) 4 MG tablet Take 1 tablet (4 mg total) by mouth every 8 (eight) hours as needed for Nausea.    pantoprazole (PROTONIX) 40 MG " tablet Take 1 tablet (40 mg total) by mouth once daily.    warfarin (COUMADIN) 2 MG tablet Current Dose ( 3 mg on Mon, Thu; 2 mg all other days) or as directed by coumadin clinic.     No current facility-administered medications for this visit.      Review of Systems   Constitution: Positive for malaise/fatigue. Negative for decreased appetite, weakness, weight gain and weight loss.   HENT: Negative for headaches and nosebleeds.    Eyes: Negative for blurred vision and visual disturbance.   Cardiovascular: Positive for chest pain, irregular heartbeat, near-syncope, palpitations and syncope. Negative for claudication, cyanosis, dyspnea on exertion, leg swelling, orthopnea and paroxysmal nocturnal dyspnea.   Respiratory: Positive for shortness of breath. Negative for cough and wheezing.    Endocrine: Negative for heat intolerance.   Skin: Negative for rash.   Musculoskeletal: Negative for muscle weakness and myalgias.   Gastrointestinal: Positive for abdominal pain, constipation and nausea. Negative for anorexia, melena and vomiting.   Genitourinary: Negative for menorrhagia.   Neurological: Positive for dizziness. Negative for excessive daytime sleepiness, loss of balance, seizures and vertigo.   Psychiatric/Behavioral: Negative for altered mental status and depression. The patient is not nervous/anxious.        Objective:   Physical Exam   Constitutional: She is oriented to person, place, and time. She appears well-developed and well-nourished.   HENT:   Head: Normocephalic and atraumatic.   Right Ear: External ear normal.   Left Ear: External ear normal.   Eyes: Conjunctivae are normal. Pupils are equal, round, and reactive to light. Left eye exhibits no discharge. No scleral icterus.   Neck: Normal range of motion. Neck supple. No thyromegaly present.   Cardiovascular: Normal rate, regular rhythm and intact distal pulses.  Exam reveals no gallop, no S3, no S4, no friction rub, no midsystolic click and no opening  snap.    No murmur heard.  Pulses:       Carotid pulses are 2+ on the right side, and 2+ on the left side.       Radial pulses are 2+ on the right side, and 2+ on the left side.        Dorsalis pedis pulses are 2+ on the right side, and 2+ on the left side.        Posterior tibial pulses are 2+ on the right side, and 2+ on the left side.   Normal LVAD humming   Pulmonary/Chest: Effort normal and breath sounds normal.   Device pocket is in excellent repair   Abdominal: Soft. She exhibits no distension. There is no hepatomegaly. There is no tenderness. There is no guarding.   LVAD lines in good repair   Musculoskeletal:        Right ankle: She exhibits no swelling.        Left ankle: She exhibits no swelling.        Right lower leg: She exhibits no swelling.        Left lower leg: She exhibits no swelling.   Neurological: She is alert and oriented to person, place, and time. She has normal strength. No cranial nerve deficit. Gait normal.   Skin: Skin is warm, dry and intact. No rash noted. No cyanosis. Nails show no clubbing.   Psychiatric: She has a normal mood and affect. Her speech is normal and behavior is normal. Thought content normal. Cognition and memory are normal.   Nursing note and vitals reviewed.    /68  Pulse 87  Ht 5' (1.524 m)  Wt 62 kg (136 lb 11 oz)  LMP  (LMP Unknown)  BMI 26.69 kg/m2     Assessment:      1. Left ventricular assist device present    2. Paroxysmal atrial fibrillation    3. Transient cerebral ischemia, unspecified type    4. History of stroke without residual deficits    5. 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    Return in about 1 year (around 2/23/2018), or if symptoms worsen or fail to improve.  There are no discontinued medications.  New Prescriptions    No medications on file     Modified Medications    No medications on file

## 2017-02-27 ENCOUNTER — OUTPATIENT CASE MANAGEMENT (OUTPATIENT)
Dept: ADMINISTRATIVE | Facility: OTHER | Age: 62
End: 2017-02-27

## 2017-02-27 NOTE — PROGRESS NOTES
2/27/17 - Attempted phone contact with pt on home and mobile numbers with no answer. Voice mail left requesting return call upon receipt of message. Will attempt to follow up at a later time. Ina Lovell RN

## 2017-03-01 ENCOUNTER — ANTI-COAG VISIT (OUTPATIENT)
Dept: CARDIOLOGY | Facility: CLINIC | Age: 62
End: 2017-03-01

## 2017-03-01 DIAGNOSIS — Z95.811 LEFT VENTRICULAR ASSIST DEVICE PRESENT: ICD-10-CM

## 2017-03-01 LAB — INR PPP: 3.9

## 2017-03-01 NOTE — PROGRESS NOTES
Pt confirms dose. States no appetite the last few days, and not eating well. She will try to resume regular intake. No bleeding issues. Will hold.

## 2017-03-03 ENCOUNTER — ANTI-COAG VISIT (OUTPATIENT)
Dept: CARDIOLOGY | Facility: CLINIC | Age: 62
End: 2017-03-03

## 2017-03-03 ENCOUNTER — OUTPATIENT CASE MANAGEMENT (OUTPATIENT)
Dept: ADMINISTRATIVE | Facility: OTHER | Age: 62
End: 2017-03-03

## 2017-03-03 DIAGNOSIS — Z95.811 LEFT VENTRICULAR ASSIST DEVICE PRESENT: ICD-10-CM

## 2017-03-03 LAB — INR PPP: 3.6

## 2017-03-03 NOTE — PROGRESS NOTES
3/3/17 - Phone contact made with pt this AM. She has rec'd literature mailed by CM, but has not really had a chance to review it. CM encouraged her to read when able and call if any questions. Also advised the literature is in reference to activity/exercise and importance of staying as active as she can to maintain current functional status. She again requested contact from DILLON King, OSCAR pitt and VIVIANE Hackett [facility], for any assistance with meds and living expenses for which she may qualify. Advised DILLON King has already attempted to reach out to her, but got no answer when she called. CM will refer to VIVIANE Hackett and request that DILLON King attempt contact again. CM will follow up in two weeks. Ina Lovell RN

## 2017-03-03 NOTE — PROGRESS NOTES
Verbal result taken from _____Deepthi____. PT/INR __3.6_____ Date drawn__3/3______ Hardcopy to be faxed.  Pt confirms dose. States no changes in medications. Reports her appetite is improving. Will lower. No bleeding issues.

## 2017-03-06 ENCOUNTER — ANTI-COAG VISIT (OUTPATIENT)
Dept: CARDIOLOGY | Facility: CLINIC | Age: 62
End: 2017-03-06

## 2017-03-06 DIAGNOSIS — Z95.811 LEFT VENTRICULAR ASSIST DEVICE PRESENT: ICD-10-CM

## 2017-03-06 LAB — INR PPP: 2.7

## 2017-03-08 ENCOUNTER — ANTI-COAG VISIT (OUTPATIENT)
Dept: CARDIOLOGY | Facility: CLINIC | Age: 62
End: 2017-03-08

## 2017-03-08 ENCOUNTER — LAB VISIT (OUTPATIENT)
Dept: LAB | Facility: HOSPITAL | Age: 62
End: 2017-03-08
Attending: INTERNAL MEDICINE
Payer: MEDICARE

## 2017-03-08 ENCOUNTER — CLINICAL SUPPORT (OUTPATIENT)
Dept: TRANSPLANT | Facility: CLINIC | Age: 62
End: 2017-03-08
Payer: MEDICARE

## 2017-03-08 ENCOUNTER — OFFICE VISIT (OUTPATIENT)
Dept: TRANSPLANT | Facility: CLINIC | Age: 62
End: 2017-03-08
Payer: MEDICARE

## 2017-03-08 VITALS
HEART RATE: 88 BPM | SYSTOLIC BLOOD PRESSURE: 98 MMHG | TEMPERATURE: 99 F | BODY MASS INDEX: 26.9 KG/M2 | HEIGHT: 60 IN | WEIGHT: 137 LBS

## 2017-03-08 DIAGNOSIS — M21.371 FOOT DROP, BILATERAL: ICD-10-CM

## 2017-03-08 DIAGNOSIS — H53.8 VISUAL BLURRINESS: ICD-10-CM

## 2017-03-08 DIAGNOSIS — Z95.811 LVAD (LEFT VENTRICULAR ASSIST DEVICE) PRESENT: ICD-10-CM

## 2017-03-08 DIAGNOSIS — Z95.811 LEFT VENTRICULAR ASSIST DEVICE PRESENT: ICD-10-CM

## 2017-03-08 DIAGNOSIS — D50.0 IRON DEFICIENCY ANEMIA DUE TO CHRONIC BLOOD LOSS: ICD-10-CM

## 2017-03-08 DIAGNOSIS — M21.372 FOOT DROP, BILATERAL: ICD-10-CM

## 2017-03-08 DIAGNOSIS — I48.0 PAROXYSMAL ATRIAL FIBRILLATION: ICD-10-CM

## 2017-03-08 DIAGNOSIS — I50.42 CHRONIC COMBINED SYSTOLIC AND DIASTOLIC HEART FAILURE: ICD-10-CM

## 2017-03-08 DIAGNOSIS — R53.81 PHYSICAL DECONDITIONING: ICD-10-CM

## 2017-03-08 DIAGNOSIS — Z99.2 END STAGE RENAL DISEASE ON DIALYSIS: ICD-10-CM

## 2017-03-08 DIAGNOSIS — I10 ESSENTIAL HYPERTENSION: ICD-10-CM

## 2017-03-08 DIAGNOSIS — Z95.811 HEART REPLACED BY HEART ASSIST DEVICE: Primary | ICD-10-CM

## 2017-03-08 DIAGNOSIS — Z86.73 HISTORY OF STROKE WITHOUT RESIDUAL DEFICITS: ICD-10-CM

## 2017-03-08 DIAGNOSIS — Z95.811 HEART REPLACED BY HEART ASSIST DEVICE: ICD-10-CM

## 2017-03-08 DIAGNOSIS — Z95.810 ICD (IMPLANTABLE CARDIOVERTER-DEFIBRILLATOR), BIVENTRICULAR, IN SITU: ICD-10-CM

## 2017-03-08 DIAGNOSIS — N18.6 END STAGE RENAL DISEASE ON DIALYSIS: ICD-10-CM

## 2017-03-08 LAB
ALBUMIN SERPL BCP-MCNC: 3.5 G/DL
ALP SERPL-CCNC: 121 U/L
ALT SERPL W/O P-5'-P-CCNC: 20 U/L
ANION GAP SERPL CALC-SCNC: 15 MMOL/L
AST SERPL-CCNC: 25 U/L
BASOPHILS # BLD AUTO: 0.02 K/UL
BASOPHILS NFR BLD: 0.4 %
BILIRUB DIRECT SERPL-MCNC: 0.2 MG/DL
BILIRUB SERPL-MCNC: 0.5 MG/DL
BNP SERPL-MCNC: 764 PG/ML
BUN SERPL-MCNC: 39 MG/DL
CALCIUM SERPL-MCNC: 9.5 MG/DL
CHLORIDE SERPL-SCNC: 95 MMOL/L
CO2 SERPL-SCNC: 30 MMOL/L
CREAT SERPL-MCNC: 6.6 MG/DL
CRP SERPL-MCNC: 2.4 MG/L
DIFFERENTIAL METHOD: ABNORMAL
EOSINOPHIL # BLD AUTO: 0.1 K/UL
EOSINOPHIL NFR BLD: 1.3 %
ERYTHROCYTE [DISTWIDTH] IN BLOOD BY AUTOMATED COUNT: 16.4 %
EST. GFR  (AFRICAN AMERICAN): 7.3 ML/MIN/1.73 M^2
EST. GFR  (NON AFRICAN AMERICAN): 6.3 ML/MIN/1.73 M^2
GLUCOSE SERPL-MCNC: 133 MG/DL
HCT VFR BLD AUTO: 29.6 %
HGB BLD-MCNC: 9.6 G/DL
INR PPP: 2.6
LDH SERPL L TO P-CCNC: 222 U/L
LYMPHOCYTES # BLD AUTO: 0.6 K/UL
LYMPHOCYTES NFR BLD: 12.1 %
MAGNESIUM SERPL-MCNC: 1.9 MG/DL
MCH RBC QN AUTO: 33.1 PG
MCHC RBC AUTO-ENTMCNC: 32.4 %
MCV RBC AUTO: 102 FL
MONOCYTES # BLD AUTO: 0.6 K/UL
MONOCYTES NFR BLD: 13.4 %
NEUTROPHILS # BLD AUTO: 3.4 K/UL
NEUTROPHILS NFR BLD: 72.6 %
PHOSPHATE SERPL-MCNC: 2 MG/DL
PLATELET # BLD AUTO: 114 K/UL
PMV BLD AUTO: 9.2 FL
POTASSIUM SERPL-SCNC: 3.8 MMOL/L
PREALB SERPL-MCNC: 29 MG/DL
PROT SERPL-MCNC: 7.3 G/DL
PROTHROMBIN TIME: 26.1 SEC
RBC # BLD AUTO: 2.9 M/UL
SODIUM SERPL-SCNC: 140 MMOL/L
WBC # BLD AUTO: 4.64 K/UL

## 2017-03-08 PROCEDURE — 99999 PR PBB SHADOW E&M-EST. PATIENT-LVL III: CPT | Mod: PBBFAC,,, | Performed by: INTERNAL MEDICINE

## 2017-03-08 PROCEDURE — 99213 OFFICE O/P EST LOW 20 MIN: CPT | Mod: PBBFAC | Performed by: INTERNAL MEDICINE

## 2017-03-08 PROCEDURE — 99214 OFFICE O/P EST MOD 30 MIN: CPT | Mod: S$PBB,,, | Performed by: INTERNAL MEDICINE

## 2017-03-08 RX ORDER — AMLODIPINE BESYLATE 10 MG/1
10 TABLET ORAL DAILY
Qty: 30 TABLET | Refills: 11 | Status: ON HOLD | OUTPATIENT
Start: 2017-03-08 | End: 2017-07-26 | Stop reason: HOSPADM

## 2017-03-08 NOTE — LETTER
March 13, 2017        BABAR Currie  [address]  [address]  [place] [address]  Phone: [phone]  Fax: [phone]             [facility] [address]  [facility] [address]  Phone: [phone]   Patient: Randa Devine   MR Number: [id number]   YOB: 1955   Date of Visit: 3/8/2017       Dear Dr. BABAR Currie    Thank you for referring Randa Devine to me for evaluation. Attached you will find relevant portions of my assessment and plan of care.    If you have questions, please do not hesitate to call me. I look forward to following Randa Devine along with you.    Sincerely,    Sera Collado MD    Enclosure    If you would like to receive this communication electronically, please contact [email or web address] or [phone] to request [employer] access.    [employer] is a tool which provides read-only access to select patient information with whom you have a relationship. Its easy to use and provides real time access to review your patients record including encounter summaries, notes, results, and demographic information.    If you feel you have received this communication in error or would no longer like to receive these types of communications, please e-mail [email or web address]

## 2017-03-08 NOTE — PROGRESS NOTES
Subjective:   Patient ID:  Randa Devine is a 61 y.o. female who presents for LVAD followup visit.      Implant Date: 5/9/12 and PFO closure  [id number], DT study     HVAD RPM 2700     INR goal: 2-3  NO Bridge with Heparin (4/22/16)  Antiplatelets: D/C 6/25/14, Coumadin only  LDH: 197/207    TXP MARIA L INTERROGATIONS 3/8/2017   Type Heartware   Flow 5.5   Speed 2600   Power (Kim) 3.8   Low Flow Alarm 2.5   High Power Alarm 6.0   Pulsatility Pulse       HPI     MsRegan Devine is a 60 yo with chronic combined HF, ICM, s/p Heartware for DT complicated by ESRD, hx GIB 04/16, AVM s/p APC with no recurrent GIB, who presented 01/30 with acute double vision/photophobia with transient aphasia and transient LOC the day before, however came the next day to have it addressed. CT head in [facility] showed thalamic infarct new prior to imaging 01/19 with no new hemorrhage. Not candidate for tpa. Vascular neurology consulted, they felt stroke was emoblic due to history of PAF. Started on statin. VAD speed also lowered due to hypotension (?) blood pressure meds adjusted during inpatient stay. She did not go to rehab. Patient was sent home with HH. She states she is recovering well, but still having some issues with blurriness. Tolerating HD well. Denies N/V/F/C, lightheadedness, dizziness, PND, orthopnea, LE edema, abdominal pain, pressure. Did not do stroke rehab after admission.      Review of Systems   Constitution: Negative. Negative for chills, decreased appetite, diaphoresis, fever, weakness, malaise/fatigue, night sweats, weight gain and weight loss.   HENT: Negative for congestion and headaches.    Eyes: Positive for blurred vision and double vision. Negative for visual disturbance.   Cardiovascular: Negative for chest pain, claudication, cyanosis, dyspnea on exertion, irregular heartbeat, leg swelling, near-syncope, orthopnea, palpitations, paroxysmal nocturnal dyspnea and syncope.   Respiratory: Negative for cough, hemoptysis,  "shortness of breath, sleep disturbances due to breathing, snoring and wheezing.    Endocrine: Negative.    Hematologic/Lymphatic: Negative.  Negative for bleeding problem. Does not bruise/bleed easily.   Skin: Negative for color change, dry skin, nail changes, poor wound healing and rash.   Musculoskeletal: Negative.  Negative for arthritis, joint pain and muscle weakness.   Gastrointestinal: Positive for nausea. Negative for bloating, abdominal pain, constipation, diarrhea, hematemesis, hematochezia, melena and vomiting.   Genitourinary: Negative.  Negative for frequency and urgency.   Neurological: Positive for focal weakness and paresthesias. Negative for difficulty with concentration, excessive daytime sleepiness, dizziness and light-headedness.   Psychiatric/Behavioral: Negative for depression. The patient does not have insomnia and is not nervous/anxious.        Objective:   Blood pressure (!) 98/0, pulse 88, temperature 98.8 °F (37.1 °C), temperature source Oral, height 5' (1.524 m), weight 62.1 kg (137 lb).body mass index is 26.76 kg/(m^2).    Physical Exam   Constitutional: She is oriented to person, place, and time. Vital signs are normal. She appears well-developed and well-nourished. No distress.   HENT:   Head: Normocephalic and atraumatic.   Mouth/Throat: No oropharyngeal exudate.   Eyes: Conjunctivae and EOM are normal. Pupils are equal, round, and reactive to light. No scleral icterus.   Neck: Normal range of motion. Neck supple. No JVD present.   Cardiovascular: Normal rate, regular rhythm, normal heart sounds and intact distal pulses.  PMI is not displaced.  Exam reveals no gallop and no friction rub.    No murmur heard.  Smooth VAD hum. DLES is "1"   Pulmonary/Chest: Effort normal and breath sounds normal. No respiratory distress. She has no wheezes. She has no rales. She exhibits no tenderness.   Abdominal: Soft. Bowel sounds are normal. She exhibits no distension and no mass. There is no " tenderness. There is no rebound and no guarding.   Musculoskeletal: Normal range of motion. She exhibits no edema or tenderness.   Neurological: She is alert and oriented to person, place, and time.   Skin: Skin is warm and dry. No rash noted. She is not diaphoretic. No erythema. No pallor.   Psychiatric: She has a normal mood and affect. Her behavior is normal. Judgment and thought content normal.   Nursing note and vitals reviewed.      Lab Results   Component Value Date    WBC 4.64 03/08/2017    HGB 9.6 (L) 03/08/2017    HCT 29.6 (L) 03/08/2017     (H) 03/08/2017     (L) 03/08/2017    CO2 30 (H) 03/08/2017    CREATININE 6.6 (H) 03/08/2017    CALCIUM 9.5 03/08/2017    ALBUMIN 3.5 03/08/2017    AST 25 03/08/2017     (H) 03/08/2017    ALT 20 03/08/2017     03/08/2017       Lab Results   Component Value Date    INR 2.6 (H) 03/08/2017    INR 2.7 03/06/2017    INR 3.6 03/03/2017       BNP   Date Value Ref Range Status   03/08/2017 764 (H) 0 - 99 pg/mL Final     Comment:     Values of less than 100 pg/ml are consistent with non-CHF populations.   02/06/2017 796 (H) 0 - 99 pg/mL Final     Comment:     Values of less than 100 pg/ml are consistent with non-CHF populations.   02/03/2017 768 (H) 0 - 99 pg/mL Final     Comment:     Values of less than 100 pg/ml are consistent with non-CHF populations.       LD   Date Value Ref Range Status   03/08/2017 222 110 - 260 U/L Final     Comment:     Results are increased in hemolyzed samples.   02/06/2017 200 110 - 260 U/L Final     Comment:     Results are increased in hemolyzed samples.   02/05/2017 205 110 - 260 U/L Final     Comment:     Results are increased in hemolyzed samples.     Labs were reviewed with the patient.    Assessment:      1. Heart replaced by heart assist device    2. Foot drop, bilateral    3. History of stroke without residual deficits    4. ICD (implantable cardioverter-defibrillator), biventricular, in situ    5. LVAD (left  ventricular assist device) present    6. Physical deconditioning    7. Iron deficiency anemia due to chronic blood loss    8. End stage renal disease on dialysis    9. Chronic combined systolic and diastolic heart failure    10. Essential hypertension    11. Paroxysmal atrial fibrillation    12. Visual blurriness        Plan:   Patient slowly recovering from stroke admission at end of January.  Would like to refer to PT/OT however patient is not comfortable having therapists in her home right now with her son and son's GF in the house. Will consider when they are gone.  With HA, congestion, fullness in ear and head, consider sinus congestion- will try allegra/claritin/zyrtec and see if improves.  With blurriness, will see if she can be seen by ophalmology.     Listed for transplant: No, DT    UNOS Patient Status  Functional Status: 90% - Able to carry on normal activity: minor symptoms of disease  Physical Capacity: No Limitations  Working for Income: No  If no, reason not working: Unknown    Sera Collado MD

## 2017-03-09 NOTE — PROGRESS NOTES
"Date of Implant with Heartware LVAD: 5/9/12    PATIENT ARRIVED IN CLINIC:  Ambulatory   Accompanied by: Daughter    Vitals  Doppler: 98/0  Pulsatile: Yes  PAIN:  on 0-10 pain scale, location of pain: 0, description of pain: 0  Is patient currently on medications for pain? no What kind?  Weight (with controller and 2 batteries): refer to encounter    VAD Interrogation:  TXP MARIA L INTERROGATIONS 3/8/2017   Type Heartware   Flow 5.5   Speed 2600   Power (Kim) 3.8   Low Flow Alarm 2.5   High Power Alarm 6.0   Pulsatility Pulse       HCT: 29.6  WAVEFORM: 4-8 with no negative deflections noted  Suction alarm: Off  Problems / Issues / Alarms with VAD if any: None noted   Any Equipment Issues: None noted  Patient states their batteries are lasting 6-8 hours. Rotating all batteries. Checking connections before and after changing battery.   Emergency Equipment With Patient: yes   VAD Binder With Patient: yes   Reviewed VAD Numbers In Binder: yes  VAD Sounds: Smooth  Manual & Visual Inspection Of Driveline: No kinks or tears noted  Checked Heartware driveline connector housing for separation, none noted.  Also checked for tight connection, which they are.  Educated pt regarding this and instructed  to check on this daily. Pt verbalized understanding and agreement.   Clamshell Repair: no  Patient wearing Consolidated bag with waist strap:YES    LVAD Dressing/DLES:  Appearance Of Driveline:  "1"  Antibiotics: NO  Frequency of Dressing Changes: every other day & soap and water dressing   Stabilization Device In Use: yes, presley securement device    Pt In Need Of Management Kits? yes 1 box soap and water    It is medically necessary to have VAD management kits in order to prevent infection or to assist in the healing of an infected DLES.    Assessment:   Complaints/reason for visit today: routine  Complaints Of Nausea / Vomiting: None noted   Appearance and Frequency Of Stools: normal and formed without blood & daily  Color Of " Urine: clear/yellow  Patient is: coping okay   Sleep Habits: 8-9 hrs /night  Sleep Aids: None noted   Showering: Yes, reminded to change dressing immediately after drying off  Activity/Exercise: Walking   Driving: yes, Reminded to pull over should there be an alarm before looking down at controller.     Labs:    Chemistry        Component Value Date/Time     03/08/2017 1105    K 3.8 03/08/2017 1105    CL 95 03/08/2017 1105    CO2 30 (H) 03/08/2017 1105    BUN 39 (H) 03/08/2017 1105    CREATININE 6.6 (H) 03/08/2017 1105     (H) 03/08/2017 1105        Component Value Date/Time    CALCIUM 9.5 03/08/2017 1105    ALKPHOS 121 03/08/2017 1105    AST 25 03/08/2017 1105    ALT 20 03/08/2017 1105    BILITOT 0.5 03/08/2017 1105            Magnesium   Date Value Ref Range Status   03/08/2017 1.9 1.6 - 2.6 mg/dL Final       Lab Results   Component Value Date    WBC 4.64 03/08/2017    HGB 9.6 (L) 03/08/2017    HCT 29.6 (L) 03/08/2017     (H) 03/08/2017     (L) 03/08/2017       Lab Results   Component Value Date    INR 2.6 (H) 03/08/2017    INR 2.7 03/06/2017    INR 3.6 03/03/2017       BNP   Date Value Ref Range Status   03/08/2017 764 (H) 0 - 99 pg/mL Final     Comment:     Values of less than 100 pg/ml are consistent with non-CHF populations.   02/06/2017 796 (H) 0 - 99 pg/mL Final     Comment:     Values of less than 100 pg/ml are consistent with non-CHF populations.   02/03/2017 768 (H) 0 - 99 pg/mL Final     Comment:     Values of less than 100 pg/ml are consistent with non-CHF populations.       LD   Date Value Ref Range Status   03/08/2017 222 110 - 260 U/L Final     Comment:     Results are increased in hemolyzed samples.   02/06/2017 200 110 - 260 U/L Final     Comment:     Results are increased in hemolyzed samples.   02/05/2017 205 110 - 260 U/L Final     Comment:     Results are increased in hemolyzed samples.       Labs reviewed with patient: YES     Patient Satisfaction Survey completed  per Patient: no  (explained about signature and box to check)  Medication reconciliation: per MA.  Purple card updated today: yes- increased Norvasc to 10mg  Coumadin Managed by: [facility]    Education: Reviewed driveline care, emergency procedures, how to change the controller, alarms with patient.      Plans/Needs: RTC in one month. Patient complaining of headaches- however given her current symptoms (watery eyes, running nose) patient told to take allergy medications. Patient given a donated wall .

## 2017-03-10 DIAGNOSIS — I50.9 CONGESTIVE HEART FAILURE, UNSPECIFIED: ICD-10-CM

## 2017-03-10 DIAGNOSIS — Z95.811 HEART REPLACED BY HEART ASSIST DEVICE: Primary | ICD-10-CM

## 2017-03-10 NOTE — ED NOTES
Bed: 11  Expected date:   Expected time:   Means of arrival:   Comments:  EMS  
LVAD coordinator paged through [facility] answering service Rashida Woodson RN will call back.  
sinus bradycardia/sinus arrhythmia

## 2017-03-13 ENCOUNTER — OUTPATIENT CASE MANAGEMENT (OUTPATIENT)
Dept: ADMINISTRATIVE | Facility: OTHER | Age: 62
End: 2017-03-13

## 2017-03-13 ENCOUNTER — ANTI-COAG VISIT (OUTPATIENT)
Dept: CARDIOLOGY | Facility: CLINIC | Age: 62
End: 2017-03-13

## 2017-03-13 DIAGNOSIS — Z95.811 LEFT VENTRICULAR ASSIST DEVICE PRESENT: ICD-10-CM

## 2017-03-13 LAB — INR PPP: 2.4

## 2017-03-13 NOTE — PROCEDURES
TXP MARIA L INTERROGATIONS 3/8/2017 2/3/2017 2/3/2017 2/3/2017 2/3/2017 2/3/2017 2/3/2017   Type Heartware - - - - - -   Flow 5.5 - - - - - -   Speed 2600 - - - - - -   Power (Kim) 3.8 - - - - - -   Low Flow Alarm 2.5 - - - - - -   High Power Alarm 6.0 - - - - - -   Pulsatility Pulse Pulse Pulse Pulse Pulse Pulse Pulse   }

## 2017-03-13 NOTE — PROGRESS NOTES
3/13/2017:   left a message at ([phone]) in an attempt to complete assessment.  Will follow up at a later date.

## 2017-03-16 ENCOUNTER — OUTPATIENT CASE MANAGEMENT (OUTPATIENT)
Dept: ADMINISTRATIVE | Facility: OTHER | Age: 62
End: 2017-03-16

## 2017-03-16 ENCOUNTER — ANTI-COAG VISIT (OUTPATIENT)
Dept: CARDIOLOGY | Facility: CLINIC | Age: 62
End: 2017-03-16

## 2017-03-16 DIAGNOSIS — Z95.811 LEFT VENTRICULAR ASSIST DEVICE PRESENT: ICD-10-CM

## 2017-03-16 LAB — INR PPP: 2.5

## 2017-03-16 NOTE — PROGRESS NOTES
3/16/2017:   spoke to patient via telephone in order to to complete assessment.  Pt expressed financial difficulties due to her medical condition.   contacted the [facility] [employer] and was provided with the food pantry in patient's area ([address] [place] [address]) and was told that there is an Adopt a Senior Program for people with disabilities who are 50 years old.   obtained an application and mailed it to patient.  (Application states 60 and over so I have sent an email to the food bank asking for clarification).   also mailed the Patient Assistance Fund Application.  Patient is over the income guidelines for [employer] and Medicaid per their websites.  Will continue to follow.      3/13/2017:   left a message at ([phone]) in an attempt to complete assessment.  Will follow up at a later date.

## 2017-03-17 ENCOUNTER — OUTPATIENT CASE MANAGEMENT (OUTPATIENT)
Dept: ADMINISTRATIVE | Facility: OTHER | Age: 62
End: 2017-03-17

## 2017-03-17 NOTE — PROGRESS NOTES
3/17/17- Attempted phone contact with pt with no answer to home phone. Mobile phone number on EMR is invalid. CM will attempt to contact at a later date. Ina Lovell RN

## 2017-03-19 ENCOUNTER — HOSPITAL ENCOUNTER (INPATIENT)
Facility: HOSPITAL | Age: 62
LOS: 2 days | Discharge: HOME HEALTH | DRG: 291 | End: 2017-03-21
Attending: EMERGENCY MEDICINE | Admitting: INTERNAL MEDICINE
Payer: MEDICARE

## 2017-03-19 DIAGNOSIS — I42.9 CARDIOMYOPATHY: ICD-10-CM

## 2017-03-19 DIAGNOSIS — Z95.811 HEART REPLACED BY HEART ASSIST DEVICE: Primary | ICD-10-CM

## 2017-03-19 DIAGNOSIS — E87.70 HYPERVOLEMIA, UNSPECIFIED HYPERVOLEMIA TYPE: ICD-10-CM

## 2017-03-19 DIAGNOSIS — Z95.810 PRESENCE OF AUTOMATIC IMPLANTABLE CARDIOVERTER-DEFIBRILLATOR: ICD-10-CM

## 2017-03-19 DIAGNOSIS — N18.6 END STAGE RENAL DISEASE ON DIALYSIS: ICD-10-CM

## 2017-03-19 DIAGNOSIS — Z99.2 END STAGE RENAL DISEASE ON DIALYSIS: ICD-10-CM

## 2017-03-19 LAB
ALBUMIN SERPL BCP-MCNC: 3.5 G/DL
ALP SERPL-CCNC: 153 U/L
ALT SERPL W/O P-5'-P-CCNC: 25 U/L
ANION GAP SERPL CALC-SCNC: 17 MMOL/L
APTT BLDCRRT: 34.2 SEC
AST SERPL-CCNC: 31 U/L
BASOPHILS # BLD AUTO: 0.03 K/UL
BASOPHILS # BLD AUTO: 0.03 K/UL
BASOPHILS NFR BLD: 0.4 %
BASOPHILS NFR BLD: 0.7 %
BILIRUB SERPL-MCNC: 0.7 MG/DL
BNP SERPL-MCNC: 2025 PG/ML
BUN SERPL-MCNC: 60 MG/DL
CALCIUM SERPL-MCNC: 9.7 MG/DL
CHLORIDE SERPL-SCNC: 95 MMOL/L
CO2 SERPL-SCNC: 28 MMOL/L
CREAT SERPL-MCNC: 8.9 MG/DL
DIFFERENTIAL METHOD: ABNORMAL
DIFFERENTIAL METHOD: ABNORMAL
EOSINOPHIL # BLD AUTO: 0.1 K/UL
EOSINOPHIL # BLD AUTO: 0.1 K/UL
EOSINOPHIL NFR BLD: 1.9 %
EOSINOPHIL NFR BLD: 2 %
ERYTHROCYTE [DISTWIDTH] IN BLOOD BY AUTOMATED COUNT: 15.5 %
ERYTHROCYTE [DISTWIDTH] IN BLOOD BY AUTOMATED COUNT: 15.5 %
EST. GFR  (AFRICAN AMERICAN): 5.1 ML/MIN/1.73 M^2
EST. GFR  (NON AFRICAN AMERICAN): 4.4 ML/MIN/1.73 M^2
GLUCOSE SERPL-MCNC: 74 MG/DL
HCT VFR BLD AUTO: 27.1 %
HCT VFR BLD AUTO: 28.5 %
HGB BLD-MCNC: 8.8 G/DL
HGB BLD-MCNC: 9.2 G/DL
INR PPP: 1.9
INR PPP: 2.2
LYMPHOCYTES # BLD AUTO: 0.6 K/UL
LYMPHOCYTES # BLD AUTO: 1 K/UL
LYMPHOCYTES NFR BLD: 14.3 %
LYMPHOCYTES NFR BLD: 14.4 %
MCH RBC QN AUTO: 32.5 PG
MCH RBC QN AUTO: 32.7 PG
MCHC RBC AUTO-ENTMCNC: 32.3 %
MCHC RBC AUTO-ENTMCNC: 32.5 %
MCV RBC AUTO: 101 FL
MCV RBC AUTO: 101 FL
MONOCYTES # BLD AUTO: 0.3 K/UL
MONOCYTES # BLD AUTO: 0.6 K/UL
MONOCYTES NFR BLD: 5.9 %
MONOCYTES NFR BLD: 7.9 %
NEUTROPHILS # BLD AUTO: 3.4 K/UL
NEUTROPHILS # BLD AUTO: 5.2 K/UL
NEUTROPHILS NFR BLD: 75.4 %
NEUTROPHILS NFR BLD: 77 %
PLATELET # BLD AUTO: 126 K/UL
PLATELET # BLD AUTO: 127 K/UL
PMV BLD AUTO: 10.4 FL
PMV BLD AUTO: 9.4 FL
POTASSIUM SERPL-SCNC: 3.8 MMOL/L
PROT SERPL-MCNC: 7.5 G/DL
PROTHROMBIN TIME: 19.1 SEC
PROTHROMBIN TIME: 22.5 SEC
RBC # BLD AUTO: 2.69 M/UL
RBC # BLD AUTO: 2.83 M/UL
SODIUM SERPL-SCNC: 140 MMOL/L
TROPONIN I SERPL DL<=0.01 NG/ML-MCNC: 0.34 NG/ML
TROPONIN I SERPL DL<=0.01 NG/ML-MCNC: 0.36 NG/ML
WBC # BLD AUTO: 4.43 K/UL
WBC # BLD AUTO: 6.94 K/UL

## 2017-03-19 PROCEDURE — 25000003 PHARM REV CODE 250: Performed by: STUDENT IN AN ORGANIZED HEALTH CARE EDUCATION/TRAINING PROGRAM

## 2017-03-19 PROCEDURE — 96360 HYDRATION IV INFUSION INIT: CPT

## 2017-03-19 PROCEDURE — 85610 PROTHROMBIN TIME: CPT | Mod: 91

## 2017-03-19 PROCEDURE — 80100020 *HC HEMODIALYSIS 1:1 - ARF

## 2017-03-19 PROCEDURE — 84484 ASSAY OF TROPONIN QUANT: CPT | Mod: 91

## 2017-03-19 PROCEDURE — 93005 ELECTROCARDIOGRAM TRACING: CPT

## 2017-03-19 PROCEDURE — 85025 COMPLETE CBC W/AUTO DIFF WBC: CPT

## 2017-03-19 PROCEDURE — 20600001 HC STEP DOWN PRIVATE ROOM

## 2017-03-19 PROCEDURE — 80100014 HC HEMODIALYSIS 1:1

## 2017-03-19 PROCEDURE — 25000003 PHARM REV CODE 250: Performed by: INTERNAL MEDICINE

## 2017-03-19 PROCEDURE — 96361 HYDRATE IV INFUSION ADD-ON: CPT

## 2017-03-19 PROCEDURE — 85730 THROMBOPLASTIN TIME PARTIAL: CPT

## 2017-03-19 PROCEDURE — 80053 COMPREHEN METABOLIC PANEL: CPT

## 2017-03-19 PROCEDURE — 99221 1ST HOSP IP/OBS SF/LOW 40: CPT | Mod: ,,, | Performed by: INTERNAL MEDICINE

## 2017-03-19 PROCEDURE — 99285 EMERGENCY DEPT VISIT HI MDM: CPT | Mod: ,,, | Performed by: EMERGENCY MEDICINE

## 2017-03-19 PROCEDURE — 99285 EMERGENCY DEPT VISIT HI MDM: CPT | Mod: 25

## 2017-03-19 PROCEDURE — 93010 ELECTROCARDIOGRAM REPORT: CPT | Mod: ,,, | Performed by: INTERNAL MEDICINE

## 2017-03-19 PROCEDURE — 25000003 PHARM REV CODE 250: Performed by: EMERGENCY MEDICINE

## 2017-03-19 PROCEDURE — 99223 1ST HOSP IP/OBS HIGH 75: CPT | Mod: ,,, | Performed by: INTERNAL MEDICINE

## 2017-03-19 PROCEDURE — 85610 PROTHROMBIN TIME: CPT

## 2017-03-19 PROCEDURE — 83880 ASSAY OF NATRIURETIC PEPTIDE: CPT

## 2017-03-19 RX ORDER — MIDODRINE HYDROCHLORIDE 5 MG/1
10 TABLET ORAL ONCE
Status: COMPLETED | OUTPATIENT
Start: 2017-03-19 | End: 2017-03-19

## 2017-03-19 RX ORDER — CALCIUM CARBONATE 200(500)MG
500 TABLET,CHEWABLE ORAL 4 TIMES DAILY
Status: DISCONTINUED | OUTPATIENT
Start: 2017-03-19 | End: 2017-03-21 | Stop reason: HOSPADM

## 2017-03-19 RX ORDER — SODIUM CHLORIDE 9 MG/ML
INJECTION, SOLUTION INTRAVENOUS ONCE
Status: COMPLETED | OUTPATIENT
Start: 2017-03-19 | End: 2017-03-19

## 2017-03-19 RX ORDER — SODIUM CHLORIDE 9 MG/ML
INJECTION, SOLUTION INTRAVENOUS
Status: DISCONTINUED | OUTPATIENT
Start: 2017-03-19 | End: 2017-03-21 | Stop reason: HOSPADM

## 2017-03-19 RX ORDER — ESCITALOPRAM OXALATE 5 MG/1
20 TABLET ORAL NIGHTLY
Status: DISCONTINUED | OUTPATIENT
Start: 2017-03-19 | End: 2017-03-21 | Stop reason: HOSPADM

## 2017-03-19 RX ORDER — GABAPENTIN 300 MG/1
300 CAPSULE ORAL NIGHTLY
Status: DISCONTINUED | OUTPATIENT
Start: 2017-03-19 | End: 2017-03-21 | Stop reason: HOSPADM

## 2017-03-19 RX ORDER — ATORVASTATIN CALCIUM 20 MG/1
40 TABLET, FILM COATED ORAL DAILY
Status: DISCONTINUED | OUTPATIENT
Start: 2017-03-19 | End: 2017-03-21 | Stop reason: HOSPADM

## 2017-03-19 RX ORDER — WARFARIN 2 MG/1
2 TABLET ORAL DAILY
Status: DISCONTINUED | OUTPATIENT
Start: 2017-03-19 | End: 2017-03-21 | Stop reason: HOSPADM

## 2017-03-19 RX ORDER — ACETAMINOPHEN 325 MG/1
650 TABLET ORAL
Status: COMPLETED | OUTPATIENT
Start: 2017-03-19 | End: 2017-03-19

## 2017-03-19 RX ORDER — PANTOPRAZOLE SODIUM 40 MG/1
40 TABLET, DELAYED RELEASE ORAL DAILY
Status: DISCONTINUED | OUTPATIENT
Start: 2017-03-19 | End: 2017-03-21 | Stop reason: HOSPADM

## 2017-03-19 RX ORDER — ACETAMINOPHEN 325 MG/1
650 TABLET ORAL EVERY 8 HOURS PRN
Status: DISCONTINUED | OUTPATIENT
Start: 2017-03-19 | End: 2017-03-21 | Stop reason: HOSPADM

## 2017-03-19 RX ORDER — CETIRIZINE HYDROCHLORIDE 5 MG/1
5 TABLET ORAL DAILY
Status: DISCONTINUED | OUTPATIENT
Start: 2017-03-19 | End: 2017-03-21 | Stop reason: HOSPADM

## 2017-03-19 RX ORDER — HEPARIN SODIUM,PORCINE/D5W 25000/250
17 INTRAVENOUS SOLUTION INTRAVENOUS CONTINUOUS
Status: DISCONTINUED | OUTPATIENT
Start: 2017-03-19 | End: 2017-03-19

## 2017-03-19 RX ORDER — HYDRALAZINE HYDROCHLORIDE 50 MG/1
50 TABLET, FILM COATED ORAL EVERY 8 HOURS
Status: DISCONTINUED | OUTPATIENT
Start: 2017-03-19 | End: 2017-03-21 | Stop reason: HOSPADM

## 2017-03-19 RX ORDER — AMLODIPINE BESYLATE 10 MG/1
10 TABLET ORAL DAILY
Status: DISCONTINUED | OUTPATIENT
Start: 2017-03-19 | End: 2017-03-21 | Stop reason: HOSPADM

## 2017-03-19 RX ADMIN — PANTOPRAZOLE SODIUM 40 MG: 40 TABLET, DELAYED RELEASE ORAL at 08:03

## 2017-03-19 RX ADMIN — ATORVASTATIN CALCIUM 40 MG: 20 TABLET, FILM COATED ORAL at 08:03

## 2017-03-19 RX ADMIN — WARFARIN SODIUM 2 MG: 2 TABLET ORAL at 04:03

## 2017-03-19 RX ADMIN — GABAPENTIN 300 MG: 300 CAPSULE ORAL at 08:03

## 2017-03-19 RX ADMIN — MIDODRINE HYDROCHLORIDE 10 MG: 5 TABLET ORAL at 01:03

## 2017-03-19 RX ADMIN — HYDRALAZINE HYDROCHLORIDE 50 MG: 50 TABLET ORAL at 06:03

## 2017-03-19 RX ADMIN — AMLODIPINE BESYLATE 10 MG: 10 TABLET ORAL at 06:03

## 2017-03-19 RX ADMIN — CALCIUM CARBONATE (ANTACID) CHEW TAB 500 MG 500 MG: 500 CHEW TAB at 12:03

## 2017-03-19 RX ADMIN — HYDRALAZINE HYDROCHLORIDE 50 MG: 50 TABLET ORAL at 01:03

## 2017-03-19 RX ADMIN — ESCITALOPRAM 20 MG: 5 TABLET, FILM COATED ORAL at 08:03

## 2017-03-19 RX ADMIN — SODIUM CHLORIDE: 0.9 INJECTION, SOLUTION INTRAVENOUS at 01:03

## 2017-03-19 RX ADMIN — HYDRALAZINE HYDROCHLORIDE 50 MG: 50 TABLET ORAL at 08:03

## 2017-03-19 RX ADMIN — CETIRIZINE HYDROCHLORIDE 5 MG: 5 TABLET, FILM COATED ORAL at 08:03

## 2017-03-19 RX ADMIN — ACETAMINOPHEN 650 MG: 325 TABLET ORAL at 04:03

## 2017-03-19 RX ADMIN — HEPARIN SODIUM AND DEXTROSE 17 UNITS/KG/HR: 10000; 5 INJECTION INTRAVENOUS at 04:03

## 2017-03-19 RX ADMIN — CALCIUM CARBONATE (ANTACID) CHEW TAB 500 MG 500 MG: 500 CHEW TAB at 04:03

## 2017-03-19 NOTE — ED NOTES
Patient identifiers verified and correct for Randa Devine.    LOC: The patient is awake, alert and aware of environment with an appropriate affect, the patient is oriented x 3 and speaking appropriately.  APPEARANCE: Patient resting comfortably and in no acute distress, patient is clean and well groomed, patient's clothing is properly fastened.  SKIN: The skin is warm and dry, color consistent with ethnicity, patient has normal skin turgor and moist mucus membranes, skin intact, no breakdown or bruising noted.  MUSCULOSKELETAL: Patient moving all extremities spontaneously, no obvious swelling or deformities noted.  RESPIRATORY: Airway is open and patent, respirations are spontaneous, patient has a normal effort and rate, no accessory muscle use noted, bilateral breath sounds wet and diminishing.  CARDIAC: Patient has a normal rate and regular rhythm, no periphreal edema noted, capillary refill < 3 seconds.  ABDOMEN: Soft and non tender to palpation, no distention noted, normoactive bowel sounds present in all four quadrants.  NEUROLOGIC: Pupils equal bilaterally, eyes open spontaneously, behavior appropriate to situation, follows commands, facial expression symmetrical, bilateral hand grasp equal and even, purposeful motor response noted, normal sensation in all extremities when touched with a finger.

## 2017-03-19 NOTE — CONSULTS
Ms. Devine is a 60 yo with chronic combined HF, ICM, s/p Heartware for DT complicated by ESRD, hx GIB 04/16, AVM s/p APC with no recurrent GIB, who presented 01/30 with a CVA- thalamic infarct with no hemorrhagic conversion. She presents top ED complaining of feeling bad, SOB and requiring oxygen 24x 7 for the past 3 days. She missed her dialysis yesterday. Also reports chest pain with cough. No VAD alarms.  She was just seen in VAD clinic 4 days ago- Dr Collado when she had none of these acute symptoms.    On exam she does have fluid overload with mild JVD and bibasilar crackles. Elevated BNP 2025.   AP: Admit for ADHF. Will consult Nephrology for HD today. On call cardiology fellow notified.    Mathew Lopez MD  [phone]

## 2017-03-19 NOTE — ED PROVIDER NOTES
Encounter Date: 3/19/2017    SCRIBE #1 NOTE: I, Bora Renee, am scribing for, and in the presence of,  Dr. Monahan. I have scribed the following portions of the note - the EKG reading and the Resident attestation.       History     Chief Complaint   Patient presents with    Shortness of Breath     short of breath and chest pain. LVAD patient     Review of patient's allergies indicates:   Allergen Reactions    Codeine Nausea And Vomiting    Demerol [meperidine] Nausea And Vomiting    Lortab [hydrocodone-acetaminophen] Nausea And Vomiting     HPI   Ms Devine 62 YO female h/o LVAD, CHF, CAD, ESRD (M,W,F HD), PAH presenting with chest pain and shortness of breath worsening over the last week. Pt says shortness of breath is most concerning- worse with exertion. She is requiring 4L O2 at home (usual is 2L O2), Says her abdomen feels distended, which usually happens when she is volume overloaded. Having dry cough, denies fevers. Chest pain is intermittent, all day, worse with exertion and coughing. Missed her last HD. Called transplant coordinator who told her to come to ED for admission.     Past Medical History:   Diagnosis Date    Anticoagulant long-term use     Anxiety     Atrial tachycardia, paroxysmal 2013    Blood transfusion     Cardiomyopathy     CHF (congestive heart failure)     Chronic kidney disease     Coronary artery disease     End stage renal disease     Hypertension     pulmonary    ICD (implantable cardioverter-defibrillator) battery depletion 2014    Myocardial infarction     Presence of left ventricular assist device (LVAD)     Pulmonary hypertension     Stroke      Past Surgical History:   Procedure Laterality Date    CARDIAC DEFIBRILLATOR PLACEMENT      CARDIAC SURGERY       SECTION      COLONOSCOPY N/A 2016    Procedure: COLONOSCOPY;  Surgeon: Mark Currie MD;  Location: [facility] ([address] [place] [address]);  Service: Endoscopy;  Laterality: N/A;    CORONARY  ARTERY BYPASS GRAFT      FRACTURE SURGERY      [facility]  12[facility]    LEFT VENTRICULAR ASSIST DEVICE  5/2012    TONSILLECTOMY      VASCULAR SURGERY       Family History   Problem Relation Age of Onset    Arthritis Mother     Heart disease Father     Hypertension Father     Cancer Maternal Grandmother     Breast cancer Neg Hx     Colon cancer Neg Hx     Ovarian cancer Neg Hx      Social History   Substance Use Topics    Smoking status: Former Smoker     Packs/day: 2.00     Years: 30.00     Quit date: 10/29/2009    Smokeless tobacco: None    Alcohol use Yes      Comment: 1 glass of wine a month     Review of Systems   Constitutional: Positive for fatigue. Negative for fever.   HENT: Negative for sore throat.    Respiratory: Positive for cough and shortness of breath.    Cardiovascular: Positive for chest pain.   Gastrointestinal: Negative for nausea.   Genitourinary: Negative for dysuria.   Musculoskeletal: Negative for back pain.   Skin: Negative for rash.   Neurological: Positive for weakness.   Hematological: Does not bruise/bleed easily.   All other systems reviewed and are negative.      Physical Exam   Initial Vitals   BP Pulse Resp Temp SpO2   -- 03/19/17 0228 03/19/17 0228 03/19/17 0228 03/19/17 0228    87 22 98.5 °F (36.9 °C) 99 %     Physical Exam    Nursing note and vitals reviewed.  Constitutional: She appears well-developed.   Chronically ill appearing   HENT:   Head: Normocephalic and atraumatic.   Eyes: EOM are normal. Pupils are equal, round, and reactive to light.   Neck: Normal range of motion. Neck supple.   Cardiovascular:   LVAD   Pulmonary/Chest: She has no wheezes. She has no rhonchi. She has rales (L>R).   Abdominal: Soft. Bowel sounds are normal. She exhibits no distension. There is no tenderness. There is no rebound and no guarding.   Musculoskeletal: Normal range of motion. She exhibits no edema or tenderness.   Neurological: She is alert and oriented to person, place,  and time. She has normal strength. No cranial nerve deficit or sensory deficit.   Skin: Skin is warm and dry.         ED Course   Procedures  Labs Reviewed   CBC W/ AUTO DIFFERENTIAL - Abnormal; Notable for the following:        Result Value    RBC 2.83 (*)     Hemoglobin 9.2 (*)     Hematocrit 28.5 (*)      (*)     MCH 32.5 (*)     RDW 15.5 (*)     Platelets 126 (*)     Gran% 75.4 (*)     Lymph% 14.3 (*)     All other components within normal limits    Narrative:     PLEASE REVIEW ORDER START TIME BEFORE MARKING SPECIMEN  COLLECTED.   COMPREHENSIVE METABOLIC PANEL - Abnormal; Notable for the following:     BUN, Bld 60 (*)     Creatinine 8.9 (*)     Alkaline Phosphatase 153 (*)     Anion Gap 17 (*)     eGFR if  5.1 (*)     eGFR if non  4.4 (*)     All other components within normal limits    Narrative:     PLEASE REVIEW ORDER START TIME BEFORE MARKING SPECIMEN  COLLECTED.   PROTIME-INR - Abnormal; Notable for the following:     Prothrombin Time 19.1 (*)     INR 1.9 (*)     All other components within normal limits    Narrative:     PLEASE REVIEW ORDER START TIME BEFORE MARKING SPECIMEN  COLLECTED.   TROPONIN I - Abnormal; Notable for the following:     Troponin I 0.344 (*)     All other components within normal limits    Narrative:     PLEASE REVIEW ORDER START TIME BEFORE MARKING SPECIMEN  COLLECTED.   B-TYPE NATRIURETIC PEPTIDE - Abnormal; Notable for the following:     BNP 2025 (*)     All other components within normal limits    Narrative:     PLEASE REVIEW ORDER START TIME BEFORE MARKING SPECIMEN  COLLECTED.   TROPONIN I     EKG Readings: (Independently Interpreted)   Initial Reading: No STEMI. Rhythm: Paced Rhythm. Heart Rate: 87.          Medical Decision Making:   History:   Old Medical Records: I decided to obtain old medical records.  Independently Interpreted Test(s):   I have ordered and independently interpreted X-rays - see prior notes.  I have ordered and  independently interpreted EKG Reading(s) - see prior notes  Clinical Tests:   Lab Tests: Ordered and Reviewed  Radiological Study: Ordered and Reviewed  Medical Tests: Ordered and Reviewed       APC / Resident Notes:   Pt presenting with SOB, CP with LVAD. On exam, VSS, sating 90s on 4L, + crackles BL bases. Concern for CHF/volume overload, electrolyte abnormality, anemia, ACS, pneumonia. Cardiology consulted on arrival to ED. Trop 0.344, BNP pending. CXR stable. Cardiology will admit.   Ina Mccarthy, HO4  [facility]  5:21 AM         Scribe Attestation:   Scribe #1: I performed the above scribed service and the documentation accurately describes the services I performed. I attest to the accuracy of the note.    Attending Attestation:   Physician Attestation Statement for Resident:  As the supervising MD   Physician Attestation Statement: I have personally seen and examined this patient.   I agree with the above history. -: Emergent evaluation of SOB and CP in an LVAD pt. Pt has noted that she has had increased O2 requirement recently and is not feeling well. On exam she is hemodynamically stable. She has noted that her lvat pressures have been running higher. Plan for labs, chest imaging and cardiology evaluation.    As the supervising MD I agree with the above PE.    As the supervising MD I agree with the above treatment, course, plan, and disposition.  I have reviewed and agree with the residents interpretation of the following: EKG, x-rays and lab data.          Physician Attestation for Scribe:  Physician Attestation Statement for Scribe #1: I, Dr. Monahan, reviewed documentation, as scribed by Bora Renee in my presence, and it is both accurate and complete.         Attending ED Notes:   Labs concerning for volume overload. Evaluated by cardiology and will be admitted to their service.           ED Course     Clinical Impression:   The primary encounter diagnosis was Heart replaced by  heart assist device. A diagnosis of End stage renal disease on dialysis was also pertinent to this visit.    Disposition:   Disposition: Admitted  Condition: Ellyn Monahan MD  03/19/17 0557

## 2017-03-19 NOTE — ED NOTES
Patient identifiers verified and correct for Randa Vanlashell.    LOC: The patient is awake, alert and oriented x 4. Pt is speaking appropriately, no slurred speech.  APPEARANCE: Patient resting comfortably and in no acute distress. Pt is clean and well groomed. No JVD visible. Pt reports pain level of 0.  SKIN: Skin is warm dry and intact, and color is consistent with ethnicity. No tenting observed and capillary refill <3 seconds. No clubbing noted to nail beds. No breakdown or brusing visible and mucus membranes moist and acyanotic.  RESPIRATORY: Airway is open and patent. Respirations-unlabored, regular rate, equal bilaterally on inspiration and expiration. No accessory muscle use noted. Lungs clear to auscultation in all fields bilaterally anterior and posterior.   CARDIAC: Patient has regular heart rate and rhythm.  No peripheral edema noted, and patient has no c/o chest pain. Pt has all LVAD power equipment at bedside and states her batteries are adequately charged at present.  NEUROLOGIC: Eyes open spontaneously and facial expression symmetrical. Pt behavior appropriate to situation, and pt follows commands.  Pt reports sensation present in all extremities when touched with a finger. No s/s of ischemic stroke. CHERIE

## 2017-03-19 NOTE — NURSING TRANSFER
Nursing Transfer Note      3/19/2017     Transfer From: ED    Transfer via stretcher    Transfer with 2L to O2, cardiac monitoring    Transported by Patient Escort    Medicines sent: no    Chart send with patient: Yes    Notified: daughter    Patient reassessed at: 3/19/17 1600    Upon arrival to floor: cardiac monitor applied, patient oriented to room, call bell in reach and bed in lowest position, VSS, head to toe assessment preformed, admission completed, dialysis initiated.

## 2017-03-19 NOTE — CONSULTS
REASON FOR CONSULT: ESRD management    HISTORY OF PRESENT ILLNESS: 61 y.o. female with a history of  has a past medical history of Anticoagulant long-term use; Anxiety; Atrial tachycardia, paroxysmal (4/21/2013); Blood transfusion; Cardiomyopathy; CHF (congestive heart failure); Chronic kidney disease; Coronary artery disease; End stage renal disease; Hypertension; ICD (implantable cardioverter-defibrillator) battery depletion (4/25/2014); Myocardial infarction; Presence of left ventricular assist device (LVAD); Pulmonary hypertension; and Stroke. presents for had concerns including Shortness of Breath. on 3/19/2017. She is on maintainence IHD TTS at [facility] in [facility] presented here with short ness of breath . She missed her HD session because of genaralized weakness.             Review of Systems - General ROS: negative for - chills, fatigue, fever, night sweats, weight gain or weight loss  ENT ROS: positive for - epistaxis, headaches, hearing change, nasal congestion, nasal discharge, nasal polyps, oral lesions, sinus pain, sneezing, sore throat, tinnitus and vertigo  Hematological and Lymphatic ROS: negative for - bleeding problems, blood clots, bruising, fatigue, jaundice, night sweats or swollen lymph nodes  Endocrine ROS: negative for - breast changes, galactorrhea, hair pattern changes, hot flashes, mood swings, palpitations, polydipsia/polyuria, skin changes or temperature intolerance  Respiratory ROS: Positive shortness of breath,   negative for - hemoptysis, pleuritic pain, sputum changes or stridor  Cardiovascular ROS: no chest pain ,Positive for dyspnea on exertion  negative for - edema, irregular heartbeat, loss of consciousness, orthopnea, palpitations, paroxysmal nocturnal dyspnea or shortness of breath  Gastrointestinal ROS: no abdominal pain, change in bowel habits, or black or bloody stools  negative for - abdominal pain, appetite loss, change in bowel habits, change in stools, gas/bloating,  heartburn, nausea/vomiting, stool incontinence or swallowing difficulty/pain  Genito-Urinary ROS: no dysuria, trouble voiding, or hematuria  Musculoskeletal ROS: negative for - joint pain, joint stiffness, joint swelling, muscle pain or muscular weakness  Neurological ROS: no TIA or stroke symptoms  negative for - confusion, headaches, impaired coordination/balance, memory loss or visual changes  Dermatological ROS: negative for acne, dry skin, eczema, hair changes, lumps, mole changes and nail changes    PAST MEDICAL HISTORY:  Past Medical History:   Diagnosis Date    Anticoagulant long-term use     Anxiety     Atrial tachycardia, paroxysmal 2013    Blood transfusion     Cardiomyopathy     CHF (congestive heart failure)     Chronic kidney disease     Coronary artery disease     End stage renal disease     Hypertension     pulmonary    ICD (implantable cardioverter-defibrillator) battery depletion 2014    Myocardial infarction     Presence of left ventricular assist device (LVAD)     Pulmonary hypertension     Stroke        PAST SURGICAL HISTORY:  Past Surgical History:   Procedure Laterality Date    CARDIAC DEFIBRILLATOR PLACEMENT      CARDIAC SURGERY       SECTION      COLONOSCOPY N/A 2016    Procedure: COLONOSCOPY;  Surgeon: Mark Currie MD;  Location: [facility] ([address] [place] [address]);  Service: Endoscopy;  Laterality: N/A;    CORONARY ARTERY BYPASS GRAFT      FRACTURE SURGERY      HIP SURGERY      RTHA    LEFT VENTRICULAR ASSIST DEVICE  2012    TONSILLECTOMY      VASCULAR SURGERY         FAMILY HISTORY:   Family History   Problem Relation Age of Onset    Arthritis Mother     Heart disease Father     Hypertension Father     Cancer Maternal Grandmother     Breast cancer Neg Hx     Colon cancer Neg Hx     Ovarian cancer Neg Hx        SOCIAL HISTORY:  Social History     Social History    Marital status:      Spouse name: N/A    Number of children: N/A     Years of education: N/A     Occupational History    Not on file.     Social History Main Topics    Smoking status: Former Smoker     Packs/day: 2.00     Years: 30.00     Quit date: 10/29/2009    Smokeless tobacco: Not on file    Alcohol use Yes      Comment: 1 glass of wine a month    Drug use: No    Sexual activity: No     Other Topics Concern    Not on file     Social History Narrative       ALLERGIES:  Review of patient's allergies indicates:   Allergen Reactions    Codeine Nausea And Vomiting    Demerol [meperidine] Nausea And Vomiting    Lortab [hydrocodone-acetaminophen] Nausea And Vomiting       MEDICATIONS:Scheduled Meds:   sodium chloride 0.9%   Intravenous Once    amlodipine  10 mg Oral Daily    atorvastatin  40 mg Oral Daily    calcium carbonate  500 mg Oral QID    cetirizine  5 mg Oral Daily    escitalopram oxalate  20 mg Oral QHS    gabapentin  300 mg Oral QHS    hydrALAZINE  50 mg Oral Q8H    midodrine  10 mg Oral Once    pantoprazole  40 mg Oral Daily    warfarin  2 mg Oral Daily     Continuous Infusions:   PRN Meds:.sodium chloride 0.9%, acetaminophen    PHYSICAL EXAM:  /82  Pulse 80  Temp 98.9 °F (37.2 °C) (Oral)   Resp (!) 22  Ht 5' (1.524 m)  Wt 61.7 kg (136 lb)  LMP  (LMP Unknown)  SpO2 98%  BMI 26.56 kg/m2  General appearance: alert, appears older than stated age and cooperative  Head: Normocephalic, without obvious abnormality, atraumatic  Eyes: conjunctivae/corneas clear. PERRL, EOM's intact. Fundi benign.  Nose: Nares normal. Septum midline. Mucosa normal. No drainage or sinus tenderness.  Throat: lips, mucosa, and tongue normal; teeth and gums normal  Neck: no adenopathy, no carotid bruit, no JVD, supple, symmetrical, trachea midline and thyroid not enlarged, symmetric, no tenderness/mass/nodules  Lungs: clear to auscultation bilaterally  Heart: regular rate and rhythm, S1, S2 normal, no murmur, click, rub or gallop  Abdomen: soft, non-tender; bowel  sounds normal; no masses,  no organomegaly  Extremities: extremities normal, atraumatic, no cyanosis or edema  Pulses: 2+ and symmetric  Skin: Skin color, texture, turgor normal. No rashes or lesions  Neurologic: Grossly normal  HDVA : Left UE AVG; God thrill and bruit.    INPUT/OUTPUT     -----------------------------------         LABS:  Recent Results (from the past 24 hour(s))   CBC auto differential    Collection Time: 03/19/17  3:05 AM   Result Value Ref Range    WBC 6.94 3.90 - 12.70 K/uL    RBC 2.83 (L) 4.00 - 5.40 M/uL    Hemoglobin 9.2 (L) 12.0 - 16.0 g/dL    Hematocrit 28.5 (L) 37.0 - 48.5 %     (H) 82 - 98 fL    MCH 32.5 (H) 27.0 - 31.0 pg    MCHC 32.3 32.0 - 36.0 %    RDW 15.5 (H) 11.5 - 14.5 %    Platelets 126 (L) 150 - 350 K/uL    MPV 10.4 9.2 - 12.9 fL    Gran # 5.2 1.8 - 7.7 K/uL    Lymph # 1.0 1.0 - 4.8 K/uL    Mono # 0.6 0.3 - 1.0 K/uL    Eos # 0.1 0.0 - 0.5 K/uL    Baso # 0.03 0.00 - 0.20 K/uL    Gran% 75.4 (H) 38.0 - 73.0 %    Lymph% 14.3 (L) 18.0 - 48.0 %    Mono% 7.9 4.0 - 15.0 %    Eosinophil% 1.9 0.0 - 8.0 %    Basophil% 0.4 0.0 - 1.9 %    Differential Method Automated    Comprehensive metabolic panel    Collection Time: 03/19/17  3:05 AM   Result Value Ref Range    Sodium 140 136 - 145 mmol/L    Potassium 3.8 3.5 - 5.1 mmol/L    Chloride 95 95 - 110 mmol/L    CO2 28 23 - 29 mmol/L    Glucose 74 70 - 110 mg/dL    BUN, Bld 60 (H) 6 - 20 mg/dL    Creatinine 8.9 (H) 0.5 - 1.4 mg/dL    Calcium 9.7 8.7 - 10.5 mg/dL    Total Protein 7.5 6.0 - 8.4 g/dL    Albumin 3.5 3.5 - 5.2 g/dL    Total Bilirubin 0.7 0.1 - 1.0 mg/dL    Alkaline Phosphatase 153 (H) 55 - 135 U/L    AST 31 10 - 40 U/L    ALT 25 10 - 44 U/L    Anion Gap 17 (H) 8 - 16 mmol/L    eGFR if African American 5.1 (A) >60 mL/min/1.73 m^2    eGFR if non  4.4 (A) >60 mL/min/1.73 m^2   Protime-INR    Collection Time: 03/19/17  3:05 AM   Result Value Ref Range    Prothrombin Time 19.1 (H) 9.0 - 12.5 sec    INR 1.9 (H) 0.8  - 1.2   Troponin I    Collection Time: 03/19/17  3:05 AM   Result Value Ref Range    Troponin I 0.344 (H) 0.000 - 0.026 ng/mL   B-Type natriuretic peptide (BNP)    Collection Time: 03/19/17  3:05 AM   Result Value Ref Range    BNP 2025 (H) 0 - 99 pg/mL   Troponin I    Collection Time: 03/19/17  6:21 AM   Result Value Ref Range    Troponin I 0.356 (H) 0.000 - 0.026 ng/mL         ASSESSMENT:  Patient Active Problem List   Diagnosis    Heart replaced by heart assist device    End stage renal disease on dialysis    Chronic anticoagulation    Left ventricular assist device present    Femoral neck fracture    Anemia    Chronic combined systolic and diastolic heart failure    Fracture of lumbar spine without cord injury    TIA (transient ischemic attack)    ICD (implantable cardioverter-defibrillator), biventricular, in situ    Paroxysmal atrial fibrillation    Atrial tachycardia, paroxysmal    HIT (heparin-induced thrombocytopenia)    Gait disorder    Chronic LBP    Right leg numbness    Foot drop, bilateral    Physical deconditioning    Secondary hyperparathyroidism (of renal origin)    History of stroke without residual deficits    Syncope    Faintness    LVAD (left ventricular assist device) present    HBP (high blood pressure)    Nausea    Essential hypertension    Chronic low back pain    Cognitive impairment    Cellulitis    Fall    Erythema    Spontaneous hematoma of forearm    Hallucinations    Embolic stroke involving left posterior cerebral artery    Migraine with aura and without status migrainosus, not intractable    Cytotoxic cerebral edema    Acute ischemic vertebrobasilar artery thalamic stroke involving left-sided vessel    Cardiomegaly    Volume overload       PLAN:  1. Shortness of breath ; Likely secondary to volume over load, we will do HD today and  reassess her again.  2. MBD; We will obtain care plan.

## 2017-03-19 NOTE — PROGRESS NOTES
Dialysis machine and DI tanks set up at bedside. Machine and water checks done.  Acute dialysis began per orders via 15 gauge fistula needles to left upper arm graft without difficulty.

## 2017-03-19 NOTE — ED TRIAGE NOTES
Randa Devine, a 61 y.o. female presents to the ED complaining of left side chest pain and SOB that has increased since Thursday. Pt is an LVAD pt and coordinator is aware of pt being in the ED. Pt complaining of productive cough and weakness. Pt denies n/v/d, fever      Chief Complaint   Patient presents with    Shortness of Breath     short of breath and chest pain. LVAD patient     Review of patient's allergies indicates:   Allergen Reactions    Codeine Nausea And Vomiting    Demerol [meperidine] Nausea And Vomiting    Lortab [hydrocodone-acetaminophen] Nausea And Vomiting     Past Medical History:   Diagnosis Date    Anticoagulant long-term use     Anxiety     Atrial tachycardia, paroxysmal 4/21/2013    Blood transfusion     Cardiomyopathy     CHF (congestive heart failure)     Chronic kidney disease     Coronary artery disease     End stage renal disease     Hypertension     pulmonary    ICD (implantable cardioverter-defibrillator) battery depletion 4/25/2014    Myocardial infarction     Presence of left ventricular assist device (LVAD)     Pulmonary hypertension     Stroke

## 2017-03-19 NOTE — IP AVS SNAPSHOT
[facility]  [address]  [facility] [address]  Phone: [phone]           Patient Discharge Instructions     Our goal is to set you up for success. This packet includes information on your condition, medications, and your home care. It will help you to care for yourself so you don't get sicker and need to go back to the hospital.     Please ask your nurse if you have any questions.        There are many details to remember when preparing to leave the hospital. Here is what you will need to do:    1. Take your medicine. If you are prescribed medications, review your Medication List in the following pages. You may have new medications to  at the pharmacy and others that you'll need to stop taking. Review the instructions for how and when to take your medications. Talk with your doctor or nurses if you are unsure of what to do.     2. Go to your follow-up appointments. Specific follow-up information is listed in the following pages. Your may be contacted by a transition nurse or clinical provider about future appointments. Be sure we have all of the phone numbers to reach you, if needed. Please contact your provider's office if you are unable to make an appointment.     3. Watch for warning signs. Your doctor or nurse will give you detailed warning signs to watch for and when to call for assistance. These instructions may also include educational information about your condition. If you experience any of warning signs to your health, call your doctor.               [facility]  Unless otherwise directed by your provider, please contact [facility], our nurse care line that is available for 24/7 assistance.     [phone] (toll-free)    Registered nurses in the [facility] provide clinical advisement, health education, appointment booking, and other advisory services.                    ** Verify the list of medication(s) below is accurate and up  to date. Carry this with you in case of emergency. If your medications have changed, please notify your healthcare provider.             Medication List      CONTINUE taking these medications        Additional Info                      acetaminophen 325 MG tablet   Commonly known as:  TYLENOL   Refills:  0   Dose:  650 mg    Last time this was given:  650 mg on 3/21/2017  9:09 AM   Instructions:  Take 2 tablets (650 mg total) by mouth every 6 (six) hours as needed for Pain (mild pain).     Begin Date    AM    Noon    PM    Bedtime       amlodipine 10 MG tablet   Commonly known as:  NORVASC   Quantity:  30 tablet   Refills:  11   Dose:  10 mg    Last time this was given:  10 mg on 3/21/2017  8:32 AM   Instructions:  Take 1 tablet (10 mg total) by mouth once daily.     Begin Date    AM    Noon    PM    Bedtime       ANTACID CALCIUM ORAL   Refills:  0   Dose:  1 tablet    Last time this was given:  500 mg on 3/21/2017  5:59 AM   Instructions:  Take 1 tablet by mouth 4 (four) times daily.     Begin Date    AM    Noon    PM    Bedtime       atorvastatin 40 MG tablet   Commonly known as:  LIPITOR   Quantity:  90 tablet   Refills:  3   Dose:  40 mg    Last time this was given:  40 mg on 3/21/2017  8:32 AM   Instructions:  Take 1 tablet (40 mg total) by mouth once daily.     Begin Date    AM    Noon    PM    Bedtime       cetirizine 5 MG tablet   Commonly known as:  ZYRTEC   Quantity:  30 tablet   Refills:  0   Dose:  5 mg    Last time this was given:  5 mg on 3/21/2017  8:32 AM   Instructions:  Take 1 tablet (5 mg total) by mouth once daily.     Begin Date    AM    Noon    PM    Bedtime       CMPD PAIN MANAGEMENT COMPOUND OINTMNENT THREE   Quantity:  30 g   Refills:  3    Instructions:  Apply small amount to painful joints once or twice a day     Begin Date    AM    Noon    PM    Bedtime       escitalopram oxalate 20 MG tablet   Commonly known as:  LEXAPRO   Quantity:  90 tablet   Refills:  3   Dose:  20 mg    Last time  this was given:  20 mg on 3/20/2017  9:21 PM   Instructions:  Take 1 tablet (20 mg total) by mouth every evening.     Begin Date    AM    Noon    PM    Bedtime       gabapentin 300 MG capsule   Commonly known as:  NEURONTIN   Quantity:  120 capsule   Refills:  2   Dose:  300 mg    Last time this was given:  300 mg on 3/20/2017  9:21 PM   Instructions:  Take 1 capsule (300 mg total) by mouth every evening.     Begin Date    AM    Noon    PM    Bedtime       hydrALAZINE 50 MG tablet   Commonly known as:  APRESOLINE   Quantity:  90 tablet   Refills:  11   Dose:  50 mg    Last time this was given:  50 mg on 3/21/2017  5:59 AM   Instructions:  Take 1 tablet (50 mg total) by mouth every 8 (eight) hours.     Begin Date    AM    Noon    PM    Bedtime       ondansetron 4 MG tablet   Commonly known as:  ZOFRAN   Quantity:  30 tablet   Refills:  3   Dose:  4 mg    Instructions:  Take 1 tablet (4 mg total) by mouth every 8 (eight) hours as needed for Nausea.     Begin Date    AM    Noon    PM    Bedtime       pantoprazole 40 MG tablet   Commonly known as:  PROTONIX   Quantity:  60 tablet   Refills:  10   Dose:  40 mg    Last time this was given:  40 mg on 3/21/2017  8:32 AM   Instructions:  Take 1 tablet (40 mg total) by mouth once daily.     Begin Date    AM    Noon    PM    Bedtime       warfarin 2 MG tablet   Commonly known as:  COUMADIN   Quantity:  102 tablet   Refills:  3   Comments:    - Rx request was called in to Jaimee at [facility] ph. # [phone]    -    (90) day supply    - Dr. Charanjit Sawant    Last time this was given:  2 mg on 3/20/2017  4:49 PM   Instructions:  Current Dose ( 3 mg on Mon, Thu; 2 mg all other days) or as directed by coumadin clinic.     Begin Date    AM    Noon    PM    Bedtime                  Please bring to all follow up appointments:    1. A copy of your discharge instructions.  2. All medicines you are currently taking in their original bottles.  3. Identification and insurance  card.    Please arrive 15 minutes ahead of scheduled appointment time.    Please call 24 hours in advance if you must reschedule your appointment and/or time.        Your Scheduled Appointments     Mar 29, 2017 12:40 PM CDT   Consult with LUMA Renee aleyda - Neurology ([facility] )    [address]  [place] [address]   [phone]            Apr 05, 2017 12:30 PM CDT   Non-Fasting Lab with LAB, APPOINTMENT [place] [facility] ([facility])    [address] [facility] [address]   [phone]            Apr 05, 2017  1:30 PM CDT   VAD with Miller Retsrepo Jr., MD   [facility] ([facility] )    [address]  [place] [address]   [phone]            Apr 05, 2017  1:30 PM CDT   Nurse Visit with HEARTTRANSPLANT, LVADN   [facility] ([facility] )    [address]  [place] [address]   [phone]            May 25, 2017  8:00 AM CDT   Remote Interrogation with HOME MONITOR DEVICE CHECK, [facility] ([facility] )    [facility]  [place] [address]   [phone]                  Primary Diagnosis     Your primary diagnosis was:  Not on File      Admission Information     Date & Time Provider Department CSN    3/19/2017  2:37 AM Miller Restrepo Jr., MD [facility] [id number]      Care Providers     Provider Role Specialty Primary office phone    Miller Restrepo Jr., MD Attending Provider Cardiology [phone]    Tello Garcia MD Consulting Physician  Nephrology [phone]    Tosin Fajardo MD Consulting Physician  Cardiology [phone]    Charanjit Sawant MD Team Attending  Cardiology [phone]      Your Vitals Were     BP Pulse Temp Resp Height Weight    84/0 (BP Location: Right arm, Patient Position: Lying, BP Method: Doppler) 93 98.6 °F (37 °C) (Oral) 18 5' (1.524 m) 59.9 kg (132 lb 0.9 oz)    Last Period  SpO2 BMI          (LMP Unknown) 94% 25.79 kg/m2        Recent Lab Values        1/20/2012 3/29/2012                        5:20 AM  4:15 AM          A1C 6.4 (H) 6.4 (H)                     Allergies as of 3/21/2017        Reactions    Codeine Nausea And Vomiting    Demerol [Meperidine] Nausea And Vomiting    Lortab [Hydrocodone-acetaminophen] Nausea And Vomiting      Advance Directives     An advance directive is a document which, in the event you are no longer able to make decisions for yourself, tells your healthcare team what kind of treatment you do or do not want to receive, or who you would like to make those decisions for you.  If you do not currently have an advance directive, [facility] encourages you to create one.  For more information call:  [phone] ([phone]), [phone] ([phone]),  or log on to [email or web address].        Smoking Cessation     If you would like to quit smoking:   You may be eligible for free services if you are a [place] resident and started smoking cigarettes before September 1, 1988.  Call the Smoking Cessation Trust (SCT) toll free at (259) 481-0229 or (722) 677-3325.   Call 3-242-QUIT-NOW if you do not meet the above criteria.            Language Assistance Services     ATTENTION: Language assistance services are available, free of charge. Please call [phone].      ATENCIÓN: Si habla español, tiene a sanchez disposición servicios gratuitos de asistencia lingüística. Llame al [phone].     CHÚ Ý: N?u b?n nói Ti?ng Vi?t, có các d?ch v? h? tr? ngôn ng? mi?n phí dành cho b?n. G?i s? [phone].        Stroke Education              Heart Failure Education       Heart Failure: Being Active  You have a condition called heart failure. Being active doesnt mean that you have to wear yourself out. Even a little movement each day helps to strengthen your heart. If you cant get out to exercise, you can do simple stretching and strengthening exercises at home.  These are good ways to keep you well-conditioned and prevent you and your heart from becoming excessively weak.    Ideas to get you started  · Add a little movement to things you do now. Walk to mail letters. Park your car at the far end of the parking lot and walk to the store. Walk up a flight of stairs instead of taking the elevator.  · Choose activities you enjoy. You might walk, swim, or ride an exercise bike. Things like gardening and washing the car count, too. Other possibilities include: washing dishes, walking the dog, walking around the mall, and doing aerobic activities with friends.  · Join a group exercise program at a [facility], a senior center, or a community center. Or look into a hospital cardiac rehabilitation program. Ask your doctor if you qualify.  Tips to keep you going  · Get up and get dressed each day. Go to a coffee shop and read a newspaper or go somewhere that you'll be in the presence of other active people. Youll feel more like being active.  · Make a plan. Choose one or more activities that you enjoy and that you can easily do. Then plan to do at least one each day. You might write your plan on a calendar.  · Go with a friend or a group if you like company. This can help you feel supported and stay motivated, too.  · Plan social events that you enjoy. This will keep you mentally engaged as well as physically motivated to do things you find pleasure in.  For your safety  · Talk with your healthcare provider before starting an exercise program.  · Exercise indoors when its too hot or too cold outside, or when the air quality is poor. Try walking at a shopping mall.  · Wear socks and sturdy shoes to maintain your balance and prevent falls.  · Start slowly. Do a few minutes several times a day at first. Increase your time and speed little by little.  · Stop and rest whenever you feel tired or get short of breath.  · Dont push yourself on days when you dont feel well.  Date Last  Reviewed: 3/20/2016  © 6843-3269 [employer]. [address] [place] [address], [place] [address]. All rights reserved. This information is not intended as a substitute for professional medical care. Always follow your healthcare professional's instructions.              Heart Failure: Evaluating Your Heart  You have a condition called heart failure. To evaluate your condition, your doctor will examine you, ask questions, and do some tests. Along with looking for signs of heart failure, the doctor looks for any other health problems that may have led to heart failure. The results of your evaluation will help your doctor form a treatment plan.  Health history and physical exam  Your visit will start with a health history. Tell the doctor about any symptoms youve noticed and about all medicines you take. Then youll have a physical exam. This includes listening to your heartbeat and breathing. Youll also be checked for swelling (edema) in your legs and neck. When you have fluid buildup or fluid in the lungs, it may be called congestive heart failure.  Diagnosing heart failure     During an echocardiogram, sound waves bounce off the heart. These are converted into a picture on the screen.   The following may be done to help your doctor form a diagnosis:  · X-rays show the size and shape of your heart. These pictures can also show fluid in your lungs.  · An electrocardiogram (ECG or EKG) shows the pattern of your heartbeat. Small pads (electrodes) are placed on your chest, arms, and legs. Wires connect the pads to the ECG machine, which records your hearts electrical signals. This can give the doctor information about heart function.  · An echocardiogram uses ultrasound waves to show the structure and movement of your heart muscle. This shows how well the heart pumps. It also shows the thickness of the heart walls, and if the heart is enlarged. It is one of the most useful, non-invasive tests as it provides  information about the heart's general function. This helps your doctor make treatment decisions.  · Lab tests evaluate small amounts of blood or urine for signs of problems. A BNP lab test can help diagnose and evaluate heart failure. BNP stands for B-type natriuretic peptide. The ventricles secrete more BNP when heart failure worsens. Lab tests can also provide information about metabolic dysfunction or heart dysfunction.  Your treatment plan  Based on the results of your evaluation and tests, your doctor will develop a treatment plan. This plan is designed to relieve some of your heart failure symptoms and help make you more comfortable. Your treatment plan may include:  · Medicine to help your heart work better and improve your quality of life  · Changes in what you eat and drink to help prevent fluid from backing up in your body  · Daily monitoring of your weight and heart failure symptoms to see how well your treatment plan is working  · Exercise to help you stay healthy  · Help with quitting smoking  · Emotional and psychological support to help adjust to the changes  · Referrals to other specialists to make sure you are being treated comprehensively  Date Last Reviewed: 3/21/2016  © 5832-9523 [employer]. [address] [place] [address]. All rights reserved. This information is not intended as a substitute for professional medical care. Always follow your healthcare professional's instructions.              Heart Failure: Making Changes to Your Diet  You have a condition called heart failure. When you have heart failure, excess fluid is more likely to build up in your body because your heart isn't working well. This makes the heart work harder to pump blood. Fluid buildup causes symptoms such as shortness of breath and swelling (edema). This is often referred to as congestive heart failure or CHF. Controlling the amount of salt (sodium) you eat may help stop fluid from building up.  Your doctor may also tell you to reduce the amount of fluid you drink.  Reading food labels    Your healthcare provider will tell you how much sodium you can eat each day. Read food labels to keep track. Keep in mind that certain foods are high in salt. These include canned, frozen, and processed foods. Check the amount of sodium in each serving. Watch out for high-sodium ingredients. These include MSG (monosodium glutamate), baking soda, and sodium phosphate.   Eating less salt  Give yourself time to get used to eating less salt. It may take a little while. Here are some tips to help:  · Take the saltshaker off the table. Replace it with salt-free herb mixes and spices.  · Eat fresh or plain frozen vegetables. These have much less salt than canned vegetables.  · Choose low-sodium snacks like sodium-free pretzels, crackers, or air-popped popcorn.  · Dont add salt to your food when youre cooking. Instead, season your foods with pepper, lemon, garlic, or onion.  · When you eat out, ask that your food be cooked without added salt.  · Avoid eating fried foods as these often have a great deal of salt.  If youre told to limit fluids  You may need to limit how much fluid you have to help prevent swelling. This includes anything that is liquid at room temperature, such as ice cream and soup. If your doctor tells you to limit fluid, try these tips:  · Measure drinks in a measuring cup before you drink them. This will help you meet daily goals.  · Chill drinks to make them more refreshing.  · Suck on frozen lemon wedges to quench thirst.  · Only drink when youre thirsty.  · Chew sugarless gum or suck on hard candy to keep your mouth moist.  · Weigh yourself daily to know if your body's fluid content is rising.  My sodium goal  Your healthcare provider may give you a sodium goal to meet each day. This includes sodium found in food as well as salt that you add. My goal is to eat no more than ___________ mg of sodium per  day.     When to call your doctor  Call your doctor right away if you have any symptoms of worsening heart failure. These can include:  · Sudden weight gain  · Increased swelling of your legs or ankles  · Trouble breathing when youre resting or at night  · Increase in the number of pillows you have to sleep on  · Chest pain, pressure, discomfort, or pain in the jaw, neck, or back   Date Last Reviewed: 3/21/2016  © 9860-9244 [employer]. [address] [place] [address], [place] [address]. All rights reserved. This information is not intended as a substitute for professional medical care. Always follow your healthcare professional's instructions.              Heart Failure: Medicines to Help Your Heart    You have a condition called heart failure (also known as congestive heart failure, or CHF). Your doctor will likely prescribe medicines for heart failure and any underlying health problems you have. Most heart failure patients take one or more types of medicinen. Your healthcare provider will work to find the combination of medicines that works best for you.  Heart failure medicines  Here are the most common heart failure medicines:  · ACE inhibitors lower blood pressure and decrease strain on the heart. This makes it easier for the heart to pump. Angiotensin receptor blockers have similar effects. These are prescribed for some patients instead of ACE inhibitors.  · Beta-blockers relieve stress on the heart. They also improve symptoms. They may also improve the heart's pumping action over time.  · Diuretics (also called water pills) help rid your body of excess water. This can help rid your body of swelling (edema). Having less fluid to pump means your heart doesnt have to work as hard. Some diuretics make your body lose a mineral called potassium. Your doctor will tell you if you need to take supplements or eat more foods high in potassium.  · Digoxin helps your heart pump with more strength. This helps  your heart pump more blood with each beat. So, more oxygen-rich blood travels to the rest of the body.  · Aldosterone antagonists help alter hormones and decrease strain on the heart.  · Hydralazine and nitrates are two separate medicines used together to treat heart failure. They may come in one combination pill. They lower blood pressure and decrease how hard the heart has to pump.  Medicines for related conditions  Controlling other heart problems helps keep heart failure under control, too. Depending on other heart problems you have, medicines may be prescribed to:  · Lower blood pressure (antihypertensives).  · Lower cholesterol levels (statins).  · Prevent blood clots (anticoagulants or aspirin).  · Keep the heartbeat steady (antiarrhythmics).  Date Last Reviewed: 3/5/2016  © 3209-9786 [employer]. [address] [place] [address], [place] [address]. All rights reserved. This information is not intended as a substitute for professional medical care. Always follow your healthcare professional's instructions.              Heart Failure: Procedures That May Help    The heart is a muscle that pumps oxygen-rich blood to all parts of the body. When you have heart failure, the heart is not able to pump as well as it should. Blood and fluid may back up into the lungs (congestive heart failure), and some parts of the body dont get enough oxygen-rich blood to work normally. These problems lead to the symptoms of heart failure.     Certain procedures may help the heart pump better in some cases of heart failure. Some procedures are done to treat health problems that may have caused the heart failure such as coronary artery disease or heart rhythm problems. For more serious heart failure, other options are available.  Treating artery and valve problems  If you have coronary artery disease or valve disease, procedures may be done to improve blood flow. This helps the heart pump better, which can improve heart  failure symptoms. First, your doctor may do a cardiac catheterization to help detect clogged blood vessels or valve damage. During this procedure, a  thin tube (catheter) in inserted into a blood vessel and guided to the heart. There a dye is injected and a special type of X-ray (angiogram) is taken of the blood vessels. Procedures to open a blocked artery or fix damaged valves can also be done using catheterization.  · Angioplasty uses a balloon-tipped instrument at the end of the catheter. The balloon is inflated to widen the narrowed artery. In many cases, a stent is expanded to further support the narrowed artery. A stent is a metal mesh tube.  · Valve surgery repairs or replacement of faulty valves can also be done during catheterization so blood can flow properly through the chambers of the heart.  Bypass surgery is another option to help treat blocked arteries. It uses a healthy blood vessel from elsewhere in the body. The healthy blood vessel is attached above and below the blocked area so that blood can flow around the blocked artery.  Treating heart rhythm problems  A device may be placed in the chest to help a weak heart maintain a healthy, heartbeat so the heart can pump more effectively:  · Pacemaker. A pacemaker is an implanted device that regulates your heartbeat electronically. It monitors your heart's rhythm and generates a painless electric impulse that helps the heart beat in a regular rhythm. A pacemaker is programmed to meet your specific heart rhythm needs.  · Biventricular pacing/cardiac resynchronization therapy. A type of pacemaker that paces both pumping chambers of the heart at the same time to coordinate contractions and to improve the heart's function. Some people with heart failure are candidates for this therapy.  · Implantable cardioverter defibrillator. A device similar to a pacemaker that senses when the heart is beating too fast and delivers an electrical shock to convert the fast  rhythm to a normal rhythm. This can be a life saving device.  In severe cases  In more serious cases of heart failure when other treatments no longer work, other options may include:  · Ventricular assist devices (VADs). These are mechanical devices used to take over the pumping function for one or both of the heart's ventricles, or pumping chambers. A VAD may be necessary when heart failure progresses to the point that medicines and other treatments no longer help. In some cases, a VAD may be used as a bridge to transplant.  · Heart transplant. This is replacing the diseased heart with a healthy one from a donor. This is an option for a few people who are very sick. A heart transplant is very serious and not an option for all patients. Your doctor can tell you more.  Date Last Reviewed: 3/20/2016  © 0861-3038 [employer]. [address] [place] [address]. All rights reserved. This information is not intended as a substitute for professional medical care. Always follow your healthcare professional's instructions.              Heart Failure: Tracking Your Weight  You have a condition called heart failure. When you have heart failure, a sudden weight gain or a steady rise in weight is a warning sign that your body is retaining too much water and salt. This could mean your heart failure is getting worse. If left untreated, it can cause problems for your lungs and result in shortness of breath. Weighing yourself each day is the best way to know if youre retaining water. If your weight goes up quickly, call your doctor. You will be given instructions on how to get rid of the excess water. You will likely need medicines and to avoid salt. This will help your heart work better.  Call your doctor if you gain more than 2 pounds in 1 day, more than 5 pounds in 1 week, or whatever weight gain you were told to report by your doctor. This is often a sign of worsening heart failure and needs to be  evaluated and treated. Your doctor will tell you what to do next.   Tips for weighing yourself    · Weigh yourself at the same time each morning, wearing the same clothes. Weigh yourself after urinating and before eating.  · Use the same scale each day. Make sure the numbers are easy to read. Put the scale on a flat, hard surface -- not on a rug or carpet.  · Do not stop weighing yourself. If you forget one day, weigh again the next morning.  How to use your weight chart  · Keep your weight chart near the scale. Write your weight on the chart as soon as you get off the scale.  · Fill in the month and the start date on the chart. Then write down your weight each day. Your chart will look like this:    · If you miss a day, leave the space blank. Weigh yourself the next day and write your weight in the next space.  · Take your weight chart with you when you go to see your doctor.  Date Last Reviewed: 3/20/2016  © 0533-1801 [employer]. [address] [place] [address]. All rights reserved. This information is not intended as a substitute for professional medical care. Always follow your healthcare professional's instructions.              Heart Failure: Warning Signs of a Flare-Up  You have a condition called heart failure. Once you have heart failure, flare-ups can happen. Below are signs that can mean your heart failure is getting worse. If you notice any of these warning signs, call your healthcare provider.  Swelling    · Your feet, ankles, or lower legs get puffier.  · You notice skin changes on your lower legs.  · Your shoes feel too tight.  · Your clothes are tighter in the waist.  · You have trouble getting rings on or off your fingers.  Shortness of breath  · You have to breathe harder even when youre doing your normal activities or when youre resting.  · You are short of breath walking up stairs or even short distances.  · You wake up at night short of breath or coughing.  · You need  to use more pillows or sit up to sleep.  · You wake up tired or restless.  Other warning signs  · You feel weaker, dizzy, or more tired.  · You have chest pain or changes in your heartbeat.  · You have a cough that wont go away.  · You cant remember things or dont feel like eating.  Tracking your weight  Gaining weight is often the first warning sign that heart failure is getting worse. Gaining even a few pounds can be a sign that your body is retaining excess water and salt. Weighing yourself each day in the morning after you urinate and before you eat, is the best way to know if you're retaining water. Get a scale that is easy to read and make sure you wear the same clothes and use the same scale every time you weigh. Your healthcare provider will show you how to track your weight. Call your doctor if you gain more than 2 pounds in 1 day, 5 pounds in 1 week, or whatever weight gain you were told to report by your doctor. This is often a sign of worsening heart failure and needs to be evaluated and treated before it compromises your breathing. Your doctor will tell you what to do next.    Date Last Reviewed: 3/15/2016  © 3061-2672 [employer]. [address] [place] [address], [place] [address]. All rights reserved. This information is not intended as a substitute for professional medical care. Always follow your healthcare professional's instructions.              Coumadin Discharge Instructions                         Chronic [place] Disease Education              [facility] complies with applicable Federal civil rights laws and does not discriminate on the basis of race, color, national origin, age, disability, or sex.

## 2017-03-19 NOTE — H&P
"      Heart Failure and Transplant Service Admission Note  Attending Physician: Haydee Monahan MD  Reason for admission: volume overload     HPI:   61 y.o. woman with PMH of CAD s/p CABG, chronic combined CHF / ICM (EF 10-15%) s/p Heartware as DT (05/2012) and SJM CRT-D (DDR ), ESRD on HD via left arm AVF (TTS), Lower GIB secondary to cecal AVM s/p APC (04/2016), Paroxysmal AF, HTN, Embolic thalamic CVA (01/2017) with residual visual deficits who presents to the hospital due to c/o progressively worsening SOB and chest pain.    The patient reports she was feeling well until last Thursday after her HD. Post-HD she reports she felt congested and was having SOB aggravated with exertion. She went home and had multiple awakenings in the middle of the night due to SOB. On Friday, her SOB became progressively worse and now was associated with a left sided chest pain that she claims occurred intermittently lasting anywhere from 3 seconds to a few minutes, "pressure-like" without any aggravating or alleviating factors. She reports it occurred up to 10 times with the last episode prior to arrival to the ED.    In the ED, her VS showed that she was hypertensive (MAPs upper 80s), pulse in 80s, saturating 98% on 4LNC. She uses baseline 2LNC at home and reports increasing her oxygen to 4L improved her symptoms partially. She missed her last HD appointment due to extensive weakness and symptoms which brought her over to the hospital. Her EKG revealed SR @ 87, A sense BiV paced with QRS of 146 (up from 122). Troponin was found to be 0.344 (up from baselines running in 0.03) and BNP was 2025 (up from baseline of 764), INR 1.9. CXR unchanged from prior, showing stable bibasilar edema, cardiomegaly.    She denies smoking, alcohol or recreational drugs. Endorses compliance with her medications and low salt diet, however does not weigh herself daily because she says her scale has broken for a "long time."  ROS:  "   Constitution: Negative for fever, chills, weight loss or gain.   HENT: Negative for sore throat, rhinorrhea, or headache.  Eyes: Negative for blurred or double vision.   Cardiovascular: See above  Pulmonary: Positive for SOB   Gastrointestinal: Negative for abdominal pain, nausea, vomiting, or diarrhea.   : Negative for dysuria.   Neurological: Negative for focal weakness or sensory changes.  PMH:     Past Medical History:   Diagnosis Date    Anticoagulant long-term use     Anxiety     Atrial tachycardia, paroxysmal 2013    Blood transfusion     Cardiomyopathy     CHF (congestive heart failure)     Chronic kidney disease     Coronary artery disease     End stage renal disease     Hypertension     pulmonary    ICD (implantable cardioverter-defibrillator) battery depletion 2014    Myocardial infarction     Presence of left ventricular assist device (LVAD)     Pulmonary hypertension     Stroke      Past Surgical History:   Procedure Laterality Date    CARDIAC DEFIBRILLATOR PLACEMENT      CARDIAC SURGERY       SECTION      COLONOSCOPY N/A 2016    Procedure: COLONOSCOPY;  Surgeon: Mark Currie MD;  Location: [address]);  Service: Endoscopy;  Laterality: N/A;    CORONARY ARTERY BYPASS GRAFT      FRACTURE SURGERY      HIP SURGERY      RTHA    LEFT VENTRICULAR ASSIST DEVICE  2012    TONSILLECTOMY      VASCULAR SURGERY       Allergies:     Review of patient's allergies indicates:   Allergen Reactions    Codeine Nausea And Vomiting    Demerol [meperidine] Nausea And Vomiting    Lortab [hydrocodone-acetaminophen] Nausea And Vomiting     Medications:     No current facility-administered medications on file prior to encounter.      Current Outpatient Prescriptions on File Prior to Encounter   Medication Sig Dispense Refill    acetaminophen (TYLENOL) 325 MG tablet Take 2 tablets (650 mg total) by mouth every 6 (six) hours as needed for Pain (mild pain).   0    amlodipine (NORVASC) 10 MG tablet Take 1 tablet (10 mg total) by mouth once daily. 30 tablet 11    atorvastatin (LIPITOR) 40 MG tablet Take 1 tablet (40 mg total) by mouth once daily. 90 tablet 3    CALCIUM CARBONATE (ANTACID CALCIUM ORAL) Take 1 tablet by mouth 4 (four) times daily.       cetirizine (ZYRTEC) 5 MG tablet Take 1 tablet (5 mg total) by mouth once daily. 30 tablet 0    escitalopram oxalate (LEXAPRO) 20 MG tablet Take 1 tablet (20 mg total) by mouth every evening. 90 tablet 3    GABAPENTIN (CMPD PAIN MANAGEMENT COMPOUND OINTMNENT THREE) Apply small amount to painful joints once or twice a day 30 g 3    gabapentin (NEURONTIN) 300 MG capsule Take 1 capsule (300 mg total) by mouth every evening. 120 capsule 2    hydrALAZINE (APRESOLINE) 50 MG tablet Take 1 tablet (50 mg total) by mouth every 8 (eight) hours. 90 tablet 11    ondansetron (ZOFRAN) 4 MG tablet Take 1 tablet (4 mg total) by mouth every 8 (eight) hours as needed for Nausea. 30 tablet 3    pantoprazole (PROTONIX) 40 MG tablet Take 1 tablet (40 mg total) by mouth once daily. 60 tablet 10    warfarin (COUMADIN) 2 MG tablet Current Dose ( 3 mg on Mon, Thu; 2 mg all other days) or as directed by coumadin clinic. 102 tablet 3       Inpatient Medications   Continuous Infusions:   Scheduled Meds:   amlodipine  10 mg Oral Daily    atorvastatin  40 mg Oral Daily    calcium carbonate  500 mg Oral QID    cetirizine  5 mg Oral Daily    escitalopram oxalate  20 mg Oral QHS    gabapentin  300 mg Oral QHS    hydrALAZINE  50 mg Oral Q8H    pantoprazole  40 mg Oral Daily    warfarin  2 mg Oral Daily     PRN Meds:acetaminophen     Social History:     Social History   Substance Use Topics    Smoking status: Former Smoker     Packs/day: 2.00     Years: 30.00     Quit date: 10/29/2009    Smokeless tobacco: Not on file    Alcohol use Yes      Comment: 1 glass of wine a month     Family History:     Family History   Problem Relation Age of  Onset    Arthritis Mother     Heart disease Father     Hypertension Father     Cancer Maternal Grandmother     Breast cancer Neg Hx     Colon cancer Neg Hx     Ovarian cancer Neg Hx      Physical Exam:     Vitals:  Temp:  [98.5 °F (36.9 °C)-98.9 °F (37.2 °C)]   Pulse:  [75-87]   Resp:  [22]   BP: ()/(69-76)   SpO2:  [98 %-99 %]     BP 96/72  Pulse 75  Temp 98.9 °F (37.2 °C) (Oral)   Resp (!) 22  Ht 5' (1.524 m)  Wt 61.7 kg (136 lb)  LMP  (LMP Unknown)  SpO2 98%  BMI 26.56 kg/m2  I/O's:  No intake or output data in the 24 hours ending 03/19/17 0626    Wt Readings from Last 10 Encounters:   03/19/17 61.7 kg (136 lb)   03/08/17 62.1 kg (137 lb)   02/23/17 62 kg (136 lb 11 oz)   02/06/17 62.1 kg (136 lb 14.5 oz)   01/30/17 62.5 kg (137 lb 12.6 oz)   01/19/17 61.3 kg (135 lb 2.3 oz)   01/11/17 61.7 kg (136 lb)   11/16/16 60.8 kg (134 lb)   09/21/16 59 kg (130 lb)   06/20/16 60.7 kg (133 lb 13.1 oz)        Constitutional: NAD, conversant  HEENT: Sclera anicteric, PERRLA, EOMI  Neck: Unable to visualize JVD, no carotid bruits  CV: RRR, + VAD hum, normal S1/S2, No Pericardial rub  Pulm: Bibasilar crackles  GI: Abdomen soft, NTND, +BS  Extremities: 1+ LE edema, warm and well perfused, No cyanosis, No clubbing  Skin: No ecchymosis, erythema, or ulcers  Psych: AOx3, appropriate affect      Labs:       Recent Labs  Lab 03/19/17  0305      K 3.8   CL 95   CO2 28   BUN 60*   CREATININE 8.9*   ANIONGAP 17*       Recent Labs  Lab 03/19/17  0305   AST 31   ALT 25   ALKPHOS 153*   BILITOT 0.7   ALBUMIN 3.5       Recent Labs  Lab 03/19/17  0305   TROPONINI 0.344*   BNP 2025*     No results for input(s): PH, PCO2, PO2, HCO3, POCSATURATED in the last 168 hours.   Recent Labs  Lab 03/19/17  0305   WBC 6.94   HGB 9.2*   HCT 28.5*   *   GRAN 75.4*  5.2       Recent Labs  Lab 03/19/17  0305   INR 1.9*     Lab Results   Component Value Date    CHOL 189 11/16/2016    HDL 46 11/16/2016    LDLCALC 121.8  11/16/2016    TRIG 106 11/16/2016     Lab Results   Component Value Date    HGBA1C 6.4 (H) 03/29/2012        Micro:  Blood Cultures  Lab Results   Component Value Date    LABBLOO No growth after 5 days. 05/30/2016    LABBLOO No growth after 5 days. 05/30/2016    LABBLOO No growth after 5 days. 05/22/2016    LABBLOO No growth after 5 days. 05/22/2016    LABBLOO No growth after 5 days. 12/10/2015     Urine Cultures  Lab Results   Component Value Date    LABURIN No significant growth 10/17/2015    LABURIN NO SIGNIFICANT GROWTH 05/08/2012       Imaging:   CXR:   LVAD is in place, unchanged when compared to the previous exam.  Cardiac pacemaker/ICD leads in stable position.  Cardiac silhouette is enlarged.  There are postsurgical changes of previous CABG.  Lung volumes are symmetric.  Mild reticular opacities noted within the lung bases, possibly face edema.  No focal consolidation.  No definite pleural effusions.  No pneumothorax.  No free air beneath the diaphragm.    TTE: to be done on Monday    TTE (02/01/17):  1 - HeartWare LVAD; speed 2600; septum midline; aortic valve opens intermittenly.     2 - Eccentric LVH with severely depressed left ventricular systolic function (EF 10-15%).     3 - Mildly depressed right ventricular systolic function .       EF   Date Value Ref Range Status   02/01/2017 10 (A) 55 - 65    01/11/2017 15 (A) 55 - 65    05/24/2016 15 (A) 55 - 65    06/08/2015 20 (A) 55 - 65    11/04/2014 10 (A) 55 - 65        Assessment:   Ms. Randa Devine is a 61 y.o. woman with PMH of CAD s/p CABG, chronic combined CHF / ICM (EF 10-15%) s/p Heartware as DT (05/2012) and SJM CRT-D (DDR ), ESRD on HD via left arm AVF (TTS), Lower GIB secondary to cecal AVM s/p APC (04/2016), Paroxysmal AF, HTN, Embolic thalamic CVA (01/2017) with residual visual deficits who presents to the hospital due to c/o progressively worsening SOB and chest pain.    Plan:   SOB/CP secondary to acute decompensated heart failure /  volume overload / uncontrolled HTN  - resume home meds - amlodipine and hydralazine to be given now  - nephrology evaluation for HD today - spoke with the team  - monitor closely - keep MAPs b/w 60-80  - check repeat troponin to r/o acute elevation; troponinemia expected with heart failure and ESRD    Chronic combined CHF/ ICM s/p LVAD Heartware as DT  - c/w warfarin at 2 mg QD  - INR borderline subtherapeutic at 1.9  - check INR daily to keep goal 2-3  - check LDH daily    ESRD on HD (T/T/S)  - Nephrology evaluation for HD    Hx of GIB 2/2 cecal AVM s/p APC  - monitor CBC    Paroxysmal AF ([id number] - 6)  - c/w warfarin    Thalamic CVA - embolic  - c/w lipitor; no ASA due to risk of GIB  - needs to have stroke rehab once discharged, still has no started    GI PPx: Protonix  DVT PPx: On therapeutic anticoagulation with warfarin    Case discussed with Dr. Gross.    Carline Glass MD  Cardiology Fellow  Pager: [phone]  3/19/2017 6:49 AM

## 2017-03-20 LAB
ALBUMIN SERPL BCP-MCNC: 3.7 G/DL
ALP SERPL-CCNC: 157 U/L
ALT SERPL W/O P-5'-P-CCNC: 23 U/L
ANION GAP SERPL CALC-SCNC: 11 MMOL/L
AORTIC VALVE REGURGITATION: ABNORMAL
AST SERPL-CCNC: 28 U/L
BASOPHILS # BLD AUTO: 0.04 K/UL
BASOPHILS NFR BLD: 0.7 %
BILIRUB SERPL-MCNC: 0.7 MG/DL
BUN SERPL-MCNC: 32 MG/DL
CALCIUM SERPL-MCNC: 9.7 MG/DL
CHLORIDE SERPL-SCNC: 107 MMOL/L
CO2 SERPL-SCNC: 22 MMOL/L
CREAT SERPL-MCNC: 5.8 MG/DL
CRP SERPL-MCNC: 12.2 MG/L
DIASTOLIC DYSFUNCTION: YES
DIFFERENTIAL METHOD: ABNORMAL
EOSINOPHIL # BLD AUTO: 0.1 K/UL
EOSINOPHIL NFR BLD: 2.3 %
ERYTHROCYTE [DISTWIDTH] IN BLOOD BY AUTOMATED COUNT: 15.5 %
EST. GFR  (AFRICAN AMERICAN): 8.5 ML/MIN/1.73 M^2
EST. GFR  (NON AFRICAN AMERICAN): 7.4 ML/MIN/1.73 M^2
ESTIMATED PA SYSTOLIC PRESSURE: 29
GLUCOSE SERPL-MCNC: 93 MG/DL
HCT VFR BLD AUTO: 29.2 %
HGB BLD-MCNC: 9.4 G/DL
INR PPP: 2.2
LDH SERPL L TO P-CCNC: 215 U/L
LYMPHOCYTES # BLD AUTO: 0.6 K/UL
LYMPHOCYTES NFR BLD: 9.9 %
MAGNESIUM SERPL-MCNC: 2.2 MG/DL
MCH RBC QN AUTO: 33.1 PG
MCHC RBC AUTO-ENTMCNC: 32.2 %
MCV RBC AUTO: 103 FL
MITRAL VALVE REGURGITATION: ABNORMAL
MONOCYTES # BLD AUTO: 0.4 K/UL
MONOCYTES NFR BLD: 7.2 %
NEUTROPHILS # BLD AUTO: 4.9 K/UL
NEUTROPHILS NFR BLD: 79.7 %
PHOSPHATE SERPL-MCNC: 2.8 MG/DL
PLATELET # BLD AUTO: 140 K/UL
PMV BLD AUTO: 10.1 FL
POTASSIUM SERPL-SCNC: 4.8 MMOL/L
PROT SERPL-MCNC: 7.6 G/DL
PROTHROMBIN TIME: 21.9 SEC
RBC # BLD AUTO: 2.84 M/UL
RETIRED EF AND QEF - SEE NOTES: 10 (ref 55–65)
SODIUM SERPL-SCNC: 140 MMOL/L
TRICUSPID VALVE REGURGITATION: ABNORMAL
WBC # BLD AUTO: 6.09 K/UL

## 2017-03-20 PROCEDURE — 93284 PRGRMG EVAL IMPLANTABLE DFB: CPT | Mod: 26,,, | Performed by: INTERNAL MEDICINE

## 2017-03-20 PROCEDURE — 80100020 *HC HEMODIALYSIS 1:1 - ARF

## 2017-03-20 PROCEDURE — 86140 C-REACTIVE PROTEIN: CPT

## 2017-03-20 PROCEDURE — 63600175 PHARM REV CODE 636 W HCPCS: Performed by: PHYSICIAN ASSISTANT

## 2017-03-20 PROCEDURE — 93284 PRGRMG EVAL IMPLANTABLE DFB: CPT

## 2017-03-20 PROCEDURE — 80100014 HC HEMODIALYSIS 1:1

## 2017-03-20 PROCEDURE — 20600001 HC STEP DOWN PRIVATE ROOM

## 2017-03-20 PROCEDURE — 99233 SBSQ HOSP IP/OBS HIGH 50: CPT | Mod: ,,, | Performed by: INTERNAL MEDICINE

## 2017-03-20 PROCEDURE — 25000003 PHARM REV CODE 250: Performed by: STUDENT IN AN ORGANIZED HEALTH CARE EDUCATION/TRAINING PROGRAM

## 2017-03-20 PROCEDURE — 85025 COMPLETE CBC W/AUTO DIFF WBC: CPT

## 2017-03-20 PROCEDURE — 84100 ASSAY OF PHOSPHORUS: CPT

## 2017-03-20 PROCEDURE — 80053 COMPREHEN METABOLIC PANEL: CPT

## 2017-03-20 PROCEDURE — 93306 TTE W/DOPPLER COMPLETE: CPT

## 2017-03-20 PROCEDURE — 83735 ASSAY OF MAGNESIUM: CPT

## 2017-03-20 PROCEDURE — 25000003 PHARM REV CODE 250: Performed by: INTERNAL MEDICINE

## 2017-03-20 PROCEDURE — 83615 LACTATE (LD) (LDH) ENZYME: CPT

## 2017-03-20 PROCEDURE — 85610 PROTHROMBIN TIME: CPT

## 2017-03-20 PROCEDURE — 36415 COLL VENOUS BLD VENIPUNCTURE: CPT

## 2017-03-20 PROCEDURE — 93306 TTE W/DOPPLER COMPLETE: CPT | Mod: 26,,, | Performed by: INTERNAL MEDICINE

## 2017-03-20 RX ORDER — HYDRALAZINE HYDROCHLORIDE 20 MG/ML
10 INJECTION INTRAMUSCULAR; INTRAVENOUS ONCE
Status: COMPLETED | OUTPATIENT
Start: 2017-03-20 | End: 2017-03-20

## 2017-03-20 RX ORDER — SODIUM CHLORIDE 9 MG/ML
INJECTION, SOLUTION INTRAVENOUS ONCE
Status: COMPLETED | OUTPATIENT
Start: 2017-03-20 | End: 2017-03-20

## 2017-03-20 RX ORDER — SODIUM CHLORIDE 9 MG/ML
INJECTION, SOLUTION INTRAVENOUS
Status: DISCONTINUED | OUTPATIENT
Start: 2017-03-20 | End: 2017-03-21 | Stop reason: HOSPADM

## 2017-03-20 RX ADMIN — CALCIUM CARBONATE (ANTACID) CHEW TAB 500 MG 500 MG: 500 CHEW TAB at 04:03

## 2017-03-20 RX ADMIN — SODIUM CHLORIDE 300 ML: 0.9 INJECTION, SOLUTION INTRAVENOUS at 05:03

## 2017-03-20 RX ADMIN — ESCITALOPRAM 20 MG: 5 TABLET, FILM COATED ORAL at 09:03

## 2017-03-20 RX ADMIN — PANTOPRAZOLE SODIUM 40 MG: 40 TABLET, DELAYED RELEASE ORAL at 08:03

## 2017-03-20 RX ADMIN — ACETAMINOPHEN 650 MG: 325 TABLET ORAL at 03:03

## 2017-03-20 RX ADMIN — HYDRALAZINE HYDROCHLORIDE 50 MG: 50 TABLET ORAL at 09:03

## 2017-03-20 RX ADMIN — ATORVASTATIN CALCIUM 40 MG: 20 TABLET, FILM COATED ORAL at 08:03

## 2017-03-20 RX ADMIN — HYDRALAZINE HYDROCHLORIDE 10 MG: 20 INJECTION INTRAMUSCULAR; INTRAVENOUS at 03:03

## 2017-03-20 RX ADMIN — HYDRALAZINE HYDROCHLORIDE 50 MG: 50 TABLET ORAL at 01:03

## 2017-03-20 RX ADMIN — ACETAMINOPHEN 650 MG: 325 TABLET ORAL at 04:03

## 2017-03-20 RX ADMIN — AMLODIPINE BESYLATE 10 MG: 10 TABLET ORAL at 08:03

## 2017-03-20 RX ADMIN — GABAPENTIN 300 MG: 300 CAPSULE ORAL at 09:03

## 2017-03-20 RX ADMIN — CETIRIZINE HYDROCHLORIDE 5 MG: 5 TABLET, FILM COATED ORAL at 08:03

## 2017-03-20 RX ADMIN — WARFARIN SODIUM 2 MG: 2 TABLET ORAL at 04:03

## 2017-03-20 RX ADMIN — CALCIUM CARBONATE (ANTACID) CHEW TAB 500 MG 500 MG: 500 CHEW TAB at 05:03

## 2017-03-20 RX ADMIN — HYDRALAZINE HYDROCHLORIDE 50 MG: 50 TABLET ORAL at 05:03

## 2017-03-20 NOTE — PROGRESS NOTES
Progress Note  Nephrology    Admit Date: 3/19/2017   LOS: 1 day     SUBJECTIVE:     Follow-up For: ESRD on HD    Interval follow up: NAEON. Patient received HD yesterday with 2L UF. Patient refers mild SOB but much more improvement as compared with yesterday. Patient excited about possibly been discharge today.     OBJECTIVE:     Vital Signs (Most Recent)  Temp: 98.3 °F (36.8 °C) (03/20/17 1140)  Pulse: 87 (03/20/17 1300)  Resp: 16 (03/20/17 1140)  BP: (!) 78/0 (03/20/17 1141)  SpO2: 98 % (03/20/17 1140)    Vital Signs Range (Last 24H):  Temp:  [97.3 °F (36.3 °C)-98.3 °F (36.8 °C)]   Pulse:  [73-92]   Resp:  [16-20]   BP: ()/(0-87)   SpO2:  [96 %-98 %]        Physical Exam:   General: No acute distress, well groomed, alert and oriented x 3  HEENT: Normocephalic, atraumatic, external inspection of ears and nose normal, moist mucous membranes  Neck: Supple, symmetrical, trachea midline, no thyromegaly  Respiratory: Mild crackles, respirations unlabored, no wheezing  Cardiovacular: +VAD hum  Gastrointestinal: Soft, non-tender, bowel sounds normal, no hepatosplenomegaly  Musculoskeletal: No knee or ankle joint tenderness or swelling.   Extremities: No clubbing or cyanosis of bilateral upper extremities; trace lower extremity edema bilaterally, radial pulses 2+ bilaterally, symmetric  Skin: warm and dry; no rash on exposed skin    Laboratory:  CBC:   Recent Labs  Lab 03/20/17  0502   WBC 6.09   RBC 2.84*   HGB 9.4*   HCT 29.2*   *   *   MCH 33.1*   MCHC 32.2     CMP:   Recent Labs  Lab 03/20/17  0502   GLU 93   CALCIUM 9.7   ALBUMIN 3.7   PROT 7.6      K 4.8   CO2 22*      BUN 32*   CREATININE 5.8*   ALKPHOS 157*   ALT 23   AST 28   BILITOT 0.7       Diagnostic Results:  Labs: Reviewed  X-Ray: Reviewed    ASSESSMENT/PLAN:     Active Hospital Problems    Diagnosis  POA    *Heart replaced by heart assist device [Z95.811]  Not Applicable    Volume overload [E87.70]  Unknown      Resolved  Hospital Problems    Diagnosis Date Resolved POA   No resolved problems to display.     61 y.o. woman with PMH of CAD s/p CABG, chronic combined CHF / ICM (EF 10-15%) s/p Heartware as DT (05/2012) and SJM CRT-D (DDR ), ESRD on HD via left arm AVF (TTS), Lower GIB secondary to cecal AVM s/p APC (04/2016), Paroxysmal AF, HTN, Embolic thalamic CVA (01/2017) with residual visual deficits who presents to the hospital due to c/o progressively worsening SOB and chest pain. We are consulted for backup HD. Had 1 HD treatment done 3/19 with 2L UF.      ESRD on IHD  TTS   - Still referring mild SOB but much more improved  - Will provide dialysis for metabolic clearance and volume   - Target ultrafiltration up to 2-3L as tolerated by patient   - Dialysate adjusted to current labs   - If patient stable from primary team stand point could be discharge with next treatment for tomorrow on outpatient clinic    Keron Rubio   Nephrology fellow PGY- 5  [phone]           I have reviewed and concur with the fellow's history, physical, assessment, and plan. I have personally interviewed and examined the patient at bedside.

## 2017-03-20 NOTE — PROGRESS NOTES
Notified Dr. Carrillo of headache improvement to 5/10. MD states he will come to bedside and probably order head CT. Will continue to monitor.

## 2017-03-20 NOTE — PROGRESS NOTES
Admit Note     Met with patient to assess needs. Patient is a 61 y.o.  female, admitted for volume overload, SOB..  PMH of LVAD 5-2012 and  stroke.     Patient admitted from emergency on 3/19/2017 .  At this time, patient presents as alert and oriented x 4, pleasant and good eye contact.  At this time, patients caregiver is not in attendance.    Household/Family Systems     Patient resides with patient's son, at     [address]  [place] [address].      Support system includes daughter and ex-.  Pt did not provide information about her son.     Patient does not have dependents that are need of being cared for.     Patients primary caregiver is self.  The pt receives support from her daughter, Ale who lives a few minutes away from pt.  Ale: cell: [phone]  Pt's Home:  [phone]  Pt's cell:  [phone]    During admission, patient's caregiver plans to stay at home.  Confirmed patient and patients caregivers do have access to reliable transportation.    Cognitive Status/Learning     Patient reports reading ability as 12th grade and states patient does not have difficulty with reading, writing, hearing, comprehension and learning. Pt reports issues with her eyesight and long and short term memory.   Patient reports patient learns best by one on one.   Needed: No.   Highest education level: High School (9-12) or GED    Vocation/Disability   .  Working for Income: No  If no, reason not working: Patient Choice - Retired  Patient is retired from working as a nurse and .    Adherence     Patient reports a medium level of adherence to patients health care regimen. Pt reports her daughter helps to set up daily medication, pt reports she tries to remember to take her medication, but she sometimes forgets.  The pt does not always follow her diet.      Adherence counseling and education provided. Patient verbalizes understanding.    Substance  Use    Patient reports the following substance usage.    Tobacco: none, patient denies any use.  Alcohol: Pt reports she does drink wine..  Illicit Drugs/Non-prescribed Medications: none, patient denies any use.  Patient states clear understanding of the potential impact of substance use.  Substance abstinence/cessation counseling, education and resources provided and reviewed.     Services Utilizing/ADLS    Infusion Service: Prior to admission, patient utilizing? no  Home Health: Prior to admission, patient utilizing? yes [phone], fax [phone]. Pt receives nursing 2x a week and needs to resume PT and OT  due to stroke.  The pt would like to use [facility].    DME: Prior to admission, yes Pt  has an LVAD, daughter assists with dressing changes.  Pt has a Rollator, Walker, wheelchair, home 02 set up with portables [address] [employer] 2-4 LPM, BSC and cane.   Pulmonary/Cardiac Rehab: Prior to admission, no  Dialysis:  Prior to admission, yes Pt goes to [facility] on [address] on ,  and .  Transplant Specialty Pharmacy:  Prior to admission, no.    Prior to admission, patient reports patient was mostly  independent with ADLS and was not driving. Pt's daughter does all the driving.   Patient reports patient is able to care for self at this time..  Patient indicates a willingness to care for self once medically cleared to do so.    Insurance/Medications    Insured by   Payor/Plan Subscr  Sex Relation Sub. Ins. ID Effective Group Num   1. MEDICARE - MEMAGNO DAVIES 1955 Female  [id number] 10/1/13                                    [address]   2. [employer] CROSS * MAGNO JOLLY 1955 Female  [id number] 4/1/15 [id number]                                   [address] [address]      Primary Insurance (for UNOS reporting): Public Insurance - Medicare FFS (Fee For Service)  Secondary Insurance (for UNOS reporting): Private Insurance    Patient reports patient is able to obtain and afford medications at  this time and at time of discharge.  Pt reports her daughter will cover the cost of medications not covered by insurance.      Living Will/Healthcare Power of     Patient states patient has a LW and/or HCPA.   Pt reports he daughter is the HCPA.   provided education regarding LW and HCPA and the completion of forms.    Coping/Mental Health    Patient is coping adequately with the aid of  family members. .  Patient denies mental health difficulties.       Discharge Planning    At time of discharge, patient plans to return to patient's home under the care of self and daughter.  Patients ex  will transport patient.  Per rounds today, expected discharge date is today per pt. Patient and patients caregiver  verbalize understanding and are involved in treatment planning and discharge process.    Additional Concerns    Patient is being followed for needs, education, resources, information, emotional support, supportive counseling, and for supportive and skilled discharge plan of care.  providing ongoing psychosocial support, education, resources and d/c planning as needed.  SW remains available. Patient denies additional needs and/or concerns at this time. Patient verbalizes understanding and agreement with information reviewed, social work availability, and how to access available resources as needed.

## 2017-03-20 NOTE — PROGRESS NOTES
Patient had approximately 3-4 minute run of VTach.  Patient is receiving dialysis at the bedside at this time.  On assessment, patient denied any lightheadedness/dizziness/palpations, and was resting comfortably.  BP 80-90s/60s and patient has no complaints at this time.  Notified Dr. Najera who arrived at bedside.  No orders received at this time.  Will continue to monitor.

## 2017-03-20 NOTE — PROGRESS NOTES
Reassessed pt, headache resolving 2/10. Neuro assessment WNL. Ambulates without problems. Will continue to monitor.

## 2017-03-20 NOTE — PROGRESS NOTES
"Dialysis completed. Needles  removed from left upper arm graft with pressure held to needle sites for 10 minutes each with hemostasis achieved. Gauze and tape to sites. Patient dialyzed for 3 hours with fluid removal of 2 liters . Tolerated well with stable vital signs. Patient states ," I can breathe a lot better now.". Report given to primary nurse FARAZ Antoine.   "

## 2017-03-20 NOTE — PLAN OF CARE
[facility]   Heart Transplant/VAD Clinic   [address] [place] [address]   [phone] after hours          [phone] fax     VAD HOME  HEALTH ORDERS      Admit to Home Health    Diagnosis:   Patient Active Problem List   Diagnosis    Heart replaced by heart assist device    End stage renal disease on dialysis    Chronic anticoagulation    Left ventricular assist device present    Femoral neck fracture    Anemia    Chronic combined systolic and diastolic heart failure    Fracture of lumbar spine without cord injury    TIA (transient ischemic attack)    ICD (implantable cardioverter-defibrillator), biventricular, in situ    Paroxysmal atrial fibrillation    Atrial tachycardia, paroxysmal    HIT (heparin-induced thrombocytopenia)    Gait disorder    Chronic LBP    Right leg numbness    Foot drop, bilateral    Physical deconditioning    Secondary hyperparathyroidism (of renal origin)    History of stroke without residual deficits    Syncope    Faintness    LVAD (left ventricular assist device) present    HBP (high blood pressure)    Nausea    Essential hypertension    Chronic low back pain    Cognitive impairment    Cellulitis    Fall    Erythema    Spontaneous hematoma of forearm    Hallucinations    Embolic stroke involving left posterior cerebral artery    Migraine with aura and without status migrainosus, not intractable    Cytotoxic cerebral edema    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    Acitivities: No Swimming, bathing, vacuuming, contact sports.    Fresh implants= Sternal Precautions    Nursing:   SN to complete comprehensive assessment including routine vital signs. Instruct on disease process and s/s of complications to report  to MD. Review/verify medication list sent home with the patient at time of discharge  and instruct patient/caregiver as needed. Frequency may be adjusted depending on start of care date.    **LVAD driveline exit site dressing change is to be completed per LVAD patient/caregiver only**.    Notify MD if SBP > 160 or < 90; DBP > 90 or < 50; HR > 120 or < 50; Temp > 101; Weight gain >3lbs in 1 day or 5lbs in 1 week.        LABS:  SN to perform labs: PT/INR per Coumadin clinic [phone].   Follow up INR date: March 27,2017    No Finger Sticks        CONSULTS:      Physical Therapy to evaluate and treat. Evaluate for home safety and equipment needs; Establish/upgrade home exercise program. Perform / instruct on therapeutic exercises, gait training, transfer training, and Range of Motion.    Occupational Therapy to evaluate and treat. Evaluate home environment for safety and equipment needs. Perform/Instruct on transfers, ADL training, ROM, and therapeutic exercises.      Send initial Home Health orders to [facility]TS attending physician on call.  Send follow up questions to VAD clinic MD [phone] or [phone].

## 2017-03-20 NOTE — PROGRESS NOTES
"Patient paged regarding going to ER for SOB. Patient states the ER she arrived to was on "lock-down" for a possible shooting. Patient verbalized that she is safe but they are not seeing patients at this time. Explained to patient that she can come to [facility] tonight. Patient's daughter will drive her.   "

## 2017-03-20 NOTE — PROGRESS NOTES
Reassessed patient, states headache is getting better. Now 5/10. Will reassess again in 30 minutes.

## 2017-03-20 NOTE — PROGRESS NOTES
Pt woke up complaining of shortness of breath and a headache 7/10. States when she woke up her oxygen was out her nose. Increased oxygen to 3L, o2 97% on 3L. /71 MAP 84 doppler 80 HR 84 RR 20. Gave PRN tylenol.  Neuro assessment WNL, pupils perrla +3. Notified Dr. Carrillo of situation, orders given to reassess headache in 30 minutes with oxygen on. Will continue to monitor.

## 2017-03-20 NOTE — PROGRESS NOTES
Ms. Randa Devine is being discharged today following admission for increased shortness of breath and chest pain. Her PMH is significant for HW lvad, ESRD on HD, and GI bleeding.     During her hospital stay she underwent HD, and SOB was much improved. Plan for discharge this afternoon. Her inr goal remains 2-3, on no aspirin. She was instructed to continue her home dose of coumadin 2mg orally daily with inr today of 2.2. Thank you.

## 2017-03-20 NOTE — PROGRESS NOTES
Notified VAD coordinator ailyn of situation with headache. Recommends CT if headache persists with tylenol and oxygen. Will notify of any changes.

## 2017-03-20 NOTE — PROGRESS NOTES
DISCHARGE    SW confirmed pt is in agreement with plan to d/c home today with home health via [facility]  ph [phone], fax [phone]. SW faxed referral and confirmed receipt. Pt denies transportation issues. No other needs reported art this time. SW providing psychosocial and counseling support, education, resources, and d/c planning as needed. SW remains available.

## 2017-03-20 NOTE — PROGRESS NOTES
Progress Note  Cardiology    Admit Date: 3/19/2017   LOS: 1 day   3/20/2017    SUBJECTIVE:     Patient seen and examined. Denies chest pain or shortness of breath. Pt denies any other complaints.     Scheduled Meds:   sodium chloride 0.9%   Intravenous Once    amlodipine  10 mg Oral Daily    atorvastatin  40 mg Oral Daily    calcium carbonate  500 mg Oral QID    cetirizine  5 mg Oral Daily    escitalopram oxalate  20 mg Oral QHS    gabapentin  300 mg Oral QHS    hydrALAZINE  50 mg Oral Q8H    pantoprazole  40 mg Oral Daily    warfarin  2 mg Oral Daily     Continuous Infusions:   PRN Meds:sodium chloride 0.9%, sodium chloride 0.9%, acetaminophen    Review of patient's allergies indicates:   Allergen Reactions    Codeine Nausea And Vomiting    Demerol [meperidine] Nausea And Vomiting    Lortab [hydrocodone-acetaminophen] Nausea And Vomiting       OBJECTIVE:     Vital Signs (Most Recent)  Temp: 98.3 °F (36.8 °C) (03/20/17 1140)  Pulse: 78 (03/20/17 1140)  Resp: 16 (03/20/17 1140)  BP: (!) 78/0 (03/20/17 1141)  SpO2: 98 % (03/20/17 1140)    Vital Signs Range (Last 24H):  Temp:  [97.3 °F (36.3 °C)-98.3 °F (36.8 °C)]   Pulse:  [73-92]   Resp:  [16-20]   BP: ()/(0-87)   SpO2:  [96 %-98 %]     I & O (Last 24H):    Intake/Output Summary (Last 24 hours) at 03/20/17 1308  Last data filed at 03/20/17 0600   Gross per 24 hour   Intake              690 ml   Output             2800 ml   Net            -2110 ml       Tele: no events    Physical Exam:   General appearance: alert, appears stated age and cooperative  Neck: no JVD and supple, symmetrical, trachea midline  Lungs: clear to auscultation bilaterally  Heart: regular rate and rhythm, normal VAD hum  Abdomen: soft, non-tender; bowel sounds normal; no masses, no organomegaly  Extremities: extremities normal, atraumatic, no cyanosis or edema  Neurologic: Grossly normal       LABS  CBC with Diff:     Recent Labs  Lab 03/19/17  0305 03/19/17  7909  03/20/17  0502   WBC 6.94 4.43 6.09   HGB 9.2* 8.8* 9.4*   HCT 28.5* 27.1* 29.2*   * 127* 140*   LYMPH 14.3*  1.0 14.4*  0.6* 9.9*  0.6*   MONO 7.9  0.6 5.9  0.3 7.2  0.4   EOSINOPHIL 1.9 2.0 2.3       COAG:    Recent Labs  Lab 03/19/17  0305 03/19/17  1630 03/20/17  0502   APTT  --  34.2*  --    INR 1.9* 2.2* 2.2*       CMP:     Recent Labs  Lab 03/19/17  0305 03/20/17  0502   GLU 74 93   CALCIUM 9.7 9.7   ALBUMIN 3.5 3.7   PROT 7.5 7.6    140   K 3.8 4.8   CO2 28 22*   CL 95 107   BUN 60* 32*   CREATININE 8.9* 5.8*   ALKPHOS 153* 157*   ALT 25 23   AST 31 28   BILITOT 0.7 0.7   MG  --  2.2   PHOS  --  2.8     Estimated Creatinine Clearance: 8.5 mL/min (based on Cr of 5.8).    .    Recent Labs  Lab 03/19/17  0305 03/19/17  0621   TROPONINI 0.344* 0.356*   BNP 2025*  --          Diagnostic Results:  Labs reviewed    Patient Active Problem List   Diagnosis    Heart replaced by heart assist device    End stage renal disease on dialysis    Chronic anticoagulation    Left ventricular assist device present    Femoral neck fracture    Anemia    Chronic combined systolic and diastolic heart failure    Fracture of lumbar spine without cord injury    TIA (transient ischemic attack)    ICD (implantable cardioverter-defibrillator), biventricular, in situ    Paroxysmal atrial fibrillation    Atrial tachycardia, paroxysmal    HIT (heparin-induced thrombocytopenia)    Gait disorder    Chronic LBP    Right leg numbness    Foot drop, bilateral    Physical deconditioning    Secondary hyperparathyroidism (of renal origin)    History of stroke without residual deficits    Syncope    Faintness    LVAD (left ventricular assist device) present    HBP (high blood pressure)    Nausea    Essential hypertension    Chronic low back pain    Cognitive impairment    Cellulitis    Fall    Erythema    Spontaneous hematoma of forearm    Hallucinations    Embolic stroke involving left posterior  cerebral artery    Migraine with aura and without status migrainosus, not intractable    Cytotoxic cerebral edema    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 Goal MAP around 80  -LDH stable, 200s      ESRD on HD T/TH/Sat  -Nephrology following  -will do HD today and Dc after           Alison Miranda PA

## 2017-03-20 NOTE — PROGRESS NOTES
MD at bedside, orders to reassess headache in 1 hour. If persist to notify. States neuro exam is normal and since headache started to subside will continue to monitor. Pt stable in no distress.

## 2017-03-20 NOTE — PROGRESS NOTES
Morning doppler 86.  No complaints of pain or headache.   Notified KAREN Santana.   No further orders received at this time.  Will continue to monitor.

## 2017-03-20 NOTE — PROGRESS NOTES
Pt doppler = 92. BP =  109/74. MAP = 87.  Patient also complaining of headache.  Notified KAREN Santana.  Received orders to give 10 mg Hydralazine IV.  Will continue to monitor.

## 2017-03-20 NOTE — PLAN OF CARE
Problem: Patient Care Overview  Goal: Plan of Care Review  Outcome: Ongoing (interventions implemented as appropriate)  Pt remained stable throughout the night. No acute distress noted. Pt remained free from injury. VSS. Pt denies any chest pain or SOB. HD removed 2L, possible HD tomorrow. Pt understands plan of care. Will continue to monitor.

## 2017-03-20 NOTE — PLAN OF CARE
Problem: Patient Care Overview  Goal: Plan of Care Review  Outcome: Ongoing (interventions implemented as appropriate)  Report received from GRACE Kitchen RN. Pt AAOx4. Acute bedside dialysis initiated via SCOTT graft without difficulty.

## 2017-03-21 VITALS
OXYGEN SATURATION: 94 % | BODY MASS INDEX: 25.93 KG/M2 | HEART RATE: 88 BPM | TEMPERATURE: 99 F | WEIGHT: 132.06 LBS | SYSTOLIC BLOOD PRESSURE: 84 MMHG | HEIGHT: 60 IN | RESPIRATION RATE: 18 BRPM

## 2017-03-21 LAB
ALBUMIN SERPL BCP-MCNC: 3.6 G/DL
ALP SERPL-CCNC: 146 U/L
ALT SERPL W/O P-5'-P-CCNC: 16 U/L
ANION GAP SERPL CALC-SCNC: 11 MMOL/L
AST SERPL-CCNC: 23 U/L
BASOPHILS # BLD AUTO: 0.03 K/UL
BASOPHILS NFR BLD: 0.6 %
BILIRUB SERPL-MCNC: 0.8 MG/DL
BUN SERPL-MCNC: 21 MG/DL
CALCIUM SERPL-MCNC: 9.4 MG/DL
CHLORIDE SERPL-SCNC: 104 MMOL/L
CO2 SERPL-SCNC: 24 MMOL/L
CREAT SERPL-MCNC: 4.1 MG/DL
DIFFERENTIAL METHOD: ABNORMAL
EOSINOPHIL # BLD AUTO: 0.1 K/UL
EOSINOPHIL NFR BLD: 2.5 %
ERYTHROCYTE [DISTWIDTH] IN BLOOD BY AUTOMATED COUNT: 15.2 %
EST. GFR  (AFRICAN AMERICAN): 12.9 ML/MIN/1.73 M^2
EST. GFR  (NON AFRICAN AMERICAN): 11.2 ML/MIN/1.73 M^2
GLUCOSE SERPL-MCNC: 87 MG/DL
HCT VFR BLD AUTO: 27.4 %
HGB BLD-MCNC: 9 G/DL
INR PPP: 2.3
LDH SERPL L TO P-CCNC: 200 U/L
LYMPHOCYTES # BLD AUTO: 0.8 K/UL
LYMPHOCYTES NFR BLD: 15.9 %
MAGNESIUM SERPL-MCNC: 2.1 MG/DL
MCH RBC QN AUTO: 33.2 PG
MCHC RBC AUTO-ENTMCNC: 32.8 %
MCV RBC AUTO: 101 FL
MONOCYTES # BLD AUTO: 0.5 K/UL
MONOCYTES NFR BLD: 10.5 %
NEUTROPHILS # BLD AUTO: 3.4 K/UL
NEUTROPHILS NFR BLD: 70.3 %
PHOSPHATE SERPL-MCNC: 2.9 MG/DL
PLATELET # BLD AUTO: 129 K/UL
PMV BLD AUTO: 9.8 FL
POTASSIUM SERPL-SCNC: 3.9 MMOL/L
PROT SERPL-MCNC: 7.3 G/DL
PROTHROMBIN TIME: 22.6 SEC
RBC # BLD AUTO: 2.71 M/UL
SODIUM SERPL-SCNC: 139 MMOL/L
WBC # BLD AUTO: 4.84 K/UL

## 2017-03-21 PROCEDURE — 85025 COMPLETE CBC W/AUTO DIFF WBC: CPT

## 2017-03-21 PROCEDURE — 83615 LACTATE (LD) (LDH) ENZYME: CPT

## 2017-03-21 PROCEDURE — 85610 PROTHROMBIN TIME: CPT

## 2017-03-21 PROCEDURE — 80053 COMPREHEN METABOLIC PANEL: CPT

## 2017-03-21 PROCEDURE — 83735 ASSAY OF MAGNESIUM: CPT

## 2017-03-21 PROCEDURE — 36415 COLL VENOUS BLD VENIPUNCTURE: CPT

## 2017-03-21 PROCEDURE — 84100 ASSAY OF PHOSPHORUS: CPT

## 2017-03-21 PROCEDURE — 99239 HOSP IP/OBS DSCHRG MGMT >30: CPT | Mod: ,,, | Performed by: INTERNAL MEDICINE

## 2017-03-21 PROCEDURE — 25000003 PHARM REV CODE 250: Performed by: STUDENT IN AN ORGANIZED HEALTH CARE EDUCATION/TRAINING PROGRAM

## 2017-03-21 PROCEDURE — 27400115

## 2017-03-21 RX ADMIN — CALCIUM CARBONATE (ANTACID) CHEW TAB 500 MG 500 MG: 500 CHEW TAB at 05:03

## 2017-03-21 RX ADMIN — ATORVASTATIN CALCIUM 40 MG: 20 TABLET, FILM COATED ORAL at 08:03

## 2017-03-21 RX ADMIN — CETIRIZINE HYDROCHLORIDE 5 MG: 5 TABLET, FILM COATED ORAL at 08:03

## 2017-03-21 RX ADMIN — HYDRALAZINE HYDROCHLORIDE 50 MG: 50 TABLET ORAL at 05:03

## 2017-03-21 RX ADMIN — PANTOPRAZOLE SODIUM 40 MG: 40 TABLET, DELAYED RELEASE ORAL at 08:03

## 2017-03-21 RX ADMIN — AMLODIPINE BESYLATE 10 MG: 10 TABLET ORAL at 08:03

## 2017-03-21 RX ADMIN — ACETAMINOPHEN 650 MG: 325 TABLET ORAL at 09:03

## 2017-03-21 NOTE — PROGRESS NOTES
Patient completed 3 hour bedside hemodialysis treatment  Net fluid removal of 1L  Patient had 3-5 minutes episode of aysmptomatic v tach.  Floor staff aware.    UF goal decreased with no further epidsodes  Hemostasis obtained to needle sites on upper left arm fistula.  Venous needle site required pressure x 25 minutes to obtain hemostasis

## 2017-03-21 NOTE — PLAN OF CARE
Problem: Patient Care Overview  Goal: Plan of Care Review  Outcome: Ongoing (interventions implemented as appropriate)  Pt remained stable throughout the night. No acute distress noted. Pt remained free from injury. VSS. Pt denies any chest pain or SOB. HD removed 2L yesterday, HD again today removed 1L. D/c tomorrow. Pt understands plan of care. Will continue to monitor.

## 2017-03-21 NOTE — PROGRESS NOTES
Pt complaining of headache rated 6/10.  Notified KAREN Santana.  Gave 650mg of acetaminophen per MAR.  No further orders given at this time.  Will continue to monitor.

## 2017-03-21 NOTE — PROGRESS NOTES
Patient is ready for discharge. Patient stable alert and oriented. IVs removed. No complaints of pain. Discussed discharge plan. Reviewed medications and side effects, appointments, and answered questions with patient and family.

## 2017-03-21 NOTE — PROGRESS NOTES
Progress Note  Cardiology    Admit Date: 3/19/2017   LOS: 2 days   3/21/2017    SUBJECTIVE:     Patient seen and examined. Denies chest pain or shortness of breath. Pt denies any other complaints. Had PMT with HD last night.     Scheduled Meds:   amlodipine  10 mg Oral Daily    atorvastatin  40 mg Oral Daily    calcium carbonate  500 mg Oral QID    cetirizine  5 mg Oral Daily    escitalopram oxalate  20 mg Oral QHS    gabapentin  300 mg Oral QHS    hydrALAZINE  50 mg Oral Q8H    pantoprazole  40 mg Oral Daily    warfarin  2 mg Oral Daily     Continuous Infusions:   PRN Meds:sodium chloride 0.9%, sodium chloride 0.9%, acetaminophen    Review of patient's allergies indicates:   Allergen Reactions    Codeine Nausea And Vomiting    Demerol [meperidine] Nausea And Vomiting    Lortab [hydrocodone-acetaminophen] Nausea And Vomiting       OBJECTIVE:     Vital Signs (Most Recent)  Temp: 98.6 °F (37 °C) (03/21/17 0740)  Pulse: 93 (03/21/17 0900)  Resp: 18 (03/21/17 0740)  BP: (!) 84/0 (03/21/17 0741)  SpO2: (!) 94 % (03/21/17 0740)    Vital Signs Range (Last 24H):  Temp:  [97.1 °F (36.2 °C)-98.8 °F (37.1 °C)]   Pulse:  []   Resp:  [16-18]   BP: ()/(0-77)   SpO2:  [94 %-98 %]     I & O (Last 24H):    Intake/Output Summary (Last 24 hours) at 03/21/17 1026  Last data filed at 03/21/17 0600   Gross per 24 hour   Intake              840 ml   Output             1650 ml   Net             -810 ml       Tele: no events    Physical Exam:   General appearance: alert, appears stated age and cooperative  Neck: no JVD and supple, symmetrical, trachea midline  Lungs: clear to auscultation bilaterally  Heart: regular rate and rhythm, normal VAD hum  Abdomen: soft, non-tender; bowel sounds normal; no masses, no organomegaly  Extremities: extremities normal, atraumatic, no cyanosis or edema  Neurologic: Grossly normal       LABS  CBC with Diff:     Recent Labs  Lab 03/19/17  1629 03/20/17  0502 03/21/17  0502   WBC 4.43  6.09 4.84   HGB 8.8* 9.4* 9.0*   HCT 27.1* 29.2* 27.4*   * 140* 129*   LYMPH 14.4*  0.6* 9.9*  0.6* 15.9*  0.8*   MONO 5.9  0.3 7.2  0.4 10.5  0.5   EOSINOPHIL 2.0 2.3 2.5       COAG:    Recent Labs  Lab 03/19/17  1630 03/20/17  0502 03/21/17  0502   APTT 34.2*  --   --    INR 2.2* 2.2* 2.3*       CMP:     Recent Labs  Lab 03/19/17  0305 03/20/17  0502 03/21/17  0502   GLU 74 93 87   CALCIUM 9.7 9.7 9.4   ALBUMIN 3.5 3.7 3.6   PROT 7.5 7.6 7.3    140 139   K 3.8 4.8 3.9   CO2 28 22* 24   CL 95 107 104   BUN 60* 32* 21*   CREATININE 8.9* 5.8* 4.1*   ALKPHOS 153* 157* 146*   ALT 25 23 16   AST 31 28 23   BILITOT 0.7 0.7 0.8   MG  --  2.2 2.1   PHOS  --  2.8 2.9     Estimated Creatinine Clearance: 12 mL/min (based on Cr of 4.1).    .    Recent Labs  Lab 03/19/17  0305 03/19/17  0621   TROPONINI 0.344* 0.356*   BNP 2025*  --          Diagnostic Results:  Labs reviewed    Patient Active Problem List   Diagnosis    Heart replaced by heart assist device    End stage renal disease on dialysis    Chronic anticoagulation    Left ventricular assist device present    Femoral neck fracture    Anemia    Chronic combined systolic and diastolic heart failure    Fracture of lumbar spine without cord injury    TIA (transient ischemic attack)    ICD (implantable cardioverter-defibrillator), biventricular, in situ    Paroxysmal atrial fibrillation    Atrial tachycardia, paroxysmal    HIT (heparin-induced thrombocytopenia)    Gait disorder    Chronic LBP    Right leg numbness    Foot drop, bilateral    Physical deconditioning    Secondary hyperparathyroidism (of renal origin)    History of stroke without residual deficits    Syncope    Faintness    LVAD (left ventricular assist device) present    HBP (high blood pressure)    Nausea    Essential hypertension    Chronic low back pain    Cognitive impairment    Cellulitis    Fall    Erythema    Spontaneous hematoma of forearm     Hallucinations    Embolic stroke involving left posterior cerebral artery    Migraine with aura and without status migrainosus, not intractable    Cytotoxic cerebral edema    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 Goal MAP around 80  -LDH stable, 200s      ESRD on HD T/TH/Sat  -Nephrology following, will dc today for two consecutive days of HD           KAREN Torres

## 2017-03-22 ENCOUNTER — OUTPATIENT CASE MANAGEMENT (OUTPATIENT)
Dept: ADMINISTRATIVE | Facility: OTHER | Age: 62
End: 2017-03-22

## 2017-03-22 NOTE — PROGRESS NOTES
Ms. Randa Devine is being discharged today following admission for increased shortness of breath and chest pain. Her PMH is significant for HW lvad, ESRD on HD, and GI bleeding.      During her hospital stay she underwent HD, and SOB was much improved. Plan for discharge this afternoon. Her inr goal remains 2-3, on no aspirin. She was instructed to continue her home dose of coumadin 2mg orally daily with inr today of 2.2. LVM for pt.

## 2017-03-22 NOTE — PROGRESS NOTES
3/22/17 - Attempted phone contact with no answer. Observed in EMR that pt was admitted to [facility] from 3/19-3/21/17 related to CHF exacerbation with plan for dialysis daily X 2 upon d/c home. Will attempt to follow up later this week. Ina Lovell RN

## 2017-03-23 ENCOUNTER — PATIENT OUTREACH (OUTPATIENT)
Dept: ADMINISTRATIVE | Facility: CLINIC | Age: 62
End: 2017-03-23
Payer: MEDICARE

## 2017-03-23 LAB — INR PPP: 2.2

## 2017-03-23 NOTE — PROGRESS NOTES
C3 nurse attempted to contact patient. No answer. The following message was left for the patient to return the call:  Good Afternoon,  I am a nurse calling on behalf of [facility] from the [facility].  This is a Transitional Care Call for Randa Sybil . When you have a moment please contact us at [phone] or [phone] Monday through Friday, between the hours of 8 am to 4 pm. We look forward to speaking with you. On behalf of [facility] have a nice day.    The patient has a scheduled HOSFU appointment with Carmen Mills PA-C on 3/29/17 @ 0128. Message sent to Physician staff.

## 2017-03-24 ENCOUNTER — OUTPATIENT CASE MANAGEMENT (OUTPATIENT)
Dept: ADMINISTRATIVE | Facility: OTHER | Age: 62
End: 2017-03-24

## 2017-03-24 ENCOUNTER — ANTI-COAG VISIT (OUTPATIENT)
Dept: CARDIOLOGY | Facility: CLINIC | Age: 62
End: 2017-03-24

## 2017-03-24 DIAGNOSIS — Z95.811 LEFT VENTRICULAR ASSIST DEVICE PRESENT: ICD-10-CM

## 2017-03-24 NOTE — PROGRESS NOTES
3/24/17 - Phone contact with pt this AM. She reports she is feeling really weak since last hospital d/c. One of her providers strongly recommended that she participate in therapy to help improve her strength and endurance and ordered PT and OT thru [facility]. She advised that she has started PT and is supposed to start OT soon and she is hopeful that it will help with making her stronger. She has not yet gotten applications sent by VIVIANE Hackett LCSW for food [place] and Pt Assistance Program. CM gave her the address to the food bank in her area and told her she does not need a referral or to complete an application. She can go there and get assistance if she needs food. CM will follow up in two weeks and will plan to d/c if pt still participating in therapy and verbalizing improvement. Ina Lovell RN

## 2017-03-27 ENCOUNTER — ANTI-COAG VISIT (OUTPATIENT)
Dept: CARDIOLOGY | Facility: CLINIC | Age: 62
End: 2017-03-27

## 2017-03-27 DIAGNOSIS — Z95.811 LEFT VENTRICULAR ASSIST DEVICE PRESENT: ICD-10-CM

## 2017-03-27 LAB — INR PPP: 2

## 2017-03-27 NOTE — DISCHARGE SUMMARY
"[facility]    Discharge Summary      Patient Name: Randa Devine  MRN: [id number]  Admission Date: 3/19/2017  Hospital Length of Stay: 2 days  Discharge Date and Time: 3/21/2017 200  Attending Physician: Miller Restrepo   Discharging Provider: KAREN Torres  Primary Care Provider: Laura Garvin DO             Hospital Course: 61 y.o. woman with PMH of CAD s/p CABG, chronic combined CHF / ICM (EF 10-15%) s/p Heartware as DT (05/2012) and SJM CRT-D (DDR ), ESRD on HD via left arm AVF (TTS), Lower GIB secondary to cecal AVM s/p APC (04/2016), Paroxysmal AF, HTN, Embolic thalamic CVA (01/2017) with residual visual deficits who presents to the hospital due to c/o progressively worsening SOB and chest pain.     The patient reports she was feeling well until last Thursday after her HD. Post-HD she reports she felt congested and was having SOB aggravated with exertion. She went home and had multiple awakenings in the middle of the night due to SOB. On Friday, her SOB became progressively worse and now was associated with a left sided chest pain that she claims occurred intermittently lasting anywhere from 3 seconds to a few minutes, "pressure-like" without any aggravating or alleviating factors. She reports it occurred up to 10 times with the last episode prior to arrival to the ED.     In the ED, her VS showed that she was hypertensive (MAPs upper 80s), pulse in 80s, saturating 98% on 4LNC. She uses baseline 2LNC at home and reports increasing her oxygen to 4L improved her symptoms partially. She missed her last HD appointment due to extensive weakness and symptoms which brought her over to the hospital. Her EKG revealed SR @ 87, A sense BiV paced with QRS of 146 (up from 122). Troponin was found to be 0.344 (up from baselines running in 0.03) and BNP was 2025 (up from baseline of 764), INR 1.9. CXR unchanged from prior, showing stable bibasilar edema, cardiomegaly.     She denies smoking, " "alcohol or recreational drugs. Endorses compliance with her medications and low salt diet, however does not weigh herself daily because she says her scale has broken for a "long time." She was admitted and had two consecutive days of HD. She then was euvolemic and will discharge home to have HD tomorrow (a non HD day) then return to her schedule of T Th Sat HD.     Consults:   Consults         Status Ordering Provider     Inpatient consult to Cardiology  Once     Provider:  (Not yet assigned)    Completed JOSSELIN CESAR     Inpatient consult to Nephrology  Once     Provider:  (Not yet assigned)    Completed MELANIE SANTIZO BILAL          Diet normal  Provisions none     Pending Diagnostic Studies:     None        Final Active Diagnoses:    Diagnosis Date Noted POA    PRINCIPAL PROBLEM:  Heart replaced by heart assist device [Z95.811] 10/29/2012 Not Applicable    Hypervolemia [E87.70] 03/19/2017 Unknown      Problems Resolved During this Admission:    Diagnosis Date Noted Date Resolved POA      Discharged Condition: good    Disposition: Home or Self Care    Follow Up:  2 months      Patient Instructions:   No discharge procedures on file.  Medications:  Reconciled Home Medications:   Discharge Medication List as of 3/21/2017  9:42 AM      CONTINUE these medications which have NOT CHANGED    Details   acetaminophen (TYLENOL) 325 MG tablet Take 2 tablets (650 mg total) by mouth every 6 (six) hours as needed for Pain (mild pain)., Starting 6/8/2016, Until Discontinued, OTC      amlodipine (NORVASC) 10 MG tablet Take 1 tablet (10 mg total) by mouth once daily., Starting 3/8/2017, Until Thu 3/8/18, Normal      atorvastatin (LIPITOR) 40 MG tablet Take 1 tablet (40 mg total) by mouth once daily., Starting 2/6/2017, Until Tue 2/6/18, Normal      CALCIUM CARBONATE (ANTACID CALCIUM ORAL) Take 1 tablet by mouth 4 (four) times daily. , Until Discontinued, Historical Med      cetirizine (ZYRTEC) 5 MG tablet Take 1 tablet (5 mg " total) by mouth once daily., Starting 6/8/2016, Until Discontinued, OTC      escitalopram oxalate (LEXAPRO) 20 MG tablet Take 1 tablet (20 mg total) by mouth every evening., Starting 2/6/2017, Until Discontinued, Normal      GABAPENTIN (CMPD PAIN MANAGEMENT COMPOUND OINTMNENT THREE) Apply small amount to painful joints once or twice a day, Print      gabapentin (NEURONTIN) 300 MG capsule Take 1 capsule (300 mg total) by mouth every evening., Starting 4/22/2016, Until Discontinued, No Print      hydrALAZINE (APRESOLINE) 50 MG tablet Take 1 tablet (50 mg total) by mouth every 8 (eight) hours., Starting 2/6/2017, Until Tue 2/6/18, Normal      ondansetron (ZOFRAN) 4 MG tablet Take 1 tablet (4 mg total) by mouth every 8 (eight) hours as needed for Nausea., Starting 1/11/2017, Until Discontinued, Normal      pantoprazole (PROTONIX) 40 MG tablet Take 1 tablet (40 mg total) by mouth once daily., Starting 2/10/2017, Until Discontinued, Normal      warfarin (COUMADIN) 2 MG tablet Current Dose ( 3 mg on Mon, Thu; 2 mg all other days) or as directed by coumadin clinic., No Print             KAREN Torres  General Surgery  [facility]

## 2017-03-29 ENCOUNTER — ANTI-COAG VISIT (OUTPATIENT)
Dept: CARDIOLOGY | Facility: CLINIC | Age: 62
End: 2017-03-29

## 2017-03-29 ENCOUNTER — OFFICE VISIT (OUTPATIENT)
Dept: NEUROLOGY | Facility: CLINIC | Age: 62
DRG: 377 | End: 2017-03-29
Payer: MEDICARE

## 2017-03-29 VITALS
HEART RATE: 45 BPM | DIASTOLIC BLOOD PRESSURE: 80 MMHG | BODY MASS INDEX: 27.68 KG/M2 | SYSTOLIC BLOOD PRESSURE: 113 MMHG | WEIGHT: 141 LBS | HEIGHT: 60 IN

## 2017-03-29 DIAGNOSIS — N18.6 END STAGE RENAL DISEASE ON DIALYSIS: ICD-10-CM

## 2017-03-29 DIAGNOSIS — I47.19 ATRIAL TACHYCARDIA, PAROXYSMAL: ICD-10-CM

## 2017-03-29 DIAGNOSIS — G43.109 MIGRAINE WITH AURA AND WITHOUT STATUS MIGRAINOSUS, NOT INTRACTABLE: ICD-10-CM

## 2017-03-29 DIAGNOSIS — I50.42 CHRONIC COMBINED SYSTOLIC AND DIASTOLIC HEART FAILURE: ICD-10-CM

## 2017-03-29 DIAGNOSIS — Z95.811 LEFT VENTRICULAR ASSIST DEVICE PRESENT: ICD-10-CM

## 2017-03-29 DIAGNOSIS — I63.432 EMBOLIC STROKE INVOLVING LEFT POSTERIOR CEREBRAL ARTERY: ICD-10-CM

## 2017-03-29 DIAGNOSIS — Z99.2 END STAGE RENAL DISEASE ON DIALYSIS: ICD-10-CM

## 2017-03-29 DIAGNOSIS — I48.0 PAROXYSMAL ATRIAL FIBRILLATION: ICD-10-CM

## 2017-03-29 DIAGNOSIS — I10 ESSENTIAL HYPERTENSION: ICD-10-CM

## 2017-03-29 DIAGNOSIS — Z95.810 ICD (IMPLANTABLE CARDIOVERTER-DEFIBRILLATOR), BIVENTRICULAR, IN SITU: ICD-10-CM

## 2017-03-29 DIAGNOSIS — I63.81: Primary | ICD-10-CM

## 2017-03-29 DIAGNOSIS — Z95.811 LVAD (LEFT VENTRICULAR ASSIST DEVICE) PRESENT: ICD-10-CM

## 2017-03-29 DIAGNOSIS — Z95.811 HEART REPLACED BY HEART ASSIST DEVICE: ICD-10-CM

## 2017-03-29 PROCEDURE — 99213 OFFICE O/P EST LOW 20 MIN: CPT | Mod: S$PBB,,, | Performed by: PHYSICIAN ASSISTANT

## 2017-03-29 PROCEDURE — 99999 PR PBB SHADOW E&M-EST. PATIENT-LVL III: CPT | Mod: PBBFAC,,, | Performed by: PHYSICIAN ASSISTANT

## 2017-03-29 NOTE — PROGRESS NOTES
[facility], Department of Neurology   [facility] [place] [address]  Phone:[phone]  Fax: [phone]  Hospital Follow Up    Patient Name: Randa Devine  : 1955  MRN:  [id number]    3/29/2017     chief complaint: Hospital Follow Up    PCP: Laura Garvin DO.    Assessment:     ICD-10-CM ICD-9-CM   1. Acute ischemic vertebrobasilar artery thalamic stroke involving left-sided vessel I63.212 434.91    I63.22    2. Heart replaced by heart assist device Z95.811 V43.21   3. Atrial tachycardia, paroxysmal I47.1 427.0   4. Chronic combined systolic and diastolic heart failure I50.42 428.42   5. Embolic stroke involving left posterior cerebral artery I63.432 434.11   6. End stage renal disease on dialysis N18.6 585.6    Z99.2 V45.11   7. Essential hypertension I10 401.9   8. ICD (implantable cardioverter-defibrillator), biventricular, in situ Z95.810 V45.02   9. LVAD (left ventricular assist device) present Z95.811 V43.21   10. Paroxysmal atrial fibrillation I48.0 427.31   11. Migraine with aura and without status migrainosus, not intractable G43.109 346.00     Stroke risk factors: LVAD on anticoagulation, HTN, afib, CHF, ESRD      Plan:   1. Headaches- chronic daily; Will try increasing to 600mg daily of neurontin, 300mg BID  2. Continue anticoagulation per cardiology/PCP (LVAD and afib)  3. Atorvastatin 40mg daily  4. RTO- 3 months   Stroke education: Continue to address with PCP these risk factor guidelines: SBP<130, FBS<100, LDL<70 mg/dL, anticoagulation. Continue PT/OT/SLT, avoid tobacco, abstain from ETOH (<3 bevs optimal a week), stay well hydrated, avoid PO intake of NaCl, regular sleep. Will have patient continue to address this with PCP. Discussed CVA symptoms with patient at length, etiology of CVA and need to remain compliant on all medications. Mentioned that medication is preventative however nothing is absolute. Robust recovery first 4-6 months up to a year for noticeable  improvement. If symptomatic, will need to report to ED in <2h for prompt eval. Patient voiced understanding.  All questions answered.  The patient indicates understanding of these issues and agrees to the plan.    HPI:   Randa Devine is a 61 y.o.  female. She presents accompanied by her daughter for hospital follow up. She still has some blurry vision in her right eye. She has right leg numbness/tingling. Has PT/OT. SHe is on coumadin for afib and LVAD. Well controlled on coumadin.  Started on atorvastatin in hospital - no issues with the medication.   She has had headaches for about 1 year, especially worsened recently. They started moderate pain and were less frequent, now she has them daily and are frequently intractable. Tried tylenol but no effect. She gets nausea occasionally with them. Generally frontal but someitmes bitemporal. Has tried antihistamines and decongestants.   She is on gabapentin 300mg (sometimes 600mg daily) for right leg pain/numbness (from hip fx). Has zofran when she has nausea associated. Finds it is somewhat useful.   No new stroke signs or symptoms since d/c from hospital.     Hospital course:  Patient is a 62 yo F with a h/o paroxysmal atrial fibrillation, CAD, MI, s/p LVAD, prior stroke (R weakness, R paresthesias, dysarthria), and ESRD on dialysis who presented with dysarthria and LOC. Found to have left thalamic stroke. Etiology cardioembolic. Now on coumadin 2mg qd.       * Embolic stroke involving left posterior cerebral artery  Cardioembolic in nature   Antithrombotics for secondary stroke prevention: Anticoagulants: Warfarin 2 mg   Statins for secondary stroke prevention and hyperlipidemia, if present: Atorvastatin- 40 mg oral daily. .8 in 11/2016  Aggressive risk factor modification: Diet, Exercise, Obesity, Atrial Fibrillation and Carotid Artery disease  Rehab Efforts: Physical Therapy, Occupational Therapy, Speech and Language Pathology and Physical Medicine  and Rehabilitation  VTE Prphylaxis: Coumadin  BP Parameters: SBP <160 (from a stroke perspective, as she is 48h out)  Disposition: home health PT         Cytotoxic cerebral edema  Evident on images      End stage renal disease on dialysis  Risk factor for stroke   On epoetin   Management per primary team/nephrology     Left ventricular assist device present  On Coumadin  As above     Chronic anticoagulation  On Coumadin for LVAD  INR 2.4 on admit  Not a candidate for tPA (also, outside of time window)     Paroxysmal atrial fibrillation  Risk factor for stroke   Continue coumadin 2mg      Migraine with aura and without status migrainosus, not intractable  R>>L HA, nausea, photophobia, blurred vision.  Follow up in headache clinic     Medication Reconciliation:   Current Outpatient Prescriptions   Medication Sig Dispense Refill    acetaminophen (TYLENOL) 325 MG tablet Take 2 tablets (650 mg total) by mouth every 6 (six) hours as needed for Pain (mild pain).  0    amlodipine (NORVASC) 10 MG tablet Take 1 tablet (10 mg total) by mouth once daily. 30 tablet 11    atorvastatin (LIPITOR) 40 MG tablet Take 1 tablet (40 mg total) by mouth once daily. 90 tablet 3    CALCIUM CARBONATE (ANTACID CALCIUM ORAL) Take 1 tablet by mouth 4 (four) times daily.       cetirizine (ZYRTEC) 5 MG tablet Take 1 tablet (5 mg total) by mouth once daily. 30 tablet 0    escitalopram oxalate (LEXAPRO) 20 MG tablet Take 1 tablet (20 mg total) by mouth every evening. 90 tablet 3    GABAPENTIN (CMPD PAIN MANAGEMENT COMPOUND OINTMNENT THREE) Apply small amount to painful joints once or twice a day 30 g 3    gabapentin (NEURONTIN) 300 MG capsule Take 1 capsule (300 mg total) by mouth every evening. 120 capsule 2    hydrALAZINE (APRESOLINE) 50 MG tablet Take 1 tablet (50 mg total) by mouth every 8 (eight) hours. 90 tablet 11    ondansetron (ZOFRAN) 4 MG tablet Take 1 tablet (4 mg total) by mouth every 8 (eight) hours as needed for Nausea. 30  tablet 3    pantoprazole (PROTONIX) 40 MG tablet Take 1 tablet (40 mg total) by mouth once daily. 60 tablet 10    warfarin (COUMADIN) 2 MG tablet Current Dose ( 3 mg on Mon, Thu; 2 mg all other days) or as directed by coumadin clinic. (Patient taking differently: 2 mg Daily. Current Dose ( 3 mg on Mon, Thu; 2 mg all other days) or as directed by coumadin clinic.) 102 tablet 3     No current facility-administered medications for this visit.      Review of patient's allergies indicates:   Allergen Reactions    Codeine Nausea And Vomiting    Demerol [meperidine] Nausea And Vomiting    Lortab [hydrocodone-acetaminophen] Nausea And Vomiting       PMHx:  Past Medical History:   Diagnosis Date    Anticoagulant long-term use     Anxiety     Atrial tachycardia, paroxysmal 2013    Blood transfusion     Cardiomyopathy     CHF (congestive heart failure)     Chronic kidney disease     Coronary artery disease     End stage renal disease     Hypertension     pulmonary    ICD (implantable cardioverter-defibrillator) battery depletion 2014    Myocardial infarction     Presence of left ventricular assist device (LVAD)     Pulmonary hypertension     Stroke      Past Surgical History:   Procedure Laterality Date    CARDIAC DEFIBRILLATOR PLACEMENT      CARDIAC SURGERY       SECTION      COLONOSCOPY N/A 2016    Procedure: COLONOSCOPY;  Surgeon: Mark Currie MD;  Location: [facility] ([address] [place] [address]);  Service: Endoscopy;  Laterality: N/A;    CORONARY ARTERY BYPASS GRAFT      FRACTURE SURGERY      HIP SURGERY      RTHA    LEFT VENTRICULAR ASSIST DEVICE  2012    TONSILLECTOMY      VASCULAR SURGERY         Fhx:  Family History   Problem Relation Age of Onset    Arthritis Mother     Heart disease Father     Hypertension Father     Cancer Maternal Grandmother     Breast cancer Neg Hx     Colon cancer Neg Hx     Ovarian cancer Neg Hx        Shx:   Social History     Social  History    Marital status:      Spouse name: N/A    Number of children: N/A    Years of education: N/A     Occupational History    Not on file.     Social History Main Topics    Smoking status: Former Smoker     Packs/day: 2.00     Years: 30.00     Quit date: 10/29/2009    Smokeless tobacco: Not on file    Alcohol use Yes      Comment: 1 glass of wine a month    Drug use: No    Sexual activity: No     Other Topics Concern    Not on file     Social History Narrative       Labs:   Lab Results   Component Value Date    HGBA1C 6.4 (H) 03/29/2012     Lab Results   Component Value Date    TSH 1.996 01/19/2017     Lab Results   Component Value Date    CHOL 189 11/16/2016    CHOL 214 (H) 03/16/2016    CHOL 209 (H) 05/22/2014     Lab Results   Component Value Date    HDL 46 11/16/2016    HDL 51 03/16/2016    HDL 40 05/22/2014     Lab Results   Component Value Date    LDLCALC 121.8 11/16/2016    LDLCALC 141.0 03/16/2016    LDLCALC 144.4 05/22/2014     Lab Results   Component Value Date    TRIG 106 11/16/2016    TRIG 110 03/16/2016    TRIG 123 05/22/2014     Lab Results   Component Value Date    CHOLHDL 24.3 11/16/2016    CHOLHDL 23.8 03/16/2016    CHOLHDL 19.1 (L) 05/22/2014         Imaging:   [facility] 2/4/17:  Noncontrast CT demonstrates no evidence of acute intracranial pathology.    Stable remote thalamic lacunar-type infarct, possible remote right centrum semiovale lacunar-type infarct, and mild sequelae of chronic microvascular ischemic change.    Note: I have independently reviewed any/all imaging/labs/tests and agree with the report (s) as documented.  Any discrepancies will be as noted/demarcated by free text.  MONA GE 3/29/2017      ROS:   Review Of Systems (questions asked, positive or additions in BOLD)  Gen: Weight change, fatigue/malaise, pyrexia   HEENT: Tinnitus, headache,  blurred vision, eye pain, diplopia, photophobia   Card: Palpitations, CP   Pulm: SOB   Vas: Easy bruising, easy bleeding    GI: N/V/D/C, incontinence, hematemesis, hematochezia    : incontinence, hematuria   Endocrine: Temp intolerance, polyuria, polydipsia   M/S: Neck pain, myalgia, back pain, joint pain, falls - once, did not hit head   Neuro: PER HPI   PSY: Memory loss, confusion, depression, anxiety, trouble in sleep         EXAM:   /80 (BP Location: Right arm, Patient Position: Sitting, BP Method: Automatic)  Pulse (!) 45  Ht 5' (1.524 m)  Wt 64 kg (141 lb)  LMP  (LMP Unknown)  BMI 27.54 kg/m2     Well developed, well nourished female  Extremities: no edema  NIH: 3    Mental status:   Awake, alert and appropriately oriented   Normal recent and remote memory   Normal attention and concentration   Normal speech and language   Normal fund of knowledge   + extinction left  Cranial nerves:   PERRLA   EOMF without nystagmus   Trouble with RLQ vision   Normal facial sensation   Facial asymmetry   Intact hearing bilaterally   Palate elevates symmetrically   Normal SCM and trapezius strength   Tongue midline  Motor:   No pronator drift   Normal FF movements bilaterally   Normal muscle tone, bulk and power   No abnormal movements  Sensory   Intact to LT  DTRs   2+ and symmetric  Coordination   Intact to FNF and HTS  Gait   Normal base and gait    Attending, Dr. Dickson, was available during today's encounter. Any change to plan along with cosign to appear in the EMR.         This document has been electronically signed by Carmen Mills MPA, PA-C on 3/29/2017, 12:16 PM. I have personally typed this message using the EMR.       CC: Laura Garvin DO

## 2017-03-29 NOTE — LETTER
April 2, 2017      Miller Restrepo Jr., MD  [address] [facility] [address]           [facility]  Neurology  [address]  [facility] [address]  Phone: [phone]  Fax: [phone]          Patient: Randa Devine   MR Number: [id number]   YOB: 1955   Date of Visit: 3/29/2017       Dear Dr. Miller Restrepo Jr.:    Thank you for referring Randa Devine to me for evaluation. Attached you will find relevant portions of my assessment and plan of care.    If you have questions, please do not hesitate to call me. I look forward to following Randa Devine along with you.    Sincerely,    Carmen Mills PA-C    Enclosure  CC:  No Recipients    If you would like to receive this communication electronically, please contact [email or web address] or [phone] to request more information on [employer] Link access.    For providers and/or their staff who would like to refer a patient to [facility], please contact us through our one-stop-shop provider referral line, [facility], at [phone].    If you feel you have received this communication in error or would no longer like to receive these types of communications, please e-mail [email or web address]

## 2017-03-30 ENCOUNTER — OUTPATIENT CASE MANAGEMENT (OUTPATIENT)
Dept: ADMINISTRATIVE | Facility: OTHER | Age: 62
End: 2017-03-30

## 2017-03-30 NOTE — PROGRESS NOTES
3/20/2017:   left a message on patient's voicemail in an attempt to follow up.    3/16/2017:   spoke to patient via telephone in order to to complete assessment.  Pt expressed financial difficulties due to her medical condition.   contacted the [facility] [employer] and was provided with the food pantry in patient's area ([address] [place] [address]) and was told that there is an Adopt a Senior Program for people with disabilities who are 50 years old.   obtained an application and mailed it to patient.  (Application states 60 and over so I have sent an email to the food bank asking for clarification).   also mailed the Patient Assistance Fund Application.  Patient is over the income guidelines for [employer] [place] and Medicaid per their websites.  Will continue to follow.      3/13/2017:   left a message at [phone]) in an attempt to complete assessment.  Will follow up at a later date.

## 2017-03-31 ENCOUNTER — TELEPHONE (OUTPATIENT)
Dept: TRANSPLANT | Facility: CLINIC | Age: 62
End: 2017-03-31

## 2017-03-31 ENCOUNTER — HOSPITAL ENCOUNTER (INPATIENT)
Facility: HOSPITAL | Age: 62
LOS: 6 days | Discharge: HOME HEALTH | DRG: 377 | End: 2017-04-06
Attending: INTERNAL MEDICINE | Admitting: INTERNAL MEDICINE
Payer: MEDICARE

## 2017-03-31 DIAGNOSIS — Z79.01 LONG TERM CURRENT USE OF ANTICOAGULANT THERAPY: ICD-10-CM

## 2017-03-31 DIAGNOSIS — Z95.811 LEFT VENTRICULAR ASSIST DEVICE PRESENT: ICD-10-CM

## 2017-03-31 DIAGNOSIS — Z99.2 END STAGE RENAL DISEASE ON DIALYSIS: ICD-10-CM

## 2017-03-31 DIAGNOSIS — D64.9 ANEMIA: ICD-10-CM

## 2017-03-31 DIAGNOSIS — Z95.811 HEART REPLACED BY HEART ASSIST DEVICE: Primary | ICD-10-CM

## 2017-03-31 DIAGNOSIS — N18.6 END STAGE RENAL DISEASE ON DIALYSIS: ICD-10-CM

## 2017-03-31 LAB
ALBUMIN SERPL BCP-MCNC: 3.5 G/DL
ALP SERPL-CCNC: 143 U/L
ALT SERPL W/O P-5'-P-CCNC: 21 U/L
ANION GAP SERPL CALC-SCNC: 13 MMOL/L
AST SERPL-CCNC: 31 U/L
BASOPHILS # BLD AUTO: 0.02 K/UL
BASOPHILS NFR BLD: 0.4 %
BILIRUB SERPL-MCNC: 0.6 MG/DL
BUN SERPL-MCNC: 34 MG/DL
CALCIUM SERPL-MCNC: 9.5 MG/DL
CHLORIDE SERPL-SCNC: 97 MMOL/L
CO2 SERPL-SCNC: 30 MMOL/L
CREAT SERPL-MCNC: 6.3 MG/DL
DIFFERENTIAL METHOD: ABNORMAL
EOSINOPHIL # BLD AUTO: 0.1 K/UL
EOSINOPHIL NFR BLD: 2.1 %
ERYTHROCYTE [DISTWIDTH] IN BLOOD BY AUTOMATED COUNT: 15.8 %
EST. GFR  (AFRICAN AMERICAN): 7.7 ML/MIN/1.73 M^2
EST. GFR  (NON AFRICAN AMERICAN): 6.7 ML/MIN/1.73 M^2
GLUCOSE SERPL-MCNC: 84 MG/DL
HCT VFR BLD AUTO: 27.2 %
HGB BLD-MCNC: 8.6 G/DL
INR PPP: 1.7
LDH SERPL L TO P-CCNC: 221 U/L
LYMPHOCYTES # BLD AUTO: 1 K/UL
LYMPHOCYTES NFR BLD: 17.4 %
MCH RBC QN AUTO: 32.8 PG
MCHC RBC AUTO-ENTMCNC: 31.6 %
MCV RBC AUTO: 104 FL
MONOCYTES # BLD AUTO: 0.7 K/UL
MONOCYTES NFR BLD: 13.2 %
NEUTROPHILS # BLD AUTO: 3.7 K/UL
NEUTROPHILS NFR BLD: 66.5 %
PLATELET # BLD AUTO: 115 K/UL
PMV BLD AUTO: 9.8 FL
POTASSIUM SERPL-SCNC: 3.5 MMOL/L
PROT SERPL-MCNC: 7.2 G/DL
PROTHROMBIN TIME: 17.4 SEC
RBC # BLD AUTO: 2.62 M/UL
SODIUM SERPL-SCNC: 140 MMOL/L
WBC # BLD AUTO: 5.59 K/UL

## 2017-03-31 PROCEDURE — 85025 COMPLETE CBC W/AUTO DIFF WBC: CPT

## 2017-03-31 PROCEDURE — 99222 1ST HOSP IP/OBS MODERATE 55: CPT | Mod: ,,, | Performed by: INTERNAL MEDICINE

## 2017-03-31 PROCEDURE — 27000248 HC VAD-ADDITIONAL DAY

## 2017-03-31 PROCEDURE — 80053 COMPREHEN METABOLIC PANEL: CPT

## 2017-03-31 PROCEDURE — 36415 COLL VENOUS BLD VENIPUNCTURE: CPT

## 2017-03-31 PROCEDURE — 25000003 PHARM REV CODE 250: Performed by: PHYSICIAN ASSISTANT

## 2017-03-31 PROCEDURE — 85610 PROTHROMBIN TIME: CPT

## 2017-03-31 PROCEDURE — 83615 LACTATE (LD) (LDH) ENZYME: CPT

## 2017-03-31 PROCEDURE — 20600001 HC STEP DOWN PRIVATE ROOM

## 2017-03-31 RX ORDER — CETIRIZINE HYDROCHLORIDE 5 MG/1
5 TABLET ORAL DAILY
Status: DISCONTINUED | OUTPATIENT
Start: 2017-04-01 | End: 2017-04-06 | Stop reason: HOSPADM

## 2017-03-31 RX ORDER — WARFARIN 2 MG/1
2 TABLET ORAL DAILY
Status: DISCONTINUED | OUTPATIENT
Start: 2017-04-01 | End: 2017-03-31

## 2017-03-31 RX ORDER — ACETAMINOPHEN 325 MG/1
650 TABLET ORAL EVERY 6 HOURS PRN
Status: DISCONTINUED | OUTPATIENT
Start: 2017-03-31 | End: 2017-04-06 | Stop reason: HOSPADM

## 2017-03-31 RX ORDER — GABAPENTIN 300 MG/1
300 CAPSULE ORAL NIGHTLY
Status: DISCONTINUED | OUTPATIENT
Start: 2017-03-31 | End: 2017-04-06 | Stop reason: HOSPADM

## 2017-03-31 RX ORDER — WARFARIN 2 MG/1
2 TABLET ORAL DAILY
Status: DISCONTINUED | OUTPATIENT
Start: 2017-03-31 | End: 2017-04-02

## 2017-03-31 RX ORDER — HYDRALAZINE HYDROCHLORIDE 50 MG/1
50 TABLET, FILM COATED ORAL EVERY 8 HOURS
Status: DISCONTINUED | OUTPATIENT
Start: 2017-03-31 | End: 2017-04-06 | Stop reason: HOSPADM

## 2017-03-31 RX ORDER — ONDANSETRON 4 MG/1
4 TABLET, FILM COATED ORAL EVERY 8 HOURS PRN
Status: DISCONTINUED | OUTPATIENT
Start: 2017-03-31 | End: 2017-04-06 | Stop reason: HOSPADM

## 2017-03-31 RX ORDER — ATORVASTATIN CALCIUM 20 MG/1
40 TABLET, FILM COATED ORAL DAILY
Status: DISCONTINUED | OUTPATIENT
Start: 2017-04-01 | End: 2017-04-06 | Stop reason: HOSPADM

## 2017-03-31 RX ORDER — AMLODIPINE BESYLATE 10 MG/1
10 TABLET ORAL DAILY
Status: DISCONTINUED | OUTPATIENT
Start: 2017-03-31 | End: 2017-04-06 | Stop reason: HOSPADM

## 2017-03-31 RX ORDER — HYDRALAZINE HYDROCHLORIDE 20 MG/ML
5 INJECTION INTRAMUSCULAR; INTRAVENOUS
Status: DISCONTINUED | OUTPATIENT
Start: 2017-04-01 | End: 2017-04-06 | Stop reason: HOSPADM

## 2017-03-31 RX ORDER — AMLODIPINE BESYLATE 10 MG/1
10 TABLET ORAL DAILY
Status: DISCONTINUED | OUTPATIENT
Start: 2017-04-01 | End: 2017-03-31

## 2017-03-31 RX ORDER — ESCITALOPRAM OXALATE 20 MG/1
20 TABLET ORAL NIGHTLY
Status: DISCONTINUED | OUTPATIENT
Start: 2017-03-31 | End: 2017-04-06 | Stop reason: HOSPADM

## 2017-03-31 RX ORDER — PANTOPRAZOLE SODIUM 40 MG/1
40 TABLET, DELAYED RELEASE ORAL DAILY
Status: DISCONTINUED | OUTPATIENT
Start: 2017-04-01 | End: 2017-04-01

## 2017-03-31 RX ADMIN — HYDRALAZINE HYDROCHLORIDE 50 MG: 50 TABLET ORAL at 09:03

## 2017-03-31 RX ADMIN — ACETAMINOPHEN 650 MG: 325 TABLET ORAL at 09:03

## 2017-03-31 RX ADMIN — GABAPENTIN 300 MG: 300 CAPSULE ORAL at 09:03

## 2017-03-31 RX ADMIN — AMLODIPINE BESYLATE 10 MG: 10 TABLET ORAL at 05:03

## 2017-03-31 RX ADMIN — ESCITALOPRAM 20 MG: 20 TABLET, FILM COATED ORAL at 09:03

## 2017-03-31 NOTE — PROGRESS NOTES
Patient admitted to CSU, vitals taken, oriented to room and call bell, tele applied, IV inserted, Dr. Najera notified of patient's arrival to floor.

## 2017-03-31 NOTE — CONSULTS
Consult Note  Nephrology    Consult Requested By: Caitlyn Ackerman MD  Reason for Consult: ESRD (TTS)    SUBJECTIVE:     History of Present Illness:  A 61 y.o. female with PMH of CAD s/p CABG, chronic combined CHF / ICM (EF 10-15%) s/p LVAD, ESRD on HD via left arm AVF (TTS), Lower GIB secondary to cecal AVM s/p APC (2016), Paroxysmal AF, HTN and CVA (2017) who presents to the hospital due to c/o progressively worsening SOB and BRBPR. She was recently discharged after being treated for HTN and volume overload. She was admitted and had two consecutive days of HD and discharged home. Patient reports since discharge she presented to her outpatient dialysis center and for her last two sessions they haven't been able to pull any fluid off because her BP was low; however, she reports BP has been normal for her. She also complaints that has been experiencing BRBPR but that that has been chronic for her.     Past Medical History:   Diagnosis Date    Anticoagulant long-term use     Anxiety     Atrial tachycardia, paroxysmal 2013    Blood transfusion     Cardiomyopathy     CHF (congestive heart failure)     Chronic kidney disease     Coronary artery disease     End stage renal disease     Hypertension     pulmonary    ICD (implantable cardioverter-defibrillator) battery depletion 2014    Myocardial infarction     Presence of left ventricular assist device (LVAD)     Pulmonary hypertension     Stroke      Past Surgical History:   Procedure Laterality Date    CARDIAC DEFIBRILLATOR PLACEMENT      CARDIAC SURGERY       SECTION      COLONOSCOPY N/A 2016    Procedure: COLONOSCOPY;  Surgeon: Mark Currie MD;  Location: [address]);  Service: Endoscopy;  Laterality: N/A;    CORONARY ARTERY BYPASS GRAFT      FRACTURE SURGERY      HIP SURGERY      RTHA    LEFT VENTRICULAR ASSIST DEVICE  2012    TONSILLECTOMY      VASCULAR SURGERY       Family History   Problem  Relation Age of Onset    Arthritis Mother     Heart disease Father     Hypertension Father     Cancer Maternal Grandmother     Breast cancer Neg Hx     Colon cancer Neg Hx     Ovarian cancer Neg Hx      Social History   Substance Use Topics    Smoking status: Former Smoker     Packs/day: 2.00     Years: 30.00     Quit date: 10/29/2009    Smokeless tobacco: None    Alcohol use Yes      Comment: 1 glass of wine a month       Review of patient's allergies indicates:   Allergen Reactions    Codeine Nausea And Vomiting    Demerol [meperidine] Nausea And Vomiting    Lortab [hydrocodone-acetaminophen] Nausea And Vomiting        Review of Systems:  Constitutional: Negative for fever, appetite change and fatigue.  HENT: Negative for hearing loss, sore throat and mouth sores.  Eyes: Negative for photophobia, pain and visual disturbance.  Respiratory: Negative for cough, chest tightness. Positive for shortness of breath.  Cardiovascular: Negative for chest pain, palpitations and leg swelling.  Gastrointestinal: Negative for nausea, vomiting, abdominal pain, diarrhea, constipation. Positive for blood in stool and abdominal distention.  Musculoskeletal: Negative for back pain, joint swelling, arthralgias and gait problem.  Skin: Negative for pallor, rash and wound.  Neurological: Negative for dizziness, tremors, syncope, weakness, light-headedness and headaches.  Hematological: Negative for adenopathy. Does not bruise/bleed easily.  Psychiatric/Behavioral: Negative for confusion, sleep disturbance and dysphoric mood. The patient is not nervous/anxious.    OBJECTIVE:     Vital Signs (Most Recent)  Temp: 98.3 °F (36.8 °C) (03/31/17 1602)  Pulse: 90 (03/31/17 1602)  Resp: 18 (03/31/17 1602)  BP: (!) 94/0 (03/31/17 1603)  SpO2: 96 % (03/31/17 1602)    Vital Signs Range (Last 24H):  Temp:  [98.3 °F (36.8 °C)]   Pulse:  [90]   Resp:  [18]   BP: ()/(0-78)   SpO2:  [96 %]     Physical Exam:  General: WF appears ill.   AOx4.  NAD.  Respiratory: Clear to auscultation bilaterally, respirations unlabored, no rales/rhonchi/wheezing  Cardiovacular: LVAD hum  Gastrointestinal: Soft, non-tender, bowel sounds normal  Extremities: No clubbing or cyanosis of bilateral upper extremities; no lower extremity edema bilaterally  Skin: warm and dry; no rash on exposed skin    Laboratory:  CBC:     Recent Labs  Lab 03/31/17  1618   WBC 5.59   RBC 2.62*   HGB 8.6*   HCT 27.2*   *   *   MCH 32.8*   MCHC 31.6*     BMP: No results for input(s): GLU, CL, CO2, BUN, CREATININE, CALCIUM, MG in the last 168 hours.    Invalid input(s): SODIUM, POTASSIUM        ASSESSMENT/PLAN:     A 61 y.o. female with PMH of CAD s/p CABG, chronic combined CHF / ICM (EF 10-15%) s/p LVAD, ESRD on HD via left arm AVF (TTS), Lower GIB secondary to cecal AVM s/p APC (04/2016), Paroxysmal AF, HTN and CVA (01/2017) who presents to the hospital due to c/o progressively worsening SOB and BRBPR. We are consulted for backup HD.     ESRD TTS  - Dialyzes in [place] at [facility].  - Last HD yesterday   - No laboratories available  - oxygenating well on 2L NC  - Will plan for HD tomorrow as patient normal schedule     Keron Rubio   Nephrology fellow PGY- 5  [phone]    Patient seen and examined with Dr Rubio;  I have reviewed and agree with assessment and plan

## 2017-03-31 NOTE — TELEPHONE ENCOUNTER
"1035am - Pt paged on call VAD coordinator.  Pt verbalizes SOB and bleeding from rectum.  Pt reports "they didn't pull any fluid off the last 2 times in dialysis".  Pt sound very SOB on the phone.  Pt instructed to go to local ER, pt verbalizes she would rather just be admitted.  Called and spoke to Dr. Amaro, plan is to Direct admit pt.  Plan discussed with pt and pt verbalizes understanding and in agreement of plan.   "

## 2017-03-31 NOTE — PLAN OF CARE
Problem: Patient Care Overview  Goal: Plan of Care Review  Outcome: Ongoing (interventions implemented as appropriate)  Plan of care discussed with patient. Patient is free of fall/trauma/injury. Denies CP, SOB, or pain/discomfort. Amlodipine given because patient did not take AM meds. All questions addressed. Will continue to monitor.

## 2017-03-31 NOTE — H&P
History & Physical  Cardiology      SUBJECTIVE:     History of Present Illness:  Patient is a 61 y.o. female presents with SOB.  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  She was recently discharged f after being treated for HTN and volume overload.  She was admitted and had two consecutive days of HD and discharged home.  Patient reports since discharge she presented to her outpatient dialysis center and for her last two sessions they did not pull any fluid off because her BP was low; however, she reports BP has been normal for her.  She reports now she feels SOB and volume overloaded.  She reports assoicated weakness.     She also complaints of bright red blood per rectum.  She reports these symptoms have been going on for about a week.  She denies change in bowel habits and dark tarry stools.       [START ON 4/1/2017] amlodipine  10 mg Oral Daily    [START ON 4/1/2017] atorvastatin  40 mg Oral Daily    [START ON 4/1/2017] cetirizine  5 mg Oral Daily    escitalopram oxalate  20 mg Oral QHS    gabapentin  300 mg Oral QHS    hydrALAZINE  50 mg Oral Q8H    [START ON 4/1/2017] pantoprazole  40 mg Oral Daily    [START ON 4/1/2017] warfarin  2 mg Oral Daily       Review of patient's allergies indicates:   Allergen Reactions    Codeine Nausea And Vomiting    Demerol [meperidine] Nausea And Vomiting    Lortab [hydrocodone-acetaminophen] Nausea And Vomiting       Past Medical History:   Diagnosis Date    Anticoagulant long-term use     Anxiety     Atrial tachycardia, paroxysmal 4/21/2013    Blood transfusion     Cardiomyopathy     CHF (congestive heart failure)     Chronic kidney disease      Coronary artery disease     End stage renal disease     Hypertension     pulmonary    ICD (implantable cardioverter-defibrillator) battery depletion 2014    Myocardial infarction     Presence of left ventricular assist device (LVAD)     Pulmonary hypertension     Stroke      Past Surgical History:   Procedure Laterality Date    CARDIAC DEFIBRILLATOR PLACEMENT      CARDIAC SURGERY       SECTION      COLONOSCOPY N/A 2016    Procedure: COLONOSCOPY;  Surgeon: Mark Currie MD;  Location: [facility] ([address] [place] [address]);  Service: Endoscopy;  Laterality: N/A;    CORONARY ARTERY BYPASS GRAFT      FRACTURE SURGERY      HIP SURGERY      RTHA    LEFT VENTRICULAR ASSIST DEVICE  2012    TONSILLECTOMY      VASCULAR SURGERY       Family History   Problem Relation Age of Onset    Arthritis Mother     Heart disease Father     Hypertension Father     Cancer Maternal Grandmother     Breast cancer Neg Hx     Colon cancer Neg Hx     Ovarian cancer Neg Hx      Social History   Substance Use Topics    Smoking status: Former Smoker     Packs/day: 2.00     Years: 30.00     Quit date: 10/29/2009    Smokeless tobacco: Not on file    Alcohol use Yes      Comment: 1 glass of wine a month        Review of Systems:  See above HPI     OBJECTIVE:     Vital Signs (Most Recent)  Temp: 98.3 °F (36.8 °C) (17 1602)  Pulse: 90 (17 1602)  Resp: 18 (17 1602)  BP: (!) 94/0 (17 1603)  SpO2: 96 % (17 1602)    Vital Signs Range (Last 24H):  Temp:  [98.3 °F (36.8 °C)]   Pulse:  [90]   Resp:  [18]   BP: ()/(0-78)   SpO2:  [96 %]   No intake or output data in the 24 hours ending 17 1624    Physical Exam:  Constitutional: sitting in bed NAD  Neck: JVD to mid neck with HJR  Cardiovascular: smoothe VAD hum  Pulmonary/Chest: CTA  Abdominal: + BS, exhibits no distension. There is no tenderness. There is no rebound.   Extremities: no cyanosis, clubbing, 1+ edema lower  extremities bilaterally       Laboratory:  pending      ASSESSMENT/PLAN:     61 y.o. female presents with SOB.  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 pending today today.   -LDH is pending Will continue to monitor daily.  -Antiplatelets d/c 6/25/14 likely secondary to GI loss  -Speed set at 2600  -Interrogation notable for n/a  -Implanted as desination therapy   -volume overloaded on exam: nephrology consulted for HD    Acute on Chronic Systolic/Diastolic/ICM EF 10-15%  -volume management with HD    ESRD- on HD  -Nephrology consulted for HD    Hypertension  -Will continue home antihypertensives for now: Hydralazine and Norvasc  -will monitor and hold if BP low    Anemia  -hx lower GIB secondary to cecal AVM s/p APC 4/2016  -reporting bright red blood.    -CBC is pending; will get iron studies, FOBT and consult GI if H/H low    Paroxysmal Atrial Fib  -ECG pending  -Anticoagulated on Coumadin    Hx CVA  -PT/OT consulted    Depression  -continued home dose of Lexapro    Ilana Kramer PA-C  Ext: [phone]

## 2017-03-31 NOTE — IP AVS SNAPSHOT
[facility]  [address]  [facility] [address]  Phone: [phone]           Patient Discharge Instructions   Our goal is to set you up for success. This packet includes information on your condition, medications, and your home care.  It will help you care for yourself to prevent having to return to the hospital.     Please ask your nurse if you have any questions.      There are many details to remember when preparing to leave the hospital. Here is what you will need to do:    1. Take your medicine. If you are prescribed medications, review your Medication List on the following pages. You may have new medications to  at the pharmacy and others that you'll need to stop taking. Review the instructions for how and when to take your medications. Talk with your doctor or nurses if you are unsure of what to do.     2. Go to your follow-up appointments. Specific follow-up information is listed in the following pages. Your may be contacted by a nurse or clinical provider about future appointments. Be sure we have all of the phone numbers to reach you. Please contact your provider's office if you are unable to make an appointment.     3. Watch for warning signs. Your doctor or nurse will give you detailed warning signs to watch for and when to call for assistance. These instructions may also include educational information about your condition. If you experience any of warning signs to your health, call your doctor.           [facility]  Unless otherwise directed by your provider, please   contact [facility], our nurse care line   that is available for 24/7 assistance.     [phone] (toll-free)     Registered nurses in the [facility]   provide: appointment scheduling, clinical advisement, health education, and other advisory services.                  ** Verify the list of medication(s) below is accurate and up to date. Carry this with you in case of  emergency. If your medications have changed, please notify your healthcare provider.             Medication List      CHANGE how you take these medications        Additional Info    Begin Date AM Noon PM Bedtime    warfarin 2 MG tablet   Commonly known as:  COUMADIN   Quantity:  102 tablet   Refills:  3   What changed:  additional instructions   Comments:    - Rx request was called in to Jaimee at [facility] ph. # [phone]    -    (90) day supply    - Dr. Charanjit Sawant    Last time this was given:  3 mg on 4/5/2017  7:16 PM   Instructions:  5mg tonight only (4/6) followed by 2mg/day                    5 mg dose for 4/6 only. 2 mg on following days.             CONTINUE taking these medications        Additional Info    Begin Date AM Noon PM Bedtime    acetaminophen 325 MG tablet   Commonly known as:  TYLENOL   Refills:  0   Dose:  650 mg    Last time this was given:  650 mg on 4/3/2017  9:20 AM   Instructions:  Take 2 tablets (650 mg total) by mouth every 6 (six) hours as needed for Pain (mild pain).                            amlodipine 10 MG tablet   Commonly known as:  NORVASC   Quantity:  30 tablet   Refills:  11   Dose:  10 mg    Last time this was given:  10 mg on 4/6/2017  8:56 AM   Instructions:  Take 1 tablet (10 mg total) by mouth once daily.                               ANTACID CALCIUM ORAL   Refills:  0   Dose:  1 tablet    Last time this was given:  500 mg on 4/6/2017  8:56 AM   Instructions:  Take 1 tablet by mouth 4 (four) times daily.                               atorvastatin 40 MG tablet   Commonly known as:  LIPITOR   Quantity:  90 tablet   Refills:  3   Dose:  40 mg    Last time this was given:  40 mg on 4/6/2017  8:56 AM   Instructions:  Take 1 tablet (40 mg total) by mouth once daily.                               cetirizine 5 MG tablet   Commonly known as:  ZYRTEC   Quantity:  30 tablet   Refills:  0   Dose:  5 mg    Last time this was given:  5 mg on 4/6/2017  8:56 AM    Instructions:  Take 1 tablet (5 mg total) by mouth once daily.                               escitalopram oxalate 20 MG tablet   Commonly known as:  LEXAPRO   Quantity:  90 tablet   Refills:  3   Dose:  20 mg    Last time this was given:  20 mg on 4/5/2017  9:18 PM   Instructions:  Take 1 tablet (20 mg total) by mouth every evening.                               gabapentin 300 MG capsule   Commonly known as:  NEURONTIN   Quantity:  120 capsule   Refills:  2   Dose:  300 mg    Last time this was given:  300 mg on 4/5/2017  9:19 PM   Instructions:  Take 1 capsule (300 mg total) by mouth every evening.                               hydrALAZINE 50 MG tablet   Commonly known as:  APRESOLINE   Quantity:  90 tablet   Refills:  11   Dose:  50 mg    Last time this was given:  50 mg on 4/6/2017  6:16 AM   Instructions:  Take 1 tablet (50 mg total) by mouth every 8 (eight) hours.                                     ondansetron 4 MG tablet   Commonly known as:  ZOFRAN   Quantity:  30 tablet   Refills:  3   Dose:  4 mg    Last time this was given:  4 mg on 4/3/2017  9:10 AM   Instructions:  Take 1 tablet (4 mg total) by mouth every 8 (eight) hours as needed for Nausea.                            pantoprazole 40 MG tablet   Commonly known as:  PROTONIX   Quantity:  60 tablet   Refills:  10   Dose:  40 mg    Last time this was given:  40 mg on 4/6/2017  8:56 AM   Instructions:  Take 1 tablet (40 mg total) by mouth once daily.                                    Where to Get Your Medications      Information about where to get these medications is not yet available     ! Ask your nurse or doctor about these medications     warfarin 2 MG tablet                  Please bring to all follow up appointments:    1. A copy of your discharge instructions.  2. All medicines you are currently taking in their original bottles.  3. Identification and insurance card.    Please arrive 15 minutes ahead of scheduled appointment time.    Please  call 24 hours in advance if you must reschedule your appointment and/or time.        Your Scheduled Appointments     May 25, 2017  8:00 AM CDT   Remote Interrogation with HOME MONITOR DEVICE CHECK, [facility]   [facility] Tuttle - Arrhythmia ([facility] )    [address]  [facility] [id number] [phone]                  Discharge Instructions         Colonoscopy     A camera attached to a flexible tube with a viewing lens is used to take video pictures.     Colonoscopy is a test to view the inside of your lower digestive tract (colon and rectum). Sometimes it can show the last part of the small intestine (ileum). During the test, small pieces of tissue may be removed for testing. This is called a biopsy. Small growths, such as polyps, may also be removed.   Why is colonoscopy done?  The test is done to help look for colon cancer. And it can help find the source of abdominal pain, bleeding, and changes in bowel habits. It may be needed once a year, depending on factors such as your:  · Age  · Health history  · Family health history  · Symptoms  · Results from any prior colonoscopy  Risks and possible complications  These include:  · Bleeding               · A puncture or tear in the colon   · Risks of anesthesia  · A cancer lesion not being seen  Getting ready   To prepare for the test:  · Talk with your healthcare provider about the risks of the test (see below). Also ask your healthcare provider about alternatives to the test.  · Tell your healthcare provider about any medicines you take. Also tell him or her about any health conditions you may have.  · Make sure your rectum and colon are empty for the test. Follow the diet and bowel prep instructions exactly. If you dont, the test may need to be rescheduled.  · Plan for a friend or family member to drive you home after the test.     Colonoscopy provides an inside view of the entire colon.     You may discuss the results with your doctor right away or  at a future visit.  During the test   The test is usually done in the hospital on an outpatient basis. This means you go home the same day. The procedure takes about 30 minutes. During that time:  · You are given relaxing (sedating) medicine through an IV line. You may be drowsy, or fully asleep.  · The healthcare provider will first give you a physical exam to check for anal and rectal problems.  · Then the anus is lubricated and the scope inserted.  · If you are awake, you may have a feeling similar to needing to have a bowel movement. You may also feel pressure as air is pumped into the colon. Its OK to pass gas during the procedure.  · Biopsy, polyp removal, or other treatments may be done during the test.  After the test   You may have gas right after the test. It can help to try to pass it to help prevent later bloating. Your healthcare provider may discuss the results with you right away. Or you may need to schedule a follow-up visit to talk about the results. After the test, you can go back to your normal eating and other activities. You may be tired from the sedation and need to rest for a few hours.  Date Last Reviewed: 11/1/2016  © 5240-4298 [employer]. [address] [place] [address], [place] [address]. All rights reserved. This information is not intended as a substitute for professional medical care. Always follow your healthcare professional's instructions.              Primary Diagnosis     Your primary diagnosis was:  Chronic Kidney Disease Requiring Chronic Dialysis      Admission Information     Date & Time Provider Department [facility]    3/31/2017  3:46 PM Caitlyn Ackerman MD [facility] [id number]      Care Providers     Provider Role Specialty Primary office phone    Caitlyn Ackerman MD Attending Provider Cardiology [phone]    Tello Garcia MD Consulting Physician  Nephrology [phone]    Charanjit Sawant MD Team Attending  Cardiology [phone]    [facility]  MD Donta Surgeon  Gastroenterology [phone]      Your Vitals Were     BP Pulse Temp Resp Height Weight    76/0 (BP Location: Right arm, Patient Position: Lying, BP Method: Doppler) 79 98 °F (36.7 °C) (Oral) 18 5' (1.524 m) 59 kg (130 lb 1.1 oz)    Last Period SpO2 BMI          (LMP Unknown) 99% 25.4 kg/m2        Recent Lab Values        1/20/2012 3/29/2012                        5:20 AM  4:15 AM          A1C 6.4 (H) 6.4 (H)                     Pending Labs     Order Current Status    Prepare RBC 2 Units; low hgb Preliminary result      Allergies as of 4/6/2017        Reactions    Codeine Nausea And Vomiting    Demerol [Meperidine] Nausea And Vomiting    Lortab [Hydrocodone-acetaminophen] Nausea And Vomiting      Advance Directives     An advance directive is a document which, in the event you are no longer able to make decisions for yourself, tells your healthcare team what kind of treatment you do or do not want to receive, or who you would like to make those decisions for you.  If you do not currently have an advance directive, [facility] encourages you to create one.  For more information call:  [phone] ([phone]), [phone] ([phone]),  or log on to [email or web address].        Smoking Cessation     If you would like to quit smoking:   You may be eligible for free services if you are a [place] resident and started smoking cigarettes before September 1, 1988.  Call the Smoking Cessation Trust ([facility]) toll free at [phone] or ([phone] [phone].   Call 5-800-QUIT-NOW if you do not meet the above criteria.   Contact us via email: [email or web address]   View our website for more information: [email or web address]        Language Assistance Services     ATTENTION: Language assistance services are available, free of charge. Please call [phone].      ATENCIÓN: Si habla español, tiene a sanchez disposición servicios gratuitos de asistencia lingüística. Llame al  [phone].     [facility] Ý: N?u b?n nói Ti?ng Vi?t, có các d?ch v? h? tr? ngôn ng? mi?n phí dành cho b?n. G?i s? [phone].        Blood Transfusion Reaction Signs and Symptoms     The blood you have received has been matched for you as carefully as possible. Most patients who receive a blood transfusion do not experience problems. However, there can be a delayed reaction that happens a few weeks after your blood transfusion. Contact your physician immediately if you experience any NEW SYMPTOMS listed below:     Fever greater than 100.4 degrees    Chills   Yellow color to your skin or eyes(Jaundice)   Back pain, chest pain, or pain at the infusion site   Weakness (more than usual)   Discomfort or uneasiness more than usual (Malaise)   Nausea or vomiting   Shortness of breath, wheezing, or coughing   Higher or lower blood pressure than normal   Skin rash, itching, skin redness, or localized swelling (example: hands or feet)   Urinating less than normal   Urine appears reddish or orange and is darker than normal      Remember that some these signs may already exist for you--such as having chronic back pain or high blood pressure. You only need to look for and report to your doctor any new occurrences since your blood transfusion that are of concern.        Stroke Education              Heart Failure Education       Heart Failure: Being Active  You have a condition called heart failure. Being active doesnt mean that you have to wear yourself out. Even a little movement each day helps to strengthen your heart. If you cant get out to exercise, you can do simple stretching and strengthening exercises at home. These are good ways to keep you well-conditioned and prevent you and your heart from becoming excessively weak.    Ideas to get you started  · Add a little movement to things you do now. Walk to mail letters. Park your car at the far end of the parking lot and walk to the store. Walk up a flight of  stairs instead of taking the elevator.  · Choose activities you enjoy. You might walk, swim, or ride an exercise bike. Things like gardening and washing the car count, too. Other possibilities include: washing dishes, walking the dog, walking around the mall, and doing aerobic activities with friends.  · Join a group exercise program at a [facility] or [facility], a senior center, or a community center. Or look into a hospital cardiac rehabilitation program. Ask your doctor if you qualify.  Tips to keep you going  · Get up and get dressed each day. Go to a coffee shop and read a newspaper or go somewhere that you'll be in the presence of other active people. Youll feel more like being active.  · Make a plan. Choose one or more activities that you enjoy and that you can easily do. Then plan to do at least one each day. You might write your plan on a calendar.  · Go with a friend or a group if you like company. This can help you feel supported and stay motivated, too.  · Plan social events that you enjoy. This will keep you mentally engaged as well as physically motivated to do things you find pleasure in.  For your safety  · Talk with your healthcare provider before starting an exercise program.  · Exercise indoors when its too hot or too cold outside, or when the air quality is poor. Try walking at a shopping mall.  · Wear socks and sturdy shoes to maintain your balance and prevent falls.  · Start slowly. Do a few minutes several times a day at first. Increase your time and speed little by little.  · Stop and rest whenever you feel tired or get short of breath.  · Dont push yourself on days when you dont feel well.  Date Last Reviewed: 3/20/2016  © 1814-8844 [employer]. [address] [place] [address], [place] [address]. All rights reserved. This information is not intended as a substitute for professional medical care. Always follow your healthcare professional's instructions.              Heart Failure: Evaluating  Your Heart  You have a condition called heart failure. To evaluate your condition, your doctor will examine you, ask questions, and do some tests. Along with looking for signs of heart failure, the doctor looks for any other health problems that may have led to heart failure. The results of your evaluation will help your doctor form a treatment plan.  Health history and physical exam  Your visit will start with a health history. Tell the doctor about any symptoms youve noticed and about all medicines you take. Then youll have a physical exam. This includes listening to your heartbeat and breathing. Youll also be checked for swelling (edema) in your legs and neck. When you have fluid buildup or fluid in the lungs, it may be called congestive heart failure.  Diagnosing heart failure     During an echocardiogram, sound waves bounce off the heart. These are converted into a picture on the screen.   The following may be done to help your doctor form a diagnosis:  · X-rays show the size and shape of your heart. These pictures can also show fluid in your lungs.  · An electrocardiogram (ECG or EKG) shows the pattern of your heartbeat. Small pads (electrodes) are placed on your chest, arms, and legs. Wires connect the pads to the ECG machine, which records your hearts electrical signals. This can give the doctor information about heart function.  · An echocardiogram uses ultrasound waves to show the structure and movement of your heart muscle. This shows how well the heart pumps. It also shows the thickness of the heart walls, and if the heart is enlarged. It is one of the most useful, non-invasive tests as it provides information about the heart's general function. This helps your doctor make treatment decisions.  · Lab tests evaluate small amounts of blood or urine for signs of problems. A BNP lab test can help diagnose and evaluate heart failure. BNP stands for B-type natriuretic peptide. The ventricles secrete more  BNP when heart failure worsens. Lab tests can also provide information about metabolic dysfunction or heart dysfunction.  Your treatment plan  Based on the results of your evaluation and tests, your doctor will develop a treatment plan. This plan is designed to relieve some of your heart failure symptoms and help make you more comfortable. Your treatment plan may include:  · Medicine to help your heart work better and improve your quality of life  · Changes in what you eat and drink to help prevent fluid from backing up in your body  · Daily monitoring of your weight and heart failure symptoms to see how well your treatment plan is working  · Exercise to help you stay healthy  · Help with quitting smoking  · Emotional and psychological support to help adjust to the changes  · Referrals to other specialists to make sure you are being treated comprehensively  Date Last Reviewed: 3/21/2016  © 2526-4710 [employer]. [address] [place] [address], [place] [address]. All rights reserved. This information is not intended as a substitute for professional medical care. Always follow your healthcare professional's instructions.              Heart Failure: Making Changes to Your Diet  You have a condition called heart failure. When you have heart failure, excess fluid is more likely to build up in your body because your heart isn't working well. This makes the heart work harder to pump blood. Fluid buildup causes symptoms such as shortness of breath and swelling (edema). This is often referred to as congestive heart failure or CHF. Controlling the amount of salt (sodium) you eat may help stop fluid from building up. Your doctor may also tell you to reduce the amount of fluid you drink.  Reading food labels    Your healthcare provider will tell you how much sodium you can eat each day. Read food labels to keep track. Keep in mind that certain foods are high in salt. These include canned, frozen, and processed foods.  Check the amount of sodium in each serving. Watch out for high-sodium ingredients. These include MSG (monosodium glutamate), baking soda, and sodium phosphate.   Eating less salt  Give yourself time to get used to eating less salt. It may take a little while. Here are some tips to help:  · Take the saltshaker off the table. Replace it with salt-free herb mixes and spices.  · Eat fresh or plain frozen vegetables. These have much less salt than canned vegetables.  · Choose low-sodium snacks like sodium-free pretzels, crackers, or air-popped popcorn.  · Dont add salt to your food when youre cooking. Instead, season your foods with pepper, lemon, garlic, or onion.  · When you eat out, ask that your food be cooked without added salt.  · Avoid eating fried foods as these often have a great deal of salt.  If youre told to limit fluids  You may need to limit how much fluid you have to help prevent swelling. This includes anything that is liquid at room temperature, such as ice cream and soup. If your doctor tells you to limit fluid, try these tips:  · Measure drinks in a measuring cup before you drink them. This will help you meet daily goals.  · Chill drinks to make them more refreshing.  · Suck on frozen lemon wedges to quench thirst.  · Only drink when youre thirsty.  · Chew sugarless gum or suck on hard candy to keep your mouth moist.  · Weigh yourself daily to know if your body's fluid content is rising.  My sodium goal  Your healthcare provider may give you a sodium goal to meet each day. This includes sodium found in food as well as salt that you add. My goal is to eat no more than ___________ mg of sodium per day.     When to call your doctor  Call your doctor right away if you have any symptoms of worsening heart failure. These can include:  · Sudden weight gain  · Increased swelling of your legs or ankles  · Trouble breathing when youre resting or at night  · Increase in the number of pillows you have to  sleep on  · Chest pain, pressure, discomfort, or pain in the jaw, neck, or back   Date Last Reviewed: 3/21/2016  © 1434-3532 [employer]. [address] [place] [address], [place] [address]. All rights reserved. This information is not intended as a substitute for professional medical care. Always follow your healthcare professional's instructions.              Heart Failure: Medicines to Help Your Heart    You have a condition called heart failure (also known as congestive heart failure, or CHF). Your doctor will likely prescribe medicines for heart failure and any underlying health problems you have. Most heart failure patients take one or more types of medicinen. Your healthcare provider will work to find the combination of medicines that works best for you.  Heart failure medicines  Here are the most common heart failure medicines:  · ACE inhibitors lower blood pressure and decrease strain on the heart. This makes it easier for the heart to pump. Angiotensin receptor blockers have similar effects. These are prescribed for some patients instead of ACE inhibitors.  · Beta-blockers relieve stress on the heart. They also improve symptoms. They may also improve the heart's pumping action over time.  · Diuretics (also called water pills) help rid your body of excess water. This can help rid your body of swelling (edema). Having less fluid to pump means your heart doesnt have to work as hard. Some diuretics make your body lose a mineral called potassium. Your doctor will tell you if you need to take supplements or eat more foods high in potassium.  · Digoxin helps your heart pump with more strength. This helps your heart pump more blood with each beat. So, more oxygen-rich blood travels to the rest of the body.  · Aldosterone antagonists help alter hormones and decrease strain on the heart.  · Hydralazine and nitrates are two separate medicines used together to treat heart failure. They may come in one  combination pill. They lower blood pressure and decrease how hard the heart has to pump.  Medicines for related conditions  Controlling other heart problems helps keep heart failure under control, too. Depending on other heart problems you have, medicines may be prescribed to:  · Lower blood pressure (antihypertensives).  · Lower cholesterol levels (statins).  · Prevent blood clots (anticoagulants or aspirin).  · Keep the heartbeat steady (antiarrhythmics).  Date Last Reviewed: 3/5/2016  © 0056-3012 [employer]. [address] [place] [address]. All rights reserved. This information is not intended as a substitute for professional medical care. Always follow your healthcare professional's instructions.              Heart Failure: Procedures That May Help    The heart is a muscle that pumps oxygen-rich blood to all parts of the body. When you have heart failure, the heart is not able to pump as well as it should. Blood and fluid may back up into the lungs (congestive heart failure), and some parts of the body dont get enough oxygen-rich blood to work normally. These problems lead to the symptoms of heart failure.     Certain procedures may help the heart pump better in some cases of heart failure. Some procedures are done to treat health problems that may have caused the heart failure such as coronary artery disease or heart rhythm problems. For more serious heart failure, other options are available.  Treating artery and valve problems  If you have coronary artery disease or valve disease, procedures may be done to improve blood flow. This helps the heart pump better, which can improve heart failure symptoms. First, your doctor may do a cardiac catheterization to help detect clogged blood vessels or valve damage. During this procedure, a  thin tube (catheter) in inserted into a blood vessel and guided to the heart. There a dye is injected and a special type of X-ray (angiogram) is taken of  the blood vessels. Procedures to open a blocked artery or fix damaged valves can also be done using catheterization.  · Angioplasty uses a balloon-tipped instrument at the end of the catheter. The balloon is inflated to widen the narrowed artery. In many cases, a stent is expanded to further support the narrowed artery. A stent is a metal mesh tube.  · Valve surgery repairs or replacement of faulty valves can also be done during catheterization so blood can flow properly through the chambers of the heart.  Bypass surgery is another option to help treat blocked arteries. It uses a healthy blood vessel from elsewhere in the body. The healthy blood vessel is attached above and below the blocked area so that blood can flow around the blocked artery.  Treating heart rhythm problems  A device may be placed in the chest to help a weak heart maintain a healthy, heartbeat so the heart can pump more effectively:  · Pacemaker. A pacemaker is an implanted device that regulates your heartbeat electronically. It monitors your heart's rhythm and generates a painless electric impulse that helps the heart beat in a regular rhythm. A pacemaker is programmed to meet your specific heart rhythm needs.  · Biventricular pacing/cardiac resynchronization therapy. A type of pacemaker that paces both pumping chambers of the heart at the same time to coordinate contractions and to improve the heart's function. Some people with heart failure are candidates for this therapy.  · Implantable cardioverter defibrillator. A device similar to a pacemaker that senses when the heart is beating too fast and delivers an electrical shock to convert the fast rhythm to a normal rhythm. This can be a life saving device.  In severe cases  In more serious cases of heart failure when other treatments no longer work, other options may include:  · Ventricular assist devices (VADs). These are mechanical devices used to take over the pumping function for one or both  of the heart's ventricles, or pumping chambers. A VAD may be necessary when heart failure progresses to the point that medicines and other treatments no longer help. In some cases, a VAD may be used as a bridge to transplant.  · Heart transplant. This is replacing the diseased heart with a healthy one from a donor. This is an option for a few people who are very sick. A heart transplant is very serious and not an option for all patients. Your doctor can tell you more.  Date Last Reviewed: 3/20/2016  © 9747-4382 [employer]. [address] [place] [address]. All rights reserved. This information is not intended as a substitute for professional medical care. Always follow your healthcare professional's instructions.              Heart Failure: Tracking Your Weight  You have a condition called heart failure. When you have heart failure, a sudden weight gain or a steady rise in weight is a warning sign that your body is retaining too much water and salt. This could mean your heart failure is getting worse. If left untreated, it can cause problems for your lungs and result in shortness of breath. Weighing yourself each day is the best way to know if youre retaining water. If your weight goes up quickly, call your doctor. You will be given instructions on how to get rid of the excess water. You will likely need medicines and to avoid salt. This will help your heart work better.  Call your doctor if you gain more than 2 pounds in 1 day, more than 5 pounds in 1 week, or whatever weight gain you were told to report by your doctor. This is often a sign of worsening heart failure and needs to be evaluated and treated. Your doctor will tell you what to do next.   Tips for weighing yourself    · Weigh yourself at the same time each morning, wearing the same clothes. Weigh yourself after urinating and before eating.  · Use the same scale each day. Make sure the numbers are easy to read. Put the scale on a  flat, hard surface -- not on a rug or carpet.  · Do not stop weighing yourself. If you forget one day, weigh again the next morning.  How to use your weight chart  · Keep your weight chart near the scale. Write your weight on the chart as soon as you get off the scale.  · Fill in the month and the start date on the chart. Then write down your weight each day. Your chart will look like this:    · If you miss a day, leave the space blank. Weigh yourself the next day and write your weight in the next space.  · Take your weight chart with you when you go to see your doctor.  Date Last Reviewed: 3/20/2016  © 2392-8293 [employer]. [address] [place] [address], [place] [address]. All rights reserved. This information is not intended as a substitute for professional medical care. Always follow your healthcare professional's instructions.              Heart Failure: Warning Signs of a Flare-Up  You have a condition called heart failure. Once you have heart failure, flare-ups can happen. Below are signs that can mean your heart failure is getting worse. If you notice any of these warning signs, call your healthcare provider.  Swelling    · Your feet, ankles, or lower legs get puffier.  · You notice skin changes on your lower legs.  · Your shoes feel too tight.  · Your clothes are tighter in the waist.  · You have trouble getting rings on or off your fingers.  Shortness of breath  · You have to breathe harder even when youre doing your normal activities or when youre resting.  · You are short of breath walking up stairs or even short distances.  · You wake up at night short of breath or coughing.  · You need to use more pillows or sit up to sleep.  · You wake up tired or restless.  Other warning signs  · You feel weaker, dizzy, or more tired.  · You have chest pain or changes in your heartbeat.  · You have a cough that wont go away.  · You cant remember things or dont feel like eating.  Tracking your  weight  Gaining weight is often the first warning sign that heart failure is getting worse. Gaining even a few pounds can be a sign that your body is retaining excess water and salt. Weighing yourself each day in the morning after you urinate and before you eat, is the best way to know if you're retaining water. Get a scale that is easy to read and make sure you wear the same clothes and use the same scale every time you weigh. Your healthcare provider will show you how to track your weight. Call your doctor if you gain more than 2 pounds in 1 day, 5 pounds in 1 week, or whatever weight gain you were told to report by your doctor. This is often a sign of worsening heart failure and needs to be evaluated and treated before it compromises your breathing. Your doctor will tell you what to do next.    Date Last Reviewed: 3/15/2016  © 5352-2245 [employer]. [address] [place] [address], [place] [address]. All rights reserved. This information is not intended as a substitute for professional medical care. Always follow your healthcare professional's instructions.              Coumadin Discharge Instructions                         Chronic Kindey Disease Education              [facility] complies with applicable Federal civil rights laws and does not discriminate on the basis of race, color, national origin, age, disability, or sex.

## 2017-04-01 LAB
ANION GAP SERPL CALC-SCNC: 11 MMOL/L
APTT BLDCRRT: 29.5 SEC
BASOPHILS # BLD AUTO: 0.03 K/UL
BASOPHILS # BLD AUTO: 0.03 K/UL
BASOPHILS NFR BLD: 0.7 %
BASOPHILS NFR BLD: 0.8 %
BUN SERPL-MCNC: 39 MG/DL
CALCIUM SERPL-MCNC: 9.1 MG/DL
CHLORIDE SERPL-SCNC: 96 MMOL/L
CO2 SERPL-SCNC: 32 MMOL/L
CREAT SERPL-MCNC: 7 MG/DL
DIFFERENTIAL METHOD: ABNORMAL
DIFFERENTIAL METHOD: ABNORMAL
EOSINOPHIL # BLD AUTO: 0.1 K/UL
EOSINOPHIL # BLD AUTO: 0.1 K/UL
EOSINOPHIL NFR BLD: 3.2 %
EOSINOPHIL NFR BLD: 3.6 %
ERYTHROCYTE [DISTWIDTH] IN BLOOD BY AUTOMATED COUNT: 15.8 %
ERYTHROCYTE [DISTWIDTH] IN BLOOD BY AUTOMATED COUNT: 16 %
EST. GFR  (AFRICAN AMERICAN): 6.8 ML/MIN/1.73 M^2
EST. GFR  (NON AFRICAN AMERICAN): 5.9 ML/MIN/1.73 M^2
GLUCOSE SERPL-MCNC: 92 MG/DL
HCT VFR BLD AUTO: 24.9 %
HCT VFR BLD AUTO: 25.7 %
HGB BLD-MCNC: 7.7 G/DL
HGB BLD-MCNC: 8.3 G/DL
INR PPP: 1.7
IRON SERPL-MCNC: 57 UG/DL
LDH SERPL L TO P-CCNC: 205 U/L
LYMPHOCYTES # BLD AUTO: 0.5 K/UL
LYMPHOCYTES # BLD AUTO: 0.8 K/UL
LYMPHOCYTES NFR BLD: 13.6 %
LYMPHOCYTES NFR BLD: 17.5 %
MAGNESIUM SERPL-MCNC: 1.9 MG/DL
MCH RBC QN AUTO: 32.2 PG
MCH RBC QN AUTO: 33.2 PG
MCHC RBC AUTO-ENTMCNC: 30.9 %
MCHC RBC AUTO-ENTMCNC: 32.3 %
MCV RBC AUTO: 103 FL
MCV RBC AUTO: 104 FL
MONOCYTES # BLD AUTO: 0.5 K/UL
MONOCYTES # BLD AUTO: 0.6 K/UL
MONOCYTES NFR BLD: 11.3 %
MONOCYTES NFR BLD: 14.9 %
NEUTROPHILS # BLD AUTO: 2.6 K/UL
NEUTROPHILS # BLD AUTO: 2.9 K/UL
NEUTROPHILS NFR BLD: 66.8 %
NEUTROPHILS NFR BLD: 67.1 %
PHOSPHATE SERPL-MCNC: 2.8 MG/DL
PLATELET # BLD AUTO: 114 K/UL
PLATELET # BLD AUTO: 128 K/UL
PMV BLD AUTO: 10 FL
PMV BLD AUTO: 10.3 FL
POTASSIUM SERPL-SCNC: 3.7 MMOL/L
PROTHROMBIN TIME: 17.5 SEC
RBC # BLD AUTO: 2.39 M/UL
RBC # BLD AUTO: 2.5 M/UL
SATURATED IRON: 23 %
SODIUM SERPL-SCNC: 139 MMOL/L
TOTAL IRON BINDING CAPACITY: 250 UG/DL
TRANSFERRIN SERPL-MCNC: 169 MG/DL
WBC # BLD AUTO: 3.9 K/UL
WBC # BLD AUTO: 4.34 K/UL

## 2017-04-01 PROCEDURE — 85610 PROTHROMBIN TIME: CPT

## 2017-04-01 PROCEDURE — 63600175 PHARM REV CODE 636 W HCPCS: Performed by: STUDENT IN AN ORGANIZED HEALTH CARE EDUCATION/TRAINING PROGRAM

## 2017-04-01 PROCEDURE — 99223 1ST HOSP IP/OBS HIGH 75: CPT | Mod: ,,, | Performed by: INTERNAL MEDICINE

## 2017-04-01 PROCEDURE — 97530 THERAPEUTIC ACTIVITIES: CPT

## 2017-04-01 PROCEDURE — 85730 THROMBOPLASTIN TIME PARTIAL: CPT

## 2017-04-01 PROCEDURE — 83735 ASSAY OF MAGNESIUM: CPT

## 2017-04-01 PROCEDURE — 25000003 PHARM REV CODE 250: Performed by: STUDENT IN AN ORGANIZED HEALTH CARE EDUCATION/TRAINING PROGRAM

## 2017-04-01 PROCEDURE — 97165 OT EVAL LOW COMPLEX 30 MIN: CPT

## 2017-04-01 PROCEDURE — C9113 INJ PANTOPRAZOLE SODIUM, VIA: HCPCS | Performed by: STUDENT IN AN ORGANIZED HEALTH CARE EDUCATION/TRAINING PROGRAM

## 2017-04-01 PROCEDURE — 83615 LACTATE (LD) (LDH) ENZYME: CPT

## 2017-04-01 PROCEDURE — 80048 BASIC METABOLIC PNL TOTAL CA: CPT

## 2017-04-01 PROCEDURE — 84466 ASSAY OF TRANSFERRIN: CPT

## 2017-04-01 PROCEDURE — 20600001 HC STEP DOWN PRIVATE ROOM

## 2017-04-01 PROCEDURE — 25000003 PHARM REV CODE 250: Performed by: PHYSICIAN ASSISTANT

## 2017-04-01 PROCEDURE — 83540 ASSAY OF IRON: CPT

## 2017-04-01 PROCEDURE — 27000248 HC VAD-ADDITIONAL DAY

## 2017-04-01 PROCEDURE — 97802 MEDICAL NUTRITION INDIV IN: CPT

## 2017-04-01 PROCEDURE — 80100014 HC HEMODIALYSIS 1:1

## 2017-04-01 PROCEDURE — 85025 COMPLETE CBC W/AUTO DIFF WBC: CPT

## 2017-04-01 PROCEDURE — 84100 ASSAY OF PHOSPHORUS: CPT

## 2017-04-01 PROCEDURE — 36415 COLL VENOUS BLD VENIPUNCTURE: CPT

## 2017-04-01 RX ORDER — SODIUM CHLORIDE 9 MG/ML
INJECTION, SOLUTION INTRAVENOUS ONCE
Status: DISCONTINUED | OUTPATIENT
Start: 2017-04-01 | End: 2017-04-01

## 2017-04-01 RX ORDER — PANTOPRAZOLE SODIUM 40 MG/10ML
40 INJECTION, POWDER, LYOPHILIZED, FOR SOLUTION INTRAVENOUS 2 TIMES DAILY
Status: DISCONTINUED | OUTPATIENT
Start: 2017-04-01 | End: 2017-04-04

## 2017-04-01 RX ORDER — POLYETHYLENE GLYCOL 3350, SODIUM SULFATE ANHYDROUS, SODIUM BICARBONATE, SODIUM CHLORIDE, POTASSIUM CHLORIDE 236; 22.74; 6.74; 5.86; 2.97 G/4L; G/4L; G/4L; G/4L; G/4L
4000 POWDER, FOR SOLUTION ORAL ONCE
Status: COMPLETED | OUTPATIENT
Start: 2017-04-02 | End: 2017-04-02

## 2017-04-01 RX ORDER — CALCIUM CARBONATE 200(500)MG
500 TABLET,CHEWABLE ORAL
Status: DISCONTINUED | OUTPATIENT
Start: 2017-04-01 | End: 2017-04-06 | Stop reason: HOSPADM

## 2017-04-01 RX ORDER — CALCIUM CARBONATE 200(500)MG
500 TABLET,CHEWABLE ORAL
Status: DISCONTINUED | OUTPATIENT
Start: 2017-04-01 | End: 2017-04-01

## 2017-04-01 RX ORDER — OXYCODONE AND ACETAMINOPHEN 10; 325 MG/1; MG/1
1 TABLET ORAL EVERY 6 HOURS PRN
Status: DISCONTINUED | OUTPATIENT
Start: 2017-04-01 | End: 2017-04-06 | Stop reason: HOSPADM

## 2017-04-01 RX ORDER — HYDRALAZINE HYDROCHLORIDE 20 MG/ML
5 INJECTION INTRAMUSCULAR; INTRAVENOUS ONCE
Status: COMPLETED | OUTPATIENT
Start: 2017-04-01 | End: 2017-04-01

## 2017-04-01 RX ORDER — SODIUM CHLORIDE 9 MG/ML
INJECTION, SOLUTION INTRAVENOUS
Status: DISCONTINUED | OUTPATIENT
Start: 2017-04-01 | End: 2017-04-03

## 2017-04-01 RX ADMIN — PANTOPRAZOLE SODIUM 40 MG: 40 TABLET, DELAYED RELEASE ORAL at 09:04

## 2017-04-01 RX ADMIN — HYDRALAZINE HYDROCHLORIDE 50 MG: 50 TABLET ORAL at 09:04

## 2017-04-01 RX ADMIN — CALCIUM CARBONATE (ANTACID) CHEW TAB 500 MG 500 MG: 500 CHEW TAB at 06:04

## 2017-04-01 RX ADMIN — GABAPENTIN 300 MG: 300 CAPSULE ORAL at 09:04

## 2017-04-01 RX ADMIN — ONDANSETRON 4 MG: 4 TABLET, FILM COATED ORAL at 04:04

## 2017-04-01 RX ADMIN — ATORVASTATIN CALCIUM 40 MG: 20 TABLET, FILM COATED ORAL at 09:04

## 2017-04-01 RX ADMIN — PANTOPRAZOLE SODIUM 40 MG: 40 INJECTION, POWDER, FOR SOLUTION INTRAVENOUS at 09:04

## 2017-04-01 RX ADMIN — WARFARIN SODIUM 2 MG: 2 TABLET ORAL at 05:04

## 2017-04-01 RX ADMIN — OXYCODONE HYDROCHLORIDE AND ACETAMINOPHEN 1 TABLET: 10; 325 TABLET ORAL at 12:04

## 2017-04-01 RX ADMIN — ACETAMINOPHEN 650 MG: 325 TABLET ORAL at 09:04

## 2017-04-01 RX ADMIN — HYDRALAZINE HYDROCHLORIDE 50 MG: 50 TABLET ORAL at 06:04

## 2017-04-01 RX ADMIN — CETIRIZINE HYDROCHLORIDE 5 MG: 5 TABLET, FILM COATED ORAL at 09:04

## 2017-04-01 RX ADMIN — ESCITALOPRAM 20 MG: 20 TABLET, FILM COATED ORAL at 09:04

## 2017-04-01 RX ADMIN — HYDRALAZINE HYDROCHLORIDE 5 MG: 20 INJECTION INTRAMUSCULAR; INTRAVENOUS at 12:04

## 2017-04-01 RX ADMIN — AMLODIPINE BESYLATE 10 MG: 10 TABLET ORAL at 09:04

## 2017-04-01 NOTE — PLAN OF CARE
Problem: Patient Care Overview  Goal: Plan of Care Review  Outcome: Ongoing (interventions implemented as appropriate)  Pt free of falls/traumas/injuries. Skin remains clean, dry, and intact. Pt given tylenol for headache 4-5/10, MD is aware. Pt has GI consult. Pt re-educated on importance of measuring accurate intake and out put; pt verbalized and demonstrates understanding. Reviewed plan of care with pt; and pt verbalized understanding.  Pt VSS in no distress will continue to monitor.

## 2017-04-01 NOTE — PROGRESS NOTES
3 hour bedside hemodialysis started in room 300  Machine and water checks performed and within normal limits  15 gauge needles x 2 placed in upper left arm graft  Net UF set for 2L  Obtained ordered BFR without difficulty

## 2017-04-01 NOTE — PROGRESS NOTES
Reviewed the admission labs. INR 1.7 today and Hemoglobin 8.6 (stable; 9.0 on 3/21/17).   She is a non-bridge and has hx of HIT. No heparin bridge to be provided.  Will resume warfarin 2 mg QD starting tonight.  Continue to monitor for ongoing GI bleed. If recurrence noted, will consult GI given prior hx of cecal AVM s/p APC (04/2016).  Case d/w Dr. Woody.    Carline Glass MD  Cardiology Fellow  Pager: [phone]  3/31/2017 8:06 PM

## 2017-04-01 NOTE — PROGRESS NOTES
Clarified with Dr. Jaramillo if ok for pt not to be on heparin gtt INR 1.7, per MD ok for pt to be off of heparin gtt but continue with coumadin as prescribed. Orders implemented, will continue to monitor.

## 2017-04-01 NOTE — PROGRESS NOTES
Patient complaining of leg and side cramps unrelieved with UF rest. Patient requesting to terminate treatment 32 minutes early.  Dr. Magaña notified and treatment discontinued.  Blood returned at 50ml/min.  Net fluid removal of 1.5L

## 2017-04-01 NOTE — CONSULTS
[facility]  Adult Nutrition  Consult Note    SUMMARY     Recommendations  Recommendation/Intervention:   1. Encourage increased PO intake.   2. Recommend adding Novasource Renal OS to all meals to increase calorie and protein intake.   RD to monitor.    Goals: Patient will consume > 75% of meals  Nutrition Goal Status: new  Communication of RD Recs: reviewed with RN    Reason for Assessment  Reason for Assessment: physician consult  Diagnosis:  (SOB)  Relevent Medical History: LVAD, CAD s/p CABG, ESRD on HD, HTN, CHF   General Information Comments: Patient on Cardiac diet, with fair appetite and PO intake per RN. Patient asleep at both attepmts to visit. Per RN, patient to be on CL diet later today for colonoscopy. Nutrition D/C Planning: adequate nutrition via PO intake.    Nutrition Prescription Ordered  Current Diet Order: Cardiac       Nutrition Risk Screen   Nutrition Risk Screen: no indicators present    Nutrition/Diet History   Typical Food/Fluid Intake: EMANI  Food Preferences: EMANI   Factors Affecting Nutritional Intake: decreased appetite     Labs/Tests/Procedures/Meds   Pertinent Labs Reviewed: reviewed  Pertinent Labs Comments: BUN 39, Creat 7.0  Pertinent Medications Reviewed: reviewed  Pertinent Medications Comments: pantoprazole, coumadin    Physical Findings  Overall Physical Appearance: overweight, on oxygen therapy  Tubes:  (none)  Skin: intact    Anthropometrics  Height (inches): 60 in  Weight Method: Standard Scale  Weight (kg): 61.8 kg   Ideal Body Weight (IBW), Female: 100 lb   % Ideal Body Weight, Female (lb): 136.25 lb  BMI (kg/m2): 26.61  BMI Grade: 25 - 29.9 - overweight  Usual Body Weight (UBW), kg:  (EMANI)     Estimated/Assessed Needs  Weight Used For Calorie Calculations: 61.8 kg (136 lb 3.9 oz)   Height (cm): 152.4 cm   Energy Need Method: Whatcom-St Jeor (1399 kcal/day (1.25))   RMR (Whatcom-St. Jeor Equation): 1118.6   Weight Used For Protein Calculations: 61.8 kg (136  lb 3.9 oz)  Protein Requirements: 74-87 kcal/day (1.2-1.4 g/kg)  Fluid Need Method: RDA Method (1 mL/kcal or per MD)     Monitor and Evaluation  Food and Nutrient Intake: energy intake, food and beverage intake  Food and Nutrient Adminstration: diet order   Physical Activity and Function: nutrition-related ADLs and IADLs  Anthropometric Measurements: weight, weight change  Biochemical Data, Medical Tests and Procedures: electrolyte and renal panel, gastrointestinal profile  Nutrition-Focused Physical Findings: overall appearance    Nutrition Risk  Level of Risk:  (f/u 1x/week)    Nutrition Follow-Up  RD Follow-up?: Yes    Nutrition Diagnosis  Problem: Inadequate energy intake  Etiology: decreased appetite  Signs/Symptoms: reports of < 75% meals  Nutrition Diagnosis Status: New

## 2017-04-01 NOTE — PLAN OF CARE
Problem: Occupational Therapy Goal  Goal: Occupational Therapy Goal  Goals to be met by: 4/11/17     Patient will increase functional independence with ADLs by performing:    Feeding with [place].  UE Dressing with Supervision with AD for balance  LE Dressing with Supervision with AD for balance  Grooming while standing at sink with Supervision.  Toileting from toilet with Supervision for hygiene and clothing management.   Toilet transfer to toilet with Supervision.  Outcome: Ongoing (interventions implemented as appropriate)  OT eval completed, and above goals established. AMINA Napier  4/1/2017

## 2017-04-01 NOTE — CONSULTS
[facility]  Gastroenterology  Consult Note    Patient Name: Randa Devine  MRN: [id number]  Admission Date: 3/31/2017  Hospital Length of Stay: 1 days  Code Status: Full Code   Attending Provider: Caitlyn Ackerman MD   Consulting Provider: Jessy Avelar MD  Primary Care Physician: Laura Garvin DO  Principal Problem:<principal problem not specified>    Inpatient consult to Gastroenterology  Consult performed by: JESSY AVELAR  Consult ordered by: WILLIAM WILSON  Reason for consult: anemia        Subjective:     HPI:    Randa Devine is a 61 y.o. female with CAD s/p CABG, chronic combined CHF / ICM (EF 10-15%) s/p LVAD, ESRD on HD via left arm AVF (TTS), Lower GIB secondary to cecal AVM s/p APC (04/2016) requiring multiple treatments, Paroxysmal AF, HTN and CVA (01/2017) who presents to the hospital due to c/o progressively worsening SOB and BRBPR. Most recent EGD on 2013 showed some erosions. She had multiple colonoscopies showing bleeding cecal AVMs most recent on in 4/2016. She is on coumadin and protonix at home. No recent abdominal imaging to review. Hgb trending down to 7.7 with baseline 9-10. Denies NSAID use. VS stable on admission.     Review of Systems:  Constitutional: No fever, chills. +fatigue   Eyes: no visual changes, no diplopia, no eye discharge  ENT: no sore throat or runny nose.  Respiratory: no cough or +shortness of breath    Cardiovascular: no chest pain or palpitations    Gastrointestinal: as per HPI  Hematologic/Lymphatic: No petechia, no lymphadenopathy  Musculoskeletal: no arthralgias or myalgias    Neurological: no seizures, tremors   Behavioral/Psych: No suicidal ideation, no hallucination  Skin: No rash, no raynaud's    Past Medical History: has a past medical history of Anticoagulant long-term use; Anxiety; Atrial tachycardia, paroxysmal; Blood transfusion; Cardiomyopathy; CHF (congestive heart failure); Chronic kidney disease; Coronary  artery disease; End stage renal disease; Hypertension; ICD (implantable cardioverter-defibrillator) battery depletion; Myocardial infarction; Presence of left ventricular assist device (LVAD); Pulmonary hypertension; and Stroke.    Past Surgical History: has a past surgical history that includes Left ventricular assist device (2012); Tonsillectomy; Cardiac surgery; Coronary artery bypass graft; Hip surgery (); Fracture surgery; Vascular surgery; Cardiac defibrillator placement;  section (); and Colonoscopy (N/A, 2016).    Family History:family history includes Arthritis in her mother; Cancer in her maternal grandmother; Heart disease in her father; Hypertension in her father. There is no history of Breast cancer, Colon cancer, or Ovarian cancer.    Allergies: Reviewed in EPIC.     Social History: reports that she quit smoking about 7 years ago. She has a 60.00 pack-year smoking history. She does not have any smokeless tobacco history on file. She reports that she drinks alcohol. She reports that she does not use illicit drugs.    Home medications:   No current facility-administered medications on file prior to encounter.      Current Outpatient Prescriptions on File Prior to Encounter   Medication Sig Dispense Refill    acetaminophen (TYLENOL) 325 MG tablet Take 2 tablets (650 mg total) by mouth every 6 (six) hours as needed for Pain (mild pain).  0    amlodipine (NORVASC) 10 MG tablet Take 1 tablet (10 mg total) by mouth once daily. 30 tablet 11    atorvastatin (LIPITOR) 40 MG tablet Take 1 tablet (40 mg total) by mouth once daily. 90 tablet 3    CALCIUM CARBONATE (ANTACID CALCIUM ORAL) Take 1 tablet by mouth 4 (four) times daily.       cetirizine (ZYRTEC) 5 MG tablet Take 1 tablet (5 mg total) by mouth once daily. 30 tablet 0    escitalopram oxalate (LEXAPRO) 20 MG tablet Take 1 tablet (20 mg total) by mouth every evening. 90 tablet 3    GABAPENTIN (CMPD PAIN MANAGEMENT COMPOUND  OINTMNENT THREE) Apply small amount to painful joints once or twice a day 30 g 3    gabapentin (NEURONTIN) 300 MG capsule Take 1 capsule (300 mg total) by mouth every evening. 120 capsule 2    hydrALAZINE (APRESOLINE) 50 MG tablet Take 1 tablet (50 mg total) by mouth every 8 (eight) hours. 90 tablet 11    ondansetron (ZOFRAN) 4 MG tablet Take 1 tablet (4 mg total) by mouth every 8 (eight) hours as needed for Nausea. 30 tablet 3    pantoprazole (PROTONIX) 40 MG tablet Take 1 tablet (40 mg total) by mouth once daily. 60 tablet 10    warfarin (COUMADIN) 2 MG tablet Current Dose ( 3 mg on Mon, Thu; 2 mg all other days) or as directed by coumadin clinic. (Patient taking differently: 2 mg Daily. Current Dose ( 3 mg on Mon, Thu; 2 mg all other days) or as directed by coumadin clinic.) 102 tablet 3       Scheduled Meds:    sodium chloride 0.9%   Intravenous Once    amlodipine  10 mg Oral Daily    atorvastatin  40 mg Oral Daily    cetirizine  5 mg Oral Daily    escitalopram oxalate  20 mg Oral QHS    gabapentin  300 mg Oral QHS    hydrALAZINE  50 mg Oral Q8H    pantoprazole  40 mg Oral Daily    warfarin  2 mg Oral Daily       Continuous Infusions:      PRN Meds: sodium chloride 0.9%, acetaminophen, hydrALAZINE, ondansetron, oxycodone-acetaminophen    Vital signs:  Temp:  [97.7 °F (36.5 °C)-98.9 °F (37.2 °C)]   Pulse:  [68-90]   Resp:  [18]   BP: ()/(0-78)   SpO2:  [96 %-100 %]     Physical exam:  General: No acute distress  HEENT: Head: Normocephalic, atraumatic.   Eyes: Sclera non-icteric. EOMI.   Neck: Supple  Heart: LVAD hum, no gallops, pacemaker  Lungs: Non labored breathing, no wheezing  Abdomen: Bowel sounds normal, no organomegaly, masses, or hernia. No tenderness, no rebound or guarding. No distention.   Rectal: Deferred  Extremities: No deformities, no C/C, +edema  Neurologic: Able to follow commands and move 4 extremities. AOX3  Skin: No rash    Routine labs:    Recent Labs  Lab 04/01/17  0354    WBC 4.34   HGB 7.7*   HCT 24.9*   *   *       Recent Labs  Lab 04/01/17  0356   INR 1.7*   APTT 29.5       Recent Labs  Lab 04/01/17  0356      K 3.7   CO2 32*   BUN 39*   CREATININE 7.0*       Recent Labs  Lab 03/31/17  1618   ALBUMIN 3.5   ALKPHOS 143*   ALT 21   AST 31   BILITOT 0.6     MELD-Na score: 26 at 4/1/2017  3:56 AM  MELD score: 26 at 4/1/2017  3:56 AM  Calculated from:  Serum Creatinine: 7.0 mg/dL (Rounded to 4) at 4/1/2017  3:56 AM  Serum Sodium: 139 mmol/L (Rounded to 137) at 4/1/2017  3:56 AM  Total Bilirubin: 0.6 mg/dL (Rounded to 1) at 3/31/2017  4:18 PM  INR(ratio): 1.7 at 4/1/2017  3:56 AM  Age: 61 years    Imaging and prior endoscopies  As above    Assessment/Plan:     Randa Devine is a 61 y.o. female with LVAD presented with rectal bleeding and anemia. Likely source colonic AVM.    Heart replaced by heart assist device  .    End stage renal disease on dialysis  .    Chronic anticoagulation  .    Anemia  .  Plan:  Protonix 40 mg IV BID  Intravascular resuscitation/support with IVFs   Serial H/H's and pRBCs transfusion as indicated  Discontinue all NSAIDs and Heparin products  Please correct any coagulopathy with platelets and FFP to a goal of platelets >50K and INR <2.0  Maintain IV access with 2 large bore IVs  Clear liquid tomorrow with PREP - ordered  NPO PMN Sunday  Plan for Colonoscopy on Monday or emergently if there is hemodynamic compromise in the setting of overt GI bleeding  Please notify GI team if there is significant change in patient's clinical status      Reno Tucker MD  Gastroenterology  [facility]

## 2017-04-01 NOTE — PLAN OF CARE
Problem: Patient Care Overview  Goal: Plan of Care Review  Plan of care discussed with patient. Fall precautions in place. Patient has complaints of a headache. Administered PRN acetaminophen 650 mg with no relief. Notified Dr. Glass. Ordered CT of the head. CT was negative. Dr. Glass ordered Percocet 10/325 for pt headache and Hydralazine 5mg IV to decrease pt doppler pressures. VAD numbers and Dopplers are WNL.  Discussed medications and care. Patient has no questions at this time.White board updated. Bed locked in lowest position. Side rails up x2. Will continue to monitor.

## 2017-04-01 NOTE — PROGRESS NOTES
Pt complaining of HA 7/10. Notified Dr. Glass. Ordered a CT of the head. Will continue to monitor.

## 2017-04-01 NOTE — PT/OT/SLP EVAL
Occupational Therapy  Evaluation    Randa Devine   MRN: [id number]   Admitting Diagnosis: <principal problem not specified>    OT Date of Treatment: 17   OT Start Time: 914  OT Stop Time: 936  OT Total Time (min): 22 min    Billable Minutes:  Evaluation 10  Therapeutic Activity 12    Diagnosis:   SOB and BRBPR; pt s/p LVAD in     Past Medical History:   Diagnosis Date    Anticoagulant long-term use     Anxiety     Atrial tachycardia, paroxysmal 2013    Blood transfusion     Cardiomyopathy     CHF (congestive heart failure)     Chronic kidney disease     Coronary artery disease     End stage renal disease     Hypertension     pulmonary    ICD (implantable cardioverter-defibrillator) battery depletion 2014    Myocardial infarction     Presence of left ventricular assist device (LVAD)     Pulmonary hypertension     Stroke       Past Surgical History:   Procedure Laterality Date    CARDIAC DEFIBRILLATOR PLACEMENT      CARDIAC SURGERY       SECTION      COLONOSCOPY N/A 2016    Procedure: COLONOSCOPY;  Surgeon: aMrk Currie MD;  Location: [facility] ([address] [place] [address]);  Service: Endoscopy;  Laterality: N/A;    CORONARY ARTERY BYPASS GRAFT      FRACTURE SURGERY      HIP SURGERY      [facility]    LEFT VENTRICULAR ASSIST DEVICE  2012    TONSILLECTOMY      VASCULAR SURGERY         Referring physician:   Date referred to OT: 17    General Precautions: Standard, fall  Orthopedic Precautions:    Braces:      Do you have any cultural, spiritual, Sikh conflicts, given your current situation?: none reported     Patient History:  Living Environment  Lives With: child(edilson), adult (pt's son and his girlfriend live with her )  Living Arrangements: apartment (no concerns)  Transportation Available: family or friend will provide  Living Environment Comment: Pt's son does not work, and is home during the day.  Equipment Currently Used at Home: cane, straight, bedside  "commode, rollator, oxygen (pt is trying to wean herself from O2 during the day and wears O2 at night; pt state that she uses SC for mobility, but has had to use w/c d/t increased  weakness. Pt further reports that her family took away her rollator)    Prior level of function:   Bed Mobility/Transfers: needs device  Grooming: independent  Bathing: independent  Upper Body Dressing: independent  Lower Body Dressing: independent  Toileting: needs device        Dominant hand: right    Subjective:  Communicated with RN prior to session.  "I'd like to get to where I don't need the walker or the oxygen."  Chief Complaint: SOB  Patient/Family stated goals: to get better    Pain Ratin/10 (headache)           Pain Addressed: Nurse notified       Objective:  Patient found with: telemetry, oxygen, peripheral IV    Cognitive Exam:  Oriented to: Person, Place, Time and Situation  Follows Commands/attention: Follows two-step commands  Communication: clear/fluent  Memory:  No Deficits noted  Safety awareness/insight to disability: intact  Coping skills/emotional control: Appropriate to situation    Visual/perceptual:  Intact    Physical Exam:  Postural examination/scapula alignment: Rounded shoulder  Skin integrity: Visible skin intact  Edema: None noted     Sensation:   Intact    Upper Extremity Range of Motion:  Right Upper Extremity: WFL  Left Upper Extremity: WFL    Upper Extremity Strength:  Right Upper Extremity: WFL  Left Upper Extremity: WFL   Strength: Good    Fine motor coordination:   Intact    Gross motor coordination: WFL    Functional Mobility:  Bed Mobility:  Rolling/Turning Right: Modified independent  Scooting/Bridging: Modified Independent  Supine to Sit: Modified Independent    Transfers:  Sit <> Stand Assistance: Contact Guard Assistance  Sit <> Stand Assistive Device: No Assistive Device  Bed <> Chair Technique: Stand Pivot  Bed <> Chair Transfer Assistance: Minimum Assistance  Bed <> Chair Assistive " "Device: No Assistive Device  Toilet Transfer Technique: Stand Pivot  Toilet Transfer Assistance: Minimum Assistance  Toilet Transfer Assistive Device: bedside commode    Functional Ambulation: Minimal A with frequent LOB without use of AD    Activities of Daily Living:  Feeding Level of Assistance: Modified independent  Feeding adaptive equipment:   UE Dressing Level of Assistance: Set-up Assistance (EOB)  UE adaptive equipment:   LE Dressing Level of Assistance: Minimum assistance (for retrieval. pt  is able to don socks/shoes/AFO EOB with SBA)  LE adaptive equipment:         Toileting Where Assessed: Bedside commode  Toileting Level of Assistance: Minimum assistance        Therapeutic Activities and Exercises:  Pt ed on safety during ADL and mobility during current functional level. Discussed with pt need for AD during functional activity for safety based on performance on eval. Pt agrees and states she will call for assistance prior to getting up    AM-PAC 6 CLICK ADL  How much help from another person does this patient currently need?  1 = Unable, Total/Dependent Assistance  2 = A lot, Maximum/Moderate Assistance  3 = A little, Minimum/Contact Guard/Supervision  4 = None, Modified [employer]/Independent    Putting on and taking off regular lower body clothing? : 3  Bathing (including washing, rinsing, drying)?: 3  Toileting, which includes using toilet, bedpan, or urinal? : 3  Putting on and taking off regular upper body clothing?: 3  Taking care of personal grooming such as brushing teeth?: 4  Eating meals?: 4  Total Score: 20    AM-PAC Raw Score CMS "G-Code Modifier Level of Impairment Assistance   6 % Total / Unable   7 - 9 CM 80 - 100% Maximal Assist   10 - 14 CL 60 - 80% Moderate Assist   15 - 19 CK 40 - 60% Moderate Assist   20 - 22 CJ 20 - 40% Minimal Assist   23 CI 1-20% SBA / CGA   24 CH 0% Independent/ Mod I       Patient left up in chair with all lines intact, call button in reach and RN " notified    Assessment:  Randa Devine is a 61 y.o. female with a medical diagnosis of old LVAD with SOB/BRBPR and presents with decreased balance, SOB and overall weakness affecting ADL (I).    Rehab identified problem list/impairments: Rehab identified problem list/impairments: weakness, impaired endurance, impaired self care skills, gait instability, impaired functional mobilty, impaired balance, decreased safety awareness, impaired cardiopulmonary response to activity    Rehab potential is good.    Activity tolerance: Good    Discharge recommendations: Discharge Facility/Level Of Care Needs: home with home health     Barriers to discharge:      Equipment recommendations:  (may benefit from bath bench)     GOALS:   Occupational Therapy Goals        Problem: Occupational Therapy Goal    Goal Priority Disciplines Outcome Interventions   Occupational Therapy Goal     OT, PT/OT Ongoing (interventions implemented as appropriate)    Description:  Goals to be met by: 4/11/17     Patient will increase functional independence with ADLs by performing:    Feeding with [place].  UE Dressing with Supervision with AD  LE Dressing with Supervision with AD  Grooming while standing at sink with Supervision.  Toileting from toilet with Supervision for hygiene and clothing management.   Toilet transfer to toilet with Supervision.                PLAN:  Patient to be seen 4 x/week to address the above listed problems via self-care/home management, therapeutic exercises, therapeutic activities  Plan of Care expires: 05/01/17  Plan of Care reviewed with: patient         AMINA Napier  04/01/2017

## 2017-04-01 NOTE — PROGRESS NOTES
Called by RN about Ms. Devine experiencing a mild headache earlier which did not respond to Tylenol. She reports chronic headaches with similar symptoms in the past however headache got worse today.  Her headache is associated with nausea and photophobia.     VS: MAP: 79; Doppler 82; Pulse: 75  No neurological deficits on exam    Ordered CT Head stat to r/o ICH  Added hydralazine 5 mg IVP PRN for MAP/Doppler > 80.    Case discussed with [facility] Fellow, Dr. Woody.    Carline Glass MD  Cardiology Fellow  Pager: [phone]  4/1/2017 12:03 AM

## 2017-04-01 NOTE — PROGRESS NOTES
Heart Failure Progress Note  Attending Physician: Caitlyn Ackerman MD  Hospital Day: 2    Subjective:   Interval History: Patient seen and examined, no events overnight, denied complaints. Hgb 7.7 looks like it was 9.0 11 days ago   Medications:   Continuous Infusions:     Scheduled Meds:   amlodipine  10 mg Oral Daily    atorvastatin  40 mg Oral Daily    calcium carbonate  500 mg Oral TID PC    cetirizine  5 mg Oral Daily    escitalopram oxalate  20 mg Oral QHS    gabapentin  300 mg Oral QHS    hydrALAZINE  50 mg Oral Q8H    pantoprazole  40 mg Intravenous BID    warfarin  2 mg Oral Daily     PRN Meds:sodium chloride 0.9%, acetaminophen, hydrALAZINE, ondansetron, oxycodone-acetaminophen  Objective:     Vitals:  Temp:  [97.7 °F (36.5 °C)-98.9 °F (37.2 °C)]   Pulse:  [68-92]   Resp:  [18]   BP: ()/(0-78)   SpO2:  [96 %-100 %]  on  I/O's:    Intake/Output Summary (Last 24 hours) at 04/01/17 1244  Last data filed at 04/01/17 0500   Gross per 24 hour   Intake              300 ml   Output                0 ml   Net              300 ml        Constitutional: NAD, conversant  HEENT: Sclera anicteric, PERRLA, EOMI  Neck: No JVD, no carotid bruits  CV: RRR, no murmur, normal S1/S2  Pulm: CTAB, no wheezes, rales, or ronchi  GI: Abdomen soft, NTND, +BS  Extremities: No LE edema, warm and well perfused  Skin: No ecchymosis, erythema, or ulcers  Psych: AOx3, appropriate affect  Neuro: CNII-XII intact, no focal deficits    Labs:       Recent Labs  Lab 03/31/17  1618 04/01/17  0356    139   K 3.5 3.7   CL 97 96   CO2 30* 32*   BUN 34* 39*   CREATININE 6.3* 7.0*   GLU 84 92   ANIONGAP 13 11       Recent Labs  Lab 03/31/17  1618   AST 31   ALT 21   ALKPHOS 143*   BILITOT 0.6   ALBUMIN 3.5        Recent Labs  Lab 03/31/17  1618 04/01/17  0356   WBC 5.59 4.34   HGB 8.6* 7.7*   HCT 27.2* 24.9*   * 114*   GRAN 66.5  3.7 67.1  2.9       Recent Labs  Lab 03/29/17  1205 03/31/17  1618 04/01/17  0356   INR  1.6* 1.7* 1.7*     No results for input(s): TROPONINI, BNP in the last 168 hours.   Micro:   Blood Cultures  Lab Results   Component Value Date    LABBLOO No growth after 5 days. 05/30/2016    LABBLOO No growth after 5 days. 05/30/2016    LABBLOO No growth after 5 days. 05/22/2016    LABBLOO No growth after 5 days. 05/22/2016    LABBLOO No growth after 5 days. 12/10/2015     Urine Cultures  Lab Results   Component Value Date    LABURIN No significant growth 10/17/2015    LABURIN NO SIGNIFICANT GROWTH 05/08/2012       Imaging:     EF   Date Value Ref Range Status   03/20/2017 10 (A) 55 - 65    02/01/2017 10 (A) 55 - 65    01/11/2017 15 (A) 55 - 65    05/24/2016 15 (A) 55 - 65    06/08/2015 20 (A) 55 - 65          ASSESSMENT/PLAN:      61 y.o. female presents with SOB. PMH of CAD s/p CABG, chronic combined CHF / ICM (EF 10-15%) s/p Heartware as DT (05/2012) and SJM CRT-D (DDR ), ESRD on HD via left arm AVF (TTS), Lower GIB secondary to cecal AVM s/p APC (04/2016), Paroxysmal AF, HTN, Embolic thalamic CVA (01/2017) with residual visual deficits who presents SOB     PLAN:      S/P LVAD HeartWare HVAD Implanted   -Coumadin at 2 mg , Goal INR 2.0-3.0. Today 1.7 no heparin bridge   -  -Antiplatelets d/c 6/25/14 likely secondary to GI loss  -Speed set at 2600  -Interrogation notable for n/a  -Implanted as desination therapy   -volume overloaded on exam: nephrology consulted, HD today     Anemia  -hx lower GIB secondary to cecal AVM s/p APC 4/2016  -reporting bright red blood.   -Will repeat CBC at 6:00 pm    -Transfuse accordingly  -GI on board plan for colonoscopy on Monday unless she need urgent colonoscopy   -Clear liquid diet    -Protonix 40 IV bid      ESRD- on HD  -Nephrology consulted for HD     Acute on Chronic Systolic/Diastolic/ICM EF 10-15%  -volume management with HD    Hypertension  -Will continue home antihypertensives for now: Hydralazine and Norvasc  -will monitor and hold if BP  low     Paroxysmal Atrial Fib  -ECG pending  -Anticoagulated on Coumadin     Hx CVA  -PT/OT consulted     Depression  -continued home dose of Lexapro      Attending addendum to follow       Terese Jaramillo MD  Cardiology Fellow  Pager: [phone]

## 2017-04-01 NOTE — PROGRESS NOTES
Patient completed 2 1/2 hours of hemodialysis doen.  Patient developed leg and side cramps which was relieved with rinseback.  Dr. Elias notified of patient request to terminate treatment early.  Hemostasis obtained after pressure applied to needle sites in upper left arm graft x 15 minutes.  Net fluid removal of 1.5L.  Report given to Jody RUTH

## 2017-04-01 NOTE — PROGRESS NOTES
Pt complaining of headache 8/10. Notified Dr. Glass. Stated to give acetaminophen 650 mg PO and if headache still persist in an hour to get a Ct of the head. Will continue to monitor.

## 2017-04-01 NOTE — PROGRESS NOTES
Pt complaining of nausea and vomiting. Administered PRN Zofran PO. Notified Dr. Glass. No further orders were given. Will continue to monitor.

## 2017-04-01 NOTE — PROGRESS NOTES
Pt has a headache 4-5/10 same as it was last night; Pt got a CT last night which was negative. Notified Dr. Ella MD ordered to give tylenol 650 for headache order implemented. Pt also  requested tums after meals. Per MD ok. Will continue to monitor.

## 2017-04-01 NOTE — PROGRESS NOTES
Spoke with radiologist who reported CT- Head is negative for any acute bleed.  Headache may be attributed to elevated BP vs. Migraine. Will give hydralazine 5 mg IVP now and reassess BP.  Will give percocet for headache.    Carline Glass MD  Cardiology Fellow  Pager: [phone]  4/1/2017 12:25 AM

## 2017-04-02 LAB
ANION GAP SERPL CALC-SCNC: 9 MMOL/L
APTT BLDCRRT: 28.3 SEC
BASOPHILS # BLD AUTO: 0.02 K/UL
BASOPHILS NFR BLD: 0.5 %
BUN SERPL-MCNC: 23 MG/DL
CALCIUM SERPL-MCNC: 9.3 MG/DL
CHLORIDE SERPL-SCNC: 106 MMOL/L
CO2 SERPL-SCNC: 22 MMOL/L
CREAT SERPL-MCNC: 5 MG/DL
DIFFERENTIAL METHOD: ABNORMAL
EOSINOPHIL # BLD AUTO: 0.2 K/UL
EOSINOPHIL NFR BLD: 3.6 %
ERYTHROCYTE [DISTWIDTH] IN BLOOD BY AUTOMATED COUNT: 15.9 %
EST. GFR  (AFRICAN AMERICAN): 10.2 ML/MIN/1.73 M^2
EST. GFR  (NON AFRICAN AMERICAN): 8.8 ML/MIN/1.73 M^2
GLUCOSE SERPL-MCNC: 84 MG/DL
HCT VFR BLD AUTO: 26 %
HGB BLD-MCNC: 8.2 G/DL
INR PPP: 2
LDH SERPL L TO P-CCNC: 195 U/L
LYMPHOCYTES # BLD AUTO: 0.6 K/UL
LYMPHOCYTES NFR BLD: 14.1 %
MAGNESIUM SERPL-MCNC: 2.1 MG/DL
MCH RBC QN AUTO: 33.1 PG
MCHC RBC AUTO-ENTMCNC: 31.5 %
MCV RBC AUTO: 105 FL
MONOCYTES # BLD AUTO: 0.4 K/UL
MONOCYTES NFR BLD: 9.3 %
NEUTROPHILS # BLD AUTO: 3.2 K/UL
NEUTROPHILS NFR BLD: 72.3 %
PHOSPHATE SERPL-MCNC: 2.9 MG/DL
PLATELET # BLD AUTO: 132 K/UL
PMV BLD AUTO: 9.7 FL
POTASSIUM SERPL-SCNC: 4.7 MMOL/L
PROTHROMBIN TIME: 20.2 SEC
RBC # BLD AUTO: 2.48 M/UL
SODIUM SERPL-SCNC: 137 MMOL/L
WBC # BLD AUTO: 4.41 K/UL

## 2017-04-02 PROCEDURE — 80048 BASIC METABOLIC PNL TOTAL CA: CPT

## 2017-04-02 PROCEDURE — 84100 ASSAY OF PHOSPHORUS: CPT

## 2017-04-02 PROCEDURE — 83735 ASSAY OF MAGNESIUM: CPT

## 2017-04-02 PROCEDURE — 36415 COLL VENOUS BLD VENIPUNCTURE: CPT

## 2017-04-02 PROCEDURE — 83615 LACTATE (LD) (LDH) ENZYME: CPT

## 2017-04-02 PROCEDURE — 20600001 HC STEP DOWN PRIVATE ROOM

## 2017-04-02 PROCEDURE — 85730 THROMBOPLASTIN TIME PARTIAL: CPT

## 2017-04-02 PROCEDURE — 25000003 PHARM REV CODE 250: Performed by: INTERNAL MEDICINE

## 2017-04-02 PROCEDURE — 85025 COMPLETE CBC W/AUTO DIFF WBC: CPT

## 2017-04-02 PROCEDURE — 25000003 PHARM REV CODE 250: Performed by: PHYSICIAN ASSISTANT

## 2017-04-02 PROCEDURE — C9113 INJ PANTOPRAZOLE SODIUM, VIA: HCPCS | Performed by: STUDENT IN AN ORGANIZED HEALTH CARE EDUCATION/TRAINING PROGRAM

## 2017-04-02 PROCEDURE — 85610 PROTHROMBIN TIME: CPT

## 2017-04-02 PROCEDURE — 25000003 PHARM REV CODE 250: Performed by: STUDENT IN AN ORGANIZED HEALTH CARE EDUCATION/TRAINING PROGRAM

## 2017-04-02 PROCEDURE — 99232 SBSQ HOSP IP/OBS MODERATE 35: CPT | Mod: ,,, | Performed by: INTERNAL MEDICINE

## 2017-04-02 PROCEDURE — 27000248 HC VAD-ADDITIONAL DAY

## 2017-04-02 PROCEDURE — 63600175 PHARM REV CODE 636 W HCPCS: Performed by: STUDENT IN AN ORGANIZED HEALTH CARE EDUCATION/TRAINING PROGRAM

## 2017-04-02 RX ADMIN — HYDRALAZINE HYDROCHLORIDE 50 MG: 50 TABLET ORAL at 06:04

## 2017-04-02 RX ADMIN — GABAPENTIN 300 MG: 300 CAPSULE ORAL at 09:04

## 2017-04-02 RX ADMIN — CALCIUM CARBONATE (ANTACID) CHEW TAB 500 MG 500 MG: 500 CHEW TAB at 02:04

## 2017-04-02 RX ADMIN — PANTOPRAZOLE SODIUM 40 MG: 40 INJECTION, POWDER, FOR SOLUTION INTRAVENOUS at 09:04

## 2017-04-02 RX ADMIN — ATORVASTATIN CALCIUM 40 MG: 20 TABLET, FILM COATED ORAL at 09:04

## 2017-04-02 RX ADMIN — ONDANSETRON 4 MG: 4 TABLET, FILM COATED ORAL at 11:04

## 2017-04-02 RX ADMIN — HYDRALAZINE HYDROCHLORIDE 50 MG: 50 TABLET ORAL at 02:04

## 2017-04-02 RX ADMIN — CALCIUM CARBONATE (ANTACID) CHEW TAB 500 MG 500 MG: 500 CHEW TAB at 09:04

## 2017-04-02 RX ADMIN — ESCITALOPRAM 20 MG: 20 TABLET, FILM COATED ORAL at 09:04

## 2017-04-02 RX ADMIN — AMLODIPINE BESYLATE 10 MG: 10 TABLET ORAL at 09:04

## 2017-04-02 RX ADMIN — CALCIUM CARBONATE (ANTACID) CHEW TAB 500 MG 500 MG: 500 CHEW TAB at 07:04

## 2017-04-02 RX ADMIN — POLYETHYLENE GLYCOL 3350, SODIUM SULFATE ANHYDROUS, SODIUM BICARBONATE, SODIUM CHLORIDE, POTASSIUM CHLORIDE 4000 ML: 236; 22.74; 6.74; 5.86; 2.97 POWDER, FOR SOLUTION ORAL at 06:04

## 2017-04-02 RX ADMIN — CETIRIZINE HYDROCHLORIDE 5 MG: 5 TABLET, FILM COATED ORAL at 09:04

## 2017-04-02 RX ADMIN — HYDRALAZINE HYDROCHLORIDE 50 MG: 50 TABLET ORAL at 09:04

## 2017-04-02 NOTE — PROGRESS NOTES
Patient woke up and walked with stand by assistance around the block, got a little dizzy and short of breath mid way, rested and walked the other half of the block back to the room.

## 2017-04-02 NOTE — PT/OT/SLP PROGRESS
Physical Therapy      Randa Devine  MRN: [id number]    Patient not seen today secondary to Patient unwilling to participate (Pt states she had been up all morning and had just returned to bed and she was too tired to work with therapy. ).   Morena Mueller, PT 4/2/17

## 2017-04-02 NOTE — PROGRESS NOTES
Heart Failure Progress Note  Attending Physician: Caitlyn Ackerman MD  Hospital Day: 3    Subjective:   Interval History: Patient seen and examined, no events overnight, denied complaints. Hgb 8.2 looks like it was 9.0 11 days ago   Medications:   Continuous Infusions:     Scheduled Meds:   amlodipine  10 mg Oral Daily    atorvastatin  40 mg Oral Daily    calcium carbonate  500 mg Oral TID PC    cetirizine  5 mg Oral Daily    escitalopram oxalate  20 mg Oral QHS    gabapentin  300 mg Oral QHS    hydrALAZINE  50 mg Oral Q8H    pantoprazole  40 mg Intravenous BID    polyethylene glycol  4,000 mL Oral Once     PRN Meds:sodium chloride 0.9%, acetaminophen, hydrALAZINE, ondansetron, oxycodone-acetaminophen  Objective:     Vitals:  Temp:  [97.8 °F (36.6 °C)-99.2 °F (37.3 °C)]   Pulse:  [69-92]   Resp:  [16-20]   BP: (70-96)/(0-66)   SpO2:  [94 %-98 %]  on  I/O's:    Intake/Output Summary (Last 24 hours) at 04/02/17 0858  Last data filed at 04/02/17 0600   Gross per 24 hour   Intake              715 ml   Output             2223 ml   Net            -1508 ml        Constitutional: NAD, conversant  HEENT: Sclera anicteric, PERRLA, EOMI  Neck: No JVD, no carotid bruits  CV: RRR, no murmur, normal S1/S2  Pulm: CTAB, no wheezes, rales, or ronchi  GI: Abdomen soft, NTND, +BS  Extremities: No LE edema, warm and well perfused  Skin: No ecchymosis, erythema, or ulcers  Psych: AOx3, appropriate affect  Neuro: CNII-XII intact, no focal deficits    Labs:       Recent Labs  Lab 03/31/17  1618 04/01/17  0356 04/02/17  0433    139 137   K 3.5 3.7 4.7   CL 97 96 106   CO2 30* 32* 22*   BUN 34* 39* 23*   CREATININE 6.3* 7.0* 5.0*   GLU 84 92 84   ANIONGAP 13 11 9       Recent Labs  Lab 03/31/17  1618   AST 31   ALT 21   ALKPHOS 143*   BILITOT 0.6   ALBUMIN 3.5        Recent Labs  Lab 04/01/17  0356 04/01/17  1610 04/02/17  0433   WBC 4.34 3.90 4.41   HGB 7.7* 8.3* 8.2*   HCT 24.9* 25.7* 26.0*   * 128* 132*   GRAN  67.1  2.9 66.8  2.6 72.3  3.2       Recent Labs  Lab 03/31/17  1618 04/01/17  0356 04/02/17  0433   INR 1.7* 1.7* 2.0*     No results for input(s): TROPONINI, BNP in the last 168 hours.   Micro:   Blood Cultures  Lab Results   Component Value Date    LABBLOO No growth after 5 days. 05/30/2016    LABBLOO No growth after 5 days. 05/30/2016    LABBLOO No growth after 5 days. 05/22/2016    LABBLOO No growth after 5 days. 05/22/2016    LABBLOO No growth after 5 days. 12/10/2015     Urine Cultures  Lab Results   Component Value Date    LABURIN No significant growth 10/17/2015    LABURIN NO SIGNIFICANT GROWTH 05/08/2012       Imaging:     EF   Date Value Ref Range Status   03/20/2017 10 (A) 55 - 65    02/01/2017 10 (A) 55 - 65    01/11/2017 15 (A) 55 - 65    05/24/2016 15 (A) 55 - 65    06/08/2015 20 (A) 55 - 65          ASSESSMENT/PLAN:      61 y.o. female presents with SOB. PMH of CAD s/p CABG, chronic combined CHF / ICM (EF 10-15%) s/p Heartware as DT (05/2012) and SJM CRT-D (DDR ), ESRD on HD via left arm AVF (TTS), Lower GIB secondary to cecal AVM s/p APC (04/2016), Paroxysmal AF, HTN, Embolic thalamic CVA (01/2017) with residual visual deficits who presents SOB     PLAN:      S/P LVAD HeartWare HVAD Implanted   -Coumadin at 2 mg  Will hold for colonoscopy tomorrow , Goal INR 2.0-3.0. Today 2 no heparin bridge   -  -Antiplatelets d/c 6/25/14 likely secondary to GI loss  -Speed set at 2600  -Interrogation notable for n/a  -Implanted as desination therapy   -volume overloaded on exam: nephrology consulted, HD yesterday looks better today      Anemia  -hx lower GIB secondary to cecal AVM s/p APC 4/2016  -reporting bright red blood.   -Transfuse accordingly  -GI on board plan for colonoscopy on Monday unless she need urgent colonoscopy   -Clear liquid diet    -Protonix 40 IV bid      ESRD- on HD  -Nephrology consulted for HD     Acute on Chronic Systolic/Diastolic/ICM EF 10-15%  -volume management with  HD    Hypertension  -Will continue home antihypertensives for now: Hydralazine and Norvasc  -will monitor and hold if BP low     Paroxysmal Atrial Fib  -Anticoagulated on hold for now  -Rate controlled 70's      Hx CVA  -PT/OT consulted     Depression  -continued home dose of Lexapro      Attending addendum to follow       Terese Jaramillo MD  Cardiology Fellow  Pager: [phone]

## 2017-04-02 NOTE — PLAN OF CARE
Problem: Patient Care Overview  Goal: Plan of Care Review  Outcome: Ongoing (interventions implemented as appropriate)  Pt free of falls/traumas/injuries. Skin remains clean, dry, and intact. Pt educated on colonoscopy 4/3/2017 and will start bowel prep at 6 pm; NPO after midnight. Pt re-educated on importance of measuring accurate intake and out put; pt verbalized and demonstrates understanding. Reviewed plan of care with pt; and pt verbalized understanding.  Pt VSS in no distress will continue to monitor.

## 2017-04-03 ENCOUNTER — ANESTHESIA EVENT (OUTPATIENT)
Dept: ENDOSCOPY | Facility: HOSPITAL | Age: 62
DRG: 377 | End: 2017-04-03
Payer: MEDICARE

## 2017-04-03 ENCOUNTER — ANESTHESIA (OUTPATIENT)
Dept: ENDOSCOPY | Facility: HOSPITAL | Age: 62
DRG: 377 | End: 2017-04-03
Payer: MEDICARE

## 2017-04-03 LAB
ALBUMIN SERPL BCP-MCNC: 3.5 G/DL
ALP SERPL-CCNC: 129 U/L
ALT SERPL W/O P-5'-P-CCNC: 15 U/L
ANION GAP SERPL CALC-SCNC: 15 MMOL/L
APTT BLDCRRT: 31.2 SEC
AST SERPL-CCNC: 20 U/L
BASOPHILS # BLD AUTO: 0.02 K/UL
BASOPHILS NFR BLD: 0.4 %
BILIRUB DIRECT SERPL-MCNC: 0.3 MG/DL
BILIRUB SERPL-MCNC: 0.6 MG/DL
BNP SERPL-MCNC: 1743 PG/ML
BUN SERPL-MCNC: 37 MG/DL
CALCIUM SERPL-MCNC: 8.8 MG/DL
CHLORIDE SERPL-SCNC: 103 MMOL/L
CO2 SERPL-SCNC: 23 MMOL/L
CREAT SERPL-MCNC: 6.7 MG/DL
CRP SERPL-MCNC: 2.3 MG/L
DIFFERENTIAL METHOD: ABNORMAL
EOSINOPHIL # BLD AUTO: 0.1 K/UL
EOSINOPHIL NFR BLD: 2.2 %
ERYTHROCYTE [DISTWIDTH] IN BLOOD BY AUTOMATED COUNT: 15.8 %
EST. GFR  (AFRICAN AMERICAN): 7.1 ML/MIN/1.73 M^2
EST. GFR  (NON AFRICAN AMERICAN): 6.2 ML/MIN/1.73 M^2
GLUCOSE SERPL-MCNC: 77 MG/DL
HCT VFR BLD AUTO: 24.4 %
HGB BLD-MCNC: 7.6 G/DL
INR PPP: 2.5
LDH SERPL L TO P-CCNC: 198 U/L
LYMPHOCYTES # BLD AUTO: 0.7 K/UL
LYMPHOCYTES NFR BLD: 15.8 %
MAGNESIUM SERPL-MCNC: 2.2 MG/DL
MCH RBC QN AUTO: 32.5 PG
MCHC RBC AUTO-ENTMCNC: 31.1 %
MCV RBC AUTO: 104 FL
MONOCYTES # BLD AUTO: 0.4 K/UL
MONOCYTES NFR BLD: 9.1 %
NEUTROPHILS # BLD AUTO: 3.3 K/UL
NEUTROPHILS NFR BLD: 72.3 %
PHOSPHATE SERPL-MCNC: 3.5 MG/DL
PLATELET # BLD AUTO: 121 K/UL
PMV BLD AUTO: 9.9 FL
POTASSIUM SERPL-SCNC: 4.2 MMOL/L
PREALB SERPL-MCNC: 24 MG/DL
PROT SERPL-MCNC: 6.9 G/DL
PROTHROMBIN TIME: 25.4 SEC
RBC # BLD AUTO: 2.34 M/UL
SODIUM SERPL-SCNC: 141 MMOL/L
WBC # BLD AUTO: 4.62 K/UL

## 2017-04-03 PROCEDURE — 37000009 HC ANESTHESIA EA ADD 15 MINS: Performed by: INTERNAL MEDICINE

## 2017-04-03 PROCEDURE — 86140 C-REACTIVE PROTEIN: CPT

## 2017-04-03 PROCEDURE — 83880 ASSAY OF NATRIURETIC PEPTIDE: CPT

## 2017-04-03 PROCEDURE — 37000008 HC ANESTHESIA 1ST 15 MINUTES: Performed by: INTERNAL MEDICINE

## 2017-04-03 PROCEDURE — 80100014 HC HEMODIALYSIS 1:1

## 2017-04-03 PROCEDURE — 27000248 HC VAD-ADDITIONAL DAY

## 2017-04-03 PROCEDURE — 84100 ASSAY OF PHOSPHORUS: CPT

## 2017-04-03 PROCEDURE — 25000003 PHARM REV CODE 250: Performed by: HOSPITALIST

## 2017-04-03 PROCEDURE — 99232 SBSQ HOSP IP/OBS MODERATE 35: CPT | Mod: ,,, | Performed by: INTERNAL MEDICINE

## 2017-04-03 PROCEDURE — 20600001 HC STEP DOWN PRIVATE ROOM

## 2017-04-03 PROCEDURE — 97162 PT EVAL MOD COMPLEX 30 MIN: CPT

## 2017-04-03 PROCEDURE — 84134 ASSAY OF PREALBUMIN: CPT

## 2017-04-03 PROCEDURE — 85730 THROMBOPLASTIN TIME PARTIAL: CPT

## 2017-04-03 PROCEDURE — 85025 COMPLETE CBC W/AUTO DIFF WBC: CPT

## 2017-04-03 PROCEDURE — 83615 LACTATE (LD) (LDH) ENZYME: CPT

## 2017-04-03 PROCEDURE — 85610 PROTHROMBIN TIME: CPT

## 2017-04-03 PROCEDURE — 83735 ASSAY OF MAGNESIUM: CPT

## 2017-04-03 PROCEDURE — 63600175 PHARM REV CODE 636 W HCPCS: Performed by: NURSE ANESTHETIST, CERTIFIED REGISTERED

## 2017-04-03 PROCEDURE — D9220A PRA ANESTHESIA: Mod: CRNA,,, | Performed by: NURSE ANESTHETIST, CERTIFIED REGISTERED

## 2017-04-03 PROCEDURE — D9220A PRA ANESTHESIA: Mod: ANES,,, | Performed by: ANESTHESIOLOGY

## 2017-04-03 PROCEDURE — 80076 HEPATIC FUNCTION PANEL: CPT

## 2017-04-03 PROCEDURE — 25000003 PHARM REV CODE 250: Performed by: STUDENT IN AN ORGANIZED HEALTH CARE EDUCATION/TRAINING PROGRAM

## 2017-04-03 PROCEDURE — 0D5M8ZZ DESTRUCTION OF DESCENDING COLON, VIA NATURAL OR ARTIFICIAL OPENING ENDOSCOPIC: ICD-10-PCS | Performed by: INTERNAL MEDICINE

## 2017-04-03 PROCEDURE — C9113 INJ PANTOPRAZOLE SODIUM, VIA: HCPCS | Performed by: STUDENT IN AN ORGANIZED HEALTH CARE EDUCATION/TRAINING PROGRAM

## 2017-04-03 PROCEDURE — 45382 COLONOSCOPY W/CONTROL BLEED: CPT | Mod: GC,,, | Performed by: INTERNAL MEDICINE

## 2017-04-03 PROCEDURE — 25000003 PHARM REV CODE 250: Performed by: NURSE ANESTHETIST, CERTIFIED REGISTERED

## 2017-04-03 PROCEDURE — 45382 COLONOSCOPY W/CONTROL BLEED: CPT | Performed by: INTERNAL MEDICINE

## 2017-04-03 PROCEDURE — 63600175 PHARM REV CODE 636 W HCPCS: Performed by: STUDENT IN AN ORGANIZED HEALTH CARE EDUCATION/TRAINING PROGRAM

## 2017-04-03 PROCEDURE — 80048 BASIC METABOLIC PNL TOTAL CA: CPT

## 2017-04-03 PROCEDURE — 27200997: Performed by: INTERNAL MEDICINE

## 2017-04-03 PROCEDURE — 25000003 PHARM REV CODE 250: Performed by: PHYSICIAN ASSISTANT

## 2017-04-03 RX ORDER — MIDAZOLAM HYDROCHLORIDE 1 MG/ML
INJECTION, SOLUTION INTRAMUSCULAR; INTRAVENOUS
Status: DISCONTINUED | OUTPATIENT
Start: 2017-04-03 | End: 2017-04-03

## 2017-04-03 RX ORDER — ETOMIDATE 2 MG/ML
INJECTION INTRAVENOUS
Status: DISCONTINUED | OUTPATIENT
Start: 2017-04-03 | End: 2017-04-03

## 2017-04-03 RX ORDER — SODIUM CHLORIDE 9 MG/ML
INJECTION, SOLUTION INTRAVENOUS
Status: DISCONTINUED | OUTPATIENT
Start: 2017-04-03 | End: 2017-04-06 | Stop reason: HOSPADM

## 2017-04-03 RX ORDER — KETAMINE HYDROCHLORIDE 100 MG/ML
INJECTION, SOLUTION INTRAMUSCULAR; INTRAVENOUS
Status: DISCONTINUED | OUTPATIENT
Start: 2017-04-03 | End: 2017-04-03

## 2017-04-03 RX ORDER — FENTANYL CITRATE 50 UG/ML
INJECTION, SOLUTION INTRAMUSCULAR; INTRAVENOUS
Status: DISCONTINUED | OUTPATIENT
Start: 2017-04-03 | End: 2017-04-03

## 2017-04-03 RX ORDER — SODIUM CHLORIDE 9 MG/ML
INJECTION, SOLUTION INTRAVENOUS ONCE
Status: DISCONTINUED | OUTPATIENT
Start: 2017-04-03 | End: 2017-04-03

## 2017-04-03 RX ADMIN — ESCITALOPRAM 20 MG: 20 TABLET, FILM COATED ORAL at 09:04

## 2017-04-03 RX ADMIN — AMLODIPINE BESYLATE 10 MG: 10 TABLET ORAL at 08:04

## 2017-04-03 RX ADMIN — ETOMIDATE 2 MG: 2 INJECTION, SOLUTION INTRAVENOUS at 07:04

## 2017-04-03 RX ADMIN — PANTOPRAZOLE SODIUM 40 MG: 40 INJECTION, POWDER, FOR SOLUTION INTRAVENOUS at 08:04

## 2017-04-03 RX ADMIN — GABAPENTIN 300 MG: 300 CAPSULE ORAL at 09:04

## 2017-04-03 RX ADMIN — ONDANSETRON 4 MG: 4 TABLET, FILM COATED ORAL at 09:04

## 2017-04-03 RX ADMIN — SODIUM CHLORIDE: 0.9 INJECTION, SOLUTION INTRAVENOUS at 06:04

## 2017-04-03 RX ADMIN — MIDAZOLAM HYDROCHLORIDE 0.5 MG: 1 INJECTION, SOLUTION INTRAMUSCULAR; INTRAVENOUS at 07:04

## 2017-04-03 RX ADMIN — MIDAZOLAM HYDROCHLORIDE 1 MG: 1 INJECTION, SOLUTION INTRAMUSCULAR; INTRAVENOUS at 07:04

## 2017-04-03 RX ADMIN — HYDRALAZINE HYDROCHLORIDE 50 MG: 50 TABLET ORAL at 04:04

## 2017-04-03 RX ADMIN — FENTANYL CITRATE 25 MCG: 50 INJECTION, SOLUTION INTRAMUSCULAR; INTRAVENOUS at 07:04

## 2017-04-03 RX ADMIN — ETOMIDATE 4 MG: 2 INJECTION, SOLUTION INTRAVENOUS at 07:04

## 2017-04-03 RX ADMIN — PANTOPRAZOLE SODIUM 40 MG: 40 INJECTION, POWDER, FOR SOLUTION INTRAVENOUS at 09:04

## 2017-04-03 RX ADMIN — HYDRALAZINE HYDROCHLORIDE 50 MG: 50 TABLET ORAL at 09:04

## 2017-04-03 RX ADMIN — ATORVASTATIN CALCIUM 40 MG: 20 TABLET, FILM COATED ORAL at 08:04

## 2017-04-03 RX ADMIN — CALCIUM CARBONATE (ANTACID) CHEW TAB 500 MG 500 MG: 500 CHEW TAB at 09:04

## 2017-04-03 RX ADMIN — CETIRIZINE HYDROCHLORIDE 5 MG: 5 TABLET, FILM COATED ORAL at 08:04

## 2017-04-03 RX ADMIN — KETAMINE HYDROCHLORIDE 10 MG: 100 INJECTION, SOLUTION, CONCENTRATE INTRAMUSCULAR; INTRAVENOUS at 07:04

## 2017-04-03 RX ADMIN — HYDRALAZINE HYDROCHLORIDE 50 MG: 50 TABLET ORAL at 05:04

## 2017-04-03 RX ADMIN — CALCIUM CARBONATE (ANTACID) CHEW TAB 500 MG 500 MG: 500 CHEW TAB at 10:04

## 2017-04-03 RX ADMIN — KETAMINE HYDROCHLORIDE 20 MG: 100 INJECTION, SOLUTION, CONCENTRATE INTRAMUSCULAR; INTRAVENOUS at 07:04

## 2017-04-03 RX ADMIN — PROMETHAZINE HYDROCHLORIDE 12.5 MG: 25 INJECTION INTRAMUSCULAR; INTRAVENOUS at 10:04

## 2017-04-03 RX ADMIN — ACETAMINOPHEN 650 MG: 325 TABLET ORAL at 09:04

## 2017-04-03 NOTE — PHYSICIAN QUERY
"PT Name: Randa Devine  MR #: [id number]    Physician Query Form - Hematology Clarification      CDS/: Kika Hall RN CCDS             Contact information: [email or web address]    This form is a permanent document in the medical record.      Query Date: April 3, 2017    By submitting this query, we are merely seeking further clarification of documentation. Please utilize your independent clinical judgment when addressing the question(s) below.    The Medical record contains the following:   Indicators  Supporting Clinical Findings Location in Medical Record   X "Anemia" documented anemia 3/31 h/p, 4/1 consult, 4/2 pn   X H & H = 8.6/27.2-->7.7/24.9-->7.6/24.4 3/31, 4/1, 4/3 lab    BP =                     HR=     X "GI bleeding" documented complaints of bright red blood per rectum  Presents with rectal bleeding and anemia 3/31 h/p  4/1 consult     Acute bleeding (Non GI site)      Transfusion(s)      Treatment:     X Other:  -hx lower GIB secondary to cecal AVM s/p APC 4/2016 4/1 pn     Provider, please specify diagnosis or diagnoses associated with above clinical findings.    [  X] Acute blood loss anemia  [  ] Iron deficiency anemia  [  ] Chronic blood loss anemia      [  ] Anemia of chronic disease ( Specify chronic disease)      [  ] CKD (specify stage) ___________________________     [  ] Neoplastic disease      [  ] Due to Chemotherapy      [ X ] Other (Specify) ___ESRD____________________________     [  ] Clinically Undetermined     [  ] Other Hematological Diagnosis (please specify): _________________________________    [  ] Clinically Undetermined       Please document in your progress notes daily for the duration of treatment, until resolved, and include in your discharge summary.                                                                                                      "

## 2017-04-03 NOTE — PROGRESS NOTES
Dr. Jaramillo notified patient back from STAT CT of head and state she is feeling a little better. Richie is still actively bleeding from anus, however she is refusing bowel prep due to nausea. States she currently is not nauseated after the PRN Zofran, but refusing to drink more prep. MD orders for Phenergan 12.5mg, IVPB, ONCE. Orders placed and to be carried out. Will continue to monitor.

## 2017-04-03 NOTE — NURSING TRANSFER
Nursing Transfer Note      4/3/2017     Transfer To: CT scan of head    Transfer via wheelchair    Transfer with cardiac monitoring and oxygen per NC    Transported by FARAZ Anderson     Medicines sent: none    Chart send with patient: No

## 2017-04-03 NOTE — PROGRESS NOTES
GI Note    Patient has completed 1/2 of golytely prep, BM's not clear yet.     Recommend scheduling antiemetics and continuing bowel prep tonight with plan for Colonoscopy in AM.    [employer]   Gastroenterology Fellow PGY V  [phone]

## 2017-04-03 NOTE — PROGRESS NOTES
Heart Failure Progress Note  Attending Physician: Caitlyn Ackerman MD  Hospital Day: 4    Subjective:   Interval History: Patient seen and examined, no events overnight, denied complaints. Hgb 7.6 from 8.2 looks like it was 9.0 >11 days ago   Medications:   Continuous Infusions:     Scheduled Meds:   amlodipine  10 mg Oral Daily    atorvastatin  40 mg Oral Daily    calcium carbonate  500 mg Oral TID PC    cetirizine  5 mg Oral Daily    escitalopram oxalate  20 mg Oral QHS    gabapentin  300 mg Oral QHS    hydrALAZINE  50 mg Oral Q8H    pantoprazole  40 mg Intravenous BID     PRN Meds:sodium chloride 0.9%, acetaminophen, hydrALAZINE, ondansetron, oxycodone-acetaminophen  Objective:     Vitals:  Temp:  [97.8 °F (36.6 °C)-99 °F (37.2 °C)]   Pulse:  [75-87]   Resp:  [14-20]   BP: ()/(0-70)   SpO2:  [95 %-97 %]  on  I/O's:    Intake/Output Summary (Last 24 hours) at 04/03/17 0840  Last data filed at 04/03/17 0600   Gross per 24 hour   Intake             1660 ml   Output              275 ml   Net             1385 ml        Constitutional: NAD, conversant  HEENT: Sclera anicteric, PERRLA, EOMI  Neck: No JVD, no carotid bruits  CV: RRR, no murmur, normal S1/S2  Pulm: CTAB, no wheezes, rales, or ronchi  GI: Abdomen soft, NTND, +BS  Extremities: No LE edema, warm and well perfused  Skin: No ecchymosis, erythema, or ulcers  Psych: AOx3, appropriate affect  Neuro: CNII-XII intact, no focal deficits    Labs:       Recent Labs  Lab 04/01/17  0356 04/02/17  0433 04/03/17  0625    137 141   K 3.7 4.7 4.2   CL 96 106 103   CO2 32* 22* 23   BUN 39* 23* 37*   CREATININE 7.0* 5.0* 6.7*   GLU 92 84 77   ANIONGAP 11 9 15       Recent Labs  Lab 03/31/17  1618 04/03/17  0625   AST 31 20   ALT 21 15   ALKPHOS 143* 129   BILITOT 0.6 0.6   BILIDIR  --  0.3   ALBUMIN 3.5 3.5        Recent Labs  Lab 04/01/17  1610 04/02/17  0433 04/03/17  0626   WBC 3.90 4.41 4.62   HGB 8.3* 8.2* 7.6*   HCT 25.7* 26.0* 24.4*   *  132* 121*   GRAN 66.8  2.6 72.3  3.2 72.3  3.3       Recent Labs  Lab 04/01/17  0356 04/02/17  0433 04/03/17  0626   INR 1.7* 2.0* 2.5*       Recent Labs  Lab 04/03/17  0625   BNP 1743*      Micro:   Blood Cultures  Lab Results   Component Value Date    LABBLOO No growth after 5 days. 05/30/2016    LABBLOO No growth after 5 days. 05/30/2016    LABBLOO No growth after 5 days. 05/22/2016    LABBLOO No growth after 5 days. 05/22/2016    LABBLOO No growth after 5 days. 12/10/2015     Urine Cultures  Lab Results   Component Value Date    LABURIN No significant growth 10/17/2015    LABURIN NO SIGNIFICANT GROWTH 05/08/2012       Imaging:     EF   Date Value Ref Range Status   03/20/2017 10 (A) 55 - 65    02/01/2017 10 (A) 55 - 65    01/11/2017 15 (A) 55 - 65    05/24/2016 15 (A) 55 - 65    06/08/2015 20 (A) 55 - 65          ASSESSMENT/PLAN:      61 y.o. female presents with SOB. PMH of CAD s/p CABG, chronic combined CHF / ICM (EF 10-15%) s/p Heartware as DT (05/2012) and SJM CRT-D (DDR ), ESRD on HD via left arm AVF (TTS), Lower GIB secondary to cecal AVM s/p APC (04/2016), Paroxysmal AF, HTN, Embolic thalamic CVA (01/2017) with residual visual deficits who presents SOB     PLAN:      S/P LVAD HeartWare HVAD Implanted   -Coumadin at 2 mg  Will hold for colonoscopy today , Goal INR 2.0-3.0. Today 2.5 no heparin bridge   -  -Antiplatelets d/c 6/25/14 likely secondary to GI loss  -Speed set at 2600  -Interrogation notable for n/a  -Implanted as desination therapy   -volume overloaded on exam: nephrology consulted, HD yesterday looks better today      Anemia  -hx lower GIB secondary to cecal AVM s/p APC 4/2016  -reporting bright red blood.   -Transfuse accordingly  -GI on board plan for colonoscopy today   -Clear liquid diet    -Protonix 40 IV bid      ESRD- on HD  -Nephrology consulted for HD     Acute on Chronic Systolic/Diastolic/ICM EF 10-15%  -volume management with HD    Hypertension  -Will continue home  antihypertensives for now: Hydralazine and Norvasc  -will monitor and hold if BP low     Paroxysmal Atrial Fib  -Anticoagulated on hold for now  -Rate controlled 70's      Hx CVA  -PT/OT consulted     Depression  -continued home dose of Lexapro      Attending addendum to follow       Terese Jaramillo MD  Cardiology Fellow  Pager: [phone]

## 2017-04-03 NOTE — PROGRESS NOTES
Dr. Jaramillo notified patient complaining of headache 7 out 10 to the temporal region, with continued nausea. MD orders for STAT CT scan of head without contrast, PRN tylenol and zofran. CT notified of need for CT and will have nurse bring patient down. Orders placed and carried out, will continue to monitor.

## 2017-04-03 NOTE — NURSING
Pt continued GI prep @ 0400.  Stool is still liquid, but not quite clear.  Dr. Carrillo notified.

## 2017-04-03 NOTE — PROGRESS NOTES
ESRD 1:1 treatment initiated via Left upper arm fistula using 15 g needles, lines secured. Tx delayed by 15 minutes due to assistance needed with perineal care, PCT at bedside.

## 2017-04-03 NOTE — PLAN OF CARE
Problem: Patient Care Overview  Goal: Plan of Care Review  Outcome: Ongoing (interventions implemented as appropriate)     Poor GI prep; possible c-scope today, current GIB. No acute events throughout the night. Reviewed plan of care with pt and family; all questions/concerns addressed. VS and assessment per flow sheet, patient progressing towards goals as tolerated. Will continue to monitor.

## 2017-04-03 NOTE — DISCHARGE INSTRUCTIONS
Colonoscopy     A camera attached to a flexible tube with a viewing lens is used to take video pictures.     Colonoscopy is a test to view the inside of your lower digestive tract (colon and rectum). Sometimes it can show the last part of the small intestine (ileum). During the test, small pieces of tissue may be removed for testing. This is called a biopsy. Small growths, such as polyps, may also be removed.   Why is colonoscopy done?  The test is done to help look for colon cancer. And it can help find the source of abdominal pain, bleeding, and changes in bowel habits. It may be needed once a year, depending on factors such as your:  · Age  · Health history  · Family health history  · Symptoms  · Results from any prior colonoscopy  Risks and possible complications  These include:  · Bleeding               · A puncture or tear in the colon   · Risks of anesthesia  · A cancer lesion not being seen  Getting ready   To prepare for the test:  · Talk with your healthcare provider about the risks of the test (see below). Also ask your healthcare provider about alternatives to the test.  · Tell your healthcare provider about any medicines you take. Also tell him or her about any health conditions you may have.  · Make sure your rectum and colon are empty for the test. Follow the diet and bowel prep instructions exactly. If you dont, the test may need to be rescheduled.  · Plan for a friend or family member to drive you home after the test.     Colonoscopy provides an inside view of the entire colon.     You may discuss the results with your doctor right away or at a future visit.  During the test   The test is usually done in the hospital on an outpatient basis. This means you go home the same day. The procedure takes about 30 minutes. During that time:  · You are given relaxing (sedating) medicine through an IV line. You may be drowsy, or fully asleep.  · The healthcare provider will first give you a physical exam to  check for anal and rectal problems.  · Then the anus is lubricated and the scope inserted.  · If you are awake, you may have a feeling similar to needing to have a bowel movement. You may also feel pressure as air is pumped into the colon. Its OK to pass gas during the procedure.  · Biopsy, polyp removal, or other treatments may be done during the test.  After the test   You may have gas right after the test. It can help to try to pass it to help prevent later bloating. Your healthcare provider may discuss the results with you right away. Or you may need to schedule a follow-up visit to talk about the results. After the test, you can go back to your normal eating and other activities. You may be tired from the sedation and need to rest for a few hours.  Date Last Reviewed: 11/1/2016  © 5532-6213 [facility] [place]. [address] [place] [address], [place] [address]. All rights reserved. This information is not intended as a substitute for professional medical care. Always follow your healthcare professional's instructions.

## 2017-04-03 NOTE — PLAN OF CARE
Problem: Physical Therapy Goal  Goal: Physical Therapy Goal  Goals to be met by: 4/10/2017     Patient will increase functional independence with mobility by performin. Supine to sit with modified independence  2. Sit to supine with Modified Bradley  3. Sit to stand transfer with Modified Bradley  4. Bed to chair transfer with Supervision using Rolling Walker  5. Gait x 200 feet with Supervision using Rolling Walker.   Outcome: Ongoing (interventions implemented as appropriate)  PT evaluation complete. Recommend home with HHPT at discharge.

## 2017-04-03 NOTE — PLAN OF CARE
Problem: Patient Care Overview  Goal: Plan of Care Review  Outcome: Ongoing (interventions implemented as appropriate)  Discussed Plan of Care with patient. Ambulates well with assist. Fall precautions in place. Bleeding precautions reviewed and maintained. Pain denied. Dialysis performed at bedside and 2L removed. STAT CT of head done this morning due to HA and nausea. CT (-). Patinet awaiting transport for c-scope. Patient remained free of falls/ injury. All questions and concerns were addressed. Will continue to monitor patient.

## 2017-04-03 NOTE — NURSING
Pt c/o nausea from golytely prep; prn zofran given.  During reassessment, pt states that she is unable to finish GI prep.  Stool is liquid, but is not clear. MD Lorena notified.   Pt will remain NPO; possible cancellation of coloscopy today.

## 2017-04-03 NOTE — PROGRESS NOTES
Dr. Jaramillo notified patient had to runs on telemetry of VTach/ change in rhythm. Patient's states she feels fine and electrolytes are within normal limits. MD orders to continue to monitor.

## 2017-04-03 NOTE — PROGRESS NOTES
Dr. Jaramillo notified patient being dialyzed, MAPs in the 70's. Dialysis nurse asked that Hydralazine be held so to not drop the patient's BP during dialysis. MD orders to HOLD Hydralazine for now and reassess BP after dialysis. MD orders to administer Hydralazine if doppler > 70. Will continue to monitor.

## 2017-04-03 NOTE — PLAN OF CARE
Problem: Patient Care Overview  Goal: Plan of Care Review  Outcome: Ongoing (interventions implemented as appropriate)  3hr HD treatment completed 2L of fluid removed pt tolerated well. Both needles of a SCOTT fistula removed and pressure held until hemostasis achieved dressing applied. Report given to FARAZ Kong.

## 2017-04-03 NOTE — PT/OT/SLP EVAL
Physical Therapy  Evaluation    Randa Devine   MRN: [id number]   Admitting Diagnosis: bleeding    PT Received On: 17  PT Start Time: 1140     PT Stop Time: 1153    PT Total Time (min): 13 min       Billable Minutes:  Evaluation 13    Diagnosis: bleeding    Past Medical History:   Diagnosis Date    Anticoagulant long-term use     Anxiety     Atrial tachycardia, paroxysmal 2013    Blood transfusion     Cardiomyopathy     CHF (congestive heart failure)     Chronic kidney disease     Coronary artery disease     End stage renal disease     Hypertension     pulmonary    ICD (implantable cardioverter-defibrillator) battery depletion 2014    Myocardial infarction     Presence of left ventricular assist device (LVAD)     Pulmonary hypertension     Stroke       Past Surgical History:   Procedure Laterality Date    CARDIAC DEFIBRILLATOR PLACEMENT      CARDIAC SURGERY       SECTION      COLONOSCOPY N/A 2016    Procedure: COLONOSCOPY;  Surgeon: Mark Currie MD;  Location: [facility] ([address] [place] [address]);  Service: Endoscopy;  Laterality: N/A;    CORONARY ARTERY BYPASS GRAFT      FRACTURE SURGERY      HIP SURGERY      [facility]    LEFT VENTRICULAR ASSIST DEVICE  2012    TONSILLECTOMY      VASCULAR SURGERY         Referring physician: Charanjit Sawant MD  Date referred to PT: 2017    General Precautions: Standard, fall     Do you have any cultural, spiritual, Orthodox conflicts, given your current situation?: None    Patient History:  Lives With: child(edilson), adult  Living Arrangements: apartment (no concerns)  Stair Railings at Home: none  Transportation Available: family or friend will provide  Living Environment Comment: Pt lives with her son and his girlfriend. Her son is home during the day and can assist as needed.   Equipment Currently Used at Home: cane, straight, bedside commode, rollator, orthotic device, oxygen (per OT note, pt's family took away rollator and she was  "using a w/c however pt stated to PT she still uses rollator)    Previous Level of Function:  Ambulation Skills: needs device  Transfer Skills: needs device  ADL Skills: needs device  Work/Leisure Activity: needs device    Subjective:  Communicated with RN prior to session.  Pt reported "I don't always have to wear the oxygen when I walk."  Chief Complaint: Weakness  Patient goals: To go home    Pain Ratin/10   Location:  (headache)  Pain Addressed: Distraction  Pain Rating Post-Intervention: 0/10    Objective:   Patient found with: telemetry, oxygen, peripheral IV (LVAD)     Cognitive Exam:  Oriented to: Person, Place, Time and Situation    Follows Commands/attention: Follows multistep  commands  Communication: clear/fluent  Safety awareness/insight to disability: intact    Physical Exam:  Postural examination/scapula alignment: Rounded shoulder, Head forward and Posterior pelvic tilt    Skin integrity: Visible skin intact  Edema: Mild in BLE    Sensation:   Impaired  light/touch BLE    Upper Extremity Range of Motion:  Right Upper Extremity: WFL  Left Upper Extremity: WFL    Upper Extremity Strength:  Right Upper Extremity: WFL  Left Upper Extremity: WFL    Lower Extremity Range of Motion:  Right Lower Extremity: WFL  Left Lower Extremity: WFL    Lower Extremity Strength:  Right Lower Extremity: WFL except ankle DF 0/5  Left Lower Extremity: WFL except ankle DF 0/5    Gross motor coordination: WFL    Functional Mobility:  Bed Mobility:  Supine to Sit: Stand by Assistance (increased time)  Sit to Supine: Stand by Assistance (increased time)    Transfers:  Sit <> Stand Assistance: Stand By Assistance  Sit <> Stand Assistive Device: Rolling Walker    Gait:   Gait Distance: 55 feet  Assistance 1: Contact Guard Assistance  Gait Assistive Device: Rolling walker  Gait Pattern: 3-point gait  Gait Deviation(s): decreased shanti, increased time in double stance, decreased velocity of limb motion, decreased " weight-shifting ability, decreased toe-to-floor clearance, toe drag    Balance:   Static Sit: GOOD: Takes MODERATE challenges from all directions  Dynamic Sit: GOOD: Maintains balance through MODERATE excursions of active trunk movement  Static Stand: FAIR+: Takes MINIMAL challenges from all directions  Dynamic stand: FAIR: Needs CONTACT GUARD during gait    Pt able to don LVAD consolidation bag without assist.    AM-PAC 6 CLICK MOBILITY  How much help from another person does this patient currently need?   1 = Unable, Total/Dependent Assistance  2 = A lot, Maximum/Moderate Assistance  3 = A little, Minimum/Contact Guard/Supervision  4 = None, Modified Cobb/Independent    Turning over in bed (including adjusting bedclothes, sheets and blankets)?: 3  Sitting down on and standing up from a chair with arms (e.g., wheelchair, bedside commode, etc.): 3  Moving from lying on back to sitting on the side of the bed?: 3  Moving to and from a bed to a chair (including a wheelchair)?: 3  Need to walk in hospital room?: 3  Climbing 3-5 steps with a railing?: 2  Total Score: 17     AM-PAC Raw Score CMS G-Code Modifier Level of Impairment Assistance   6 % Total / Unable   7 - 9 CM 80 - 100% Maximal Assist   10 - 14 CL 60 - 80% Moderate Assist   15 - 19 CK 40 - 60% Moderate Assist   20 - 22 CJ 20 - 40% Minimal Assist   23 CI 1-20% SBA / CGA   24 CH 0% Independent/ Mod I     Patient left seated EOB with all lines intact and call button in reach.    Assessment:   Randa Devine is a 61 y.o. female with a medical diagnosis of bleeding and presents with LE weakness, decreased endurance and limited safety and independence with functional mobility She is near baseline but slightly deconditioned from hospital stay.    Rehab identified problem list/impairments: Rehab identified problem list/impairments: weakness, impaired endurance, gait instability, impaired functional mobilty, decreased lower extremity function, impaired  balance, impaired cardiopulmonary response to activity, decreased safety awareness    Rehab potential is fair.    Activity tolerance: Fair    Discharge recommendations: Discharge Facility/Level Of Care Needs: home health PT     Barriers to discharge: Barriers to Discharge: None    Equipment recommendations: Equipment Needed After Discharge: bath bench     GOALS:   Physical Therapy Goals        Problem: Physical Therapy Goal    Goal Priority Disciplines Outcome Goal Variances Interventions   Physical Therapy Goal     PT/OT, PT Ongoing (interventions implemented as appropriate)     Description:  Goals to be met by: 4/10/2017     Patient will increase functional independence with mobility by performin. Supine to sit with modified independence  2. Sit to supine with Modified Harris  3. Sit to stand transfer with Modified Harris  4. Bed to chair transfer with Supervision using Rolling Walker  5. Gait  x 200 feet with Supervision using Rolling Walker.                 PLAN:    Patient to be seen 4 x/week to address the above listed problems via gait training, therapeutic activities, therapeutic exercises  Plan of Care expires: 17  Plan of Care reviewed with: patient    Jessica LeJeune, PT, DPT  [phone]

## 2017-04-04 ENCOUNTER — OUTPATIENT CASE MANAGEMENT (OUTPATIENT)
Dept: ADMINISTRATIVE | Facility: OTHER | Age: 62
End: 2017-04-04

## 2017-04-04 LAB
ANION GAP SERPL CALC-SCNC: 13 MMOL/L
APTT BLDCRRT: 32 SEC
BASOPHILS # BLD AUTO: 0.04 K/UL
BASOPHILS NFR BLD: 0.8 %
BUN SERPL-MCNC: 23 MG/DL
CALCIUM SERPL-MCNC: 9.1 MG/DL
CHLORIDE SERPL-SCNC: 105 MMOL/L
CO2 SERPL-SCNC: 22 MMOL/L
CREAT SERPL-MCNC: 4.7 MG/DL
DIFFERENTIAL METHOD: ABNORMAL
EOSINOPHIL # BLD AUTO: 0.1 K/UL
EOSINOPHIL NFR BLD: 2 %
ERYTHROCYTE [DISTWIDTH] IN BLOOD BY AUTOMATED COUNT: 15.6 %
EST. GFR  (AFRICAN AMERICAN): 11 ML/MIN/1.73 M^2
EST. GFR  (NON AFRICAN AMERICAN): 9.5 ML/MIN/1.73 M^2
GLUCOSE SERPL-MCNC: 73 MG/DL
HCT VFR BLD AUTO: 24.6 %
HGB BLD-MCNC: 8 G/DL
INR PPP: 2
LDH SERPL L TO P-CCNC: 207 U/L
LYMPHOCYTES # BLD AUTO: 0.6 K/UL
LYMPHOCYTES NFR BLD: 12.7 %
MAGNESIUM SERPL-MCNC: 2.1 MG/DL
MCH RBC QN AUTO: 33.2 PG
MCHC RBC AUTO-ENTMCNC: 32.5 %
MCV RBC AUTO: 102 FL
MONOCYTES # BLD AUTO: 0.5 K/UL
MONOCYTES NFR BLD: 9.4 %
NEUTROPHILS # BLD AUTO: 3.8 K/UL
NEUTROPHILS NFR BLD: 74.9 %
PHOSPHATE SERPL-MCNC: 3.1 MG/DL
PLATELET # BLD AUTO: 130 K/UL
PMV BLD AUTO: 9.7 FL
POTASSIUM SERPL-SCNC: 4 MMOL/L
PROTHROMBIN TIME: 20.4 SEC
RBC # BLD AUTO: 2.41 M/UL
SODIUM SERPL-SCNC: 140 MMOL/L
WBC # BLD AUTO: 5.02 K/UL

## 2017-04-04 PROCEDURE — 27000248 HC VAD-ADDITIONAL DAY

## 2017-04-04 PROCEDURE — 93750 INTERROGATION VAD IN PERSON: CPT | Performed by: PHYSICIAN ASSISTANT

## 2017-04-04 PROCEDURE — 25000003 PHARM REV CODE 250: Performed by: PHYSICIAN ASSISTANT

## 2017-04-04 PROCEDURE — 36415 COLL VENOUS BLD VENIPUNCTURE: CPT

## 2017-04-04 PROCEDURE — 85730 THROMBOPLASTIN TIME PARTIAL: CPT

## 2017-04-04 PROCEDURE — 63600175 PHARM REV CODE 636 W HCPCS: Performed by: STUDENT IN AN ORGANIZED HEALTH CARE EDUCATION/TRAINING PROGRAM

## 2017-04-04 PROCEDURE — 25000003 PHARM REV CODE 250: Performed by: STUDENT IN AN ORGANIZED HEALTH CARE EDUCATION/TRAINING PROGRAM

## 2017-04-04 PROCEDURE — 85610 PROTHROMBIN TIME: CPT

## 2017-04-04 PROCEDURE — 20600001 HC STEP DOWN PRIVATE ROOM

## 2017-04-04 PROCEDURE — C9113 INJ PANTOPRAZOLE SODIUM, VIA: HCPCS | Performed by: STUDENT IN AN ORGANIZED HEALTH CARE EDUCATION/TRAINING PROGRAM

## 2017-04-04 PROCEDURE — 93750 INTERROGATION VAD IN PERSON: CPT | Mod: ,,, | Performed by: INTERNAL MEDICINE

## 2017-04-04 PROCEDURE — 83735 ASSAY OF MAGNESIUM: CPT

## 2017-04-04 PROCEDURE — 97116 GAIT TRAINING THERAPY: CPT

## 2017-04-04 PROCEDURE — 84100 ASSAY OF PHOSPHORUS: CPT

## 2017-04-04 PROCEDURE — 85025 COMPLETE CBC W/AUTO DIFF WBC: CPT

## 2017-04-04 PROCEDURE — 80048 BASIC METABOLIC PNL TOTAL CA: CPT

## 2017-04-04 PROCEDURE — 99232 SBSQ HOSP IP/OBS MODERATE 35: CPT | Mod: ,,, | Performed by: INTERNAL MEDICINE

## 2017-04-04 PROCEDURE — 83615 LACTATE (LD) (LDH) ENZYME: CPT

## 2017-04-04 RX ORDER — WARFARIN 2 MG/1
2 TABLET ORAL DAILY
Status: DISCONTINUED | OUTPATIENT
Start: 2017-04-04 | End: 2017-04-05

## 2017-04-04 RX ORDER — OXYMETAZOLINE HCL 0.05 %
2 SPRAY, NON-AEROSOL (ML) NASAL 2 TIMES DAILY PRN
Status: DISCONTINUED | OUTPATIENT
Start: 2017-04-04 | End: 2017-04-06 | Stop reason: HOSPADM

## 2017-04-04 RX ORDER — PANTOPRAZOLE SODIUM 40 MG/1
40 TABLET, DELAYED RELEASE ORAL DAILY
Status: DISCONTINUED | OUTPATIENT
Start: 2017-04-04 | End: 2017-04-06 | Stop reason: HOSPADM

## 2017-04-04 RX ADMIN — CETIRIZINE HYDROCHLORIDE 5 MG: 5 TABLET, FILM COATED ORAL at 09:04

## 2017-04-04 RX ADMIN — ATORVASTATIN CALCIUM 40 MG: 20 TABLET, FILM COATED ORAL at 09:04

## 2017-04-04 RX ADMIN — GABAPENTIN 300 MG: 300 CAPSULE ORAL at 09:04

## 2017-04-04 RX ADMIN — HYDRALAZINE HYDROCHLORIDE 50 MG: 50 TABLET ORAL at 02:04

## 2017-04-04 RX ADMIN — PANTOPRAZOLE SODIUM 40 MG: 40 TABLET, DELAYED RELEASE ORAL at 02:04

## 2017-04-04 RX ADMIN — ESCITALOPRAM 20 MG: 20 TABLET, FILM COATED ORAL at 09:04

## 2017-04-04 RX ADMIN — HYDRALAZINE HYDROCHLORIDE 50 MG: 50 TABLET ORAL at 05:04

## 2017-04-04 RX ADMIN — HYDRALAZINE HYDROCHLORIDE 50 MG: 50 TABLET ORAL at 09:04

## 2017-04-04 RX ADMIN — CALCIUM CARBONATE (ANTACID) CHEW TAB 500 MG 500 MG: 500 CHEW TAB at 02:04

## 2017-04-04 RX ADMIN — AMLODIPINE BESYLATE 10 MG: 10 TABLET ORAL at 09:04

## 2017-04-04 RX ADMIN — CALCIUM CARBONATE (ANTACID) CHEW TAB 500 MG 500 MG: 500 CHEW TAB at 09:04

## 2017-04-04 RX ADMIN — PANTOPRAZOLE SODIUM 40 MG: 40 INJECTION, POWDER, FOR SOLUTION INTRAVENOUS at 09:04

## 2017-04-04 RX ADMIN — WARFARIN SODIUM 2 MG: 2 TABLET ORAL at 05:04

## 2017-04-04 NOTE — PROGRESS NOTES
[facility]  Consult Note Nephrology    Admit Date: 3/31/2017  Consult Requested By: Caitlyn Ackerman MD   LOS: 3 days     SUBJECTIVE:     Follow-up For:  ESRD on iHD    Interval History:  NAEON, hg decreased, Had EGD today. Seen while on HD today. Tolerated well.    Review of Systems:  Constitutional: no fever or chills  Respiratory: no cough or shortness of breath  Cardiovascular: no chest pain or palpitations  Gastrointestinal: no nausea or vomiting, no abdominal pain or change in bowel habits  Hematologic/Lymphatic: no easy bruising or lymphadenopathy  Musculoskeletal: no arthralgias or myalgias  Neurological: no seizures or tremors      OBJECTIVE:     Vital Signs (Most Recent)  Temp: 97.7 °F (36.5 °C) (04/03/17 2100)  Pulse: 90 (04/03/17 2100)  Resp: 18 (04/03/17 2100)  BP: (!) 102/0 (04/03/17 2108)  SpO2: 95 % (04/03/17 2100)    Vital Signs Range (Last 24H):  Temp:  [97.2 °F (36.2 °C)-98.1 °F (36.7 °C)]   Pulse:  [69-92]   Resp:  [14-20]   BP: ()/(0-72)   SpO2:  [91 %-97 %]       Intake/Output Summary (Last 24 hours) at 04/03/17 2114  Last data filed at 04/03/17 2000   Gross per 24 hour   Intake             1600 ml   Output             2603 ml   Net            -1003 ml         Physical Exam:   General: Well developed, well nourished in NAD  HEENT: Conjunctiva clear; Oropharynx clear  Neck: No JVD noted, Supple  CV- Normal S1, S2 with no murmurs,gallops,rubs  Resp- Lungs CTA Bilaterally, Unlabored  Abdomen- NTND, BS normoactive x4 quads, soft  Extrem- No cyanosis, clubbing, edema.  Skin- No rashes, lesions, ulcers  Neuro: awake, Oriented x3, no FND      Laboratory:  CBC:   Recent Labs  Lab 04/03/17  0626   WBC 4.62   RBC 2.34*   HGB 7.6*   HCT 24.4*   *   *   MCH 32.5*   MCHC 31.1*     BMP:   Recent Labs  Lab 04/03/17  0625   GLU 77      CO2 23   BUN 37*   CREATININE 6.7*   CALCIUM 8.8   MG 2.2     CMP:   Recent Labs  Lab 04/03/17  0625   GLU 77   CALCIUM 8.8    ALBUMIN 3.5   PROT 6.9      K 4.2   CO2 23      BUN 37*   CREATININE 6.7*   ALKPHOS 129   ALT 15   AST 20   BILITOT 0.6     PTH: No results for input(s): PTH in the last 168 hours.  Coagulation:   Recent Labs  Lab 04/03/17  0626   INR 2.5*   APTT 31.2     Cardiac Markers: No results for input(s): CKMB, TROPONINT, MYOGLOBIN in the last 168 hours.  ABGs: No results for input(s): PH, PCO2, HCO3, POCSATURATED, BE in the last 168 hours.    Labs reviewed  Diagnostic Results:  X-Ray: Reviewed  US: Reviewed  Echo: Reviewed    ASSESSMENT/PLAN:     Active Hospital Problems    Diagnosis  POA    History of stroke without residual deficits [Z86.73]  Not Applicable    HIT (heparin-induced thrombocytopenia) [D75.82]  Yes     Chronic    Paroxysmal atrial fibrillation [I48.0]  Yes    ICD (implantable cardioverter-defibrillator), biventricular, in situ [Z95.810]  Yes    Chronic anticoagulation [Z79.01]  Not Applicable    Anemia [D64.9]  Yes    Chronic combined systolic and diastolic heart failure [I50.42]  Yes    End stage renal disease on dialysis [N18.6, Z99.2]  Not Applicable    Heart replaced by heart assist device [Z95.811]  Not Applicable      Resolved Hospital Problems    Diagnosis Date Resolved POA   No resolved problems to display.       61 y.o. female with PMH of CAD s/p CABG, chronic combined CHF / ICM (EF 10-15%) s/p LVAD, ESRD on HD via left arm AVF (TTS), Lower GIB secondary to cecal AVM s/p APC (04/2016), Paroxysmal AF, HTN and CVA (01/2017) who presents to the hospital due to c/o progressively worsening SOB and BRBPR. We are consulted for backup HD    ESRD on IHD TTS   Out patient [facility] in [place]  Duration of outpatient dialysis session - 3.5 hrs  Residual Laura Function - no  - Will provide dialysis for metabolic clearance and volume management x 3 hrs  - Seen and examined today during hemodialysis; tolerating treatment well without issues. Denied headaches, chest  pain, abdominal pain, or muscle cramps   -   - Target ultrafiltration 2 lts  - Dialysate adjusted to current labs   Aceess SCOTT AVF with good thrill and bruit    Active Problem in Hospital  - GI Bleed   - Volume overload    Anemia of Chronic Kidney Disease on Acute blood loss anemia  - Will resume EDUAR as outpatient   - Will request iron studies to assess further needs of supplementation     Mineral Bone Disease in CKD   - Renal Function Panel Daily for electrolytes monitoring  - Phos 3.5  - No need for binders  - CoCa 8.8    Nutrition   - Renal Diet    HTN   - Seems well control BP, will continue to monitor. Goal for BP is <130 mmHg SBP and BDP <80 mmHg.  -- continue current management as per HTS    Case discussed with staff further recs with attestation.    Tian Norris MD  Nephrology Fellow PGY4  [phone]

## 2017-04-04 NOTE — PROGRESS NOTES
Dr. Glass notified of patient with nose bleed this morning.  Gauze inserted in nose and pinched x15 minutes.  Will continue to monitor.

## 2017-04-04 NOTE — NURSING TRANSFER
Nursing Transfer Note      4/3/2017     Transfer To: room 300    Transfer via stretcher    Transfer with cardiac monitor, LVAD emergency bag    Transported by RN and PCT    Medicines sent: none    Chart send with patient: Yes    Notified: receiving RN

## 2017-04-04 NOTE — PROGRESS NOTES
Heart Failure Progress Note  Attending Physician: Caitlyn Ackerman MD  Hospital Day: 5    Subjective:   Interval History: Patient seen and examined, no events overnight, denied complaints. Hgb 8.0 today has some epistaxis taht was resolved    Medications:   Continuous Infusions:     Scheduled Meds:   amlodipine  10 mg Oral Daily    atorvastatin  40 mg Oral Daily    calcium carbonate  500 mg Oral TID PC    cetirizine  5 mg Oral Daily    escitalopram oxalate  20 mg Oral QHS    gabapentin  300 mg Oral QHS    hydrALAZINE  50 mg Oral Q8H    pantoprazole  40 mg Intravenous BID    warfarin  2 mg Oral Daily     PRN Meds:sodium chloride 0.9%, acetaminophen, hydrALAZINE, ondansetron, oxycodone-acetaminophen, oxymetazoline  Objective:     Vitals:  Temp:  [97.2 °F (36.2 °C)-99 °F (37.2 °C)]   Pulse:  [69-93]   Resp:  [17-20]   BP: ()/(0-72)   SpO2:  [91 %-97 %]  on  I/O's:    Intake/Output Summary (Last 24 hours) at 04/04/17 1019  Last data filed at 04/04/17 0433   Gross per 24 hour   Intake             1080 ml   Output             2603 ml   Net            -1523 ml        Constitutional: NAD, conversant  HEENT: Sclera anicteric, PERRLA, EOMI  Neck: No JVD, no carotid bruits  CV: RRR, no murmur, normal S1/S2  Pulm: CTAB, no wheezes, rales, or ronchi  GI: Abdomen soft, NTND, +BS  Extremities: No LE edema, warm and well perfused  Skin: No ecchymosis, erythema, or ulcers  Psych: AOx3, appropriate affect  Neuro: CNII-XII intact, no focal deficits    Labs:       Recent Labs  Lab 04/02/17  0433 04/03/17  0625 04/04/17  0657    141 140   K 4.7 4.2 4.0    103 105   CO2 22* 23 22*   BUN 23* 37* 23*   CREATININE 5.0* 6.7* 4.7*   GLU 84 77 73   ANIONGAP 9 15 13       Recent Labs  Lab 03/31/17  1618 04/03/17  0625   AST 31 20   ALT 21 15   ALKPHOS 143* 129   BILITOT 0.6 0.6   BILIDIR  --  0.3   ALBUMIN 3.5 3.5        Recent Labs  Lab 04/02/17  0433 04/03/17  0626 04/04/17  0657   WBC 4.41 4.62 5.02   HGB  8.2* 7.6* 8.0*   HCT 26.0* 24.4* 24.6*   * 121* 130*   GRAN 72.3  3.2 72.3  3.3 74.9*  3.8       Recent Labs  Lab 04/02/17  0433 04/03/17  0626 04/04/17  0657   INR 2.0* 2.5* 2.0*       Recent Labs  Lab 04/03/17  0625   BNP 1743*      Micro:   Blood Cultures  Lab Results   Component Value Date    LABBLOO No growth after 5 days. 05/30/2016    LABBLOO No growth after 5 days. 05/30/2016    LABBLOO No growth after 5 days. 05/22/2016    LABBLOO No growth after 5 days. 05/22/2016    LABBLOO No growth after 5 days. 12/10/2015     Urine Cultures  Lab Results   Component Value Date    LABURIN No significant growth 10/17/2015    LABURIN NO SIGNIFICANT GROWTH 05/08/2012       Imaging:     EF   Date Value Ref Range Status   03/20/2017 10 (A) 55 - 65    02/01/2017 10 (A) 55 - 65    01/11/2017 15 (A) 55 - 65    05/24/2016 15 (A) 55 - 65    06/08/2015 20 (A) 55 - 65          ASSESSMENT/PLAN:      61 y.o. female presents with SOB. PMH of CAD s/p CABG, chronic combined CHF / ICM (EF 10-15%) s/p Heartware as DT (05/2012) and SJM CRT-D (DDR ), ESRD on HD via left arm AVF (TTS), Lower GIB secondary to cecal AVM s/p APC (04/2016), Paroxysmal AF, HTN, Embolic thalamic CVA (01/2017) with residual visual deficits who presents SOB     PLAN:      S/P LVAD HeartWare HVAD Implanted   -Coumadin at 2 mg  Will resume she had colonoscopy 4/3/2017 showing bleeding medium size angiodysplasia in descending colon s/p clip , Goal INR 2.0-3.0. Today 2.0  -  -Antiplatelets d/c 6/25/14 likely secondary to GI loss  -Speed set at 2600  -Interrogation notable for n/a  -Implanted as desination therapy   -volume overloaded on exam: nephrology consulted, HD yesterday looks better today     Epistaxis:   -Right nostril  -Stopped this am after packing  -will give Afrin     Anemia  -hx lower GIB secondary to cecal AVM s/p APC 4/2016  -reporting bright red blood.   -Transfuse accordingly  -GI on board s/p colonoscopy 4/3/2017 showing bleeding  medium size angiodysplasia in descending colon s/p clip  -Protonix 40 po daily      ESRD- on HD  -Nephrology consulted for HD     Acute on Chronic Systolic/Diastolic/ICM EF 10-15%  -volume management with HD  -Spoke to HD center regarding her BP and the necessity to pull out fluid even if they do it slower and discussed with them that 60/0 is ok for her while on dialysis as long as patient feeling fine ([phone])    Hypertension  -Will continue home antihypertensives for now: Hydralazine and Norvasc  -will monitor and hold if BP low     Paroxysmal Atrial Fib  -Anticoagulated on hold for now  -Rate controlled 70's      Hx CVA  -PT/OT consulted     Depression  -continued home dose of Lexapro      Attending addendum to follow       Terese Jaramillo MD  Cardiology Fellow  Pager: [phone]

## 2017-04-04 NOTE — ANESTHESIA PREPROCEDURE EVALUATION
04/03/2017  Randa Devine is a 61 y.o., female.  Pre-operative evaluation for Procedure(s):  COLONOSCOPY    Randa Devine is a 61 y.o. female w/ PMHx of LVAD, chronic anticoagulation, hx of TIA, afib, hx of HIT, HTN, ESRD who presents for above procedure. Pt with HD 4/19 but not tolerated well 2/2 hypotension. Pt also with GI bleed on admission and colonoscopy once INR less than 2.5.     LDA:   R AC 18 g PIV   R upper arm 20 g PIV   VAD     Prev airway:   11/28/12; 1725; Direct laryngoscopy; CRNA; Endotracheal Tube; Easy; Postinduction; Hardy #2; 7.0; Cuffed; Minimal occlusive pressure; Auscultation; Grade II; Anterior larynx, Small mouth; Positive EtCO2, Bilateral breath sounds, Atraumatic/Condition of teeth unchanged; Lips; 1      Patient Active Problem List   Diagnosis    Heart replaced by heart assist device    End stage renal disease on dialysis    Chronic anticoagulation    Left ventricular assist device present    Femoral neck fracture    Anemia    Chronic combined systolic and diastolic heart failure    Fracture of lumbar spine without cord injury    TIA (transient ischemic attack)    ICD (implantable cardioverter-defibrillator), biventricular, in situ    Paroxysmal atrial fibrillation    Atrial tachycardia, paroxysmal    HIT (heparin-induced thrombocytopenia)    Gait disorder    Chronic LBP    Right leg numbness    Foot drop, bilateral    Physical deconditioning    Secondary hyperparathyroidism (of renal origin)    History of stroke without residual deficits    Syncope    Faintness    LVAD (left ventricular assist device) present    HBP (high blood pressure)    Nausea    Essential hypertension    Chronic low back pain    Cognitive impairment    Cellulitis    Fall    Erythema    Spontaneous hematoma of forearm    Hallucinations    Embolic stroke involving left  posterior cerebral artery    Migraine with aura and without status migrainosus, not intractable    Cytotoxic cerebral edema    Acute ischemic vertebrobasilar artery thalamic stroke involving left-sided vessel    Cardiomegaly    Hypervolemia       Review of patient's allergies indicates:   Allergen Reactions    Codeine Nausea And Vomiting    Demerol [meperidine] Nausea And Vomiting    Lortab [hydrocodone-acetaminophen] Nausea And Vomiting        No current facility-administered medications on file prior to encounter.      Current Outpatient Prescriptions on File Prior to Encounter   Medication Sig Dispense Refill    acetaminophen (TYLENOL) 325 MG tablet Take 2 tablets (650 mg total) by mouth every 6 (six) hours as needed for Pain (mild pain).  0    amlodipine (NORVASC) 10 MG tablet Take 1 tablet (10 mg total) by mouth once daily. 30 tablet 11    atorvastatin (LIPITOR) 40 MG tablet Take 1 tablet (40 mg total) by mouth once daily. 90 tablet 3    CALCIUM CARBONATE (ANTACID CALCIUM ORAL) Take 1 tablet by mouth 4 (four) times daily.       cetirizine (ZYRTEC) 5 MG tablet Take 1 tablet (5 mg total) by mouth once daily. 30 tablet 0    escitalopram oxalate (LEXAPRO) 20 MG tablet Take 1 tablet (20 mg total) by mouth every evening. 90 tablet 3    GABAPENTIN (CMPD PAIN MANAGEMENT COMPOUND OINTMNENT THREE) Apply small amount to painful joints once or twice a day 30 g 3    gabapentin (NEURONTIN) 300 MG capsule Take 1 capsule (300 mg total) by mouth every evening. 120 capsule 2    hydrALAZINE (APRESOLINE) 50 MG tablet Take 1 tablet (50 mg total) by mouth every 8 (eight) hours. 90 tablet 11    ondansetron (ZOFRAN) 4 MG tablet Take 1 tablet (4 mg total) by mouth every 8 (eight) hours as needed for Nausea. 30 tablet 3    pantoprazole (PROTONIX) 40 MG tablet Take 1 tablet (40 mg total) by mouth once daily. 60 tablet 10    warfarin (COUMADIN) 2 MG tablet Current Dose ( 3 mg on Mon, Thu; 2 mg all other days) or as  directed by coumadin clinic. (Patient taking differently: 2 mg Daily. Current Dose ( 3 mg on Mon, Thu; 2 mg all other days) or as directed by coumadin clinic.) 102 tablet 3       Past Surgical History:   Procedure Laterality Date    CARDIAC DEFIBRILLATOR PLACEMENT      CARDIAC SURGERY       SECTION      COLONOSCOPY N/A 2016    Procedure: COLONOSCOPY;  Surgeon: Mark Currie MD;  Location: [facility] ([address] [place] [address]);  Service: Endoscopy;  Laterality: N/A;    CORONARY ARTERY BYPASS GRAFT      FRACTURE SURGERY      HIP SURGERY      RTHA    LEFT VENTRICULAR ASSIST DEVICE  2012    TONSILLECTOMY      VASCULAR SURGERY         Social History     Social History    Marital status:      Spouse name: N/A    Number of children: N/A    Years of education: N/A     Occupational History    Not on file.     Social History Main Topics    Smoking status: Former Smoker     Packs/day: 2.00     Years: 30.00     Quit date: 10/29/2009    Smokeless tobacco: Not on file    Alcohol use Yes      Comment: 1 glass of wine a month    Drug use: No    Sexual activity: No     Other Topics Concern    Not on file     Social History Narrative         Vital Signs Range (Last 24H):  Temp:  [36.2 °C (97.2 °F)-36.7 °C (98.1 °F)]   Pulse:  [69-92]   Resp:  [14-20]   BP: ()/(0-72)   SpO2:  [91 %-97 %]       CBC:   Recent Labs      17   0433  17   0626   WBC  4.41  4.62   RBC  2.48*  2.34*   HGB  8.2*  7.6*   HCT  26.0*  24.4*   PLT  132*  121*   MCV  105*  104*   MCH  33.1*  32.5*   MCHC  31.5*  31.1*       CMP:   Recent Labs      17   0433  17   0625   NA  137  141   K  4.7  4.2   CL  106  103   CO2  22*  23   BUN  23*  37*   CREATININE  5.0*  6.7*   GLU  84  77   MG  2.1  2.2   PHOS  2.9  3.5   CALCIUM  9.3  8.8   ALBUMIN   --   3.5   PROT   --   6.9   ALKPHOS   --   129   ALT   --   15   AST   --   20   BILITOT   --   0.6       INR  Recent Labs      17   0356  17   04/03/17   0626   INR  1.7*  2.0*  2.5*   APTT  29.5  28.3  31.2           Diagnostic Studies:      EKG:  Vent. Rate : 083 BPM     Atrial Rate : 083 BPM     P-R Int : 108 ms          QRS Dur : 114 ms      QT Int : 426 ms       P-R-T Axes : 086 134 -06 degrees     QTc Int : 500 ms    Sinus rhythm  AV sequential or dual chamber electronic pacemaker  Biventricular pacemaker detected  Abnormal ECG    17-OCT-2015 05:22,  Sinus rhythm has replaced electronic atrial pacemaker  Confirmed by MARY REN MD ([id number]) on 4/19/2016 3:57:55 PM    Referred By: SELF REFERRAL           Confirmed By:MARY REN MD    2D Echo:    CONCLUSIONS     1 - HeartWare LVAD; speed 2700 RPM - the aortic valve opens with each beat.     2 - Severely depressed left ventricular systolic function (EF 20-25%).     3 - Mildly depressed right ventricular systolic function .     4 - Left ventricular diastolic dysfunction.     5 - Mild aortic regurgitation.     6 - Mild tricuspid regurgitation.       This document has been electronically    SIGNED BY: Terry Granados MD On: 06/08/2015 12:03      [facility] Anesthesia Evaluation    I have reviewed the Patient Summary Reports.     I have reviewed the Medications.     Review of Systems  Anesthesia Hx:  No problems with previous Anesthesia Denies Hx of Anesthetic complications  History of prior surgery of interest to airway management or planning: Previous anesthesia: General  Denies Personal Hx of Anesthesia complications.   Social:  Non-Smoker    Hematology/Oncology:     Oncology Normal    -- Anemia:   EENT/Dental:EENT/Dental Normal   Cardiovascular:   Hypertension Past MI CAD  CABG/stent  CHF + LVAD in place    Pulmonary:  Pulmonary Normal    Renal/:   Chronic Renal Disease, ESRD, Dialysis    Hepatic/GI:   + GI bleed    Musculoskeletal:  Musculoskeletal Normal    Neurological:   TIA, CVA    Endocrine:  Endocrine Normal    Dermatological:  Skin Normal    Psych:  Psychiatric Normal           Physical  Exam  General:  Well nourished    Airway/Jaw/Neck:  Airway Findings: Mouth Opening: Normal Tongue: Normal  General Airway Assessment: Adult  Mallampati: II  Improves to I with phonation.  TM Distance: Normal, at least 6 cm  Jaw/Neck Findings:  Neck ROM: Normal ROM     Eyes/Ears/Nose:  EYES/EARS/NOSE FINDINGS: Normal   Dental:  Dental Findings: In tact    Chest/Lungs:  Chest/Lungs Clear    Heart/Vascular:  Heart Findings: Normal       Mental Status:  Mental Status Findings:  Cooperative, Alert and Oriented         Anesthesia Plan  Type of Anesthesia, risks & benefits discussed:  Anesthesia Type:  general, MAC  Patient's Preference: MAC  Intra-op Monitoring Plan:   Intra-op Monitoring Plan Comments:   Post Op Pain Control Plan:   Post Op Pain Control Plan Comments:   Induction:   IV  Beta Blocker:  Patient is not currently on a Beta-Blocker (No further documentation required).       Informed Consent: Patient understands risks and agrees with Anesthesia plan.  Questions answered. Anesthesia consent signed with patient.  ASA Score: 4     Day of Surgery Review of History & Physical:  There are no significant changes.  H&P update referred to the provider.         Ready For Surgery From Anesthesia Perspective.     .

## 2017-04-04 NOTE — PLAN OF CARE
Problem: Physical Therapy Goal  Goal: Physical Therapy Goal  Goals to be met by: 4/10/2017     Patient will increase functional independence with mobility by performin. Supine to sit with modified independence  2. Sit to supine with Modified [place]  3. Sit to stand transfer with Modified [place]  4. Bed to chair transfer with Supervision using Rolling Walker  5. Gait x 200 feet with Supervision using Rolling Walker.    Outcome: Ongoing (interventions implemented as appropriate)  Pt is progressing well toward goals.

## 2017-04-04 NOTE — PROGRESS NOTES
Patient received after colonoscopy via stretcher accompanied by RN.  Patient alert and oriented x4.  Telemetry and oxygen in place.  LVAD connected to [employer].  Assessment completed, see flow sheet.  Will continue to monitor.

## 2017-04-04 NOTE — PLAN OF CARE
Problem: Patient Care Overview  Goal: Plan of Care Review  Outcome: Ongoing (interventions implemented as appropriate)  Pt free of falls/traumas/injuries. Skin remains clean, dry, and intact.  Nose bleed this AM, applied ice and afrin per MD order. Pt re-educated on importance of measuring accurate intake and out put; pt verbalized and demonstrates understanding. Reviewed plan of care with pt; and pt verbalized understanding.  Pt VSS in no distress will continue to monitor.

## 2017-04-04 NOTE — TRANSFER OF CARE
Anesthesia Transfer of Care Note    Patient: Randa Devine    Procedure(s) Performed: Procedure(s) (LRB):  COLONOSCOPY (N/A)    Patient location: PACU    Anesthesia Type: general    Transport from OR: Transported from OR on 2-3 L/min O2 by NC with adequate spontaneous ventilation    Post pain: adequate analgesia    Post assessment: no apparent anesthetic complications and tolerated procedure well    Post vital signs: stable    Level of consciousness: awake, alert and oriented    Nausea/Vomiting: no nausea/vomiting    Complications: none          Last vitals:   Visit Vitals    /66 (BP Location: Left arm, Patient Position: Lying, BP Method: Automatic)    Pulse 88    Temp 36.4 °C (97.6 °F) (Oral)    Resp 18    Ht 5' (1.524 m)    Wt 61.2 kg (134 lb 14.7 oz)    LMP  (LMP Unknown)    SpO2 97%    Breastfeeding No    BMI 26.35 kg/m2

## 2017-04-04 NOTE — ANESTHESIA RELEASE NOTE
Anesthesia Release from PACU Note    Patient: Randa Devine    Procedure(s) Performed: Procedure(s) (LRB):  COLONOSCOPY (N/A)    Anesthesia type: general    Post pain: Adequate analgesia    Post assessment: no apparent anesthetic complications, tolerated procedure well and no evidence of recall    Last Vitals:   Visit Vitals    BP (!) 92/55    Pulse 86    Temp 36.2 °C (97.2 °F) (Oral)    Resp 18    Ht 5' (1.524 m)    Wt 61.2 kg (134 lb 14.7 oz)    LMP  (LMP Unknown)    SpO2 (!) 94%    Breastfeeding No    BMI 26.35 kg/m2       Post vital signs: stable    Level of consciousness: awake, alert  and oriented    Nausea/Vomiting: no nausea/no vomiting    Complications: none    Airway Patency: patent    Respiratory: unassisted    Cardiovascular: stable and blood pressure at baseline    Hydration: euvolemic

## 2017-04-04 NOTE — PROGRESS NOTES
4/4/2017:   unable to follow up with patient at this time due to admission to acute care.  I will continue to follow.      3/20/2017:   left a message on patient's voicemail in an attempt to follow up.    3/16/2017:   spoke to patient via telephone in order to to complete assessment.  Pt expressed financial difficulties due to her medical condition.   contacted the [facility] and was provided with the food pantry in patient's area ([address] [place] [address]) and was told that there is an Adopt a Senior Program for people with disabilities who are 50 years old.   obtained an application and mailed it to patient.  (Application states 60 and over so I have sent an email to the food bank asking for clarification).   also mailed the Patient Assistance Fund Application.  Patient is over the income guidelines for [employer] [place] and Medicaid per their websites.  Will continue to follow.      3/13/2017:   left a message at ([phone]) in an attempt to complete assessment.  Will follow up at a later date.

## 2017-04-04 NOTE — PLAN OF CARE
Problem: Patient Care Overview  Goal: Plan of Care Review  Outcome: Ongoing (interventions implemented as appropriate)  Patient cooperative and pleasant with routine care and procedures.  Instructed on fall precautions and patient verbalized understanding to only get out of bed with assistance. Resting quietly after colonoscopy this evening.  Labs pending in am.  Will continue to monitor.

## 2017-04-04 NOTE — PT/OT/SLP PROGRESS
"Physical Therapy  Treatment    Randa Devine   MRN: [id number]   Admitting Diagnosis: <principal problem not specified>    PT Received On: 17  PT Start Time: 1333     PT Stop Time: 1351    PT Total Time (min): 18 min       Billable Minutes:  Gait Training 18    Treatment Type: Treatment  PT/PTA: PT       General Precautions: Standard, fall    Do you have any cultural, spiritual, Baptist conflicts, given your current situation?: None    Subjective:  Communicated with RN prior to session.  Pt reported "They found the spot where I was bleeding and fixed it."    Pain Ratin/10  Pain Rating Post-Intervention: 0/10    Objective:   Patient found with: telemetry (LVAD)    Functional Mobility:  Bed Mobility:   Pt sitting at EOB at beginning and end of session.     Transfers:  Sit <> Stand Assistance: Stand By Assistance  Sit <> Stand Assistive Device: Rolling Walker    Gait:   Gait Distance: 140 feet  Assistance 1: Stand by Assistance  Gait Assistive Device: Rolling walker  Gait Pattern: 3-point gait  Gait Deviation(s): decreased shanti, increased time in double stance, decreased velocity of limb motion, decreased step length, decreased toe-to-floor clearance   Pt reported fatigue at end of walk.     Balance:   Static Sit: GOOD: Takes MODERATE challenges from all directions  Dynamic Sit: GOOD: Maintains balance through MODERATE excursions of active trunk movement  Static Stand: FAIR+: Takes MINIMAL challenges from all directions  Dynamic stand: FAIR+: Needs CLOSE SUPERVISION during gait and is able to right self with minor LOB     Therapeutic Activities and Exercises:  Pt seated EOB at beginning of session, transferring to battery. Min assist given to don waist strap. Pt able to don AFOs and shoes with supervision.      AM-PAC 6 CLICK MOBILITY  How much help from another person does this patient currently need?   1 = Unable, Total/Dependent Assistance  2 = A lot, Maximum/Moderate Assistance  3 = A little, " Minimum/Contact Guard/Supervision  4 = None, Modified [place]/Independent    Turning over in bed (including adjusting bedclothes, sheets and blankets)?: 4  Sitting down on and standing up from a chair with arms (e.g., wheelchair, bedside commode, etc.): 3  Moving from lying on back to sitting on the side of the bed?: 4  Moving to and from a bed to a chair (including a wheelchair)?: 3  Need to walk in hospital room?: 3  Climbing 3-5 steps with a railing?: 2  Total Score: 19    AM-PAC Raw Score CMS G-Code Modifier Level of Impairment Assistance   6 % Total / Unable   7 - 9 CM 80 - 100% Maximal Assist   10 - 14 CL 60 - 80% Moderate Assist   15 - 19 CK 40 - 60% Moderate Assist   20 - 22 CJ 20 - 40% Minimal Assist   23 CI 1-20% SBA / CGA   24 CH 0% Independent/ Mod I     Patient left seated EOB with all lines intact, call button in reach and lunch arriving.    Assessment:  Randa Devine is a 61 y.o. female with a medical diagnosis of bleeding and presents with decreased gait stability and endurance. Pt with much more energy today and improved alertness which improved gait distance. She would benefit from continued PT to progress mobility.    Rehab identified problem list/impairments: Rehab identified problem list/impairments: weakness, impaired endurance, impaired functional mobilty, gait instability, decreased lower extremity function, impaired cardiopulmonary response to activity    Rehab potential is fair    Activity tolerance: Good    Discharge recommendations: Discharge Facility/Level Of Care Needs: home health PT     Barriers to discharge: Barriers to Discharge: None    Equipment recommendations: Equipment Needed After Discharge: bath bench     GOALS:   Physical Therapy Goals        Problem: Physical Therapy Goal    Goal Priority Disciplines Outcome Goal Variances Interventions   Physical Therapy Goal     PT/OT, PT Ongoing (interventions implemented as appropriate)     Description:  Goals to be met  by: 4/10/2017     Patient will increase functional independence with mobility by performin. Supine to sit with modified independence  2. Sit to supine with Modified [place]  3. Sit to stand transfer with Modified [place]  4. Bed to chair transfer with Supervision using Rolling Walker  5. Gait  x 200 feet with Supervision using Rolling Walker.                 PLAN:    Patient to be seen 4 x/week  to address the above listed problems via gait training, therapeutic activities, therapeutic exercises  Plan of Care expires: 17  Plan of Care reviewed with: patient     Jessica LeJeune, PT, DPT  [phone]

## 2017-04-04 NOTE — PROGRESS NOTES
04/03/17 2100 04/03/17 2108   Vital Signs   /72 (!) 102/0   MAP (mmHg) 84 --    BP Location Right arm Right arm   BP Method Automatic Doppler   Patient Position Lying Lying   Dr. Wilkerson notified and orders received to give routine dose of oral hydralazine.  Will continue to monitor.

## 2017-04-04 NOTE — PROGRESS NOTES
Admit Note     Met with patient to assess needs. Patient is a 61 y.o.  female, admitted for Anemia [D64.9], per medical record.     Patient admitted from home on 3/31/2017.  At this time, patient presents as alert and oriented x 4, pleasant, good eye contact, calm, communicative, cooperative and asking and answering questions appropriately.  At this time, patients caregiver is not in attendance.    Household/Family Systems     Patient resides with patient's son, Darin, and Darin's girlfriend, Brenda, at:     [address]  [place] [address]      Constance: cell: [phone]  Pt's cell:  [phone]    Support system includes daughter and ex-. Pt's son and his girlfriend live with pt, and pt reports they do not assist her in any way and are more of a stressor than a support.  Patient does not have dependents that are need of being cared for.     Patients primary caregiver is her dtr Constance.    During admission, patient's caregiver plans to stay at home.  Confirmed patient and patients caregivers do have access to reliable transportation.    Cognitive Status/Learning     Patient reports reading ability as 12th grade and states patient does not have difficulty with reading, writing, hearing, comprehension and learning Pt reports issues with her eyesight and short term memory. Pt wears glasses, and reports plan to have prescription checked soon.  Patient reports patient learns best by one on one.   Needed: No.   Highest education level: Associate/Bachelor Degree    Vocation/Disability   .  Working for Income: No  If no, reason not working: Patient Choice - Retired  Patient is retired from working as a nurse and .    Adherence     Patient reports a medium level of adherence to patients health care regimen. Pt reports her daughter helps to set up daily medication. Pt reports using a pill box and alarms, but still sometimes forgets to take medications. The pt does  "not always follow her diet, and has "at least one cheat day a week."   Adherence counseling and education provided. Patient verbalizes understanding.    Substance Use    Patient reports the following substance usage.    Tobacco: none, patient denies any use.  Alcohol: none, patient denies any use  Illicit Drugs/Non-prescribed Medications: none, patient denies any use.  Patient states clear understanding of the potential impact of substance use.  Substance abstinence/cessation counseling, education and resources provided and reviewed.     Services Utilizing/ADLS    Infusion Service: Prior to admission, patient utilizing? no  Home Health: Prior to admission, patient utilizing? yes Pt uses [facility] ([phone], fax [phone]) for SN, PT, and OT.  Pt would like to resume HH services at D/C.   DME: Prior to admission, yes. Pt has a Rollator, Walker, wheelchair, home 02 set up with portables [address] [employer] 2-4 LPM, BSC and cane.   Pulmonary/Cardiac Rehab: Prior to admission, no  Dialysis:  Prior to admission, yes Pt goes to [facility] on [address] on ,  and  with a 6:30 am chair time.  Transplant Specialty Pharmacy:  Prior to admission, no.    Prior to admission, patient reports patient was somewhat independent with ADLS and was not driving. Pt's daughter does all the driving. Pt reports some difficulty with ADL prior to this admit due to lethargy and shortness of breath. Patient reports patient is not able to care for self at this time due to compromised medical condition (as documented in medical record) and physical weakness.  Patient indicates a willingness to care for self once medically cleared to do so.    Insurance/Medications    Insured by   Payor/Plan Subscr  Sex Relation Sub. Ins. ID Effective Group Num   1. MEDICARE - [place]* RIKMAGNO 1955 Female  [id number] 10/1/13                                    [address]   2. BLUE CROSS * RIKMAGNO 1955 Female  [id number] 4/1/15 " [id number]                                    [address] [address]      Primary Insurance (for UNOS reporting): Public Insurance - Medicare FFS (Fee For Service)  Secondary Insurance (for UNOS reporting): Private Insurance    Patient reports patient is able to obtain and afford medications at this time and at time of discharge.  Pt reports her daughter will cover the cost of medications not covered by insurance.      Living Will/Healthcare Power of     Patient states patient has a LW and/or HCPA.   Pt reports he daughter is the HCPA.  provided education regarding LW and HCPA and the completion of forms.    Coping/Mental Health    Patient is coping adequately with the aid of  family members.  Patient indicates mental health difficulties. Pt reports recent symptoms of depression. Pt reports has lost interest in gardening and baking. Pt reports added stress from having her son and his girlfriend live in pt's home. Pt reports asked son to either move out or begin providing assistance with household chores. Pt reports playing with cat helps her cope, and pt is looking forward to being able to do this once she returns home. SW providing emotional support and counseling.     Discharge Planning    At time of discharge, patient plans to return to patient's home under the care of self and daughter.  Patients daughter will transport patient.  Per rounds today, expected discharge date has not been medically determined at this time. Patient and patients caregiver  verbalize understanding and are involved in treatment planning and discharge process.    Additional Concerns    Patient is being followed for needs, education, resources, information, emotional support, supportive counseling, and for supportive and skilled discharge plan of care.  providing ongoing psychosocial support, education, resources and d/c planning as needed.  SW remains available. Patient denies additional needs and/or concerns at  this time. Patient verbalizes understanding and agreement with information reviewed, social work availability, and how to access available resources as needed.

## 2017-04-04 NOTE — PROGRESS NOTES
Notified Dr. Jaramillo of nose bleed, ordered afrin and ice to nose. Pt VSS; face mask at 2 L NC  placed due to gauze in right nare to assist in stopping nose bleed.

## 2017-04-04 NOTE — NURSING TRANSFER
Nursing Transfer Note      4/3/2017     Transfer To: EGD    Transfer via wheelchair    Transfer with cardiac monitoring    Transported by [facility]    Medicines sent: none    Chart send with patient: Yes    VAD emergency bag with patient

## 2017-04-04 NOTE — PROCEDURES
Patient aaox3 with no family at bedside. VAD interrogation completed this AM in the event changes needed to be made. Will continue to monitor for further issues.     Pulsatile: Yes, intermittent   VAD Sounds: Smooth  Problems / Issues / Alarms with VAD if any: None noted  HCT: 24.6  Waveforms: 4-7      VAD Interrogation:  TXP LVAD INTERROGATIONS 4/4/2017 4/4/2017 4/4/2017 4/3/2017 4/3/2017 4/3/2017 4/3/2017   Type Heartware Heartware Heartware Heartware Heartware Heartware Heartware   Flow 5.0 5.1 5.1 5.5 5.4 5.3 5.3   Speed 2600 2600 2600 2600 2600 2600 2600   Power (Kim) 3.5 3.6 3.6 3.6 3.6 3.7 3.6   Low Flow Alarm 2.5 - - - - - -   High Power Alarm 6.0 - - - - - -   Pulsatility Pulse Pulse Pulse - Pulse - -

## 2017-04-04 NOTE — PROGRESS NOTES
"LVAD dressing change done per protocol using soap and water method and sterile technique maintained.  Site "1" per assessment tool with no drainage and site without redness or edema.  Occlusive dressing placed.  Dressing biweekly and due on Thursday 4/6/2017.  "

## 2017-04-05 LAB
ABO + RH BLD: NORMAL
ALBUMIN SERPL BCP-MCNC: 3.2 G/DL
ALP SERPL-CCNC: 121 U/L
ALT SERPL W/O P-5'-P-CCNC: 13 U/L
ANION GAP SERPL CALC-SCNC: 11 MMOL/L
APTT BLDCRRT: 27.2 SEC
AST SERPL-CCNC: 22 U/L
BASOPHILS # BLD AUTO: 0.02 K/UL
BASOPHILS # BLD AUTO: 0.02 K/UL
BASOPHILS NFR BLD: 0.5 %
BASOPHILS NFR BLD: 0.5 %
BILIRUB DIRECT SERPL-MCNC: 0.2 MG/DL
BILIRUB SERPL-MCNC: 0.4 MG/DL
BLD GP AB SCN CELLS X3 SERPL QL: NORMAL
BLD PROD TYP BPU: NORMAL
BLD PROD TYP BPU: NORMAL
BLOOD UNIT EXPIRATION DATE: NORMAL
BLOOD UNIT EXPIRATION DATE: NORMAL
BLOOD UNIT TYPE CODE: 5100
BLOOD UNIT TYPE CODE: 5100
BLOOD UNIT TYPE: NORMAL
BLOOD UNIT TYPE: NORMAL
BNP SERPL-MCNC: 2095 PG/ML
BUN SERPL-MCNC: 39 MG/DL
CALCIUM SERPL-MCNC: 8.5 MG/DL
CHLORIDE SERPL-SCNC: 107 MMOL/L
CO2 SERPL-SCNC: 22 MMOL/L
CODING SYSTEM: NORMAL
CODING SYSTEM: NORMAL
CREAT SERPL-MCNC: 7 MG/DL
CRP SERPL-MCNC: 4.7 MG/L
DIFFERENTIAL METHOD: ABNORMAL
DIFFERENTIAL METHOD: ABNORMAL
DISPENSE STATUS: NORMAL
DISPENSE STATUS: NORMAL
EOSINOPHIL # BLD AUTO: 0.1 K/UL
EOSINOPHIL # BLD AUTO: 0.1 K/UL
EOSINOPHIL NFR BLD: 2.3 %
EOSINOPHIL NFR BLD: 2.4 %
ERYTHROCYTE [DISTWIDTH] IN BLOOD BY AUTOMATED COUNT: 15.2 %
ERYTHROCYTE [DISTWIDTH] IN BLOOD BY AUTOMATED COUNT: 15.3 %
EST. GFR  (AFRICAN AMERICAN): 6.8 ML/MIN/1.73 M^2
EST. GFR  (NON AFRICAN AMERICAN): 5.9 ML/MIN/1.73 M^2
GLUCOSE SERPL-MCNC: 85 MG/DL
HCT VFR BLD AUTO: 21.6 %
HCT VFR BLD AUTO: 22.5 %
HGB BLD-MCNC: 7.1 G/DL
HGB BLD-MCNC: 7.4 G/DL
INR PPP: 1.4
LDH SERPL L TO P-CCNC: 181 U/L
LYMPHOCYTES # BLD AUTO: 0.5 K/UL
LYMPHOCYTES # BLD AUTO: 0.6 K/UL
LYMPHOCYTES NFR BLD: 11.1 %
LYMPHOCYTES NFR BLD: 16.4 %
MAGNESIUM SERPL-MCNC: 2.3 MG/DL
MCH RBC QN AUTO: 32.7 PG
MCH RBC QN AUTO: 32.7 PG
MCHC RBC AUTO-ENTMCNC: 32.9 %
MCHC RBC AUTO-ENTMCNC: 32.9 %
MCV RBC AUTO: 100 FL
MCV RBC AUTO: 100 FL
MONOCYTES # BLD AUTO: 0.4 K/UL
MONOCYTES # BLD AUTO: 0.5 K/UL
MONOCYTES NFR BLD: 12.1 %
MONOCYTES NFR BLD: 9.7 %
NEUTROPHILS # BLD AUTO: 2.6 K/UL
NEUTROPHILS # BLD AUTO: 3.4 K/UL
NEUTROPHILS NFR BLD: 68.3 %
NEUTROPHILS NFR BLD: 76.2 %
NUM UNITS TRANS PACKED RBC: NORMAL
NUM UNITS TRANS PACKED RBC: NORMAL
PHOSPHATE SERPL-MCNC: 4.4 MG/DL
PLATELET # BLD AUTO: 101 K/UL
PLATELET # BLD AUTO: 98 K/UL
PMV BLD AUTO: 10 FL
PMV BLD AUTO: 10 FL
POTASSIUM SERPL-SCNC: 4.3 MMOL/L
PREALB SERPL-MCNC: 22 MG/DL
PROT SERPL-MCNC: 6.5 G/DL
PROTHROMBIN TIME: 14.7 SEC
RBC # BLD AUTO: 2.17 M/UL
RBC # BLD AUTO: 2.26 M/UL
SODIUM SERPL-SCNC: 140 MMOL/L
WBC # BLD AUTO: 3.73 K/UL
WBC # BLD AUTO: 4.42 K/UL

## 2017-04-05 PROCEDURE — 25000003 PHARM REV CODE 250: Performed by: PHYSICIAN ASSISTANT

## 2017-04-05 PROCEDURE — P9040 RBC LEUKOREDUCED IRRADIATED: HCPCS

## 2017-04-05 PROCEDURE — 85025 COMPLETE CBC W/AUTO DIFF WBC: CPT

## 2017-04-05 PROCEDURE — 80100014 HC HEMODIALYSIS 1:1

## 2017-04-05 PROCEDURE — 25000003 PHARM REV CODE 250: Performed by: STUDENT IN AN ORGANIZED HEALTH CARE EDUCATION/TRAINING PROGRAM

## 2017-04-05 PROCEDURE — 36415 COLL VENOUS BLD VENIPUNCTURE: CPT

## 2017-04-05 PROCEDURE — 97535 SELF CARE MNGMENT TRAINING: CPT

## 2017-04-05 PROCEDURE — 80076 HEPATIC FUNCTION PANEL: CPT

## 2017-04-05 PROCEDURE — 83615 LACTATE (LD) (LDH) ENZYME: CPT

## 2017-04-05 PROCEDURE — 20600001 HC STEP DOWN PRIVATE ROOM

## 2017-04-05 PROCEDURE — 84100 ASSAY OF PHOSPHORUS: CPT

## 2017-04-05 PROCEDURE — 84134 ASSAY OF PREALBUMIN: CPT

## 2017-04-05 PROCEDURE — 86850 RBC ANTIBODY SCREEN: CPT

## 2017-04-05 PROCEDURE — 86140 C-REACTIVE PROTEIN: CPT

## 2017-04-05 PROCEDURE — 85610 PROTHROMBIN TIME: CPT

## 2017-04-05 PROCEDURE — 36430 TRANSFUSION BLD/BLD COMPNT: CPT

## 2017-04-05 PROCEDURE — 86922 COMPATIBILITY TEST ANTIGLOB: CPT

## 2017-04-05 PROCEDURE — 27000248 HC VAD-ADDITIONAL DAY

## 2017-04-05 PROCEDURE — 83735 ASSAY OF MAGNESIUM: CPT

## 2017-04-05 PROCEDURE — 86900 BLOOD TYPING SEROLOGIC ABO: CPT

## 2017-04-05 PROCEDURE — 99232 SBSQ HOSP IP/OBS MODERATE 35: CPT | Mod: ,,, | Performed by: INTERNAL MEDICINE

## 2017-04-05 PROCEDURE — 85730 THROMBOPLASTIN TIME PARTIAL: CPT

## 2017-04-05 PROCEDURE — 97530 THERAPEUTIC ACTIVITIES: CPT

## 2017-04-05 PROCEDURE — 80048 BASIC METABOLIC PNL TOTAL CA: CPT

## 2017-04-05 PROCEDURE — 83880 ASSAY OF NATRIURETIC PEPTIDE: CPT

## 2017-04-05 RX ORDER — SODIUM CHLORIDE 9 MG/ML
INJECTION, SOLUTION INTRAVENOUS
Status: DISCONTINUED | OUTPATIENT
Start: 2017-04-05 | End: 2017-04-06 | Stop reason: HOSPADM

## 2017-04-05 RX ORDER — SODIUM CHLORIDE 9 MG/ML
INJECTION, SOLUTION INTRAVENOUS ONCE
Status: DISCONTINUED | OUTPATIENT
Start: 2017-04-05 | End: 2017-04-06 | Stop reason: HOSPADM

## 2017-04-05 RX ORDER — HYDROCODONE BITARTRATE AND ACETAMINOPHEN 500; 5 MG/1; MG/1
TABLET ORAL
Status: DISCONTINUED | OUTPATIENT
Start: 2017-04-05 | End: 2017-04-06 | Stop reason: HOSPADM

## 2017-04-05 RX ORDER — WARFARIN 3 MG/1
3 TABLET ORAL DAILY
Status: DISCONTINUED | OUTPATIENT
Start: 2017-04-05 | End: 2017-04-06

## 2017-04-05 RX ADMIN — CALCIUM CARBONATE (ANTACID) CHEW TAB 500 MG 500 MG: 500 CHEW TAB at 10:04

## 2017-04-05 RX ADMIN — ESCITALOPRAM 20 MG: 20 TABLET, FILM COATED ORAL at 09:04

## 2017-04-05 RX ADMIN — CALCIUM CARBONATE (ANTACID) CHEW TAB 500 MG 500 MG: 500 CHEW TAB at 09:04

## 2017-04-05 RX ADMIN — CALCIUM CARBONATE (ANTACID) CHEW TAB 500 MG 500 MG: 500 CHEW TAB at 01:04

## 2017-04-05 RX ADMIN — HYDRALAZINE HYDROCHLORIDE 50 MG: 50 TABLET ORAL at 09:04

## 2017-04-05 RX ADMIN — PANTOPRAZOLE SODIUM 40 MG: 40 TABLET, DELAYED RELEASE ORAL at 08:04

## 2017-04-05 RX ADMIN — AMLODIPINE BESYLATE 10 MG: 10 TABLET ORAL at 08:04

## 2017-04-05 RX ADMIN — HYDRALAZINE HYDROCHLORIDE 50 MG: 50 TABLET ORAL at 06:04

## 2017-04-05 RX ADMIN — WARFARIN SODIUM 3 MG: 3 TABLET ORAL at 07:04

## 2017-04-05 RX ADMIN — ATORVASTATIN CALCIUM 40 MG: 20 TABLET, FILM COATED ORAL at 08:04

## 2017-04-05 RX ADMIN — CALCIUM CARBONATE (ANTACID) CHEW TAB 500 MG 500 MG: 500 CHEW TAB at 07:04

## 2017-04-05 RX ADMIN — CETIRIZINE HYDROCHLORIDE 5 MG: 5 TABLET, FILM COATED ORAL at 08:04

## 2017-04-05 RX ADMIN — GABAPENTIN 300 MG: 300 CAPSULE ORAL at 09:04

## 2017-04-05 NOTE — PLAN OF CARE
Problem: Patient Care Overview  Goal: Plan of Care Review  Outcome: Ongoing (interventions implemented as appropriate)  Pt had no significant events overnight. Pt VAD sounded smooth and dopplers where stable over night. Pt was told to contact someone to assist her to bedside commode. Pt turns independently in the bed. Pt has left upper arm graft to excess for HD which is done on Tues, Thurs, Sat. Pt went to get colon ostomy to clip origin of GI bleed.

## 2017-04-05 NOTE — PLAN OF CARE
Problem: Occupational Therapy Goal  Goal: Occupational Therapy Goal  Goals to be met by: 4/11/17     Patient will increase functional independence with ADLs by performing:    Feeding with [place].  UE Dressing with Supervision with AD  LE Dressing with Supervision with AD  Grooming while standing at sink with Supervision.  Toileting from toilet with Supervision for hygiene and clothing management.   Toilet transfer to toilet with Supervision.   Outcome: Ongoing (interventions implemented as appropriate)  No goals met this date.  POC remains appropriately set 2* pt progressing with goals.   AMINA Lu, MOT, CKTP  4/5/2017

## 2017-04-05 NOTE — PLAN OF CARE
Problem: Patient Care Overview  Goal: Plan of Care Review  Outcome: Ongoing (interventions implemented as appropriate)  Pt free of falls/traumas/injuries. Skin remains clean, dry, and intact.  Pt on home O2  2 L NC. Pt to get dialysis and blood to be administered  With dialysis. Pt's coumadin increased INR 1.4 no need to bridge coumadin at this point per MD due to diagnosis and DUTCH hx.  Pt re-educated on importance of measuring accurate intake and out put;pt verbalized and demonstrates understanding. Reviewed plan of care with pt; and pt verbalized understanding.  Pt VSS in no distress will continue to monitor.

## 2017-04-05 NOTE — PROGRESS NOTES
Maintenance hemodialysis treatment started to pt left upper arm avg with two 15G needles. Pt tolerated well.

## 2017-04-05 NOTE — PLAN OF CARE
Problem: Patient Care Overview  Goal: Plan of Care Review  Outcome: Ongoing (interventions implemented as appropriate)  Pt completed three hour hemodialysis treatment. Pt received two unit prbcs during treatment. Net UF 2 liters. Pt tolerated well. Blood returned with normal saline to left upper arm avg, two needles removed, pressure held for 15 mins, hemostasis achieved, covered with gauze and paper tape. +bruit/+thrill.

## 2017-04-05 NOTE — PT/OT/SLP PROGRESS
"Occupational Therapy  Treatment    Randa Devine   MRN: [id number]   Admitting Diagnosis: <principal problem not specified>    OT Date of Treatment: 17   OT Start Time: 09  OT Stop Time: 1007  OT Total Time (min): 26 min    Billable Minutes:  Self Care/Home Management 16 and Therapeutic Activity 10    General Precautions: Standard, fall, LVAD (cardiac)  Orthopedic Precautions: N/A  Braces:  (AFO for B LE's)    Do you have any cultural, spiritual, Adventism conflicts, given your current situation?: none reported    Subjective:  Communicated with RN prior to session.  "I know I'm not going very far when I walk but I have no energy.  I just keep trying my best."    Pain Ratin/10  Pain Rating Post-Intervention: 0/10; reported soreness in B shoulders after gait from pushing into walker for support; encouraged to ambulate without pushing into walker if possible.     Objective:  Patient found with: telemetry, peripheral IV (IV not connected; LVAD to PM)     Functional Mobility:  Bed Mobility:  Rolling/Turning Right: Modified independent  Scooting/Bridging: Modified Independent (towards EOB)  Supine to Sit: Modified Independent (HOB elevated)    Transfers:   Sit <> Stand Assistance: Contact Guard Assistance (fromEOB x2 trials)  Sit <> Stand Assistive Device: Rolling Walker  Toilet Transfer Technique: Stand Pivot  Toilet Transfer Assistance: Contact Guard Assistance  Toilet Transfer Assistive Device: Rolling Walker    Functional Ambulation: Pt requesting to ambulated; Able to complete community distance x~240 feet with RW and CGA.  No LOB or SOB but 1 standing rest break taken around 120 feet.  Min VC's required for walker mgmt 2* pt running into objects in room/schaffer and getting wheel of walker snagged on items.  Odd gait pattern 2* AFO with slapping motion of feet towards floor; please see PT note for full gait descriptives.     Activities of Daily Living:  Feeding Level of Assistance: Activity did not occur  UE " Dressing Level of Assistance: Stand by assistance (gown mgmt; to don consolidation bag)  LE Dressing Level of Assistance: Contact guard (increaed time; to don and doff B tennis shoes)  Grooming Position: Standing at sink  Grooming Level of Assistance: Stand by assistance (face washing, hair combing, oral hygiene)  Toileting Where Assessed: Bedside commode  Toileting Level of Assistance: Contact guard  Bathing Level of Assistance: Activity did not occur    Balance:   Static Sit: GOOD: Takes MODERATE challenges from all directions  Dynamic Sit: GOOD: Maintains balance through MODERATE excursions of active trunk movement  Static Stand: FAIR: Maintains without assist but unable to take challenges  Dynamic stand: FAIR: Needs CONTACT GUARD during gait    Therapeutic Activities and Exercises:  Pt educated on OT role and POC (familiar with).  Pt able to change LVAD to PM to batteries with no VC's and no physical assist.  She was able to alert OT to hand her another battery 2* hers had alarmed with low power.  Pt able to organize in bag without assist and donned bag with SBA and increased time, while able to de-tangle lines without assist.  She did not remember to bring emergency bag with her outside the room and required VC's for this.  OT carried during gait.  Pt requested to return to bed after treatment but VU to sit UIC later and around meals.  Pt VU to call RN staff for all mobility.  White board updated.  RW ordered for in room use by pt.     AM-PAC 6 CLICK ADL   How much help from another person does this patient currently need?   1 = Unable, Total/Dependent Assistance  2 = A lot, Maximum/Moderate Assistance  3 = A little, Minimum/Contact Guard/Supervision  4 = None, Modified Winifred/Independent    Putting on and taking off regular lower body clothing? : 3  Bathing (including washing, rinsing, drying)?: 3  Toileting, which includes using toilet, bedpan, or urinal? : 3  Putting on and taking off regular upper  body clothing?: 3  Taking care of personal grooming such as brushing teeth?: 4  Eating meals?: 4  Total Score: 20     AM-PAC Raw Score CMS G-Code Modifier Level of Impairment Assistance   6 % Total / Unable   7 - 9 CM 80 - 100% Maximal Assist   10 - 14 CL 60 - 80% Moderate Assist   15 - 19 CK 40 - 60% Moderate Assist   20 - 22 CJ 20 - 40% Minimal Assist   23 CI 1-20% SBA / CGA   24 CH 0% Independent/ Mod I     Patient left HOB elevated with all lines intact, call button in reach and RN notified; LVAD to batteries.     ASSESSMENT:  Randa Devine is a 61 y.o. female with a medical diagnosis of <principal problem not specified> and presents with good tolerance of treatment.  Pt presents as well motivated for improvements and demo'd bright affect during treatment.  She followed all commands and was receptive to all feedback given.  At this time, she remains with limited endurance, decreased safety awareness, and decreased general activity tolerance with standing ADLs.  She remains fall risk and remains appropriate for continued OT services to address deficits, promote improved functional performance as caregiver to self, and to maximize her engagement in valued activities.  She is progressing towards goals already and remains likely to achieve set goals.     Rehab identified problem list/impairments: Rehab identified problem list/impairments: weakness, impaired endurance, impaired self care skills, impaired functional mobilty, gait instability, impaired balance, decreased coordination, decreased lower extremity function, decreased ROM, impaired coordination, impaired cardiopulmonary response to activity, pain    Rehab potential is good.    Activity tolerance: Good    Discharge recommendations: Discharge Facility/Level Of Care Needs: home health PT     Barriers to discharge: Barriers to Discharge: None    Equipment recommendations: bath bench (promote energy conservation)     GOALS:   Occupational Therapy Goals         Problem: Occupational Therapy Goal    Goal Priority Disciplines Outcome Interventions   Occupational Therapy Goal     OT, PT/OT Ongoing (interventions implemented as appropriate)    Description:  Goals to be met by: 4/11/17     Patient will increase functional independence with ADLs by performing:    Feeding with [place].  UE Dressing with Supervision with AD  LE Dressing with Supervision with AD  Grooming while standing at sink with Supervision.  Toileting from toilet with Supervision for hygiene and clothing management.   Toilet transfer to toilet with Supervision.                Plan:  Patient to be seen 4 x/week to address the above listed problems via self-care/home management, therapeutic activities, therapeutic exercises  Plan of Care expires: 05/01/17  Plan of Care reviewed with: patient         AMINA Lu  04/05/2017

## 2017-04-06 VITALS
BODY MASS INDEX: 25.53 KG/M2 | TEMPERATURE: 98 F | OXYGEN SATURATION: 99 % | SYSTOLIC BLOOD PRESSURE: 76 MMHG | WEIGHT: 130.06 LBS | HEIGHT: 60 IN | HEART RATE: 79 BPM | RESPIRATION RATE: 18 BRPM

## 2017-04-06 LAB
ANION GAP SERPL CALC-SCNC: 9 MMOL/L
APTT BLDCRRT: 25.8 SEC
BASOPHILS # BLD AUTO: 0.02 K/UL
BASOPHILS NFR BLD: 0.5 %
BUN SERPL-MCNC: 19 MG/DL
CALCIUM SERPL-MCNC: 8.8 MG/DL
CHLORIDE SERPL-SCNC: 107 MMOL/L
CO2 SERPL-SCNC: 24 MMOL/L
CREAT SERPL-MCNC: 4.4 MG/DL
DIFFERENTIAL METHOD: ABNORMAL
EOSINOPHIL # BLD AUTO: 0.1 K/UL
EOSINOPHIL NFR BLD: 3 %
ERYTHROCYTE [DISTWIDTH] IN BLOOD BY AUTOMATED COUNT: 15.9 %
EST. GFR  (AFRICAN AMERICAN): 11.9 ML/MIN/1.73 M^2
EST. GFR  (NON AFRICAN AMERICAN): 10.3 ML/MIN/1.73 M^2
GLUCOSE SERPL-MCNC: 82 MG/DL
HCT VFR BLD AUTO: 29.3 %
HGB BLD-MCNC: 9.9 G/DL
INR PPP: 1.3
LDH SERPL L TO P-CCNC: 190 U/L
LYMPHOCYTES # BLD AUTO: 0.6 K/UL
LYMPHOCYTES NFR BLD: 14.1 %
MAGNESIUM SERPL-MCNC: 2 MG/DL
MCH RBC QN AUTO: 32.4 PG
MCHC RBC AUTO-ENTMCNC: 33.8 %
MCV RBC AUTO: 96 FL
MONOCYTES # BLD AUTO: 0.5 K/UL
MONOCYTES NFR BLD: 10.8 %
NEUTROPHILS # BLD AUTO: 3.1 K/UL
NEUTROPHILS NFR BLD: 71.4 %
PHOSPHATE SERPL-MCNC: 3.4 MG/DL
PLATELET # BLD AUTO: 90 K/UL
PMV BLD AUTO: 10.2 FL
POTASSIUM SERPL-SCNC: 3.9 MMOL/L
PROTHROMBIN TIME: 13 SEC
RBC # BLD AUTO: 3.06 M/UL
SODIUM SERPL-SCNC: 140 MMOL/L
WBC # BLD AUTO: 4.34 K/UL

## 2017-04-06 PROCEDURE — 27000248 HC VAD-ADDITIONAL DAY

## 2017-04-06 PROCEDURE — 84100 ASSAY OF PHOSPHORUS: CPT

## 2017-04-06 PROCEDURE — 85025 COMPLETE CBC W/AUTO DIFF WBC: CPT

## 2017-04-06 PROCEDURE — 25000003 PHARM REV CODE 250: Performed by: PHYSICIAN ASSISTANT

## 2017-04-06 PROCEDURE — 99238 HOSP IP/OBS DSCHRG MGMT 30/<: CPT | Mod: ,,, | Performed by: INTERNAL MEDICINE

## 2017-04-06 PROCEDURE — 85610 PROTHROMBIN TIME: CPT

## 2017-04-06 PROCEDURE — 36415 COLL VENOUS BLD VENIPUNCTURE: CPT

## 2017-04-06 PROCEDURE — 85730 THROMBOPLASTIN TIME PARTIAL: CPT

## 2017-04-06 PROCEDURE — 83735 ASSAY OF MAGNESIUM: CPT

## 2017-04-06 PROCEDURE — 97803 MED NUTRITION INDIV SUBSEQ: CPT

## 2017-04-06 PROCEDURE — 25000003 PHARM REV CODE 250: Performed by: STUDENT IN AN ORGANIZED HEALTH CARE EDUCATION/TRAINING PROGRAM

## 2017-04-06 PROCEDURE — 83615 LACTATE (LD) (LDH) ENZYME: CPT

## 2017-04-06 PROCEDURE — 80048 BASIC METABOLIC PNL TOTAL CA: CPT

## 2017-04-06 RX ORDER — WARFARIN 2 MG/1
TABLET ORAL
Qty: 102 TABLET | Refills: 3 | Status: ON HOLD
Start: 2017-04-06 | End: 2017-07-26

## 2017-04-06 RX ORDER — WARFARIN SODIUM 5 MG/1
5 TABLET ORAL DAILY
Status: DISCONTINUED | OUTPATIENT
Start: 2017-04-06 | End: 2017-04-06 | Stop reason: HOSPADM

## 2017-04-06 RX ADMIN — HYDRALAZINE HYDROCHLORIDE 50 MG: 50 TABLET ORAL at 06:04

## 2017-04-06 RX ADMIN — ATORVASTATIN CALCIUM 40 MG: 20 TABLET, FILM COATED ORAL at 08:04

## 2017-04-06 RX ADMIN — CETIRIZINE HYDROCHLORIDE 5 MG: 5 TABLET, FILM COATED ORAL at 08:04

## 2017-04-06 RX ADMIN — PANTOPRAZOLE SODIUM 40 MG: 40 TABLET, DELAYED RELEASE ORAL at 08:04

## 2017-04-06 RX ADMIN — CALCIUM CARBONATE (ANTACID) CHEW TAB 500 MG 500 MG: 500 CHEW TAB at 08:04

## 2017-04-06 RX ADMIN — DOCUSATE SODIUM 50 MG: 50 CAPSULE, LIQUID FILLED ORAL at 09:04

## 2017-04-06 RX ADMIN — AMLODIPINE BESYLATE 10 MG: 10 TABLET ORAL at 08:04

## 2017-04-06 NOTE — PROGRESS NOTES
[facility]  Adult Nutrition  Progress Note    SUMMARY     Recommendations    Recommendation/Intervention:   1. Continue current diet order.   2. Encouraged good PO intake of meals. Will monitor. Noted Alb 3.2, PAB 22, and CRP 4.7.   Goals: Patient will consume > 75% of meals  Nutrition Goal Status: goal met  Communication of RD Recs: reviewed with RN    Reason for Assessment    Reason for Assessment: RD follow-up  Diagnosis:  (SOB)  Relevent Medical History: LVAD, CAD s/p CABG, ESRD on HD, HTN, CHF   Nutrition Discharge Planning: Adequate PO intake for optimal nutrition.     Nutrition Prescription Ordered    Current Diet Order: Cardiac     Nutrition Risk Screen     Nutrition Risk Screen: no indicators present    Nutrition/Diet History    Typical Food/Fluid Intake: Pt reports PO intake 75%.   Food Preferences: EMANI  Factors Affecting Nutritional Intake: other (see comments) (none reported)    Labs/Tests/Procedures/Meds    Pertinent Labs Reviewed: reviewed  Pertinent Medications Reviewed: reviewed  Pertinent Medications Comments: Ca carbonate, coumadin    Physical Findings    Overall Physical Appearance: overweight, on oxygen therapy  Tubes:  (none)  Skin: intact    Anthropometrics    Height (inches): 60 in  Weight Method: Standard Scale  Weight (kg): 59 kg  Ideal Body Weight (IBW), Female: 100 lb  % Ideal Body Weight, Female (lb): 136.25   BMI (kg/m2): 26.61  BMI Grade: 25 - 29.9 - overweight  Usual Body Weight (UBW), kg:  (EMANI)    Estimated/Assessed Needs    Weight Used For Calorie Calculations: 61.8 kg (136 lb 3.9 oz)   Height (cm): 152.4 cm  Energy Need Method: Trumbull-St Jeor (1399 kcal/day (1.25))  RMR (Trumbull-St. Jeor Equation): 1118.6  Weight Used For Protein Calculations: 61.8 kg (136 lb 3.9 oz)  Protein Requirements: 74-87 kcal/day (1.2-1.4 g/kg)  Fluid Need Method: RDA Method (1 mL/kcal or per MD)    Monitor and Evaluation    Food and Nutrient Intake: energy intake, food and beverage  intake  Food and Nutrient Adminstration: diet order  Physical Activity and Function: nutrition-related ADLs and IADLs  Anthropometric Measurements: weight, weight change  Biochemical Data, Medical Tests and Procedures: electrolyte and renal panel, gastrointestinal profile  Nutrition-Focused Physical Findings: overall appearance    Nutrition Risk    Level of Risk:  (f/u 1x/week)    Nutrition Follow-Up    RD Follow-up?: Yes    Assessment and Plan    Problem: Inadequate energy intake  Etiology: decreased appetite  Signs/Symptoms: reports of < 75% meals  Nutrition Diagnosis Status: improving

## 2017-04-06 NOTE — PROGRESS NOTES
Heart Failure Progress Note  Attending Physician: Caitlyn Ackerman MD  Hospital Day: 7    Subjective:   Interval History: Patient seen and examined, no events overnight, denied complaints. Hgb 9.9 today after 2 units of blood   Medications:   Continuous Infusions:     Scheduled Meds:   sodium chloride 0.9%   Intravenous Once    amlodipine  10 mg Oral Daily    atorvastatin  40 mg Oral Daily    calcium carbonate  500 mg Oral TID PC    cetirizine  5 mg Oral Daily    escitalopram oxalate  20 mg Oral QHS    gabapentin  300 mg Oral QHS    hydrALAZINE  50 mg Oral Q8H    pantoprazole  40 mg Oral Daily    warfarin  5 mg Oral Daily     PRN Meds:sodium chloride, sodium chloride 0.9%, sodium chloride 0.9%, acetaminophen, hydrALAZINE, ondansetron, oxycodone-acetaminophen, oxymetazoline  Objective:     Vitals:  Temp:  [97.6 °F (36.4 °C)-98.7 °F (37.1 °C)]   Pulse:  [68-87]   Resp:  [16-20]   BP: (70-96)/(0-69)   SpO2:  [97 %-98 %]  on  I/O's:    Intake/Output Summary (Last 24 hours) at 04/06/17 0853  Last data filed at 04/06/17 0600   Gross per 24 hour   Intake             1963 ml   Output             3002 ml   Net            -1039 ml        Constitutional: NAD, conversant  HEENT: Sclera anicteric, PERRLA, EOMI  Neck: No JVD, no carotid bruits  CV: RRR, no murmur, normal S1/S2  Pulm: CTAB, no wheezes, rales, or ronchi  GI: Abdomen soft, NTND, +BS  Extremities: No LE edema, warm and well perfused  Skin: No ecchymosis, erythema, or ulcers  Psych: AOx3, appropriate affect  Neuro: CNII-XII intact, no focal deficits    Labs:       Recent Labs  Lab 04/04/17  0657 04/05/17  0411 04/06/17  0511    140 140   K 4.0 4.3 3.9    107 107   CO2 22* 22* 24   BUN 23* 39* 19   CREATININE 4.7* 7.0* 4.4*   GLU 73 85 82   ANIONGAP 13 11 9       Recent Labs  Lab 03/31/17  1618  04/03/17  0625 04/05/17  0411   AST 31  --  20 22   ALT 21  --  15 13   ALKPHOS 143*  --  129 121   BILITOT 0.6  --  0.6 0.4   BILIDIR  --   < > 0.3  0.2   ALBUMIN 3.5  --  3.5 3.2*   < > = values in this interval not displayed.     Recent Labs  Lab 04/05/17  0411 04/05/17  1420 04/06/17  0511   WBC 3.73* 4.42 4.34   HGB 7.1* 7.4* 9.9*   HCT 21.6* 22.5* 29.3*   PLT 98* 101* 90*   GRAN 68.3  2.6 76.2*  3.4 71.4  3.1       Recent Labs  Lab 04/04/17  0657 04/05/17  0411 04/06/17  0511   INR 2.0* 1.4* 1.3*       Recent Labs  Lab 04/03/17  0625 04/05/17  0411   BNP 1743* 2095*      Micro:   Blood Cultures  Lab Results   Component Value Date    LABBLOO No growth after 5 days. 05/30/2016    LABBLOO No growth after 5 days. 05/30/2016    LABBLOO No growth after 5 days. 05/22/2016    LABBLOO No growth after 5 days. 05/22/2016    LABBLOO No growth after 5 days. 12/10/2015     Urine Cultures  Lab Results   Component Value Date    LABURIN No significant growth 10/17/2015    LABURIN NO SIGNIFICANT GROWTH 05/08/2012       Imaging:     EF   Date Value Ref Range Status   03/20/2017 10 (A) 55 - 65    02/01/2017 10 (A) 55 - 65    01/11/2017 15 (A) 55 - 65    05/24/2016 15 (A) 55 - 65    06/08/2015 20 (A) 55 - 65          ASSESSMENT/PLAN:      61 y.o. female presents with SOB. PMH of CAD s/p CABG, chronic combined CHF / ICM (EF 10-15%) s/p Heartware as DT (05/2012) and SJM CRT-D (DDR ), ESRD on HD via left arm AVF (TTS), Lower GIB secondary to cecal AVM s/p APC (04/2016), Paroxysmal AF, HTN, Embolic thalamic CVA (01/2017) with residual visual deficits who presents SOB     PLAN:      S/P LVAD HeartWare HVAD Implanted   -Coumadin at 2 mg at home, she had colonoscopy 4/3/2017 showing bleeding medium size angiodysplasia in descending colon s/p clip , Goal INR 2.0-3.0. Today 1.3 will give dose 5 mg po tonight and go back to 2 mg po daily at home with INR on Monday 4/10/2017   -  -Antiplatelets d/c 6/25/14 likely secondary to GI loss  -Speed set at 2600  -Interrogation notable for n/a  -Implanted as desination therapy   -volume overloaded on exam: nephrology consulted, HD  today with 2 units of blood     Sub thearputic INR:    -No bridge in setting of Hx of GI bleeding dn H/H drop     Epistaxis:     Resolved     Anemia  -hx lower GIB secondary to cecal AVM s/p APC 4/2016  -reporting bright red blood. No BM after colonoscopy   -GI was on board s/p colonoscopy 4/3/2017 showing bleeding medium size angiodysplasia in descending colon s/p clip  -Received  2 units of blood   -Protonix 40 po daily      ESRD- on HD  -Nephrology consulted for HD  -Hopefully HD today prior to Dc     Acute on Chronic Systolic/Diastolic/ICM EF 10-15%  -volume management with HD  -Spoke to HD center regarding her BP and the necessity to pull out fluid even if they do it slower and discussed with them that 60/0 is ok for her while on dialysis as long as patient feeling fine ([phone])    Hypertension  -Will continue home antihypertensives for now: Hydralazine and Norvasc  -Will monitor and hold if BP low     Paroxysmal Atrial Fib  -Anticoagulated on hold for now  -Rate controlled 70's      Hx CVA  -PT/OT consulted     Depression  -continued home dose of Lexapro      Attending addendum to follow       Terese Jaramillo MD  Cardiology Fellow  Pager: [phone]

## 2017-04-06 NOTE — PROGRESS NOTES
DISCHARGE    SW to pt's room for d/c plan. Pt presents as aaox3 with calm and pleasant affect. SW confirmed pt is in agreement with plan to d/c home today with home health via [facility] [phone], fax [phone]. SW faxed referral and confirmed receipt. Pt denies transportation issues. No other needs reported at this time. SW providing psychosocial and counseling support, education, resources, and d/c planning as needed. SW remains available.

## 2017-04-06 NOTE — PLAN OF CARE
Problem: Patient Care Overview  Goal: Plan of Care Review  Outcome: Ongoing (interventions implemented as appropriate)  Pt had no significant events overnight. Pt VAD sounded smooth and dopplers where stable over night. Pt was told to contact someone to assist her to bedside commode. Pt turns independently in the bed. Pt has left upper arm graft to excess for HD which is done on Tues, Thurs, Sat. HD done today pulled off 2L and gave 2 units of PRBC. Pt went to get colon ostomy to clip origin of GI bleed. Pt also on 2L Nasal cannula and O2 sats have been in the upper 90's.

## 2017-04-06 NOTE — PROGRESS NOTES
Mrs. Zavaleta will be discharged after admission for recurrent GIB due to a colonic angiodysplasia s/p clip.  PMH includes CVA, ESRD on HD, and multiple GIBs due to AVMs. She will be discharged with an INR goal of 2-3, with a bridge up to physician discretion.  She remains off all antiplatelet therapy.  Discharge dose remains 2mg/day, however, she will be boosted 5mg tonight.  F/u INR recommended on Monday.

## 2017-04-06 NOTE — DISCHARGE SUMMARY
Discharge Summary  Heart Transplant Service      Admit Date: 3/31/2017    Discharge Date:  4/6/2017    Attending Physician:Sera Collado MD    Discharge Physician: Terese Jaramillo MD    Principal Diagnoses: <principal problem not specified>  Indication for Admission: COLONOSCOPY (N/A)    Discharged Condition: Good    Hospital Course:   61 y.o. woman with PMH of CAD s/p CABG, chronic combined CHF / ICM (EF 10-15%) s/p Heartware as DT (05/2012) and SJM CRT-D (DDR ), ESRD on HD via left arm AVF (TTS), Lower GIB secondary to cecal AVM s/p APC (04/2016), Paroxysmal AF, HTN, Embolic thalamic CVA (01/2017) with residual visual deficits who presents to the hospital due to c/o progressively worsening SOB and lower GI bleeding:     HVAD:  -Coumadin at 2 mg at home, she had colonoscopy 4/3/2017 showing bleeding medium size angiodysplasia in descending colon s/p clip , Goal INR 2.0-3.0. Today 1.3 will give dose 5 mg po tonight and go back to 2 mg po daily at home with INR on Monday 4/10/2017   - today and stable during hospital stay   -Antiplatelets d/c 6/25/14 likely secondary to GI loss  -Speed set at 2600  -Interrogation notable for n/a  -Implanted as desination therapy   -volume overloaded improved with HD      Sub thearputic INR:     -No bridge in setting of Hx of GI bleeding      Epistaxis:      Resolved      Anemia  -hx lower GIB secondary to cecal AVM s/p APC 4/2016  -reporting bright red blood.   -GI was on board s/p colonoscopy 4/3/2017 showing bleeding medium size angiodysplasia in descending colon s/p clip  -Received  2 units of blood during hospital stay   -Protonix 40 po daily       ESRD- on HD  -Nephrology consulted for HD evaluated patient today, she will resume her HD schedule Tuesday, Thursday and Saturday         Acute on Chronic Systolic/Diastolic/ICM EF 10-15%  -volume management with HD   -Spoke to HD center regarding her BP and the necessity to pull out fluid even if they do it slower and  discussed with them that 60/0 is ok for her while on dialysis as long as patient feeling fine ([phone])         Paroxysmal Atrial Fib  -On coumadin   -Rate controlled 70's       Hx CVA  -PT/OT consulted will go home with PT          Outpatient Plan:  - Coumadin 5 mg tonight then resume 2 mg PO daily with INR check Monday 4/10/2017  - Home health with PT   - Follow up in clinic in 2 weeks     Diet: Cardiac diet     Activity: As tolerated     Disposition: Home or Self Care    Discharge Medications:      Medication List      CHANGE how you take these medications          warfarin 2 MG tablet   Commonly known as:  COUMADIN   5mg tonight only (4/6) followed by 2mg/day   What changed:  additional instructions         CONTINUE taking these medications          acetaminophen 325 MG tablet   Commonly known as:  TYLENOL   Take 2 tablets (650 mg total) by mouth every 6 (six) hours as needed for Pain (mild pain).       amlodipine 10 MG tablet   Commonly known as:  NORVASC   Take 1 tablet (10 mg total) by mouth once daily.       ANTACID CALCIUM ORAL       atorvastatin 40 MG tablet   Commonly known as:  LIPITOR   Take 1 tablet (40 mg total) by mouth once daily.       cetirizine 5 MG tablet   Commonly known as:  ZYRTEC   Take 1 tablet (5 mg total) by mouth once daily.       escitalopram oxalate 20 MG tablet   Commonly known as:  LEXAPRO   Take 1 tablet (20 mg total) by mouth every evening.       gabapentin 300 MG capsule   Commonly known as:  NEURONTIN   Take 1 capsule (300 mg total) by mouth every evening.       hydrALAZINE 50 MG tablet   Commonly known as:  APRESOLINE   Take 1 tablet (50 mg total) by mouth every 8 (eight) hours.       ondansetron 4 MG tablet   Commonly known as:  ZOFRAN   Take 1 tablet (4 mg total) by mouth every 8 (eight) hours as needed for Nausea.       pantoprazole 40 MG tablet   Commonly known as:  PROTONIX   Take 1 tablet (40 mg total) by mouth once daily.            Where to Get Your Medications       Information about where to get these medications is not yet available     ! Ask your nurse or doctor about these medications     warfarin 2 MG tablet           Follow Up:  -in 2 weeks with heart failure clinic       Terese Jaramillo MD  Cardiology Fellow  Pager: [phone]

## 2017-04-06 NOTE — PT/OT/SLP DISCHARGE
Occupational Therapy Discharge Summary    Randa Devine  MRN: [id number]   End stage renal disease on dialysis   Patient Discharged from acute Occupational Therapy on 4/6/17.  Please refer to prior OT note dated on 4/5/17 for functional status.     Assessment:   Patient was discharge unexpectedly.  Information required to complete and accurate discharge summary is unknown.  Refer to therapy initial evaluation and last progress note for initial and most recent functional status and goal achievement.  Recommendations made may be found in medical record.  GOALS:   Occupational Therapy Goals        Problem: Occupational Therapy Goal    Goal Priority Disciplines Outcome Interventions   Occupational Therapy Goal     OT, PT/OT Ongoing (interventions implemented as appropriate)    Description:  Goals to be met by: 4/11/17     Patient will increase functional independence with ADLs by performing:    Feeding with [place].  UE Dressing with Supervision with AD  LE Dressing with Supervision with AD  Grooming while standing at sink with Supervision.  Toileting from toilet with Supervision for hygiene and clothing management.   Toilet transfer to toilet with Supervision.              Reasons for Discontinuation of Therapy Services  Transfer to alternate level of care.      Plan:  Patient Discharged to: home with AMINA Buckner  4/6/2017  .

## 2017-04-06 NOTE — PLAN OF CARE
[facility]   Heart Transplant/VAD Clinic   [address] [place] [address]   [phone] after hours          [phone] fax     VAD HOME  HEALTH ORDERS      Admit to Home Health    Diagnosis:   Patient Active Problem List   Diagnosis    Heart replaced by heart assist device    End stage renal disease on dialysis    Chronic anticoagulation    Left ventricular assist device present    Femoral neck fracture    Anemia    Chronic combined systolic and diastolic heart failure    Fracture of lumbar spine without cord injury    TIA (transient ischemic attack)    ICD (implantable cardioverter-defibrillator), biventricular, in situ    Paroxysmal atrial fibrillation    Atrial tachycardia, paroxysmal    HIT (heparin-induced thrombocytopenia)    Gait disorder    Chronic LBP    Right leg numbness    Foot drop, bilateral    Physical deconditioning    Secondary hyperparathyroidism (of renal origin)    History of stroke without residual deficits    Syncope    Faintness    LVAD (left ventricular assist device) present    HBP (high blood pressure)    Nausea    Essential hypertension    Chronic low back pain    Cognitive impairment    Cellulitis    Fall    Erythema    Spontaneous hematoma of forearm    Hallucinations    Embolic stroke involving left posterior cerebral artery    Migraine with aura and without status migrainosus, not intractable    Cytotoxic cerebral edema    Acute ischemic vertebrobasilar artery thalamic stroke involving left-sided vessel    Cardiomegaly    Hypervolemia       Patient is homebound due to:      Diet: Low Fat, Low cholesterol, 2Gm Na, Coumadin restrictions.    Acitivities: No Swimming, bathing, vacuuming, contact sports.    Fresh implants= Sternal Precautions    Nursing:   SN to complete comprehensive assessment including routine vital signs. Instruct on disease process and s/s of complications to report to MD.  Review/verify medication list sent home with the patient at time of discharge  and instruct patient/caregiver as needed. Frequency may be adjusted depending on start of care date.    **LVAD driveline exit site dressing change is to be completed per LVAD patient/caregiver only**.    Notify MD if SBP > 160 or < 90; DBP > 90 or < 50; HR > 120 or < 50; Temp > 101; Weight gain >3lbs in 1 day or 5lbs in 1 week.        LABS:  SN to perform labs: PT/INR per Coumadin clinic [phone].   Follow up INR date:Monday 4/10/2017  No Finger Sticks                Central line care:  Scrub the Hub: Prior to accessing the line, always perform a 30 second alcohol scrub  Each lumen of the central line is to be flushed at least daily with 10 mL Normal Saline and 3 mL Heparin flush (100 units/mL)  Skilled Nurse (SN) may draw blood from IV access  Blood Draw Procedure:   - Aspirate at least 5 mL of blood   - Discard   - Obtain specimen   - Change posiflow cap   - Flush with 20 mL Normal Saline followed by a                 3-5 mL Heparin flush (100 units/mL)  Central :   - Sterile dressing changes are done weekly and as needed.   - Use chlor-hexadine scrub to cleanse site, apply Biopatch to insertion site,       apply securement device dressing   - Posi-flow caps are changed weekly and after EVERY lab draw.   - If sterile gauze is under dressing to control oozing,                 dressing change must be performed every 24 hours until gauze is not needed.        Physical Therapy to evaluate and treat. Evaluate for home safety and equipment needs; Establish/upgrade home exercise program. Perform / instruct on therapeutic exercises, gait training, transfer training, and Range of Motion.        Send initial Home Health orders to [facility] attending physician on call.  Send follow up questions to VAD clinic MD [phone] or [phone].        Terese Jaramillo MD  Cardiology Fellow  Pager: [phone]

## 2017-04-06 NOTE — PROGRESS NOTES
[facility]  Consult Note Nephrology    Admit Date: 3/31/2017  Consult Requested By: Caitlyn Ackerman MD   LOS: 5 days     SUBJECTIVE:     Follow-up For:  ESRD on iHD    Interval History:  NAEON, hg decreased to 7.4, Had. Seen while on HD today. Tolerated well. NO CP or shrot tness of Breath.    Review of Systems:  Constitutional: no fever or chills  Respiratory: no cough or shortness of breath  Cardiovascular: no chest pain or palpitations  Gastrointestinal: no nausea or vomiting, no abdominal pain or change in bowel habits  Hematologic/Lymphatic: no easy bruising or lymphadenopathy  Musculoskeletal: no arthralgias or myalgias  Neurological: no seizures or tremors      OBJECTIVE:     Vital Signs (Most Recent)  Temp: 98.7 °F (37.1 °C) (04/05/17 1948)  Pulse: 87 (04/05/17 1948)  Resp: 17 (04/05/17 1948)  BP: (!) 76/0 (04/1957)  SpO2: 97 % (04/05/17 1948)    Vital Signs Range (Last 24H):  Temp:  [97.6 °F (36.4 °C)-98.7 °F (37.1 °C)]   Pulse:  [70-87]   Resp:  [16-20]   BP: (70-96)/(0-67)   SpO2:  [95 %-99 %]       Intake/Output Summary (Last 24 hours) at 04/05/17 2046  Last data filed at 04/05/17 1815   Gross per 24 hour   Intake             1873 ml   Output             3002 ml   Net            -1129 ml         Physical Exam:   General: Well developed, well nourished in NAD  HEENT: Conjunctiva clear; Oropharynx clear  Neck: No JVD noted, Supple  CV- Normal S1, S2 with no murmurs,gallops,rubs  Resp- Lungs CTA Bilaterally, Unlabored  Abdomen- NTND, BS normoactive x4 quads, soft  Extrem- No cyanosis, clubbing, edema.  Skin- No rashes, lesions, ulcers  Neuro: awake, Oriented x3, no FND      Laboratory:  CBC:     Recent Labs  Lab 04/05/17  1420   WBC 4.42   RBC 2.26*   HGB 7.4*   HCT 22.5*   *   *   MCH 32.7*   MCHC 32.9     BMP:     Recent Labs  Lab 04/05/17  0411   GLU 85      CO2 22*   BUN 39*   CREATININE 7.0*   CALCIUM 8.5*   MG 2.3     CMP:     Recent Labs  Lab  04/05/17  0411   GLU 85   CALCIUM 8.5*   ALBUMIN 3.2*   PROT 6.5      K 4.3   CO2 22*      BUN 39*   CREATININE 7.0*   ALKPHOS 121   ALT 13   AST 22   BILITOT 0.4     PTH: No results for input(s): PTH in the last 168 hours.  Coagulation:     Recent Labs  Lab 04/05/17 0411   INR 1.4*   APTT 27.2     Cardiac Markers: No results for input(s): CKMB, TROPONINT, MYOGLOBIN in the last 168 hours.  ABGs: No results for input(s): PH, PCO2, HCO3, POCSATURATED, BE in the last 168 hours.    Labs reviewed  Diagnostic Results:  X-Ray: Reviewed  US: Reviewed  Echo: Reviewed    ASSESSMENT/PLAN:     Active Hospital Problems    Diagnosis  POA    History of stroke without residual deficits [Z86.73]  Not Applicable    HIT (heparin-induced thrombocytopenia) [D75.82]  Yes     Chronic    Paroxysmal atrial fibrillation [I48.0]  Yes    ICD (implantable cardioverter-defibrillator), biventricular, in situ [Z95.810]  Yes    Chronic anticoagulation [Z79.01]  Not Applicable    Anemia [D64.9]  Yes    Chronic combined systolic and diastolic heart failure [I50.42]  Yes    End stage renal disease on dialysis [N18.6, Z99.2]  Not Applicable    Heart replaced by heart assist device [Z95.811]  Not Applicable      Resolved Hospital Problems    Diagnosis Date Resolved POA   No resolved problems to display.       61 y.o. female with PMH of CAD s/p CABG, chronic combined CHF / ICM (EF 10-15%) s/p LVAD, ESRD on HD via left arm AVF (TTS), Lower GIB secondary to cecal AVM s/p APC (04/2016), Paroxysmal AF, HTN and CVA (01/2017) who presents to the hospital due to c/o progressively worsening SOB and BRBPR. We are consulted for backup HD    ESRD on IHD TTS   Out patient HD [facility] - [facility] in [place]  Duration of outpatient dialysis session - 3.5 hrs  Residual Laura Function - no  - Will provide dialysis for metabolic clearance and volume management x 3 hrs  - Seen and examined today during hemodialysis; tolerating treatment well  without issues. Denied headaches, chest pain, abdominal pain, or muscle cramps   -   - Target ultrafiltration 2 lts  - Dialysate adjusted to current labs   Aceess SCOTT AVF with good thrill and bruit    Active Problem in Hospital  - GI Bleed   - Volume overload    Anemia of Chronic Kidney Disease on Acute blood loss anemia  - Will resume EDUAR as outpatient   - Will request iron studies to assess further needs of supplementation     Mineral Bone Disease in CKD   - Renal Function Panel Daily for electrolytes monitoring  - Phos 4.4   - No need for binders  - CoCa 9.1    Nutrition   - Renal Diet    HTN   - Seems well control BP, will continue to monitor. Goal for BP is <130 mmHg SBP and BDP <80 mmHg.  -- continue current management as per HTS    Case discussed with staff further recs with attestation.    Tian Norris MD  Nephrology Fellow PGY4  [phone]

## 2017-04-06 NOTE — NURSING
Pt discharged per MD orders.  Tele and IV discontinued.  Catheter tip intact x1.  Medication list and prescriptions reviewed; prescriptions sent to pt preferred pharmacy and printed prescriptions provided.  Pt verbalizes understanding of all written and verbal discharge instructions.  MIS performed and documented in chart. Pt awaiting family/escort arrival.  Will continue to monitor.

## 2017-04-07 ENCOUNTER — OUTPATIENT CASE MANAGEMENT (OUTPATIENT)
Dept: ADMINISTRATIVE | Facility: OTHER | Age: 62
End: 2017-04-07

## 2017-04-07 NOTE — PROGRESS NOTES
4/7/17 - Phone contact with pt today for D/C from [facility]. She was hospitalized from 3/31/17-4/6/17 related to worsening SOB and GI bleed. She reports she is feeling better since d/c yesterday. She will resume therapy thru home health in effort to improve strength and endurance. CM discussed diet restrictions with her as she verbalized this is the hard part of managing her health and what she frequently has problems with. She advised she is able to eat very few foods because of renal and Coumadin diet restrictions. CM suggested that small frequent meals and snacks that help her avoid getting too hungry will help in making good food choices. She relays that she is very compliant with attending dialysis treatments and taking meds as ordered and will continue to do the best she can with her diet. Advised CM will d/c from [facility] today related to goals met. Advised VIVIANE Hackett LCSW will follow up with her related to community resource info she mailed. Ina Lovell RN

## 2017-04-07 NOTE — PROGRESS NOTES
Per note: Mrs. Zavaleta will be discharged after admission for recurrent GIB due to a colonic angiodysplasia s/p clip. PMH includes CVA, ESRD on HD, and multiple GIBs due to AVMs. She will be discharged with an INR goal of 2-3, with a bridge up to physician discretion. She remains off all antiplatelet therapy. Discharge dose remains 2mg/day, however, she will be boosted 5mg tonight. F/u INR recommended on Monday. Calendar update. INR 4/10 per Allegra. lvm for pt. Pt confirms dose.

## 2017-04-08 ENCOUNTER — PATIENT OUTREACH (OUTPATIENT)
Dept: ADMINISTRATIVE | Facility: CLINIC | Age: 62
End: 2017-04-08
Payer: MEDICARE

## 2017-04-08 NOTE — PROGRESS NOTES
Unable to schedule patient to see her PCP for  'HOSFU appointment type.' Staff message requesting assistance with rescheduling sent today.

## 2017-04-10 LAB — INR PPP: 1.7

## 2017-04-11 ENCOUNTER — ANTI-COAG VISIT (OUTPATIENT)
Dept: CARDIOLOGY | Facility: CLINIC | Age: 62
End: 2017-04-11

## 2017-04-11 DIAGNOSIS — Z95.811 LEFT VENTRICULAR ASSIST DEVICE PRESENT: ICD-10-CM

## 2017-04-13 ENCOUNTER — ANTI-COAG VISIT (OUTPATIENT)
Dept: CARDIOLOGY | Facility: CLINIC | Age: 62
End: 2017-04-13

## 2017-04-13 DIAGNOSIS — Z95.811 LEFT VENTRICULAR ASSIST DEVICE PRESENT: ICD-10-CM

## 2017-04-13 LAB — INR PPP: 1.7

## 2017-04-17 LAB — INR PPP: 1.8

## 2017-04-18 ENCOUNTER — ANTI-COAG VISIT (OUTPATIENT)
Dept: CARDIOLOGY | Facility: CLINIC | Age: 62
End: 2017-04-18

## 2017-04-18 DIAGNOSIS — Z95.811 LEFT VENTRICULAR ASSIST DEVICE PRESENT: ICD-10-CM

## 2017-04-20 ENCOUNTER — ANTI-COAG VISIT (OUTPATIENT)
Dept: CARDIOLOGY | Facility: CLINIC | Age: 62
End: 2017-04-20

## 2017-04-20 DIAGNOSIS — Z95.811 LEFT VENTRICULAR ASSIST DEVICE PRESENT: ICD-10-CM

## 2017-04-20 LAB — INR PPP: 1.4

## 2017-04-21 NOTE — PROGRESS NOTES
Patient reports only taking 2 mg on Thursday 4/20 instead of 3mg. She denies other changes or problems; she denies bleeding issues.

## 2017-04-24 ENCOUNTER — ANTI-COAG VISIT (OUTPATIENT)
Dept: CARDIOLOGY | Facility: CLINIC | Age: 62
End: 2017-04-24

## 2017-04-24 DIAGNOSIS — Z95.811 LEFT VENTRICULAR ASSIST DEVICE PRESENT: ICD-10-CM

## 2017-04-24 LAB — INR PPP: 2.3

## 2017-04-25 NOTE — PROGRESS NOTES
Verbal result taken from [facility]_________. PT/INR _2.3______ Date drawn_4/24/17_______ Hardcopy to be faxed.

## 2017-04-26 DIAGNOSIS — R20.0 RIGHT LEG NUMBNESS: ICD-10-CM

## 2017-04-26 RX ORDER — GABAPENTIN 300 MG/1
300 CAPSULE ORAL NIGHTLY
Qty: 120 CAPSULE | Refills: 2
Start: 2017-04-26 | End: 2017-05-10 | Stop reason: SDUPTHER

## 2017-04-27 ENCOUNTER — ANTI-COAG VISIT (OUTPATIENT)
Dept: CARDIOLOGY | Facility: CLINIC | Age: 62
End: 2017-04-27

## 2017-04-27 DIAGNOSIS — Z95.811 LEFT VENTRICULAR ASSIST DEVICE PRESENT: ICD-10-CM

## 2017-04-27 LAB — INR PPP: 2.4

## 2017-05-01 NOTE — PROGRESS NOTES
Alegra with [facility] called to report that Patient was not at home when field nurse went to the Patient's house to draw the lab today, when field nurse called the Patient she was told by the Patient that she didn't know when she would be home. Waldo put Patient on the schedule to try again tomorrow to draw the lab, Mar-Erin was notified

## 2017-05-02 LAB — INR PPP: 2.3

## 2017-05-03 ENCOUNTER — ANTI-COAG VISIT (OUTPATIENT)
Dept: CARDIOLOGY | Facility: CLINIC | Age: 62
End: 2017-05-03

## 2017-05-03 DIAGNOSIS — Z95.811 LEFT VENTRICULAR ASSIST DEVICE PRESENT: ICD-10-CM

## 2017-05-03 NOTE — PROGRESS NOTES
Patient was called and given lab result, she verified correct dose of coumadin, Patient reports no bleeding/bruising/swelling, no change in meds./diet/appetite, Patient was advised to continue same dose of coumadin and was given date of next lab draw, order was faxed to [facility]

## 2017-05-03 NOTE — PROGRESS NOTES
Verbal result taken from __Allegra_______. PT/INR __2.3_____ Date drawn_5/2_______ Hardcopy to be faxed.

## 2017-05-08 ENCOUNTER — ANTI-COAG VISIT (OUTPATIENT)
Dept: CARDIOLOGY | Facility: CLINIC | Age: 62
End: 2017-05-08

## 2017-05-08 DIAGNOSIS — Z95.811 LEFT VENTRICULAR ASSIST DEVICE PRESENT: ICD-10-CM

## 2017-05-08 LAB — INR PPP: 3.4

## 2017-05-09 NOTE — PROGRESS NOTES
Verbal result taken from __HH nurse_______. PT/INR __41.3/3.4_____ Date drawn_5/8/17_______ Hardcopy to be faxed.    Patient denies any changes to warrant this INR. I am reducing her dose today and re-challenging this dose until follow-up.

## 2017-05-10 ENCOUNTER — HOSPITAL ENCOUNTER (OUTPATIENT)
Dept: PULMONOLOGY | Facility: CLINIC | Age: 62
Discharge: HOME | End: 2017-05-10
Payer: MEDICARE

## 2017-05-10 ENCOUNTER — CLINICAL SUPPORT (OUTPATIENT)
Dept: TRANSPLANT | Facility: CLINIC | Age: 62
End: 2017-05-10
Payer: MEDICARE

## 2017-05-10 ENCOUNTER — OFFICE VISIT (OUTPATIENT)
Dept: TRANSPLANT | Facility: CLINIC | Age: 62
End: 2017-05-10
Payer: MEDICARE

## 2017-05-10 ENCOUNTER — ANTI-COAG VISIT (OUTPATIENT)
Dept: CARDIOLOGY | Facility: CLINIC | Age: 62
End: 2017-05-10

## 2017-05-10 ENCOUNTER — RESEARCH ENCOUNTER (OUTPATIENT)
Dept: RESEARCH | Facility: HOSPITAL | Age: 62
End: 2017-05-10

## 2017-05-10 VITALS
SYSTOLIC BLOOD PRESSURE: 76 MMHG | HEART RATE: 84 BPM | HEIGHT: 60 IN | TEMPERATURE: 98 F | WEIGHT: 131 LBS | BODY MASS INDEX: 25.72 KG/M2

## 2017-05-10 VITALS — HEIGHT: 58 IN | WEIGHT: 136.69 LBS | BODY MASS INDEX: 28.69 KG/M2

## 2017-05-10 DIAGNOSIS — I50.9 CONGESTIVE HEART FAILURE, UNSPECIFIED CONGESTIVE HEART FAILURE CHRONICITY, UNSPECIFIED CONGESTIVE HEART FAILURE TYPE: Primary | ICD-10-CM

## 2017-05-10 DIAGNOSIS — N18.6 END STAGE RENAL DISEASE ON DIALYSIS: ICD-10-CM

## 2017-05-10 DIAGNOSIS — R20.0 RIGHT LEG NUMBNESS: ICD-10-CM

## 2017-05-10 DIAGNOSIS — Z86.73 HISTORY OF STROKE WITHOUT RESIDUAL DEFICITS: ICD-10-CM

## 2017-05-10 DIAGNOSIS — I48.0 PAROXYSMAL ATRIAL FIBRILLATION: ICD-10-CM

## 2017-05-10 DIAGNOSIS — Z95.811 LEFT VENTRICULAR ASSIST DEVICE PRESENT: ICD-10-CM

## 2017-05-10 DIAGNOSIS — Z95.811 HEART REPLACED BY HEART ASSIST DEVICE: ICD-10-CM

## 2017-05-10 DIAGNOSIS — I50.42 CHRONIC COMBINED SYSTOLIC AND DIASTOLIC HEART FAILURE: Primary | ICD-10-CM

## 2017-05-10 DIAGNOSIS — R53.81 PHYSICAL DECONDITIONING: ICD-10-CM

## 2017-05-10 DIAGNOSIS — Z79.01 CHRONIC ANTICOAGULATION: ICD-10-CM

## 2017-05-10 DIAGNOSIS — I50.9 CONGESTIVE HEART FAILURE, UNSPECIFIED CONGESTIVE HEART FAILURE CHRONICITY, UNSPECIFIED CONGESTIVE HEART FAILURE TYPE: ICD-10-CM

## 2017-05-10 DIAGNOSIS — Z99.2 END STAGE RENAL DISEASE ON DIALYSIS: ICD-10-CM

## 2017-05-10 DIAGNOSIS — I10 ESSENTIAL HYPERTENSION: ICD-10-CM

## 2017-05-10 PROCEDURE — 99214 OFFICE O/P EST MOD 30 MIN: CPT | Mod: S$PBB,,, | Performed by: INTERNAL MEDICINE

## 2017-05-10 PROCEDURE — 94620 PR PULMONARY STRESS TESTING,SIMPLE: CPT | Mod: PBBFAC | Performed by: INTERNAL MEDICINE

## 2017-05-10 PROCEDURE — 93750 INTERROGATION VAD IN PERSON: CPT | Mod: S$PBB,,, | Performed by: INTERNAL MEDICINE

## 2017-05-10 PROCEDURE — 99999 PR PBB SHADOW E&M-EST. PATIENT-LVL III: CPT | Mod: PBBFAC,,, | Performed by: INTERNAL MEDICINE

## 2017-05-10 PROCEDURE — 94620 PR PULMONARY STRESS TESTING,SIMPLE: CPT | Mod: 26,S$PBB,, | Performed by: INTERNAL MEDICINE

## 2017-05-10 RX ORDER — GABAPENTIN 300 MG/1
300 CAPSULE ORAL 2 TIMES DAILY
Qty: 180 CAPSULE | Refills: 3 | Status: ON HOLD | OUTPATIENT
Start: 2017-05-10 | End: 2017-07-26

## 2017-05-10 NOTE — PROGRESS NOTES
SW followed with pt today in LVAD clinic. Pt alone and alert/oriented x4 with pleasant affect. Pt's son drove her here and is in the lobby.    Pt reports she is feeling well and has been walking more and working in her garden. Pt reports compliance with her dialysis.    No concerns voiced. Pt coping adequately.    SW provided general support, continues to follow and remains available.

## 2017-05-10 NOTE — PROGRESS NOTES
Subjective:   Patient ID:  Randa Devine is a 61 y.o. female who presents for LVAD followup visit.      Implant Date: 5/9/12 and PFO closure  [id number], DT study     HVAD RPM 2700     INR goal: 2-3  NO Bridge with Heparin (4/22/16)  Antiplatelets: D/C 6/25/14, Coumadin only  LDH: 197/207    TXP MARIA L INTERROGATIONS 5/10/2017   Type Heartware   Flow 5.3   Speed 2600   Power (Kim) 4.0   Low Flow Alarm 2.5   High Power Alarm 6.0   Pulsatility Pulse       HPI     MsRegan Devine is a 62 yo with chronic combined HF, ICM, s/p Heartware for DT complicated by ESRD, hx GIB 04/16, AVM s/p APC with no recurrent GIB, who presented 01/30 with acute double vision/photophobia with transient aphasia and transient LOC the day before, however came the next day to have it addressed. CT head in [facility] showed thalamic infarct new prior to imaging 01/19 with no new hemorrhage. Not candidate for tpa. Vascular neurology consulted, they felt stroke was emoblic due to history of PAF. Started on statin. VAD speed also lowered due to hypotension (?) blood pressure meds adjusted during inpatient stay. Was admitted 3/31-4/6 with progressively worsening SOB and lower GI bleeding- had colonoscopy 4/3/2017 showing bleeding medium size angiodysplasia in descending colon s/p clip -Received 2 units of blood during hospital stay. Returns for routine VAD f/u    Since then, pt has been doing well- has no complaints today- keeping busy with her flowers. Still gets short winded at times, will try to go without her oxygen but really needs it. Says they are doing a good job of keeping the fluid off with HD. No further bleeding.    DLES is grade 1    Interrogation of device data reveals no alarms, normal flows and power (see VAD interrogation report for full details.)            Review of Systems   Constitution: Negative. Negative for chills, decreased appetite, diaphoresis, fever, weakness, malaise/fatigue, night sweats, weight gain and weight loss.   HENT:  Negative for congestion and headaches.    Eyes: Negative for blurred vision, double vision and visual disturbance.   Cardiovascular: Negative for chest pain, claudication, cyanosis, dyspnea on exertion, irregular heartbeat, leg swelling, near-syncope, orthopnea, palpitations, paroxysmal nocturnal dyspnea and syncope.   Respiratory: Negative for cough, hemoptysis, shortness of breath, sleep disturbances due to breathing, snoring and wheezing.    Endocrine: Negative.    Hematologic/Lymphatic: Negative.  Negative for bleeding problem. Does not bruise/bleed easily.   Skin: Negative for color change, dry skin, nail changes, poor wound healing and rash.   Musculoskeletal: Negative.  Negative for arthritis, joint pain and muscle weakness.   Gastrointestinal: Positive for nausea. Negative for bloating, abdominal pain, constipation, diarrhea, hematemesis, hematochezia, melena and vomiting.   Genitourinary: Negative.  Negative for frequency and urgency.   Neurological: Negative for difficulty with concentration, excessive daytime sleepiness, dizziness, focal weakness, light-headedness and paresthesias.   Psychiatric/Behavioral: Negative for depression. The patient does not have insomnia and is not nervous/anxious.        Objective:   Blood pressure (!) 76/0, pulse 84, temperature 98.3 °F (36.8 °C), temperature source Oral, height 5' (1.524 m), weight 59.4 kg (131 lb).body mass index is 25.58 kg/(m^2).  MAP 74    Physical Exam   Constitutional: She is oriented to person, place, and time. Vital signs are normal. She appears well-developed and well-nourished. No distress.   HENT:   Head: Normocephalic and atraumatic.   Mouth/Throat: No oropharyngeal exudate.   Eyes: Conjunctivae and EOM are normal. Pupils are equal, round, and reactive to light. No scleral icterus.   Neck: Normal range of motion. Neck supple. No JVD present.   Cardiovascular: Normal rate, regular rhythm, normal heart sounds and intact distal pulses.  PMI is not  "displaced.  Exam reveals no gallop and no friction rub.    No murmur heard.  Smooth VAD hum. DLES is "1"   Pulmonary/Chest: Effort normal and breath sounds normal. No respiratory distress. She has no wheezes. She has no rales. She exhibits no tenderness.   Abdominal: Soft. Bowel sounds are normal. She exhibits no distension and no mass. There is no tenderness. There is no rebound and no guarding.   Musculoskeletal: Normal range of motion. She exhibits no edema or tenderness.   Neurological: She is alert and oriented to person, place, and time.   Skin: Skin is warm and dry. No rash noted. She is not diaphoretic. No erythema. No pallor.   Psychiatric: She has a normal mood and affect. Her behavior is normal. Judgment and thought content normal.   Nursing note and vitals reviewed.      Lab Results   Component Value Date    WBC 6.37 05/10/2017    HGB 11.5 (L) 05/10/2017    HCT 36.2 (L) 05/10/2017     (H) 05/10/2017     (L) 05/10/2017    CO2 29 05/10/2017    CREATININE 6.2 (H) 05/10/2017    CALCIUM 9.4 05/10/2017    ALBUMIN 3.7 05/10/2017    AST 27 05/10/2017     (H) 05/10/2017    ALT 17 05/10/2017     05/10/2017       Lab Results   Component Value Date    INR 3.1 (H) 05/10/2017    INR 3.4 05/08/2017    INR 2.3 05/02/2017       BNP   Date Value Ref Range Status   05/10/2017 917 (H) 0 - 99 pg/mL Final     Comment:     Values of less than 100 pg/ml are consistent with non-CHF populations.   04/05/2017 2095 (H) 0 - 99 pg/mL Final     Comment:     Values of less than 100 pg/ml are consistent with non-CHF populations.   04/03/2017 1743 (H) 0 - 99 pg/mL Final     Comment:     Values of less than 100 pg/ml are consistent with non-CHF populations.       LD   Date Value Ref Range Status   05/10/2017 227 110 - 260 U/L Final     Comment:     Results are increased in hemolyzed samples.   04/06/2017 190 110 - 260 U/L Final     Comment:     Results are increased in hemolyzed samples.   04/05/2017 181 110 - " 260 U/L Final     Comment:     Results are increased in hemolyzed samples.     Labs were reviewed with the patient.    Assessment:      1. Chronic combined systolic and diastolic heart failure- euvolemic on exam, NYHA FC II, BNP chronically elevated but pt is on HD so it's less useful to follow-   2. Right leg numbness    3. Heart replaced by heart assist device- Bp controlled, INR slightly supratherapeutic, LD remains low   4. Essential hypertension- controlled   5. Left ventricular assist device present    6. Physical deconditioning    7. Paroxysmal atrial fibrillation    8. History of stroke without residual deficits    9. Chronic anticoagulation    10. End stage renal disease on dialysis- doing well with HD       Plan:   Dispense 1 mo daily soap and water driveline management kit (medically necessary)    Refilled gabapentin today    Keep salt intake to under 2000 mg sodium, fluids to under 2 L (64 oz)    Sent a message to Dr Breen as she needs a new orthotic shoe    Listed for transplant: No, DT    UNOS Patient Status  Functional Status: 90% - Able to carry on normal activity: minor symptoms of disease  Physical Capacity: No Limitations  Working for Income: No  If no, reason not working: Unknown    F/u 2 mo with labs, clinic visit and interrogation

## 2017-05-10 NOTE — PATIENT INSTRUCTIONS
No changes today    Keep salt intake to under 2000 mg sodium, fluids to under 2 L (64 oz)    Call us if you find yourself getting more short of breath, have more swelling or unexpected weight changes, fever, chills, alarms, bloody or black bowel movements, or drainage from your driveline

## 2017-05-10 NOTE — PROCEDURES
TXP MARIA L INTERROGATIONS 5/10/2017 4/6/2017 4/6/2017 4/5/2017 4/5/2017 4/5/2017 4/5/2017   Type Heartware - - - - - -   Flow 5.3 - - - - - -   Speed 2600 - - - - - -   Power (Kim) 4.0 - - - - - -   Low Flow Alarm 2.5 - - - - - -   High Power Alarm 6.0 - - - - - -   Pulsatility Pulse Pulse Pulse Pulse Pulse Pulse Pulse   }

## 2017-05-10 NOTE — PROGRESS NOTES
Saw subject today in the heart failure clinic for 60 month HeartWare follow-up. This is her final visit for the study. She currently lives at home. Dr. Ackerman will document her NYHA classification in her clinic note. She had labs drawn today. The VAD coordinator will download her VAD parameters. I performed a 12 week post NIH stroke scale as a follow-up to the stroke she had on 1/29/17. NIH = 1 (right leg) ,  MRS= 0. She does walk with a walker but this is due to her bilateral foot drop. She has been hospitalized several times since her last visit. This will be reported to the study sponsor.     I thanked her for participating in the study. She will continue to follow-up according to SOC.

## 2017-05-10 NOTE — MR AVS SNAPSHOT
[facility]  [address]  [facility] [address]  Phone: [phone]                  Randa Devine   5/10/2017 9:30 AM   Office Visit    Description:  Female : 1955   Provider:  Caitlyn Ackerman MD   Department:  [facility]           Reason for Visit     VAD Follow Up           Diagnoses this Visit        Comments    Chronic combined systolic and diastolic heart failure    -  Primary     Right leg numbness         Heart replaced by heart assist device         Essential hypertension         Left ventricular assist device present         Physical deconditioning         Paroxysmal atrial fibrillation         History of stroke without residual deficits         Chronic anticoagulation         End stage renal disease on dialysis                To Do List           Future Appointments        Provider Department Dept Phone    5/10/2017 10:40 AM SIX, MINUTE WALK [facility] - Pulmonary Lab [phone]    2017 8:00 AM HOME MONITOR DEVICE CHECK, [facility] - Arrhythmia [phone]    2017 8:30 AM LAB, APPOINTMENT [place] [facility] [phone]    2017 9:30 AM HEARTTRANSPLANT, LVADN [facility] [phone]    2017 9:30 AM Miller Restrepo Jr., MD [facility] [phone]      Goals (5 Years of Data)     COMPLETED: Patient/caregiver will have an action plan in place to manage and prevent complications of weakness/debility prior to discharge from O[facility].     Notes - Note edited  2017 11:02 AM by Ina Lovell RN    Overall Time to Completion  2 months from 2017    Short Term Goals  Patient/caregiver will Walk for 10-15 minutes 3 days a week within 2 weeks.  Interventions   · Discuss alternate Levels of Care with patient/caregiver.  · Recognize and provide educational material (CUBA).  · Encourage Exercise.   Status  · Met    Patient/caregiver will discuss health care needs with Physician and care  team during visits or using Patient Portal.  Interventions   · Empower patient/caregiver to discuss treatment plan with Physician/care team.  · Encourage compliance with Physician follow-ups.   Status  · Met                COMPLETED: Patient/caregiver will have knowledge of resources available in order to obtain the services that are needed prior to discharge from [facility].     Notes - Note edited  3/24/2017 11:37 AM by Ina Lovell RN    Overall Time to Completion  2 months from 02/20/2017    Short Term Goals  Patient/caregiver will fill out any applications that were sent to patient to complete from Pharmacy Assistance Program within 2 weeks.  Interventions   · Refer to [email or web address].   Status  · Met    Patient/caregiver will notify RN CCM if patient does not hear from Pharmacy Assistance Program within 1 week.  Interventions   · [facility].   Status  · Met             These Medications        Disp Refills Start End    gabapentin (NEURONTIN) 300 MG capsule 180 capsule 3 5/10/2017     Take 1 capsule (300 mg total) by mouth 2 (two) times daily. - Oral    [facility]: [id number] [place] [facility] [address] #: [phone]         [facility]     [facility] - 24/7 Assistance  Unless otherwise directed by your provider, please contact [facility], our nurse care line that is available for 24/7 assistance.     Registered nurses in the [facility] provide: appointment scheduling, clinical advisement, health education, and other advisory services.  Call: [phone] (toll free)               Medications           Message regarding Medications     Verify the changes and/or additions to your medication regime listed below are the same as discussed with your clinician today.  If any of these changes or additions are incorrect, please notify your healthcare provider.        CHANGE how you are taking  these medications     Start Taking Instead of    gabapentin (NEURONTIN) 300 MG capsule gabapentin (NEURONTIN) 300 MG capsule    Dosage:  Take 1 capsule (300 mg total) by mouth 2 (two) times daily. Dosage:  Take 1 capsule (300 mg total) by mouth every evening.    Reason for Change:  Reorder            Verify that the below list of medications is an accurate representation of the medications you are currently taking.  If none reported, the list may be blank. If incorrect, please contact your healthcare provider. Carry this list with you in case of emergency.           Current Medications     acetaminophen (TYLENOL) 325 MG tablet Take 2 tablets (650 mg total) by mouth every 6 (six) hours as needed for Pain (mild pain).    amlodipine (NORVASC) 10 MG tablet Take 1 tablet (10 mg total) by mouth once daily.    atorvastatin (LIPITOR) 40 MG tablet Take 1 tablet (40 mg total) by mouth once daily.    CALCIUM CARBONATE (ANTACID CALCIUM ORAL) Take 1 tablet by mouth 4 (four) times daily.     cetirizine (ZYRTEC) 5 MG tablet Take 1 tablet (5 mg total) by mouth once daily.    escitalopram oxalate (LEXAPRO) 20 MG tablet Take 1 tablet (20 mg total) by mouth every evening.    hydrALAZINE (APRESOLINE) 50 MG tablet Take 1 tablet (50 mg total) by mouth every 8 (eight) hours.    ondansetron (ZOFRAN) 4 MG tablet Take 1 tablet (4 mg total) by mouth every 8 (eight) hours as needed for Nausea.    pantoprazole (PROTONIX) 40 MG tablet Take 1 tablet (40 mg total) by mouth once daily.    warfarin (COUMADIN) 2 MG tablet 5mg tonight only (4/6) followed by 2mg/day    gabapentin (NEURONTIN) 300 MG capsule Take 1 capsule (300 mg total) by mouth 2 (two) times daily.           Clinical Reference Information           Your Vitals Were     BP Pulse Temp Height Weight Last Period    76/0 (BP Location: Right arm, Patient Position: Sitting, BP Method: Doppler) 84 98.3 °F (36.8 °C) (Oral) 5' (1.524 m) 59.4 kg (131 lb) (LMP Unknown)    BMI                25.58  kg/m2          Blood Pressure          Most Recent Value    BP  (!)  76/0      Allergies as of 5/10/2017     Codeine    Demerol [Meperidine]    Lortab [Hydrocodone-acetaminophen]      Immunizations Administered on Date of Encounter - 5/10/2017     None      Orders Placed During Today's Visit      Normal Orders This Visit    LVAD Interrogations Out Patient Only     LVAD Maintenance       Maintenance Dialysis History     Patient has no recorded history of maintenance dialysis.      Instructions    No changes today    Keep salt intake to under 2000 mg sodium, fluids to under 2 L (64 oz)    Call us if you find yourself getting more short of breath, have more swelling or unexpected weight changes, fever, chills, alarms, bloody or black bowel movements, or drainage from your driveline         Language Assistance Services     ATTENTION: Language assistance services are available, free of charge. Please call [phone].      ATENCIÓN: Si habla raffy, tiene a sanchez disposición servicios gratuitos de asistencia lingüística. Llame al [phone].     CHÚ Ý: N?u b?n nói Ti?ng Vi?t, có các d?ch v? h? tr? ngôn ng? mi?n phí dành cho b?n. G?i s? [phone].         [facility] complies with applicable Federal civil rights laws and does not discriminate on the basis of race, color, national origin, age, disability, or sex.

## 2017-05-10 NOTE — PROGRESS NOTES
"Date of Implant with Heartware LVAD: 5/9/2012    PATIENT ARRIVED IN CLINIC:  Ambulatory  Accompanied by:self, son drove  Vitals  Doppler:76  Pulsatile:yes  PAIN:denies on 0-10 pain scale , location of pain: n/a, description of pain: n/a  Is patient currently on medications for pain?no What kind? Tylenol PRN  Weight (with controller and 2 batteries): refer to encounter    VAD Interrogation:  TXP MARIA L INTERROGATIONS 5/10/2017   Type Heartware   Flow 5.3   Speed 2600   Power (Kim) 4.0   Low Flow Alarm 2.5   High Power Alarm 6.0   Pulsatility Pulse       HCT:36.2  WAVEFORM:3-7, no negative deflections  Suction alarm: off     Problems / Issues / Alarms with VAD if any:none reported   Any Equipment Issues:none reported  Patient states their batteries are lasting 8-12 hours. Rotating all batteries. Checking connections before and after changing battery.   Emergency Equipment With Patient:yes   VAD Binder With Patient: no   Reviewed VAD Numbers In Binder:no  VAD Sounds: SMOOTH   Manual & Visual Inspection Of Driveline:  NO KINKS OR TEARS NOTED   Checked Heartware driveline connector housing for separation, none noted.  Also checked for tight connection, which they are.  Educated pt regarding this and instructed  to check on this daily. Pt verbalized understanding and agreement.   Clamshell Repair: no  Patient wearing (consolidated bag, vest, holster )with waist strap:YES consolidated bag    LVAD Dressing/DLES:  Appearance Of Driveline:"1"  Antibiotics:NO  Frequency of Dressing Changes:twice weekly with soap and water   Stabilization Device In Use: YES presley anchor    Pt In Need Of Management Kits?:yes 1 box soap and water kits ordered  It is medically necessary to have VAD management kits in order to prevent infection or to assist in the healing of an infected DLES.    Assessment:   Complaints/reason for visit today:  Routine follow up  Complaints Of Nausea / Vomiting:none   Appearance and Frequency Of Stools:normal and " formed without blood  Color Of Urine: clear yellow  Coping/Depression/Anxiety:some anxiety, but coping ok  Sleep Habits:10-12 hrs /night  Sleep Aids:none  Showering :NO   Activity/Exercise: twice weekly, walking, gardening   Driving:no, Reminded to pull over should there be an alarm before looking down at controller.       Labs:    Chemistry        Component Value Date/Time     05/10/2017 0823    K 3.6 05/10/2017 0823    CL 93 (L) 05/10/2017 0823    CO2 29 05/10/2017 0823    BUN 28 (H) 05/10/2017 0823    CREATININE 6.2 (H) 05/10/2017 0823    GLU 86 05/10/2017 0823        Component Value Date/Time    CALCIUM 9.4 05/10/2017 0823    ALKPHOS 133 05/10/2017 0823    AST 27 05/10/2017 0823    ALT 17 05/10/2017 0823    BILITOT 0.8 05/10/2017 0823            Magnesium   Date Value Ref Range Status   05/10/2017 2.2 1.6 - 2.6 mg/dL Final       Lab Results   Component Value Date    WBC 6.37 05/10/2017    HGB 11.5 (L) 05/10/2017    HCT 36.2 (L) 05/10/2017     (H) 05/10/2017     (L) 05/10/2017       Lab Results   Component Value Date    INR 3.1 (H) 05/10/2017    INR 3.4 05/08/2017    INR 2.3 05/02/2017       BNP   Date Value Ref Range Status   05/10/2017 917 (H) 0 - 99 pg/mL Final     Comment:     Values of less than 100 pg/ml are consistent with non-CHF populations.   04/05/2017 2095 (H) 0 - 99 pg/mL Final     Comment:     Values of less than 100 pg/ml are consistent with non-CHF populations.   04/03/2017 1743 (H) 0 - 99 pg/mL Final     Comment:     Values of less than 100 pg/ml are consistent with non-CHF populations.       LD   Date Value Ref Range Status   05/10/2017 227 110 - 260 U/L Final     Comment:     Results are increased in hemolyzed samples.   04/06/2017 190 110 - 260 U/L Final     Comment:     Results are increased in hemolyzed samples.   04/05/2017 181 110 - 260 U/L Final     Comment:     Results are increased in hemolyzed samples.       Labs reviewed with patient: YES      Patient Satisfaction  Survey completed per Patient: no  (explained about signature and box to check)  Medication reconciliation: per MA.  Purple card updated today: no  Coumadin Managed by: [facility], INR 3.1    Education: Reviewed driveline care, emergency procedures, how to change the controller, alarms with patient.        Plans/Needs:Pt in for routine follow up. No major problems or complaints noted. No changes made today. Pt to RTC in 2 months, refer to MD note.

## 2017-05-10 NOTE — Clinical Note
Dr EWST,  Mrs Devine asked I reach out to you- she says that  You had given her an orthotic shoe but it has borken and she would like another one-  Can you help?  Thanks in advance Caitlyn

## 2017-05-10 NOTE — LETTER
May 10, 2017        BABAR Currie  [address]  [address]  [place] [address]  Phone: [phone]  Fax: [phone]             [facility] [address]  [facility] [address]  Phone: [phone]   Patient: Randa Devine   MR Number: [id number]   YOB: 1955   Date of Visit: 5/10/2017       Dear Dr. BABAR Currie    Thank you for referring Randa Devine to me for evaluation. Attached you will find relevant portions of my assessment and plan of care.    If you have questions, please do not hesitate to call me. I look forward to following Randa Devine along with you.    Sincerely,    Caitlyn Ackerman MD    Enclosure    If you would like to receive this communication electronically, please contact [email or web address] or [phone] to request [employer] access.    [employer] is a tool which provides read-only access to select patient information with whom you have a relationship. Its easy to use and provides real time access to review your patients record including encounter summaries, notes, results, and demographic information.    If you feel you have received this communication in error or would no longer like to receive these types of communications, please e-mail [email or web address]

## 2017-05-11 NOTE — PROCEDURES
Randa Devine is a 61 y.o.  female patient, who presents for a 6 minute walk test ordered by MD Tyron.  The diagnosis is Qualify for Oxygen.  The patient's BMI is 28.6kg/m2. Predicted distance (lower limit of normal) is 355.57 meters.    Test Results:    The test was completed.  The patient stopped 1 times for a total of 22 seconds.  The total time walked was 338 seconds.  During walking, the patient reported:  Dyspnea, Leg pain, Lightheadedness. The patient used a walker during testing.     05/10/2017---------Distance:  183 meters (600 feet)     O2 Sat % Supplemental Oxygen Heart Rate Blood Pressure Victorina Scale   Pre-exercise  (Resting) 89%  Room Air 91 bpm 87/71 mmHg 0.5   During Exercise 88% Room Air 96 bpm 89/54 mmHg 4   Post-exercise   89% Room Air 95 bpm               Recovery Time: 73 seconds    Oxygen Qualification:    Distance:30.48 meters (100 feet)     O2 Sat % Supplemental Oxygen Heart Rate Blood Pressure Victorina Scale   Pre-exercise  (Resting) 99 % 2 L/M  88 bpm 93/56 1   During Exercise 93 % 2 L/M 70 bpm (!) 83/57 1   Post-exercise   95 % 2 L/M  86 bpm               Recovery Time:      Performing nurse/tech: MARITZA Omalley    PREVIOUS STUDY:   The patient had a previous study.  03/16/2016---------Distance: 213.36 meters (700 feet)       O2 Sat % Supplemental Oxygen Heart Rate Blood Pressure Victorina Scale   Pre-exercise  (Resting) 91 % Room Air 84 bpm 87/65 mmHg 2   During Exercise 88 % Room Air 99 bpm 102/72 mmHg 4   Post-exercise 92 % Room Air  91 bpm             CLINICAL INTERPRETATION:  Six minute walk distance is 30.48 meters (100 feet) with somewhat heavy dyspnea.  During exercise, there was significant desaturation while breathing room air.  Both blood pressure and heart rate remained stable with walking.  The patient reported non-pulmonary symptoms during exercise.  Significant exercise impairment is likely due to desaturation and subjective symptoms.  The patient did complete the study, walking  338 seconds of the 360 second test.  The patient may benefit from using supplemental oxygen.  Since the previous study in March 2016, exercise capacity is significantly worse.  Based upon age and body mass index, exercise capacity is less than predicted.   Oxygen saturation did improve while breathing supplemental oxygen.

## 2017-05-15 LAB — INR PPP: 2.1

## 2017-05-16 ENCOUNTER — ANTI-COAG VISIT (OUTPATIENT)
Dept: CARDIOLOGY | Facility: CLINIC | Age: 62
End: 2017-05-16

## 2017-05-16 DIAGNOSIS — Z95.811 LEFT VENTRICULAR ASSIST DEVICE PRESENT: ICD-10-CM

## 2017-05-16 NOTE — PROGRESS NOTES
Verbal result taken from __Allegra_______. PT/INR ____2.1___ Date drawn____5/15____ Hardcopy to be faxed.

## 2017-05-18 ENCOUNTER — ANTI-COAG VISIT (OUTPATIENT)
Dept: CARDIOLOGY | Facility: CLINIC | Age: 62
End: 2017-05-18

## 2017-05-18 DIAGNOSIS — Z95.811 LEFT VENTRICULAR ASSIST DEVICE PRESENT: ICD-10-CM

## 2017-05-18 LAB — INR PPP: 1.8

## 2017-05-22 LAB — INR PPP: 1.8

## 2017-05-23 ENCOUNTER — ANTI-COAG VISIT (OUTPATIENT)
Dept: CARDIOLOGY | Facility: CLINIC | Age: 62
End: 2017-05-23

## 2017-05-23 DIAGNOSIS — Z95.811 LEFT VENTRICULAR ASSIST DEVICE PRESENT: ICD-10-CM

## 2017-05-23 NOTE — PROGRESS NOTES
Verbal result taken from ___Allegra______.   Date drawn_____5/23___ Hardcopy to be faxed.  Patient reports taking 2mg daily since last INR. Will gently increase. INR 5/25 after 11am, Allegra advised.

## 2017-05-25 ENCOUNTER — ANTI-COAG VISIT (OUTPATIENT)
Dept: CARDIOLOGY | Facility: CLINIC | Age: 62
End: 2017-05-25

## 2017-05-25 ENCOUNTER — CLINICAL SUPPORT (OUTPATIENT)
Dept: ELECTROPHYSIOLOGY | Facility: CLINIC | Age: 62
End: 2017-05-25
Payer: MEDICARE

## 2017-05-25 DIAGNOSIS — Z95.810 ICD (IMPLANTABLE CARDIOVERTER-DEFIBRILLATOR) IN PLACE: ICD-10-CM

## 2017-05-25 DIAGNOSIS — Z95.811 LEFT VENTRICULAR ASSIST DEVICE PRESENT: ICD-10-CM

## 2017-05-25 DIAGNOSIS — I50.9 HEART FAILURE, UNSPECIFIED HEART FAILURE CHRONICITY, UNSPECIFIED HEART FAILURE TYPE: ICD-10-CM

## 2017-05-25 LAB — INR PPP: 2.7

## 2017-05-25 PROCEDURE — 93295 DEV INTERROG REMOTE 1/2/MLT: CPT | Mod: ,,, | Performed by: INTERNAL MEDICINE

## 2017-05-25 PROCEDURE — 93296 REM INTERROG EVL PM/IDS: CPT | Mod: ,,, | Performed by: INTERNAL MEDICINE

## 2017-05-29 ENCOUNTER — ANTI-COAG VISIT (OUTPATIENT)
Dept: CARDIOLOGY | Facility: CLINIC | Age: 62
End: 2017-05-29

## 2017-05-29 ENCOUNTER — TELEPHONE (OUTPATIENT)
Dept: TRANSPLANT | Facility: CLINIC | Age: 62
End: 2017-05-29

## 2017-05-29 DIAGNOSIS — Z95.811 LEFT VENTRICULAR ASSIST DEVICE PRESENT: ICD-10-CM

## 2017-05-29 LAB — INR PPP: 2.4

## 2017-05-29 NOTE — TELEPHONE ENCOUNTER
Supplies request.  Pt called last Friday to let her know that the company was aware that a new shipment needed to be sent to the patient.    A message was received from the company stating that the supplies were shipped to [facility].  i called the patient and informed her of this and stated that the post office was closed today and she would not receive it today.  They should deliver it sometime this week.    Pt is doing well with no other problems.

## 2017-05-29 NOTE — PROGRESS NOTES
Verbal result taken from ____Joy_____. PT/INR ___2.4___ Date drawn____5/29____ Hardcopy to be faxed.  Patient refuses venipuncture, she only agrees to Point of Care. Will advise LVAD.  Will maintain dose.

## 2017-06-01 ENCOUNTER — ANTI-COAG VISIT (OUTPATIENT)
Dept: CARDIOLOGY | Facility: CLINIC | Age: 62
End: 2017-06-01

## 2017-06-01 DIAGNOSIS — Z95.811 LEFT VENTRICULAR ASSIST DEVICE PRESENT: ICD-10-CM

## 2017-06-01 LAB — INR PPP: 2

## 2017-06-05 LAB — INR PPP: 2.5

## 2017-06-06 ENCOUNTER — ANTI-COAG VISIT (OUTPATIENT)
Dept: CARDIOLOGY | Facility: CLINIC | Age: 62
End: 2017-06-06

## 2017-06-06 DIAGNOSIS — Z95.811 LEFT VENTRICULAR ASSIST DEVICE PRESENT: ICD-10-CM

## 2017-06-06 NOTE — PROGRESS NOTES
Verbal result taken from ____Allegra_____. PT/INR __30.5 / 2.5_____ Date drawn____6/5/2017____ Hardcopy to be faxed.

## 2017-06-08 ENCOUNTER — ANTI-COAG VISIT (OUTPATIENT)
Dept: CARDIOLOGY | Facility: CLINIC | Age: 62
End: 2017-06-08

## 2017-06-08 DIAGNOSIS — Z95.811 LEFT VENTRICULAR ASSIST DEVICE PRESENT: ICD-10-CM

## 2017-06-08 LAB — INR PPP: 2.9

## 2017-06-12 LAB — INR PPP: 3.3

## 2017-06-13 ENCOUNTER — ANTI-COAG VISIT (OUTPATIENT)
Dept: CARDIOLOGY | Facility: CLINIC | Age: 62
End: 2017-06-13

## 2017-06-13 DIAGNOSIS — Z95.811 LEFT VENTRICULAR ASSIST DEVICE PRESENT: ICD-10-CM

## 2017-06-13 NOTE — PROGRESS NOTES
Patient confirms dose. States eating less recently but did eat a very small portion of cabbage. No bleeding issues. Will lower and advise a small portion of greens today.

## 2017-06-15 ENCOUNTER — ANTI-COAG VISIT (OUTPATIENT)
Dept: CARDIOLOGY | Facility: CLINIC | Age: 62
End: 2017-06-15

## 2017-06-15 DIAGNOSIS — Z95.811 LEFT VENTRICULAR ASSIST DEVICE PRESENT: ICD-10-CM

## 2017-06-15 LAB — INR PPP: 2.7

## 2017-06-19 ENCOUNTER — ANTI-COAG VISIT (OUTPATIENT)
Dept: CARDIOLOGY | Facility: CLINIC | Age: 62
End: 2017-06-19

## 2017-06-19 DIAGNOSIS — Z95.811 LEFT VENTRICULAR ASSIST DEVICE PRESENT: ICD-10-CM

## 2017-06-19 LAB — INR PPP: 2.8

## 2017-06-23 ENCOUNTER — ANTI-COAG VISIT (OUTPATIENT)
Dept: CARDIOLOGY | Facility: CLINIC | Age: 62
End: 2017-06-23

## 2017-06-23 DIAGNOSIS — Z95.811 LEFT VENTRICULAR ASSIST DEVICE PRESENT: ICD-10-CM

## 2017-06-23 LAB — INR PPP: 2.2

## 2017-06-26 ENCOUNTER — ANTI-COAG VISIT (OUTPATIENT)
Dept: CARDIOLOGY | Facility: CLINIC | Age: 62
End: 2017-06-26

## 2017-06-26 DIAGNOSIS — Z95.811 LEFT VENTRICULAR ASSIST DEVICE PRESENT: ICD-10-CM

## 2017-06-26 LAB — INR PPP: 1.7

## 2017-06-29 ENCOUNTER — ANTI-COAG VISIT (OUTPATIENT)
Dept: CARDIOLOGY | Facility: CLINIC | Age: 62
End: 2017-06-29

## 2017-06-29 DIAGNOSIS — Z95.811 LEFT VENTRICULAR ASSIST DEVICE PRESENT: ICD-10-CM

## 2017-06-29 LAB — INR PPP: 1.7

## 2017-07-03 ENCOUNTER — TELEPHONE (OUTPATIENT)
Dept: TRANSPLANT | Facility: CLINIC | Age: 62
End: 2017-07-03

## 2017-07-03 ENCOUNTER — ANTI-COAG VISIT (OUTPATIENT)
Dept: CARDIOLOGY | Facility: CLINIC | Age: 62
End: 2017-07-03

## 2017-07-03 DIAGNOSIS — Z95.811 LEFT VENTRICULAR ASSIST DEVICE PRESENT: ICD-10-CM

## 2017-07-03 LAB — INR PPP: 1.6

## 2017-07-03 NOTE — TELEPHONE ENCOUNTER
Got call from [place], Coumadin clinic who states patient is bleeding.     Called Patient she states she is bleeding from the colon but she feels fine. Advised her to come into the ER however she says she does not want to come. She will talk with her daughter and let us know if she is coming.

## 2017-07-05 ENCOUNTER — CLINICAL SUPPORT (OUTPATIENT)
Dept: TRANSPLANT | Facility: CLINIC | Age: 62
End: 2017-07-05
Payer: MEDICARE

## 2017-07-05 ENCOUNTER — LAB VISIT (OUTPATIENT)
Dept: LAB | Facility: HOSPITAL | Age: 62
End: 2017-07-05
Attending: INTERNAL MEDICINE
Payer: MEDICARE

## 2017-07-05 ENCOUNTER — OFFICE VISIT (OUTPATIENT)
Dept: TRANSPLANT | Facility: CLINIC | Age: 62
End: 2017-07-05
Attending: INTERNAL MEDICINE
Payer: MEDICARE

## 2017-07-05 ENCOUNTER — ANTI-COAG VISIT (OUTPATIENT)
Dept: CARDIOLOGY | Facility: CLINIC | Age: 62
End: 2017-07-05

## 2017-07-05 VITALS
HEIGHT: 60 IN | HEART RATE: 82 BPM | WEIGHT: 132 LBS | BODY MASS INDEX: 25.91 KG/M2 | TEMPERATURE: 98 F | SYSTOLIC BLOOD PRESSURE: 86 MMHG

## 2017-07-05 DIAGNOSIS — Z95.810 ICD (IMPLANTABLE CARDIOVERTER-DEFIBRILLATOR), BIVENTRICULAR, IN SITU: ICD-10-CM

## 2017-07-05 DIAGNOSIS — Z95.811 HEART REPLACED BY HEART ASSIST DEVICE: Primary | ICD-10-CM

## 2017-07-05 DIAGNOSIS — I48.0 PAROXYSMAL ATRIAL FIBRILLATION: ICD-10-CM

## 2017-07-05 DIAGNOSIS — Z87.19 HISTORY OF GI BLEED: ICD-10-CM

## 2017-07-05 DIAGNOSIS — N18.6 END STAGE RENAL DISEASE ON DIALYSIS: ICD-10-CM

## 2017-07-05 DIAGNOSIS — Z95.811 HEART REPLACED BY HEART ASSIST DEVICE: ICD-10-CM

## 2017-07-05 DIAGNOSIS — I50.42 CHRONIC COMBINED SYSTOLIC AND DIASTOLIC HEART FAILURE: ICD-10-CM

## 2017-07-05 DIAGNOSIS — Z86.73 HISTORY OF STROKE WITHOUT RESIDUAL DEFICITS: ICD-10-CM

## 2017-07-05 DIAGNOSIS — Z99.2 END STAGE RENAL DISEASE ON DIALYSIS: ICD-10-CM

## 2017-07-05 DIAGNOSIS — Z95.811 LEFT VENTRICULAR ASSIST DEVICE PRESENT: ICD-10-CM

## 2017-07-05 LAB
ALBUMIN SERPL BCP-MCNC: 3.3 G/DL
ALP SERPL-CCNC: 126 U/L
ALT SERPL W/O P-5'-P-CCNC: 15 U/L
ANION GAP SERPL CALC-SCNC: 11 MMOL/L
AST SERPL-CCNC: 20 U/L
BASOPHILS # BLD AUTO: 0.04 K/UL
BASOPHILS NFR BLD: 0.9 %
BILIRUB DIRECT SERPL-MCNC: 0.3 MG/DL
BILIRUB SERPL-MCNC: 0.8 MG/DL
BNP SERPL-MCNC: 2000 PG/ML
BUN SERPL-MCNC: 38 MG/DL
CALCIUM SERPL-MCNC: 8.8 MG/DL
CHLORIDE SERPL-SCNC: 102 MMOL/L
CO2 SERPL-SCNC: 26 MMOL/L
CREAT SERPL-MCNC: 6 MG/DL
CRP SERPL-MCNC: 2.1 MG/L
DIFFERENTIAL METHOD: ABNORMAL
EOSINOPHIL # BLD AUTO: 0.1 K/UL
EOSINOPHIL NFR BLD: 1.8 %
ERYTHROCYTE [DISTWIDTH] IN BLOOD BY AUTOMATED COUNT: 16.2 %
EST. GFR  (AFRICAN AMERICAN): 8.2 ML/MIN/1.73 M^2
EST. GFR  (NON AFRICAN AMERICAN): 7.1 ML/MIN/1.73 M^2
GLUCOSE SERPL-MCNC: 115 MG/DL
HCT VFR BLD AUTO: 32.1 %
HGB BLD-MCNC: 10.2 G/DL
INR PPP: 1.6
LDH SERPL L TO P-CCNC: 181 U/L
LYMPHOCYTES # BLD AUTO: 0.6 K/UL
LYMPHOCYTES NFR BLD: 13.5 %
MAGNESIUM SERPL-MCNC: 1.9 MG/DL
MCH RBC QN AUTO: 31.2 PG
MCHC RBC AUTO-ENTMCNC: 31.8 %
MCV RBC AUTO: 98 FL
MONOCYTES # BLD AUTO: 0.5 K/UL
MONOCYTES NFR BLD: 10.6 %
NEUTROPHILS # BLD AUTO: 3.3 K/UL
NEUTROPHILS NFR BLD: 73 %
PHOSPHATE SERPL-MCNC: 2.1 MG/DL
PLATELET # BLD AUTO: 115 K/UL
PMV BLD AUTO: 9.5 FL
POTASSIUM SERPL-SCNC: 4.2 MMOL/L
PREALB SERPL-MCNC: 25 MG/DL
PROT SERPL-MCNC: 6.7 G/DL
PROTHROMBIN TIME: 15.8 SEC
RBC # BLD AUTO: 3.27 M/UL
SODIUM SERPL-SCNC: 139 MMOL/L
WBC # BLD AUTO: 4.53 K/UL

## 2017-07-05 PROCEDURE — 93750 INTERROGATION VAD IN PERSON: CPT | Mod: PBBFAC | Performed by: INTERNAL MEDICINE

## 2017-07-05 PROCEDURE — 99999 PR PBB SHADOW E&M-EST. PATIENT-LVL III: CPT | Mod: PBBFAC,,, | Performed by: INTERNAL MEDICINE

## 2017-07-05 PROCEDURE — 99213 OFFICE O/P EST LOW 20 MIN: CPT | Mod: PBBFAC | Performed by: INTERNAL MEDICINE

## 2017-07-05 PROCEDURE — 99214 OFFICE O/P EST MOD 30 MIN: CPT | Mod: S$PBB,,, | Performed by: INTERNAL MEDICINE

## 2017-07-05 NOTE — PROGRESS NOTES
Confirmed correct dose .  Reports bleeding with diarrhea  form colon on 7/2 Sunday . Patient states she has spoke with  about condition.  Reports eating green beans.  No other changes reported.

## 2017-07-05 NOTE — PROCEDURES
TXP MARIA L INTERROGATIONS 7/5/2017 5/10/2017 4/6/2017 4/6/2017 4/5/2017 4/5/2017 4/5/2017   Type Heartware Heartware - - - - -   Flow - 5.3 - - - - -   Speed - 2600 - - - - -   Power (Kim) - 4.0 - - - - -   Low Flow Alarm - 2.5 - - - - -   High Power Alarm - 6.0 - - - - -   Pulsatility - Pulse Pulse Pulse Pulse Pulse Pulse   }

## 2017-07-05 NOTE — PROGRESS NOTES
Subjective:   Patient ID:  Randa Devine is a 61 y.o. female who presents for LVAD followup visit.    Implant Date: 5/9/12 and PFO closure  [id number], DT study     HVAD RPM 2700     INR goal: 2-3  NO Bridge with Heparin (4/22/16)  Antiplatelets: D/C 6/25/14, Coumadin only    TXP MARIA L INTERROGATIONS 7/5/2017   Type Heartware   Flow -   Speed -   Power (Kim) -   Low Flow Alarm -   High Power Alarm -   Pulsatility -        HPI  60 yo WF with combined CHF stage D due to ischemic CM underwent Heartware 5/9/12 DT complicated by renal failure and 5 month post-op hospitalization.  She then fell in rehab and fractured right hip and L5 vertebral body.  On 1/29/2017 acute double vision with transient aphasia and transient LOC went the next day and CT head in [facility] showed thalamic infarct new prior to imaging 01/19/17 with no new hemorrhage. Vascular neurology consulted and felt stroke was emoblic due to history of PAF/LVAD. She has continued to experience GI bleeding events limiting treatment options.  She returns today for routine visit.  Symptoms in ROS all old though thinks but not sure may have bloody smell to chronic dark stools from iron since yesterday.    Review of Systems   Constitution: Positive for weakness and malaise/fatigue. Negative for chills, fever, night sweats, weight gain (overall no wt change; dialysis Tues, Thurs and Sat) and weight loss.   HENT: Positive for congestion and hearing loss. Negative for headaches, hoarse voice, nosebleeds and sore throat.    Eyes: Positive for vision loss in right eye (old and pain also old). Negative for double vision, pain and vision loss in left eye.   Cardiovascular: Positive for chest pain, dyspnea on exertion (better than usual) and palpitations (common and without change). Negative for claudication, irregular heartbeat, leg swelling, near-syncope, orthopnea, paroxysmal nocturnal dyspnea and syncope.   Respiratory: Positive for wheezing (occasional). Negative for  "cough, hemoptysis, shortness of breath, sleep disturbances due to breathing, snoring and sputum production.    Endocrine: Positive for polydipsia. Negative for cold intolerance, heat intolerance and polyuria.   Hematologic/Lymphatic: Negative for bleeding problem. Bruises/bleeds easily (both).   Musculoskeletal: Positive for falls. Negative for muscle cramps, myalgias and neck pain.   Gastrointestinal: Positive for diarrhea (she has intermittently), hematochezia (rectal bleeding at times but not recent and sometimes blood on tissue) and melena (dark stool with iron so goes by smell and thinks may have some bloody smell last night "but I may be wrong"). Negative for bloating, abdominal pain, change in bowel habit, constipation, jaundice and nausea.   Genitourinary: Negative for bladder incontinence, dysuria, frequency, hematuria, hesitancy, incomplete emptying and urgency.        She voids  cc each void and goes 3-5x/day   Neurological: Positive for dizziness, light-headedness and paresthesias. Negative for brief paralysis, focal weakness, numbness and seizures.   Psychiatric/Behavioral: Positive for depression (and stress). Negative for memory loss. The patient has insomnia. The patient is not nervous/anxious.      Objective:   Blood pressure (!) 86/0, pulse 82, temperature 98.2 °F (36.8 °C), temperature source Oral, height 5' (1.524 m), weight 59.9 kg (132 lb).body mass index is 25.78 kg/m².  Doppler: 86 mm Hg  Physical Exam   Constitutional: She appears well-developed and well-nourished. No distress.   HENT:   Head: Normocephalic and atraumatic.   Eyes: Conjunctivae are normal. No scleral icterus.   Neck: No JVD present. No tracheal deviation present. No thyromegaly present.   Cardiovascular:   Drive line 1  Normal VAD sounds   Pulmonary/Chest: Effort normal and breath sounds normal. No respiratory distress. She has no wheezes. She has no rales.   Abdominal: Soft. Bowel sounds are normal. She exhibits no " distension and no mass. There is no tenderness. There is no rebound and no guarding.   Musculoskeletal: She exhibits no edema or tenderness.   Neurological: She is alert.   Skin: Skin is warm and dry. She is not diaphoretic.   Psychiatric: She has a normal mood and affect. Her behavior is normal. Judgment and thought content normal.     Lab Results   Component Value Date    BNP 2,000 (H) 07/05/2017     07/05/2017    K 4.2 07/05/2017    MG 1.9 07/05/2017     07/05/2017    CO2 26 07/05/2017    BUN 38 (H) 07/05/2017    CREATININE 6.0 (H) 07/05/2017     (H) 07/05/2017    HGBA1C 6.4 (H) 03/29/2012    AST 20 07/05/2017    ALT 15 07/05/2017    ALBUMIN 3.3 (L) 07/05/2017    PROT 6.7 07/05/2017    BILITOT 0.8 07/05/2017    WBC 4.53 07/05/2017    HGB 10.2 (L) 07/05/2017    HCT 32.1 (L) 07/05/2017    HCT 21 (L) 08/10/2013     (L) 07/05/2017    INR 1.6 (H) 07/05/2017    INR 1.6 07/03/2017     07/05/2017    TSH 1.996 01/19/2017    CHOL 144 05/10/2017    HDL 54 05/10/2017    LDLCALC 73.8 05/10/2017    TRIG 81 05/10/2017     Assessment:      1. Heart replaced by heart assist device    2. Chronic combined systolic and diastolic heart failure    3. History of stroke without residual deficits    4. History of GI bleed    5. End stage renal disease on dialysis    6. Paroxysmal atrial fibrillation    7. ICD (implantable cardioverter-defibrillator), biventricular, in situ      Plan:   We can check CBC Thursday on dialysis to assure no active  Bleeding  Same treatment    Patient is now NYHA III  Recommend 2 gram sodium restriction and 1500cc fluid restriction.  Encourage physical activity with graded exercise program.  Requested patient to weigh themselves daily, and to notify us if their weight increases by more than 3 lbs in 1 day or 5 lbs in 1 week.     Listed for transplant: No    UNOS Patient Status  Functional Status: 50% - Requires considerable assistance and frequent medical care  Physical  Capacity: Limited Mobility  Working for Income: No  If no, reason not working: Disability
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
## 2017-07-05 NOTE — LETTER
July 5, 2017        BABAR Currie  [address]  [address]  [place] [facility] [place] [facility] [address]  Phone: [phone]  Fax: [phone]             [facility] [address]  [facility] [address]  Phone: [phone]   Patient: Randa Devine   MR Number: [id number]   YOB: 1955   Date of Visit: 7/5/2017       Dear Dr. BABAR Currie    Thank you for referring Randa Devine to me for evaluation. Attached you will find relevant portions of my assessment and plan of care.    If you have questions, please do not hesitate to call me. I look forward to following Randa Devine along with you.    Sincerely,    Miller Restrepo Jr, MD    Enclosure    If you would like to receive this communication electronically, please contact [email or web address] or [phone] to request [email or web address] Link access.    [email or web address] Link is a tool which provides read-only access to select patient information with whom you have a relationship. Its easy to use and provides real time access to review your patients record including encounter summaries, notes, results, and demographic information.    If you feel you have received this communication in error or would no longer like to receive these types of communications, please e-mail [email or web address]

## 2017-07-10 ENCOUNTER — ANTI-COAG VISIT (OUTPATIENT)
Dept: CARDIOLOGY | Facility: CLINIC | Age: 62
End: 2017-07-10

## 2017-07-10 DIAGNOSIS — Z95.811 LEFT VENTRICULAR ASSIST DEVICE PRESENT: ICD-10-CM

## 2017-07-10 LAB — INR PPP: 2.2

## 2017-07-11 ENCOUNTER — OUTPATIENT CASE MANAGEMENT (OUTPATIENT)
Dept: ADMINISTRATIVE | Facility: OTHER | Age: 62
End: 2017-07-11

## 2017-07-11 NOTE — PROGRESS NOTES
For your information:    The following patient has been assigned to Ina Lovell RN with Outpatient Complex Care Management for high risk screening.    Reason: High Risk    Please contact [facility] at [phone] with any questions.    Thank you,  Monika Rivera

## 2017-07-14 ENCOUNTER — ANTI-COAG VISIT (OUTPATIENT)
Dept: CARDIOLOGY | Facility: CLINIC | Age: 62
End: 2017-07-14

## 2017-07-14 DIAGNOSIS — Z95.811 LEFT VENTRICULAR ASSIST DEVICE PRESENT: ICD-10-CM

## 2017-07-14 LAB — INR PPP: 1.9

## 2017-07-14 NOTE — PROGRESS NOTES
Patient confirmed correct dose .  Reports diarrhea off and on with bloody stool that started two weeks ago patient stated she has seen a MD about condition was not prescribed any new medications.  No other changes reported .

## 2017-07-18 ENCOUNTER — HOSPITAL ENCOUNTER (INPATIENT)
Facility: HOSPITAL | Age: 62
LOS: 8 days | Discharge: HOME HEALTH | DRG: 813 | End: 2017-07-26
Attending: EMERGENCY MEDICINE | Admitting: INTERNAL MEDICINE
Payer: MEDICARE

## 2017-07-18 DIAGNOSIS — R55 SYNCOPE AND COLLAPSE: ICD-10-CM

## 2017-07-18 DIAGNOSIS — N18.6 END STAGE RENAL DISEASE ON DIALYSIS: ICD-10-CM

## 2017-07-18 DIAGNOSIS — Z99.2 END STAGE RENAL DISEASE ON DIALYSIS: ICD-10-CM

## 2017-07-18 DIAGNOSIS — K92.2 GASTROINTESTINAL HEMORRHAGE, UNSPECIFIED GASTROINTESTINAL HEMORRHAGE TYPE: ICD-10-CM

## 2017-07-18 DIAGNOSIS — Z45.02 AICD DISCHARGE: Primary | ICD-10-CM

## 2017-07-18 DIAGNOSIS — R00.0 TACHYCARDIA: ICD-10-CM

## 2017-07-18 DIAGNOSIS — K92.2 GI BLEED: ICD-10-CM

## 2017-07-18 DIAGNOSIS — R20.0 RIGHT LEG NUMBNESS: ICD-10-CM

## 2017-07-18 DIAGNOSIS — Z79.01 LONG TERM CURRENT USE OF ANTICOAGULANT THERAPY: ICD-10-CM

## 2017-07-18 DIAGNOSIS — Z95.811 LEFT VENTRICULAR ASSIST DEVICE PRESENT: ICD-10-CM

## 2017-07-18 LAB
ABO + RH BLD: NORMAL
ALBUMIN SERPL BCP-MCNC: 3.3 G/DL
ALP SERPL-CCNC: 90 U/L
ALT SERPL W/O P-5'-P-CCNC: 10 U/L
ANION GAP SERPL CALC-SCNC: 18 MMOL/L
AST SERPL-CCNC: 20 U/L
BASOPHILS # BLD AUTO: 0.03 K/UL
BASOPHILS NFR BLD: 0.5 %
BILIRUB SERPL-MCNC: 0.7 MG/DL
BLD GP AB SCN CELLS X3 SERPL QL: NORMAL
BUN SERPL-MCNC: 73 MG/DL
CALCIUM SERPL-MCNC: 8.6 MG/DL
CHLORIDE SERPL-SCNC: 96 MMOL/L
CO2 SERPL-SCNC: 21 MMOL/L
CREAT SERPL-MCNC: 9.7 MG/DL
DIFFERENTIAL METHOD: ABNORMAL
EOSINOPHIL # BLD AUTO: 0.1 K/UL
EOSINOPHIL NFR BLD: 0.8 %
ERYTHROCYTE [DISTWIDTH] IN BLOOD BY AUTOMATED COUNT: 17.7 %
EST. GFR  (AFRICAN AMERICAN): 4.6 ML/MIN/1.73 M^2
EST. GFR  (NON AFRICAN AMERICAN): 4 ML/MIN/1.73 M^2
GLUCOSE SERPL-MCNC: 95 MG/DL
HCT VFR BLD AUTO: 19.7 %
HGB BLD-MCNC: 6.2 G/DL
INR PPP: 3
LYMPHOCYTES # BLD AUTO: 0.8 K/UL
LYMPHOCYTES NFR BLD: 12.8 %
MCH RBC QN AUTO: 31.5 PG
MCHC RBC AUTO-ENTMCNC: 31.5 %
MCV RBC AUTO: 100 FL
MONOCYTES # BLD AUTO: 0.5 K/UL
MONOCYTES NFR BLD: 8.2 %
NEUTROPHILS # BLD AUTO: 5 K/UL
NEUTROPHILS NFR BLD: 77.5 %
PLATELET # BLD AUTO: 145 K/UL
PMV BLD AUTO: 9.8 FL
POTASSIUM SERPL-SCNC: 5.3 MMOL/L
PROT SERPL-MCNC: 6.4 G/DL
PROTHROMBIN TIME: 30 SEC
RBC # BLD AUTO: 1.97 M/UL
SODIUM SERPL-SCNC: 135 MMOL/L
WBC # BLD AUTO: 6.47 K/UL

## 2017-07-18 PROCEDURE — P9040 RBC LEUKOREDUCED IRRADIATED: HCPCS

## 2017-07-18 PROCEDURE — 25000003 PHARM REV CODE 250: Performed by: EMERGENCY MEDICINE

## 2017-07-18 PROCEDURE — 99285 EMERGENCY DEPT VISIT HI MDM: CPT | Mod: 25

## 2017-07-18 PROCEDURE — 96374 THER/PROPH/DIAG INJ IV PUSH: CPT

## 2017-07-18 PROCEDURE — 85025 COMPLETE CBC W/AUTO DIFF WBC: CPT

## 2017-07-18 PROCEDURE — 86900 BLOOD TYPING SEROLOGIC ABO: CPT

## 2017-07-18 PROCEDURE — 20600001 HC STEP DOWN PRIVATE ROOM

## 2017-07-18 PROCEDURE — 63600175 PHARM REV CODE 636 W HCPCS: Performed by: PHYSICIAN ASSISTANT

## 2017-07-18 PROCEDURE — C9113 INJ PANTOPRAZOLE SODIUM, VIA: HCPCS | Performed by: PHYSICIAN ASSISTANT

## 2017-07-18 PROCEDURE — 99233 SBSQ HOSP IP/OBS HIGH 50: CPT | Mod: ,,, | Performed by: NURSE PRACTITIONER

## 2017-07-18 PROCEDURE — 99285 EMERGENCY DEPT VISIT HI MDM: CPT | Mod: ,,, | Performed by: EMERGENCY MEDICINE

## 2017-07-18 PROCEDURE — 80100014 HC HEMODIALYSIS 1:1

## 2017-07-18 PROCEDURE — 80053 COMPREHEN METABOLIC PANEL: CPT

## 2017-07-18 PROCEDURE — 27000248 HC VAD-ADDITIONAL DAY

## 2017-07-18 PROCEDURE — 25000003 PHARM REV CODE 250: Performed by: PHYSICIAN ASSISTANT

## 2017-07-18 PROCEDURE — 99222 1ST HOSP IP/OBS MODERATE 55: CPT | Mod: ,,, | Performed by: INTERNAL MEDICINE

## 2017-07-18 PROCEDURE — 86850 RBC ANTIBODY SCREEN: CPT

## 2017-07-18 PROCEDURE — 85610 PROTHROMBIN TIME: CPT

## 2017-07-18 PROCEDURE — 86922 COMPATIBILITY TEST ANTIGLOB: CPT

## 2017-07-18 PROCEDURE — 96361 HYDRATE IV INFUSION ADD-ON: CPT

## 2017-07-18 RX ORDER — ACETAMINOPHEN 325 MG/1
650 TABLET ORAL EVERY 6 HOURS PRN
Status: DISCONTINUED | OUTPATIENT
Start: 2017-07-18 | End: 2017-07-26 | Stop reason: HOSPADM

## 2017-07-18 RX ORDER — AMLODIPINE BESYLATE 10 MG/1
10 TABLET ORAL DAILY
Status: DISCONTINUED | OUTPATIENT
Start: 2017-07-18 | End: 2017-07-26 | Stop reason: HOSPADM

## 2017-07-18 RX ORDER — ATORVASTATIN CALCIUM 20 MG/1
40 TABLET, FILM COATED ORAL DAILY
Status: DISCONTINUED | OUTPATIENT
Start: 2017-07-18 | End: 2017-07-26 | Stop reason: HOSPADM

## 2017-07-18 RX ORDER — ONDANSETRON 4 MG/1
4 TABLET, FILM COATED ORAL EVERY 8 HOURS PRN
Status: DISCONTINUED | OUTPATIENT
Start: 2017-07-18 | End: 2017-07-26 | Stop reason: HOSPADM

## 2017-07-18 RX ORDER — CALCIUM CARBONATE 200(500)MG
500 TABLET,CHEWABLE ORAL 4 TIMES DAILY
Status: DISCONTINUED | OUTPATIENT
Start: 2017-07-18 | End: 2017-07-24

## 2017-07-18 RX ORDER — HYDROCODONE BITARTRATE AND ACETAMINOPHEN 500; 5 MG/1; MG/1
TABLET ORAL
Status: DISCONTINUED | OUTPATIENT
Start: 2017-07-18 | End: 2017-07-26 | Stop reason: HOSPADM

## 2017-07-18 RX ORDER — HYDRALAZINE HYDROCHLORIDE 50 MG/1
50 TABLET, FILM COATED ORAL EVERY 8 HOURS
Status: DISCONTINUED | OUTPATIENT
Start: 2017-07-18 | End: 2017-07-26 | Stop reason: HOSPADM

## 2017-07-18 RX ORDER — PANTOPRAZOLE SODIUM 40 MG/10ML
40 INJECTION, POWDER, LYOPHILIZED, FOR SOLUTION INTRAVENOUS DAILY
Status: DISCONTINUED | OUTPATIENT
Start: 2017-07-18 | End: 2017-07-24

## 2017-07-18 RX ORDER — ESCITALOPRAM OXALATE 20 MG/1
20 TABLET ORAL NIGHTLY
Status: DISCONTINUED | OUTPATIENT
Start: 2017-07-18 | End: 2017-07-26 | Stop reason: HOSPADM

## 2017-07-18 RX ORDER — GABAPENTIN 300 MG/1
300 CAPSULE ORAL NIGHTLY
Status: DISCONTINUED | OUTPATIENT
Start: 2017-07-18 | End: 2017-07-26 | Stop reason: HOSPADM

## 2017-07-18 RX ORDER — CETIRIZINE HYDROCHLORIDE 5 MG/1
5 TABLET ORAL DAILY
Status: DISCONTINUED | OUTPATIENT
Start: 2017-07-18 | End: 2017-07-26 | Stop reason: HOSPADM

## 2017-07-18 RX ADMIN — ACETAMINOPHEN 650 MG: 325 TABLET ORAL at 10:07

## 2017-07-18 RX ADMIN — CETIRIZINE HYDROCHLORIDE 5 MG: 5 TABLET, FILM COATED ORAL at 04:07

## 2017-07-18 RX ADMIN — ONDANSETRON 4 MG: 4 TABLET, FILM COATED ORAL at 03:07

## 2017-07-18 RX ADMIN — GABAPENTIN 300 MG: 300 CAPSULE ORAL at 10:07

## 2017-07-18 RX ADMIN — SODIUM CHLORIDE: 0.9 INJECTION, SOLUTION INTRAVENOUS at 02:07

## 2017-07-18 RX ADMIN — ACETAMINOPHEN 650 MG: 325 TABLET ORAL at 03:07

## 2017-07-18 RX ADMIN — ATORVASTATIN CALCIUM 40 MG: 20 TABLET, FILM COATED ORAL at 04:07

## 2017-07-18 RX ADMIN — CALCIUM CARBONATE (ANTACID) CHEW TAB 500 MG 500 MG: 500 CHEW TAB at 07:07

## 2017-07-18 RX ADMIN — AMLODIPINE BESYLATE 10 MG: 10 TABLET ORAL at 04:07

## 2017-07-18 RX ADMIN — ESCITALOPRAM 20 MG: 20 TABLET, FILM COATED ORAL at 10:07

## 2017-07-18 RX ADMIN — PANTOPRAZOLE SODIUM 40 MG: 40 INJECTION, POWDER, FOR SOLUTION INTRAVENOUS at 04:07

## 2017-07-18 NOTE — CONSULTS
Consult Note  Nephrology    Consult Requested By: Vineet Ayala, *  Reason for Consult: ESRD (TTS)    SUBJECTIVE:     History of Present Illness:  Patient is a 61 y.o. female with PMHx of MI, HTN, stroke, and ESRD on HD TTS, with LVAD and currently on coumadin, who presents with complaint of bloody diarrhea for two weeks, dark red in color.  She reports that she was in her usual state of health until last night when she developed light headedness, fatigue, and SOB on exertion.  She has had GI bleeds in the past and is well known to our service.  She dialyzes at [facility] on [facility], last treatment on Saturday.    Past Medical History:   Diagnosis Date    Anticoagulant long-term use     Anxiety     Atrial tachycardia, paroxysmal 2013    Blood transfusion     Cardiomyopathy     CHF (congestive heart failure)     Chronic kidney disease     Coronary artery disease     End stage renal disease     Hypertension     pulmonary    ICD (implantable cardioverter-defibrillator) battery depletion 2014    Myocardial infarction     Presence of left ventricular assist device (LVAD)     Pulmonary hypertension     Stroke      Past Surgical History:   Procedure Laterality Date    CARDIAC DEFIBRILLATOR PLACEMENT      CARDIAC SURGERY       SECTION      COLONOSCOPY N/A 2016    Procedure: COLONOSCOPY;  Surgeon: Mark Currie MD;  Location: [facility] ([address] [place] [address]);  Service: Endoscopy;  Laterality: N/A;    COLONOSCOPY N/A 4/3/2017    Procedure: COLONOSCOPY;  Surgeon: Todd Longo MD;  Location: [facility] ([address] [place] [address]);  Service: Endoscopy;  Laterality: N/A;    CORONARY ARTERY BYPASS GRAFT      FRACTURE SURGERY      HIP SURGERY      RTHA    LEFT VENTRICULAR ASSIST DEVICE  2012    TONSILLECTOMY      VASCULAR SURGERY       Family History   Problem Relation Age of Onset    Arthritis Mother     Heart disease Father     Hypertension Father     Cancer Maternal  Grandmother     Breast cancer Neg Hx     Colon cancer Neg Hx     Ovarian cancer Neg Hx      Social History   Substance Use Topics    Smoking status: Former Smoker     Packs/day: 2.00     Years: 30.00     Quit date: 10/29/2009    Smokeless tobacco: Never Used    Alcohol use Yes      Comment: 1 glass of wine a month       Review of patient's allergies indicates:   Allergen Reactions    Codeine Nausea And Vomiting    Demerol [meperidine] Nausea And Vomiting    Lortab [hydrocodone-acetaminophen] Nausea And Vomiting        Review of Systems:  Constitutional: Negative for fever, appetite change.  + fatigue.  HENT: Negative for hearing loss, sore throat and mouth sores.  Eyes: Negative for photophobia, pain and visual disturbance.  Respiratory: Negative for cough, chest tightness, + SOB.  No wheezing  Cardiovascular: Negative for chest pain, palpitations and leg swelling.  Gastrointestinal: Negative for nausea, vomiting, abdominal pain, diarrhea, constipation.  + BRBPR  Musculoskeletal: Negative for back pain, joint swelling, arthralgias and gait problem.  Neurological: + weakness, + Light-headedness.  Negative syncope.  Psychiatric/Behavioral: Negative for confusion, sleep disturbance and dysphoric mood. The patient is not nervous/anxious.        OBJECTIVE:     Vital Signs (Most Recent)  Temp: 97.8 °F (36.6 °C) (07/18/17 1602)  Pulse: 70 (07/18/17 1602)  Resp: 18 (07/18/17 1602)  BP: 101/82 (07/18/17 1602)  SpO2: 98 % (07/18/17 1602)    Vital Signs Range (Last 24H):  Temp:  [97.8 °F (36.6 °C)-98.6 °F (37 °C)]   Pulse:  [70-88]   Resp:  [0-20]   BP: ()/(65-82)   SpO2:  [90 %-98 %]     Physical Exam:  General: No acute distress, well groomed, alert and oriented x 3  HEENT: Normocephalic, atraumatic, EOM's intact bilaterally, external inspection of ears and nose normal, moist mucous membranes, no oral ulcerations/lesions  Respiratory: bibasilar crackles, respirations unlabored, no wheezing.  Cardiovacular:  "smooth LVAD hum.  Gastrointestinal: Soft, non-tender, bowel sounds normal.  Extremities: No clubbing or cyanosis of bilateral upper extremities; no lower extremity edema bilaterally,   Skin: warm and dry; no rash on exposed skin  Neurologic: CN II- IX grossly intact    Laboratory:  CBC:   Recent Labs  Lab 07/18/17  1248   WBC 6.47   RBC 1.97*   HGB 6.2*   HCT 19.7*   *   *   MCH 31.5*   MCHC 31.5*     BMP:   Recent Labs  Lab 07/18/17  1248   GLU 95   CL 96   CO2 21*   BUN 73*   CREATININE 9.7*   CALCIUM 8.6*         ASSESSMENT/PLAN:       Active Hospital Problems    Diagnosis  POA    GI bleed [K92.2]  Yes    History of GI bleed [Z87.19]  Not Applicable    Left ventricular assist device present [Z95.811]  Not Applicable    Anemia [D64.9]  Yes    Chronic combined systolic and diastolic heart failure [I50.42]  Yes    End stage renal disease on dialysis [N18.6, Z99.2]  Not Applicable      Resolved Hospital Problems    Diagnosis Date Resolved POA   No resolved problems to display.         Plan:   ESRD on HD  -TTS schedule.  Last treatment on Saturday.  -slightly hyperkalemic today, receiving PRBC.  HDS.  -voices mild SOB on exertion, feels "overloaded".  -will provide 3 hour HD treatment tonight for metabolic clearance/ultrafiltration.  -UF 2L as tolerated.  -will keep on TTS schedule while in-patient.    JESSENIA Donaldson, MARGI-BC  Nephrology  Pager:  [phone]  "

## 2017-07-18 NOTE — H&P
[facility]  Heart Transplant  H&P    Patient Name: Randa Devine  MRN: [id number]  Admission Date: 2017  Attending Physician: Vineet Ayala, *  Primary Care Provider: Laura Garvin DO  Principal Problem:GI bleed    Subjective:     History of Present Illness:  61 y.o. woman with PMH of CAD s/p CABG, chronic combined CHF / ICM (EF 10-15%) s/p Heartware as DT (2012) and SJM CRT-D (DDR ), ESRD on HD via left arm AVF (TTS), h/o GIB secondary to AVMs s/p APC (now only on coumadin), Paroxysmal AF, HTN, and Embolic thalamic CVA (2017) presents to ED with acute on chronic anemia and hematochezia. She started with hematochezia 1-2 weeks ago but got a lot worse last night. Also c/o N/V last night. C/o more SOB and feeling more fatigued and tired since last night.     Past Medical History:   Diagnosis Date    Anticoagulant long-term use     Anxiety     Atrial tachycardia, paroxysmal 2013    Blood transfusion     Cardiomyopathy     CHF (congestive heart failure)     Chronic kidney disease     Coronary artery disease     End stage renal disease     Hypertension     pulmonary    ICD (implantable cardioverter-defibrillator) battery depletion 2014    Myocardial infarction     Presence of left ventricular assist device (LVAD)     Pulmonary hypertension     Stroke        Past Surgical History:   Procedure Laterality Date    CARDIAC DEFIBRILLATOR PLACEMENT      CARDIAC SURGERY       SECTION      COLONOSCOPY N/A 2016    Procedure: COLONOSCOPY;  Surgeon: Mark Currie MD;  Location: [facility] ([address] [place] [address]);  Service: Endoscopy;  Laterality: N/A;    COLONOSCOPY N/A 4/3/2017    Procedure: COLONOSCOPY;  Surgeon: Todd Longo MD;  Location: [facility] ([address] [place] [address]);  Service: Endoscopy;  Laterality: N/A;    CORONARY ARTERY BYPASS GRAFT      FRACTURE SURGERY      HIP SURGERY      RTHA    LEFT VENTRICULAR ASSIST DEVICE  2012    TONSILLECTOMY       VASCULAR SURGERY         Review of patient's allergies indicates:   Allergen Reactions    Codeine Nausea And Vomiting    Demerol [meperidine] Nausea And Vomiting    Lortab [hydrocodone-acetaminophen] Nausea And Vomiting       Current Facility-Administered Medications   Medication    0.9%  NaCl infusion (for blood administration)    acetaminophen tablet 650 mg    amlodipine tablet 10 mg    atorvastatin tablet 40 mg    calcium carbonate 200 mg calcium (500 mg) chewable tablet 500 mg    cetirizine tablet 5 mg    escitalopram oxalate tablet 20 mg    gabapentin capsule 300 mg    hydrALAZINE tablet 50 mg    ondansetron tablet 4 mg    pantoprazole injection 40 mg     Current Outpatient Prescriptions   Medication Sig    amlodipine (NORVASC) 10 MG tablet Take 1 tablet (10 mg total) by mouth once daily.    atorvastatin (LIPITOR) 40 MG tablet Take 1 tablet (40 mg total) by mouth once daily.    CALCIUM CARBONATE (ANTACID CALCIUM ORAL) Take 1 tablet by mouth 4 (four) times daily.     cetirizine (ZYRTEC) 5 MG tablet Take 1 tablet (5 mg total) by mouth once daily.    escitalopram oxalate (LEXAPRO) 20 MG tablet Take 1 tablet (20 mg total) by mouth every evening.    gabapentin (NEURONTIN) 300 MG capsule Take 1 capsule (300 mg total) by mouth 2 (two) times daily.    hydrALAZINE (APRESOLINE) 50 MG tablet Take 1 tablet (50 mg total) by mouth every 8 (eight) hours.    pantoprazole (PROTONIX) 40 MG tablet Take 1 tablet (40 mg total) by mouth once daily.    warfarin (COUMADIN) 2 MG tablet 5mg tonight only (4/6) followed by 2mg/day    acetaminophen (TYLENOL) 325 MG tablet Take 2 tablets (650 mg total) by mouth every 6 (six) hours as needed for Pain (mild pain).    ondansetron (ZOFRAN) 4 MG tablet Take 1 tablet (4 mg total) by mouth every 8 (eight) hours as needed for Nausea.     Family History     Problem Relation (Age of Onset)    Arthritis Mother    Cancer Maternal Grandmother    Heart disease Father     Hypertension Father        Social History Main Topics    Smoking status: Former Smoker     Packs/day: 2.00     Years: 30.00     Quit date: 10/29/2009    Smokeless tobacco: Never Used    Alcohol use Yes      Comment: 1 glass of wine a month    Drug use: No    Sexual activity: No     Review of Systems   Constitutional: Positive for fatigue.   Respiratory: Positive for shortness of breath.    Cardiovascular: Negative for leg swelling.   Gastrointestinal: Positive for blood in stool, diarrhea, nausea and vomiting.   Neurological: Positive for weakness. Negative for light-headedness.     Objective:     Vital Signs (Most Recent):  Temp: 97.8 °F (36.6 °C) (07/18/17 1602)  Pulse: 70 (07/18/17 1602)  Resp: 18 (07/18/17 1602)  BP: 101/82 (07/18/17 1602)  SpO2: 98 % (07/18/17 1602) Vital Signs (24h Range):  Temp:  [97.8 °F (36.6 °C)-98.6 °F (37 °C)] 97.8 °F (36.6 °C)  Pulse:  [70-88] 70  Resp:  [0-20] 18  SpO2:  [90 %-98 %] 98 %  BP: ()/(65-82) 101/82        There is no height or weight on file to calculate BMI.      Intake/Output Summary (Last 24 hours) at 07/18/17 1651  Last data filed at 07/18/17 1455   Gross per 24 hour   Intake              250 ml   Output                0 ml   Net              250 ml       Physical Exam   Constitutional: She is oriented to person, place, and time. She appears well-developed and well-nourished.   Neck: Normal range of motion. Neck supple. No JVD present.   Cardiovascular: Normal rate and regular rhythm.  Exam reveals no gallop and no friction rub.    No murmur heard.  Normal VAD hum   Pulmonary/Chest: Effort normal and breath sounds normal. She has no wheezes. She has no rales.   Abdominal: Soft. Bowel sounds are normal. There is no tenderness.   Musculoskeletal: She exhibits no edema.   Neurological: She is alert and oriented to person, place, and time.   Skin: Skin is warm and dry.       Significant Labs:  CBC:    Recent Labs  Lab 07/18/17  1248   WBC 6.47   RBC 1.97*   HGB  6.2*   HCT 19.7*   *   *   MCH 31.5*   MCHC 31.5*     BNP:  No results for input(s): BNP in the last 72 hours.    Invalid input(s): BNPTRIAGELBLO  CMP:    Recent Labs  Lab 07/18/17  1248   GLU 95   CALCIUM 8.6*   ALBUMIN 3.3*   PROT 6.4   *   K 5.3*   CO2 21*   CL 96   BUN 73*   CREATININE 9.7*   ALKPHOS 90   ALT 10   AST 20   BILITOT 0.7      Coagulation:     Recent Labs  Lab 07/18/17  1248   INR 3.0*     LDH:  No results for input(s): LDH in the last 72 hours.  Microbiology:  Microbiology Results (last 7 days)     ** No results found for the last 168 hours. **          BMP:   Recent Labs  Lab 07/18/17  1248   GLU 95   *   K 5.3*   CL 96   CO2 21*   BUN 73*   CREATININE 9.7*   CALCIUM 8.6*     Coagulation:   Recent Labs  Lab 07/18/17  1248   INR 3.0*     I have reviewed all pertinent labs within the past 24 hours.    Diagnostic Results:  I have reviewed all pertinent imaging results/findings within the past 24 hours.    Assessment/Plan:   61 y.o. woman with PMH of CAD s/p CABG, chronic combined CHF / ICM (EF 10-15%) s/p Heartware as DT (05/2012) and SJM CRT-D (DDR ), ESRD on HD via left arm AVF (TTS), h/o GIB secondary to AVMs s/p APC (now only on coumadin), Paroxysmal AF, HTN, and Embolic thalamic CVA (01/2017) presents to ED with acute on chronic anemia and GIB.     * GI bleed    -INR 3.0 today. Hold coumadin  -GI consulted  -Protonix IV         Anemia    -Acute on Chronic, due to GIB  -Transfuse 1 unit PRBCs now          Left ventricular assist device present    -HW implanted 5/2012  -Speed 2600  -Hold coumadin as above  -HTN- cont hydralazine, amlodipine        End stage renal disease on dialysis    -Nephrology consulted            Karen Ibanez PA-C  Heart Transplant  [facility]

## 2017-07-18 NOTE — CONSULTS
Ms. Hoang is a 62 yo F with a history of ICM s/p Heartware LVAD placement 5/9/12 who presents with fatigue and lightheadedness.    She has been having dark stools with blood in them intermittently for the past 2 weeks.  Her Hg had been stable and initially she had been asymptomatic. Last night she felt weak, lightheaded, and had nausea along with one episode of vomiting this AM.    In the ED she was found to have a Hg of 6.2.    Plan:    -Will admit to HTS for GI bleed  -ED ordering PRBCs (DT VAD)  -GI consult    Discussed with HTS.    Morena Rocha MD, MPH  Cardiology Fellow

## 2017-07-18 NOTE — SUBJECTIVE & OBJECTIVE
Past Medical History:   Diagnosis Date    Anticoagulant long-term use     Anxiety     Atrial tachycardia, paroxysmal 2013    Blood transfusion     Cardiomyopathy     CHF (congestive heart failure)     Chronic kidney disease     Coronary artery disease     End stage renal disease     Hypertension     pulmonary    ICD (implantable cardioverter-defibrillator) battery depletion 2014    Myocardial infarction     Presence of left ventricular assist device (LVAD)     Pulmonary hypertension     Stroke        Past Surgical History:   Procedure Laterality Date    CARDIAC DEFIBRILLATOR PLACEMENT      CARDIAC SURGERY       SECTION      COLONOSCOPY N/A 2016    Procedure: COLONOSCOPY;  Surgeon: Mark Currie MD;  Location: [facility] ENDO (2ND FLR);  Service: Endoscopy;  Laterality: N/A;    COLONOSCOPY N/A 4/3/2017    Procedure: COLONOSCOPY;  Surgeon: Todd Longo MD;  Location: [facility] ENDO (2ND FLR);  Service: Endoscopy;  Laterality: N/A;    CORONARY ARTERY BYPASS GRAFT      FRACTURE SURGERY      HIP SURGERY      RTHA    LEFT VENTRICULAR ASSIST DEVICE  2012    TONSILLECTOMY      VASCULAR SURGERY         Review of patient's allergies indicates:   Allergen Reactions    Codeine Nausea And Vomiting    Demerol [meperidine] Nausea And Vomiting    Lortab [hydrocodone-acetaminophen] Nausea And Vomiting       Current Facility-Administered Medications   Medication    0.9%  NaCl infusion (for blood administration)    acetaminophen tablet 650 mg    amlodipine tablet 10 mg    atorvastatin tablet 40 mg    calcium carbonate 200 mg calcium (500 mg) chewable tablet 500 mg    cetirizine tablet 5 mg    escitalopram oxalate tablet 20 mg    gabapentin capsule 300 mg    hydrALAZINE tablet 50 mg    ondansetron tablet 4 mg    pantoprazole injection 40 mg     Current Outpatient Prescriptions   Medication Sig    amlodipine (NORVASC) 10 MG tablet Take 1 tablet (10 mg total) by mouth once  daily.    atorvastatin (LIPITOR) 40 MG tablet Take 1 tablet (40 mg total) by mouth once daily.    CALCIUM CARBONATE (ANTACID CALCIUM ORAL) Take 1 tablet by mouth 4 (four) times daily.     cetirizine (ZYRTEC) 5 MG tablet Take 1 tablet (5 mg total) by mouth once daily.    escitalopram oxalate (LEXAPRO) 20 MG tablet Take 1 tablet (20 mg total) by mouth every evening.    gabapentin (NEURONTIN) 300 MG capsule Take 1 capsule (300 mg total) by mouth 2 (two) times daily.    hydrALAZINE (APRESOLINE) 50 MG tablet Take 1 tablet (50 mg total) by mouth every 8 (eight) hours.    pantoprazole (PROTONIX) 40 MG tablet Take 1 tablet (40 mg total) by mouth once daily.    warfarin (COUMADIN) 2 MG tablet 5mg tonight only (4/6) followed by 2mg/day    acetaminophen (TYLENOL) 325 MG tablet Take 2 tablets (650 mg total) by mouth every 6 (six) hours as needed for Pain (mild pain).    ondansetron (ZOFRAN) 4 MG tablet Take 1 tablet (4 mg total) by mouth every 8 (eight) hours as needed for Nausea.     Family History     Problem Relation (Age of Onset)    Arthritis Mother    Cancer Maternal Grandmother    Heart disease Father    Hypertension Father        Social History Main Topics    Smoking status: Former Smoker     Packs/day: 2.00     Years: 30.00     Quit date: 10/29/2009    Smokeless tobacco: Never Used    Alcohol use Yes      Comment: 1 glass of wine a month    Drug use: No    Sexual activity: No     Review of Systems   Constitutional: Positive for fatigue.   Respiratory: Positive for shortness of breath.    Cardiovascular: Negative for leg swelling.   Gastrointestinal: Positive for blood in stool, diarrhea, nausea and vomiting.   Neurological: Positive for weakness. Negative for light-headedness.     Objective:     Vital Signs (Most Recent):  Temp: 97.8 °F (36.6 °C) (07/18/17 1602)  Pulse: 70 (07/18/17 1602)  Resp: 18 (07/18/17 1602)  BP: 101/82 (07/18/17 1602)  SpO2: 98 % (07/18/17 1602) Vital Signs (24h Range):  Temp:   [97.8 °F (36.6 °C)-98.6 °F (37 °C)] 97.8 °F (36.6 °C)  Pulse:  [70-88] 70  Resp:  [0-20] 18  SpO2:  [90 %-98 %] 98 %  BP: ()/(65-82) 101/82        There is no height or weight on file to calculate BMI.      Intake/Output Summary (Last 24 hours) at 07/18/17 1651  Last data filed at 07/18/17 1455   Gross per 24 hour   Intake              250 ml   Output                0 ml   Net              250 ml       Physical Exam   Constitutional: She is oriented to person, place, and time. She appears well-developed and well-nourished.   Neck: Normal range of motion. Neck supple. No JVD present.   Cardiovascular: Normal rate and regular rhythm.  Exam reveals no gallop and no friction rub.    No murmur heard.  Normal VAD hum   Pulmonary/Chest: Effort normal and breath sounds normal. She has no wheezes. She has no rales.   Abdominal: Soft. Bowel sounds are normal. There is no tenderness.   Musculoskeletal: She exhibits no edema.   Neurological: She is alert and oriented to person, place, and time.   Skin: Skin is warm and dry.       Significant Labs:  CBC:    Recent Labs  Lab 07/18/17  1248   WBC 6.47   RBC 1.97*   HGB 6.2*   HCT 19.7*   *   *   MCH 31.5*   MCHC 31.5*     BNP:  No results for input(s): BNP in the last 72 hours.    Invalid input(s): BNPTRIAGELBLO  CMP:    Recent Labs  Lab 07/18/17  1248   GLU 95   CALCIUM 8.6*   ALBUMIN 3.3*   PROT 6.4   *   K 5.3*   CO2 21*   CL 96   BUN 73*   CREATININE 9.7*   ALKPHOS 90   ALT 10   AST 20   BILITOT 0.7      Coagulation:     Recent Labs  Lab 07/18/17  1248   INR 3.0*     LDH:  No results for input(s): LDH in the last 72 hours.  Microbiology:  Microbiology Results (last 7 days)     ** No results found for the last 168 hours. **          BMP:   Recent Labs  Lab 07/18/17  1248   GLU 95   *   K 5.3*   CL 96   CO2 21*   BUN 73*   CREATININE 9.7*   CALCIUM 8.6*     Coagulation:   Recent Labs  Lab 07/18/17  1248   INR 3.0*     I have reviewed all pertinent  labs within the past 24 hours.    Diagnostic Results:  I have reviewed all pertinent imaging results/findings within the past 24 hours.

## 2017-07-18 NOTE — HPI
61 y.o. woman with PMH of CAD s/p CABG, chronic combined CHF / ICM (EF 10-15%) s/p Heartware as DT (05/2012) and SJM CRT-D (DDR ), ESRD on HD via left arm AVF (TTS), h/o GIB secondary to AVMs s/p APC (now only on coumadin), Paroxysmal AF, HTN, and Embolic thalamic CVA (01/2017) presents to ED with acute on chronic anemia and hematochezia. She started with hematochezia 1-2 weeks ago but got a lot worse last night. Also c/o N/V last night. C/o more SOB and feeling more fatigued and tired since last night.

## 2017-07-18 NOTE — CONSULTS
[facility]  Gastroenterology  Consult Note    Patient Name: Randa Devine  MRN: [id number]  Admission Date: 7/18/2017  Hospital Length of Stay: 0 days  Code Status: Full Code   Attending Provider: Vineet Ayala, *   Consulting Provider: Christiano Berg MD  Primary Care Physician: Laura Garvin DO  Principal Problem:GI bleed    Consults  Subjective:     HPI:  61 year old female with a history of HTN, PAF, ESRD on HD T/TH/Sat, ICM now s/p Heartware for DT, and history of colonic AVM who GI is being consulted on for evaluation of lower GI bleed.    For the last couple of month patient has been seeing intermittent red stools. She told her transplant coordinator and she was getting routine blood work which showed stable Hgb. Within the last 2 days she has had 5 bloody episodes of diarrhea associated with nausea, lightheadedness/weakness, chest pain, and shortness of breath. Last night she took Immodium and her bowel movements have improved. However she decided to come to the ED due to the continued nausea, lightheadedness/weakness, chest pain, and shortness of breath. She states that this is like her past bleeds but more intense due to frequency/amount of blood.    She denies any alcohol, NSAID, or steroid use. She is compliant with her Coumadin (3 mg 3 times a week and then 2 mg 4 times a week).    She has had multiple scopes in the past couple of years (the most recent ones are outline below-04/2017):  - One 3 mm polyp in the sigmoid colon in the descending colon. Resection not attempted.  - A single bleeding colonic angiodysplastic lesion. Clip was placed.  - The examination was otherwise normal on direct and retroflexion views.  - No specimens collected.        Past Medical History:   Diagnosis Date    Anticoagulant long-term use     Anxiety     Atrial tachycardia, paroxysmal 4/21/2013    Blood transfusion     Cardiomyopathy     CHF (congestive heart failure)     Chronic kidney  disease     Coronary artery disease     End stage renal disease     Hypertension     pulmonary    ICD (implantable cardioverter-defibrillator) battery depletion 2014    Myocardial infarction     Presence of left ventricular assist device (LVAD)     Pulmonary hypertension     Stroke        Past Surgical History:   Procedure Laterality Date    CARDIAC DEFIBRILLATOR PLACEMENT      CARDIAC SURGERY       SECTION      COLONOSCOPY N/A 2016    Procedure: COLONOSCOPY;  Surgeon: Mark Currie MD;  Location: [address]);  Service: Endoscopy;  Laterality: N/A;    COLONOSCOPY N/A 4/3/2017    Procedure: COLONOSCOPY;  Surgeon: Todd Longo MD;  Location: [facility] ([address] [place] [address]);  Service: Endoscopy;  Laterality: N/A;    CORONARY ARTERY BYPASS GRAFT      FRACTURE SURGERY      HIP SURGERY      RTHA    LEFT VENTRICULAR ASSIST DEVICE  2012    TONSILLECTOMY      VASCULAR SURGERY         Review of patient's allergies indicates:   Allergen Reactions    Codeine Nausea And Vomiting    Demerol [meperidine] Nausea And Vomiting    Lortab [hydrocodone-acetaminophen] Nausea And Vomiting     Family History     Problem Relation (Age of Onset)    Arthritis Mother    Cancer Maternal Grandmother    Heart disease Father    Hypertension Father        Social History Main Topics    Smoking status: Former Smoker     Packs/day: 2.00     Years: 30.00     Quit date: 10/29/2009    Smokeless tobacco: Never Used    Alcohol use Yes      Comment: 1 glass of wine a month    Drug use: No    Sexual activity: No     Review of Systems   Constitutional: Positive for diaphoresis and fatigue.   HENT: Negative.    Eyes: Negative.    Respiratory: Positive for shortness of breath.    Cardiovascular: Positive for chest pain.   Gastrointestinal: Positive for anal bleeding.   Endocrine: Negative.    Genitourinary: Negative.    Musculoskeletal: Negative.    Neurological: Negative.    Hematological: Negative.       Objective:     Vital Signs (Most Recent):  Temp: 97.8 °F (36.6 °C) (07/18/17 1700)  Pulse: 72 (07/18/17 1700)  Resp: 18 (07/18/17 1700)  BP: 100/78 (07/18/17 1700)  SpO2: 98 % (07/18/17 1700) Vital Signs (24h Range):  Temp:  [97.8 °F (36.6 °C)-98.6 °F (37 °C)] 97.8 °F (36.6 °C)  Pulse:  [70-88] 72  Resp:  [0-20] 18  SpO2:  [90 %-98 %] 98 %  BP: ()/(65-82) 100/78        There is no height or weight on file to calculate BMI.      Intake/Output Summary (Last 24 hours) at 07/18/17 1719  Last data filed at 07/18/17 1455   Gross per 24 hour   Intake              250 ml   Output                0 ml   Net              250 ml       Lines/Drains/Airways     Drain                 Hemodialysis AV Graft Left upper arm 83583 days          Line                 VAD 10/29/12 1200 Heartware 1723 days          Peripheral Intravenous Line                 Peripheral IV - Single Lumen 07/18/17 1154 Right Antecubital less than 1 day                Physical Exam   Constitutional: She is oriented to person, place, and time. No distress.   HENT:   Head: Normocephalic.   Eyes: Pupils are equal, round, and reactive to light.   Neck: Normal range of motion.   Cardiovascular:   Audible LVAT hum   Pulmonary/Chest: Effort normal and breath sounds normal.   Abdominal:   BS present, abdomen not distended or tender, on JESUS there was gross red blood with no obvious hemorrhoids   Musculoskeletal: Normal range of motion.   Neurological: She is alert and oriented to person, place, and time.   Skin: Skin is warm. She is not diaphoretic.       Significant Labs:  CBC:   Recent Labs  Lab 07/18/17  1248   WBC 6.47   HGB 6.2*   HCT 19.7*   *     CMP:   Recent Labs  Lab 07/18/17  1248   GLU 95   CALCIUM 8.6*   ALBUMIN 3.3*   PROT 6.4   *   K 5.3*   CO2 21*   CL 96   BUN 73*   CREATININE 9.7*   ALKPHOS 90   ALT 10   AST 20   BILITOT 0.7     Coagulation:   Recent Labs  Lab 07/18/17  1248   INR 3.0*       Significant Imaging:  Imaging  results within the past 24 hours have been reviewed.    Assessment/Plan:     * GI bleed    Patient has a history of recurrent colonic AVM's (multiple scopes with AVM treated with APC). In the setting of gross blood per rectum with no true hemorrhoids this could be the cause of her bleed. Despite her history in the differential diagnosis will also consider a possible upper GI bleed. Will proceed with plans for EGD in the Am. Once procedure is completed will determine need for further scopes.  -Place 2 large bore IV's  -CLD diet tonight and NPO after MN  -Continue Protonix PO BID  -Continue holding Coumadin due to GI bleed and in anticipation for possible procedure tomorrow  -EGD schedule for the Am, further workup depends on what is found on tomorrows procedure  -If there are any acute changes overnight please notify the GI fellow  -Thank you for this consultation, will continue to follow alongside primary team          Christiano Berg M.D.  Gastroenterology Fellow, PGY-IV  Pager: [phone]  [facility]

## 2017-07-18 NOTE — ASSESSMENT & PLAN NOTE
Patient has a history of recurrent colonic AVM's (multiple scopes with AVM treated with APC). In the setting of gross blood per rectum with no true hemorrhoids this could be the cause of her bleed. Despite her history in the differential diagnosis will also consider a possible upper GI bleed. Will proceed with plans for EGD in the Am. Once procedure is completed will determine need for further scopes.  -Place 2 large bore IV's  -CLD diet tonight and NPO after MN  -Continue Protonix PO BID  -Continue holding Coumadin due to GI bleed and in anticipation for possible procedure tomorrow  -EGD schedule for the Am, further workup depends on what is found on tomorrows procedure  -If there are any acute changes overnight please notify the GI fellow  -Thank you for this consultation, will continue to follow alongside primary team

## 2017-07-18 NOTE — ED NOTES
Patient with LVAD and history of ESRD on hemodialysis here c/o abdominal pain and rectal bleeding for 2 weeks worse today.  Last BM was this morning, diarrhea and mixture of bright red and dark red blood.  Pt in mild to moderate distress, respirations even and unlabored with dyspnea on exertion, AA&Ox3, family at bedside

## 2017-07-18 NOTE — ED PROVIDER NOTES
Encounter Date: 2017       History     Chief Complaint   Patient presents with    Rectal Bleeding     LVAD pt , vomiting     Time seen by provider: 12:15 PM    This is a 61 y.o. female with PM Hx of MI, HTN, stroke, and CKD (on dialysis , and Saturday), with LVAD and currently on coumadin, who presents with complaint of bloody diarrhea for two weeks, dark red in color. She had been feeling fine until last night when she began to experience weakness, light headedness, HA, minimal abdominal pain,  nausea, vomiting (one episode this morning), and increased blood in stool (many clots, medium to dark red in color). No hx of hemorrhoids or Crohn's disease. Had soup for dinner and took Imodium yesterday. Last dialyzed three days ago, no treatment today. SOB at baseline. Pt denies leg swelling, fever, chills, nosebleed, difficulty urinating, numbness, speec difficulty, confusion, or any other symptoms at this time.       The history is provided by the patient, a relative and medical records.     Review of patient's allergies indicates:   Allergen Reactions    Codeine Nausea And Vomiting    Demerol [meperidine] Nausea And Vomiting    Lortab [hydrocodone-acetaminophen] Nausea And Vomiting     Past Medical History:   Diagnosis Date    Anticoagulant long-term use     Anxiety     Atrial tachycardia, paroxysmal 2013    Blood transfusion     Cardiomyopathy     CHF (congestive heart failure)     Chronic kidney disease     Coronary artery disease     End stage renal disease     Hypertension     pulmonary    ICD (implantable cardioverter-defibrillator) battery depletion 2014    Myocardial infarction     Presence of left ventricular assist device (LVAD)     Pulmonary hypertension     Stroke      Past Surgical History:   Procedure Laterality Date    CARDIAC DEFIBRILLATOR PLACEMENT      CARDIAC SURGERY       SECTION      COLONOSCOPY N/A 2016    Procedure: COLONOSCOPY;  Surgeon:  Mark Currie MD;  Location: [facility] ([address] [place] [address]);  Service: Endoscopy;  Laterality: N/A;    COLONOSCOPY N/A 4/3/2017    Procedure: COLONOSCOPY;  Surgeon: Todd Longo MD;  Location: [facility] ([address] [place] [address]);  Service: Endoscopy;  Laterality: N/A;    CORONARY ARTERY BYPASS GRAFT      FRACTURE SURGERY      HIP SURGERY  12/12    RTHA    LEFT VENTRICULAR ASSIST DEVICE  5/2012    TONSILLECTOMY      VASCULAR SURGERY       Family History   Problem Relation Age of Onset    Arthritis Mother     Heart disease Father     Hypertension Father     Cancer Maternal Grandmother     Breast cancer Neg Hx     Colon cancer Neg Hx     Ovarian cancer Neg Hx      Social History   Substance Use Topics    Smoking status: Former Smoker     Packs/day: 2.00     Years: 30.00     Quit date: 10/29/2009    Smokeless tobacco: Never Used    Alcohol use Yes      Comment: 1 glass of wine a month     Review of Systems   Constitutional: Negative for chills and fever.   HENT: Negative for congestion and nosebleeds.    Eyes: Negative for visual disturbance.   Respiratory: Negative for shortness of breath.    Cardiovascular: Negative for leg swelling.   Gastrointestinal: Positive for blood in stool, diarrhea, nausea and vomiting. Negative for abdominal pain (minimal last night).   Genitourinary: Negative for difficulty urinating.   Musculoskeletal: Negative for back pain.   Skin: Negative for rash.   Neurological: Positive for weakness, light-headedness and headaches. Negative for speech difficulty and numbness.       Physical Exam     Initial Vitals [07/18/17 1031]   BP Pulse Resp Temp SpO2   93/65 88 18 98.6 °F (37 °C) (!) 90 %      MAP       74.33         Physical Exam    ED Course   Procedures  Labs Reviewed   CBC W/ AUTO DIFFERENTIAL - Abnormal; Notable for the following:        Result Value    RBC 1.97 (*)     Hemoglobin 6.2 (*)     Hematocrit 19.7 (*)      (*)     MCH 31.5 (*)     MCHC 31.5 (*)     RDW 17.7 (*)     Platelets 145 (*)      Lymph # 0.8 (*)     Gran% 77.5 (*)     Lymph% 12.8 (*)     All other components within normal limits    Narrative:      HCT AND HGB  critical result(s) called and verbal readback obtained   from GENIE GILLIAM RN, 07/18/2017 13:07   COMPREHENSIVE METABOLIC PANEL - Abnormal; Notable for the following:     Sodium 135 (*)     Potassium 5.3 (*)     CO2 21 (*)     BUN, Bld 73 (*)     Creatinine 9.7 (*)     Calcium 8.6 (*)     Albumin 3.3 (*)     Anion Gap 18 (*)     eGFR if  4.6 (*)     eGFR if non  4.0 (*)     All other components within normal limits   PROTIME-INR - Abnormal; Notable for the following:     Prothrombin Time 30.0 (*)     INR 3.0 (*)     All other components within normal limits   URINALYSIS, REFLEX TO URINE CULTURE   TYPE & SCREEN   PREPARE RBC SOFT             Medical Decision Making:   History:   Old Medical Records: I decided to obtain old medical records.  Initial Assessment:   62 yo female who is on LVAD and ESRD on dialysis presenting today because of worsening bloody diarrhea. She states she has had this for two weeks and the LVAD team has known of this however in the last 24 hours the blood has darkened considerably and she is passing a larger amount of blood.   Differential Diagnosis:   Differential dx include over coagulated, diverticulosis, enteritis, and infective diarrhea.   Clinical Tests:   Lab Tests: Ordered and Reviewed  ED Management:  1:28 PM  Labs reviewed: hemoglobin of 6.2 from a 10.2 on 7/5/17 and hematocrit 19.7 from 32.1. Discussed case with cardiology and will also consult nephrology because this is a dialysis pt. Plan is to transfuse the patient. The plan is also to admit the patient to the Cardiology service   Other:   I have discussed this case with another health care provider.            Scribe Attestation:   Scribe #1: I performed the above scribed service and the documentation accurately describes the services I performed. I attest to the  accuracy of the note.    Attending Attestation:           Physician Attestation for Scribe:  Physician Attestation Statement for Scribe #1: I, Dr. Ayala, reviewed documentation, as scribed by Shanika Lemus in my presence, and it is both accurate and complete.                 ED Course     Clinical Impression:   There were no encounter diagnoses.    Disposition:   Disposition: Admitted  Condition: Serious                        Vineet Ayala MD  08/31/17 0949

## 2017-07-18 NOTE — SUBJECTIVE & OBJECTIVE
Past Medical History:   Diagnosis Date    Anticoagulant long-term use     Anxiety     Atrial tachycardia, paroxysmal 2013    Blood transfusion     Cardiomyopathy     CHF (congestive heart failure)     Chronic kidney disease     Coronary artery disease     End stage renal disease     Hypertension     pulmonary    ICD (implantable cardioverter-defibrillator) battery depletion 2014    Myocardial infarction     Presence of left ventricular assist device (LVAD)     Pulmonary hypertension     Stroke        Past Surgical History:   Procedure Laterality Date    CARDIAC DEFIBRILLATOR PLACEMENT      CARDIAC SURGERY       SECTION      COLONOSCOPY N/A 2016    Procedure: COLONOSCOPY;  Surgeon: Mark Currie MD;  Location: [facility] ENDO (2ND FLR);  Service: Endoscopy;  Laterality: N/A;    COLONOSCOPY N/A 4/3/2017    Procedure: COLONOSCOPY;  Surgeon: Todd Longo MD;  Location: [facility] ENDO (2ND FLR);  Service: Endoscopy;  Laterality: N/A;    CORONARY ARTERY BYPASS GRAFT      FRACTURE SURGERY      HIP SURGERY      RTHA    LEFT VENTRICULAR ASSIST DEVICE  2012    TONSILLECTOMY      VASCULAR SURGERY         Review of patient's allergies indicates:   Allergen Reactions    Codeine Nausea And Vomiting    Demerol [meperidine] Nausea And Vomiting    Lortab [hydrocodone-acetaminophen] Nausea And Vomiting     Family History     Problem Relation (Age of Onset)    Arthritis Mother    Cancer Maternal Grandmother    Heart disease Father    Hypertension Father        Social History Main Topics    Smoking status: Former Smoker     Packs/day: 2.00     Years: 30.00     Quit date: 10/29/2009    Smokeless tobacco: Never Used    Alcohol use Yes      Comment: 1 glass of wine a month    Drug use: No    Sexual activity: No     Review of Systems   Constitutional: Positive for diaphoresis and fatigue.   HENT: Negative.    Eyes: Negative.    Respiratory: Positive for shortness of breath.     Cardiovascular: Positive for chest pain.   Gastrointestinal: Positive for anal bleeding.   Endocrine: Negative.    Genitourinary: Negative.    Musculoskeletal: Negative.    Neurological: Negative.    Hematological: Negative.      Objective:     Vital Signs (Most Recent):  Temp: 97.8 °F (36.6 °C) (07/18/17 1700)  Pulse: 72 (07/18/17 1700)  Resp: 18 (07/18/17 1700)  BP: 100/78 (07/18/17 1700)  SpO2: 98 % (07/18/17 1700) Vital Signs (24h Range):  Temp:  [97.8 °F (36.6 °C)-98.6 °F (37 °C)] 97.8 °F (36.6 °C)  Pulse:  [70-88] 72  Resp:  [0-20] 18  SpO2:  [90 %-98 %] 98 %  BP: ()/(65-82) 100/78        There is no height or weight on file to calculate BMI.      Intake/Output Summary (Last 24 hours) at 07/18/17 1719  Last data filed at 07/18/17 1455   Gross per 24 hour   Intake              250 ml   Output                0 ml   Net              250 ml       Lines/Drains/Airways     Drain                 Hemodialysis AV Graft Left upper arm 24179 days          Line                 VAD 10/29/12 1200 Heartware 1723 days          Peripheral Intravenous Line                 Peripheral IV - Single Lumen 07/18/17 1154 Right Antecubital less than 1 day                Physical Exam   Constitutional: She is oriented to person, place, and time. No distress.   HENT:   Head: Normocephalic.   Eyes: Pupils are equal, round, and reactive to light.   Neck: Normal range of motion.   Cardiovascular:   Audible LVAT hum   Pulmonary/Chest: Effort normal and breath sounds normal.   Abdominal:   BS present, abdomen not distended or tender, on JESUS there was gross red blood with no obvious hemorrhoids   Musculoskeletal: Normal range of motion.   Neurological: She is alert and oriented to person, place, and time.   Skin: Skin is warm. She is not diaphoretic.       Significant Labs:  CBC:   Recent Labs  Lab 07/18/17  1248   WBC 6.47   HGB 6.2*   HCT 19.7*   *     CMP:   Recent Labs  Lab 07/18/17  1248   GLU 95   CALCIUM 8.6*   ALBUMIN  3.3*   PROT 6.4   *   K 5.3*   CO2 21*   CL 96   BUN 73*   CREATININE 9.7*   ALKPHOS 90   ALT 10   AST 20   BILITOT 0.7     Coagulation:   Recent Labs  Lab 07/18/17  1248   INR 3.0*       Significant Imaging:  Imaging results within the past 24 hours have been reviewed.

## 2017-07-18 NOTE — HPI
61 year old female with a history of HTN, PAF, ESRD on HD T/TH/Sat, ICM now s/p Heartware for DT, and history of colonic AVM who GI is being consulted on for evaluation of lower GI bleed.    For the last couple of month patient has been seeing intermittent red stools. She told her transplant coordinator and she was getting routine blood work which showed stable Hgb. Within the last 2 days she has had 5 bloody episodes of diarrhea associated with nausea, lightheadedness/weakness, chest pain, and shortness of breath. Last night she took Immodium and her bowel movements have improved. However she decided to come to the ED due to the continued nausea, lightheadedness/weakness, chest pain, and shortness of breath. She states that this is like her past bleeds but more intense due to frequency/amount of blood.    She denies any alcohol, NSAID, or steroid use. She is compliant with her Coumadin (3 mg 3 times a week and then 2 mg 4 times a week).    She has had multiple scopes in the past couple of years (the most recent ones are outline below-04/2017):  - One 3 mm polyp in the sigmoid colon in the descending colon. Resection not attempted.  - A single bleeding colonic angiodysplastic lesion. Clip was placed.  - The examination was otherwise normal on direct and retroflexion views.  - No specimens collected.

## 2017-07-18 NOTE — PROGRESS NOTES
Kyle eason from [facility] stated patient INR was not drawn 7/18 because patient was unavailable .  Patient has now been admitted to [facility] 7/18 regarding rectal bleeding.

## 2017-07-19 ENCOUNTER — ANESTHESIA EVENT (OUTPATIENT)
Dept: ENDOSCOPY | Facility: HOSPITAL | Age: 62
DRG: 813 | End: 2017-07-19
Payer: MEDICARE

## 2017-07-19 ENCOUNTER — ANESTHESIA (OUTPATIENT)
Dept: ENDOSCOPY | Facility: HOSPITAL | Age: 62
DRG: 813 | End: 2017-07-19
Payer: MEDICARE

## 2017-07-19 PROBLEM — Z45.02 AICD DISCHARGE: Status: ACTIVE | Noted: 2017-07-19

## 2017-07-19 LAB
ALBUMIN SERPL BCP-MCNC: 3.1 G/DL
ALP SERPL-CCNC: 84 U/L
ALT SERPL W/O P-5'-P-CCNC: 11 U/L
ANION GAP SERPL CALC-SCNC: 10 MMOL/L
ANION GAP SERPL CALC-SCNC: 11 MMOL/L
APTT BLDCRRT: 29.3 SEC
APTT BLDCRRT: 29.7 SEC
AST SERPL-CCNC: 20 U/L
BACTERIA #/AREA URNS AUTO: ABNORMAL /HPF
BASOPHILS # BLD AUTO: 0.02 K/UL
BASOPHILS # BLD AUTO: 0.03 K/UL
BASOPHILS # BLD AUTO: 0.04 K/UL
BASOPHILS NFR BLD: 0.4 %
BASOPHILS NFR BLD: 0.8 %
BASOPHILS NFR BLD: 1 %
BILIRUB DIRECT SERPL-MCNC: 0.4 MG/DL
BILIRUB SERPL-MCNC: 0.9 MG/DL
BILIRUB UR QL STRIP: NEGATIVE
BNP SERPL-MCNC: 2544 PG/ML
BUN SERPL-MCNC: 26 MG/DL
BUN SERPL-MCNC: 35 MG/DL
CALCIUM SERPL-MCNC: 8.5 MG/DL
CALCIUM SERPL-MCNC: 8.6 MG/DL
CHLORIDE SERPL-SCNC: 105 MMOL/L
CHLORIDE SERPL-SCNC: 105 MMOL/L
CLARITY UR REFRACT.AUTO: CLEAR
CO2 SERPL-SCNC: 23 MMOL/L
CO2 SERPL-SCNC: 23 MMOL/L
COLOR UR AUTO: YELLOW
CREAT SERPL-MCNC: 4.9 MG/DL
CREAT SERPL-MCNC: 6 MG/DL
CRP SERPL-MCNC: 1.5 MG/L
DIFFERENTIAL METHOD: ABNORMAL
EOSINOPHIL # BLD AUTO: 0.1 K/UL
EOSINOPHIL NFR BLD: 1.6 %
EOSINOPHIL NFR BLD: 2.5 %
EOSINOPHIL NFR BLD: 2.6 %
ERYTHROCYTE [DISTWIDTH] IN BLOOD BY AUTOMATED COUNT: 18.9 %
ERYTHROCYTE [DISTWIDTH] IN BLOOD BY AUTOMATED COUNT: 19.2 %
ERYTHROCYTE [DISTWIDTH] IN BLOOD BY AUTOMATED COUNT: 19.5 %
EST. GFR  (AFRICAN AMERICAN): 10.4 ML/MIN/1.73 M^2
EST. GFR  (AFRICAN AMERICAN): 8.2 ML/MIN/1.73 M^2
EST. GFR  (NON AFRICAN AMERICAN): 7.1 ML/MIN/1.73 M^2
EST. GFR  (NON AFRICAN AMERICAN): 9 ML/MIN/1.73 M^2
GLUCOSE SERPL-MCNC: 78 MG/DL
GLUCOSE SERPL-MCNC: 93 MG/DL
GLUCOSE UR QL STRIP: ABNORMAL
HCT VFR BLD AUTO: 22 %
HCT VFR BLD AUTO: 22.2 %
HCT VFR BLD AUTO: 24.1 %
HGB BLD-MCNC: 6.9 G/DL
HGB BLD-MCNC: 7.1 G/DL
HGB BLD-MCNC: 7.6 G/DL
HGB UR QL STRIP: ABNORMAL
HYALINE CASTS UR QL AUTO: 0 /LPF
INR PPP: 2.6
INR PPP: 2.8
KETONES UR QL STRIP: NEGATIVE
LDH SERPL L TO P-CCNC: 198 U/L
LEUKOCYTE ESTERASE UR QL STRIP: ABNORMAL
LYMPHOCYTES # BLD AUTO: 0.6 K/UL
LYMPHOCYTES # BLD AUTO: 0.7 K/UL
LYMPHOCYTES # BLD AUTO: 0.9 K/UL
LYMPHOCYTES NFR BLD: 12.8 %
LYMPHOCYTES NFR BLD: 18.6 %
LYMPHOCYTES NFR BLD: 21 %
MAGNESIUM SERPL-MCNC: 1.9 MG/DL
MCH RBC QN AUTO: 30.9 PG
MCH RBC QN AUTO: 31.1 PG
MCH RBC QN AUTO: 31.1 PG
MCHC RBC AUTO-ENTMCNC: 31.4 G/DL
MCHC RBC AUTO-ENTMCNC: 31.5 G/DL
MCHC RBC AUTO-ENTMCNC: 32 %
MCV RBC AUTO: 97 FL
MCV RBC AUTO: 98 FL
MCV RBC AUTO: 99 FL
MICROSCOPIC COMMENT: ABNORMAL
MONOCYTES # BLD AUTO: 0.4 K/UL
MONOCYTES # BLD AUTO: 0.4 K/UL
MONOCYTES # BLD AUTO: 0.5 K/UL
MONOCYTES NFR BLD: 11.1 %
MONOCYTES NFR BLD: 7.6 %
MONOCYTES NFR BLD: 9.3 %
NEUTROPHILS # BLD AUTO: 2 K/UL
NEUTROPHILS # BLD AUTO: 3.5 K/UL
NEUTROPHILS # BLD AUTO: 3.8 K/UL
NEUTROPHILS NFR BLD: 64.4 %
NEUTROPHILS NFR BLD: 68.5 %
NEUTROPHILS NFR BLD: 77.4 %
NITRITE UR QL STRIP: NEGATIVE
PH UR STRIP: 7 [PH] (ref 5–8)
PHOSPHATE SERPL-MCNC: 3.2 MG/DL
PLATELET # BLD AUTO: 128 K/UL
PLATELET # BLD AUTO: 96 K/UL
PLATELET # BLD AUTO: 99 K/UL
PMV BLD AUTO: 9.2 FL
PMV BLD AUTO: 9.3 FL
PMV BLD AUTO: 9.7 FL
POTASSIUM SERPL-SCNC: 4.3 MMOL/L
POTASSIUM SERPL-SCNC: 4.4 MMOL/L
PREALB SERPL-MCNC: 25 MG/DL
PROT SERPL-MCNC: 5.9 G/DL
PROT UR QL STRIP: ABNORMAL
PROTHROMBIN TIME: 26.5 SEC
PROTHROMBIN TIME: 28.1 SEC
RBC # BLD AUTO: 2.22 M/UL
RBC # BLD AUTO: 2.28 M/UL
RBC # BLD AUTO: 2.46 M/UL
RBC #/AREA URNS AUTO: 2 /HPF (ref 0–4)
SODIUM SERPL-SCNC: 138 MMOL/L
SODIUM SERPL-SCNC: 139 MMOL/L
SP GR UR STRIP: 1.01 (ref 1–1.03)
SQUAMOUS #/AREA URNS AUTO: 1 /HPF
URN SPEC COLLECT METH UR: ABNORMAL
UROBILINOGEN UR STRIP-ACNC: NEGATIVE EU/DL
WBC # BLD AUTO: 3.15 K/UL
WBC # BLD AUTO: 4.86 K/UL
WBC # BLD AUTO: 5.06 K/UL
WBC #/AREA URNS AUTO: 50 /HPF (ref 0–5)
WBC CLUMPS UR QL AUTO: ABNORMAL

## 2017-07-19 PROCEDURE — 80048 BASIC METABOLIC PNL TOTAL CA: CPT | Mod: 91

## 2017-07-19 PROCEDURE — 87086 URINE CULTURE/COLONY COUNT: CPT

## 2017-07-19 PROCEDURE — 80076 HEPATIC FUNCTION PANEL: CPT

## 2017-07-19 PROCEDURE — 20600001 HC STEP DOWN PRIVATE ROOM

## 2017-07-19 PROCEDURE — 83735 ASSAY OF MAGNESIUM: CPT

## 2017-07-19 PROCEDURE — G8988 SELF CARE GOAL STATUS: HCPCS | Mod: CI

## 2017-07-19 PROCEDURE — 85025 COMPLETE CBC W/AUTO DIFF WBC: CPT

## 2017-07-19 PROCEDURE — 84134 ASSAY OF PREALBUMIN: CPT

## 2017-07-19 PROCEDURE — 85610 PROTHROMBIN TIME: CPT | Mod: 91

## 2017-07-19 PROCEDURE — C9113 INJ PANTOPRAZOLE SODIUM, VIA: HCPCS | Performed by: PHYSICIAN ASSISTANT

## 2017-07-19 PROCEDURE — 25000003 PHARM REV CODE 250: Performed by: NURSE ANESTHETIST, CERTIFIED REGISTERED

## 2017-07-19 PROCEDURE — D9220A PRA ANESTHESIA: Mod: ANES,,, | Performed by: ANESTHESIOLOGY

## 2017-07-19 PROCEDURE — 63600175 PHARM REV CODE 636 W HCPCS: Performed by: PHYSICIAN ASSISTANT

## 2017-07-19 PROCEDURE — 63600175 PHARM REV CODE 636 W HCPCS: Performed by: NURSE ANESTHETIST, CERTIFIED REGISTERED

## 2017-07-19 PROCEDURE — D9220A PRA ANESTHESIA: Mod: CRNA,,, | Performed by: NURSE ANESTHETIST, CERTIFIED REGISTERED

## 2017-07-19 PROCEDURE — 0DJ08ZZ INSPECTION OF UPPER INTESTINAL TRACT, VIA NATURAL OR ARTIFICIAL OPENING ENDOSCOPIC: ICD-10-PCS | Performed by: INTERNAL MEDICINE

## 2017-07-19 PROCEDURE — 99232 SBSQ HOSP IP/OBS MODERATE 35: CPT | Mod: ,,, | Performed by: INTERNAL MEDICINE

## 2017-07-19 PROCEDURE — 93750 INTERROGATION VAD IN PERSON: CPT | Mod: ,,, | Performed by: INTERNAL MEDICINE

## 2017-07-19 PROCEDURE — 97162 PT EVAL MOD COMPLEX 30 MIN: CPT

## 2017-07-19 PROCEDURE — 93750 INTERROGATION VAD IN PERSON: CPT | Performed by: PHYSICIAN ASSISTANT

## 2017-07-19 PROCEDURE — 94761 N-INVAS EAR/PLS OXIMETRY MLT: CPT

## 2017-07-19 PROCEDURE — 43235 EGD DIAGNOSTIC BRUSH WASH: CPT | Performed by: INTERNAL MEDICINE

## 2017-07-19 PROCEDURE — 81001 URINALYSIS AUTO W/SCOPE: CPT

## 2017-07-19 PROCEDURE — 27000248 HC VAD-ADDITIONAL DAY

## 2017-07-19 PROCEDURE — 83615 LACTATE (LD) (LDH) ENZYME: CPT

## 2017-07-19 PROCEDURE — 97165 OT EVAL LOW COMPLEX 30 MIN: CPT

## 2017-07-19 PROCEDURE — 83880 ASSAY OF NATRIURETIC PEPTIDE: CPT

## 2017-07-19 PROCEDURE — 84100 ASSAY OF PHOSPHORUS: CPT

## 2017-07-19 PROCEDURE — 43235 EGD DIAGNOSTIC BRUSH WASH: CPT | Mod: ,,, | Performed by: INTERNAL MEDICINE

## 2017-07-19 PROCEDURE — 37000009 HC ANESTHESIA EA ADD 15 MINS: Performed by: INTERNAL MEDICINE

## 2017-07-19 PROCEDURE — 86140 C-REACTIVE PROTEIN: CPT

## 2017-07-19 PROCEDURE — 36415 COLL VENOUS BLD VENIPUNCTURE: CPT

## 2017-07-19 PROCEDURE — 25000003 PHARM REV CODE 250: Performed by: STUDENT IN AN ORGANIZED HEALTH CARE EDUCATION/TRAINING PROGRAM

## 2017-07-19 PROCEDURE — G8987 SELF CARE CURRENT STATUS: HCPCS | Mod: CJ

## 2017-07-19 PROCEDURE — 85730 THROMBOPLASTIN TIME PARTIAL: CPT | Mod: 91

## 2017-07-19 PROCEDURE — 25000003 PHARM REV CODE 250: Performed by: PHYSICIAN ASSISTANT

## 2017-07-19 PROCEDURE — 37000008 HC ANESTHESIA 1ST 15 MINUTES: Performed by: INTERNAL MEDICINE

## 2017-07-19 RX ORDER — POLYETHYLENE GLYCOL 3350, SODIUM SULFATE ANHYDROUS, SODIUM BICARBONATE, SODIUM CHLORIDE, POTASSIUM CHLORIDE 236; 22.74; 6.74; 5.86; 2.97 G/4L; G/4L; G/4L; G/4L; G/4L
2000 POWDER, FOR SOLUTION ORAL ONCE
Status: COMPLETED | OUTPATIENT
Start: 2017-07-19 | End: 2017-07-19

## 2017-07-19 RX ORDER — SODIUM CHLORIDE 0.9 % (FLUSH) 0.9 %
3 SYRINGE (ML) INJECTION
Status: DISCONTINUED | OUTPATIENT
Start: 2017-07-19 | End: 2017-07-19 | Stop reason: HOSPADM

## 2017-07-19 RX ORDER — SODIUM CHLORIDE 9 MG/ML
INJECTION, SOLUTION INTRAVENOUS CONTINUOUS PRN
Status: DISCONTINUED | OUTPATIENT
Start: 2017-07-19 | End: 2017-07-19

## 2017-07-19 RX ORDER — ETOMIDATE 2 MG/ML
INJECTION INTRAVENOUS
Status: DISCONTINUED | OUTPATIENT
Start: 2017-07-19 | End: 2017-07-19

## 2017-07-19 RX ORDER — KETAMINE HCL IN 0.9 % NACL 50 MG/5 ML
SYRINGE (ML) INTRAVENOUS
Status: DISCONTINUED | OUTPATIENT
Start: 2017-07-19 | End: 2017-07-19

## 2017-07-19 RX ORDER — LIDOCAINE HCL/PF 100 MG/5ML
SYRINGE (ML) INTRAVENOUS
Status: DISCONTINUED | OUTPATIENT
Start: 2017-07-19 | End: 2017-07-19

## 2017-07-19 RX ORDER — POLYETHYLENE GLYCOL 3350, SODIUM SULFATE ANHYDROUS, SODIUM BICARBONATE, SODIUM CHLORIDE, POTASSIUM CHLORIDE 236; 22.74; 6.74; 5.86; 2.97 G/4L; G/4L; G/4L; G/4L; G/4L
2000 POWDER, FOR SOLUTION ORAL ONCE
Status: DISCONTINUED | OUTPATIENT
Start: 2017-07-20 | End: 2017-07-20

## 2017-07-19 RX ORDER — POLYETHYLENE GLYCOL 3350, SODIUM SULFATE ANHYDROUS, SODIUM BICARBONATE, SODIUM CHLORIDE, POTASSIUM CHLORIDE 236; 22.74; 6.74; 5.86; 2.97 G/4L; G/4L; G/4L; G/4L; G/4L
4000 POWDER, FOR SOLUTION ORAL ONCE
Status: DISCONTINUED | OUTPATIENT
Start: 2017-07-19 | End: 2017-07-19

## 2017-07-19 RX ORDER — FENTANYL CITRATE 50 UG/ML
INJECTION, SOLUTION INTRAMUSCULAR; INTRAVENOUS
Status: DISCONTINUED | OUTPATIENT
Start: 2017-07-19 | End: 2017-07-19

## 2017-07-19 RX ADMIN — ETOMIDATE 4 MG: 2 INJECTION, SOLUTION INTRAVENOUS at 10:07

## 2017-07-19 RX ADMIN — HYDRALAZINE HYDROCHLORIDE 50 MG: 50 TABLET ORAL at 09:07

## 2017-07-19 RX ADMIN — CALCIUM CARBONATE (ANTACID) CHEW TAB 500 MG 500 MG: 500 CHEW TAB at 06:07

## 2017-07-19 RX ADMIN — POLYETHYLENE GLYCOL 3350, SODIUM SULFATE ANHYDROUS, SODIUM BICARBONATE, SODIUM CHLORIDE, POTASSIUM CHLORIDE 2000 ML: 236; 22.74; 6.74; 5.86; 2.97 POWDER, FOR SOLUTION ORAL at 06:07

## 2017-07-19 RX ADMIN — FENTANYL CITRATE 25 MCG: 50 INJECTION, SOLUTION INTRAMUSCULAR; INTRAVENOUS at 10:07

## 2017-07-19 RX ADMIN — HYDRALAZINE HYDROCHLORIDE 50 MG: 50 TABLET ORAL at 05:07

## 2017-07-19 RX ADMIN — SODIUM CHLORIDE: 0.9 INJECTION, SOLUTION INTRAVENOUS at 10:07

## 2017-07-19 RX ADMIN — GABAPENTIN 300 MG: 300 CAPSULE ORAL at 08:07

## 2017-07-19 RX ADMIN — HYDRALAZINE HYDROCHLORIDE 50 MG: 50 TABLET ORAL at 02:07

## 2017-07-19 RX ADMIN — AMLODIPINE BESYLATE 10 MG: 10 TABLET ORAL at 08:07

## 2017-07-19 RX ADMIN — CALCIUM CARBONATE (ANTACID) CHEW TAB 500 MG 500 MG: 500 CHEW TAB at 12:07

## 2017-07-19 RX ADMIN — PANTOPRAZOLE SODIUM 40 MG: 40 INJECTION, POWDER, FOR SOLUTION INTRAVENOUS at 08:07

## 2017-07-19 RX ADMIN — LIDOCAINE HYDROCHLORIDE 40 MG: 20 INJECTION, SOLUTION INTRAVENOUS at 10:07

## 2017-07-19 RX ADMIN — CALCIUM CARBONATE (ANTACID) CHEW TAB 500 MG 500 MG: 500 CHEW TAB at 05:07

## 2017-07-19 RX ADMIN — Medication 20 MG: at 10:07

## 2017-07-19 RX ADMIN — ETOMIDATE 6 MG: 2 INJECTION, SOLUTION INTRAVENOUS at 10:07

## 2017-07-19 RX ADMIN — ATORVASTATIN CALCIUM 40 MG: 20 TABLET, FILM COATED ORAL at 08:07

## 2017-07-19 RX ADMIN — ESCITALOPRAM 20 MG: 20 TABLET, FILM COATED ORAL at 08:07

## 2017-07-19 RX ADMIN — ONDANSETRON 4 MG: 4 TABLET, FILM COATED ORAL at 08:07

## 2017-07-19 RX ADMIN — ACETAMINOPHEN 650 MG: 325 TABLET ORAL at 08:07

## 2017-07-19 RX ADMIN — CALCIUM CARBONATE (ANTACID) CHEW TAB 500 MG 500 MG: 500 CHEW TAB at 02:07

## 2017-07-19 RX ADMIN — CETIRIZINE HYDROCHLORIDE 5 MG: 5 TABLET, FILM COATED ORAL at 08:07

## 2017-07-19 NOTE — PROGRESS NOTES
Pt complains of headache 7/10. She is a LVAD-HW. Dr. Lopez notified. Stat CT head ordered.   VAD coordinator notified about patient's situation.  Will monitor the patient and update the team.

## 2017-07-19 NOTE — PATIENT INSTRUCTIONS
Discharge Summary/Instructions after an Endoscopic Procedure  Patient Name: Randa Devine  Patient MRN: [id number]  Patient YOB: 1955 Wednesday, July 19, 2017  Mark Currie MD  RESTRICTIONS ON ACTIVITY:  - DO NOT drive a car, operate machinery, make legal/financial decisions, or   drink alcohol until the day after the procedure.    - The following day: return to full activity including work, except no heavy   lifting, straining or running for 3 days if polyps were removed.  - Diet: Eat and drink normally unless instructed otherwise.  TREATMENT FOR COMMON SIDE EFFECTS:  - Mild abdominal pain, bloating or excessive gas: rest, eat lightly and use   a heating pad.  - Sore Throat - treat with throat lozenges. Gargle with warm salt water.  SYMPTOMS TO WATCH FOR AND REPORT TO YOUR PHYSICIAN:  1. Severe abdominal pain or bloating.  2. Pain in chest.  3. Chills or fever occurring within 24 hours after a procedure.  4. A large amount of rectal bleeding, which would show as bright red,   maroon, or black stools. (A small amount of blood from the rectum is not   serious, especially if hemorrhoids are present.)  5. Because air was used during the procedure, expelling large amounts of air   from your rectum or belching is normal.  6. If a bowel prep was taken, you may not have a bowel movement for 1-3   days.  This is normal.  7. Go directly to the emergency room if you notice any of the following:   Chills and/or fever over 101 F   Persistent vomiting   Severe abdominal pain, other than gas cramps   Severe chest pain   Black, tarry stools   Any bleeding - exceeding one tablespoon  Your doctor recommends these additional instructions:  If any biopsies were performed, my office will call you in 5 to 6 business   days with any results.  Take a clear liquid diet.   Continue your present medications.   Your physician has recommended a colonoscopy tomorrow.  For questions, problems or results please call your physician -  Mark Currie MD at Work:  [phone].  [facility], EMERGENCY ROOM PHONE NUMBER: [phone]  IF A COMPLICATION OR EMERGENCY SITUATION ARISES AND YOU ARE UNABLE TO REACH   YOUR PHYSICIAN - GO TO THE EMERGENCY ROOM.  Mark Currie MD  7/19/2017 10:29:03 AM  This report has been verified and signed electronically.

## 2017-07-19 NOTE — PROGRESS NOTES
Pt had two episodes of 5 and 6 beats of Vtach. She is asymptomatic. DP: 76/0. Denies CP; c/o mild Headache; CT head negative.  K: 5.3 from 7/18 at 1248.  Dialysis patient. Done at midnight. Dr. Lopez notified. Will monitor the morning labs.

## 2017-07-19 NOTE — ASSESSMENT & PLAN NOTE
-INR 2.6 today. Hold coumadin  -GI consulted. EGD today showed Erythematous duodenopathy. C scope tomorrow  -Protonix IV

## 2017-07-19 NOTE — ANESTHESIA RELEASE NOTE
"Anesthesia Release from PACU Note    Patient: Randa Devine    Procedure(s) Performed: Procedure(s) (LRB):  ESOPHAGOGASTRODUODENOSCOPY (EGD) (N/A)        Last Vitals:   Visit Vitals  BP (!) 81/51   Pulse 63   Temp 36.7 °C (98 °F) (Oral)   Resp 11   Ht 5' 1" (1.549 m)   Wt 54.1 kg (119 lb 4 oz)   LMP  (LMP Unknown)   SpO2 96%   Breastfeeding? No   BMI 22.53 kg/m²       Anesthesia type: general    Post pain: Adequate analgesia    Post assessment: no apparent anesthetic complications, tolerated procedure well and no evidence of recall    Post vital signs: stable    Level of consciousness: awake, alert  and oriented    Nausea/Vomiting: no nausea/no vomiting    Complications: none    Airway Patency: patent    Respiratory: unassisted, spontaneous ventilation, room air    Cardiovascular: stable and blood pressure at baseline    Hydration: euvolemic     "

## 2017-07-19 NOTE — PT/OT/SLP EVAL
Occupational Therapy  Evaluation    Randa Devine   MRN: [id number]   Admitting Diagnosis: GI bleed    OT Date of Treatment: 17   OT Start Time: 0850  OT Stop Time: 0904  OT Total Time (min): 14 min    Billable Minutes:  Evaluation 14 minutes    Diagnosis: GI bleed   History of LVAD ()    Past Medical History:   Diagnosis Date    Anticoagulant long-term use     Anxiety     Atrial tachycardia, paroxysmal 2013    Blood transfusion     Cardiomyopathy     CHF (congestive heart failure)     Chronic kidney disease     Coronary artery disease     End stage renal disease     Hypertension     pulmonary    ICD (implantable cardioverter-defibrillator) battery depletion 2014    Myocardial infarction     Presence of left ventricular assist device (LVAD)     Pulmonary hypertension     Stroke       Past Surgical History:   Procedure Laterality Date    CARDIAC DEFIBRILLATOR PLACEMENT      CARDIAC SURGERY       SECTION      COLONOSCOPY N/A 2016    Procedure: COLONOSCOPY;  Surgeon: Mark Currie MD;  Location: [facility] ([address] [place] [address]);  Service: Endoscopy;  Laterality: N/A;    COLONOSCOPY N/A 4/3/2017    Procedure: COLONOSCOPY;  Surgeon: Todd Longo MD;  Location: [facility] ([address] [place] [address]);  Service: Endoscopy;  Laterality: N/A;    CORONARY ARTERY BYPASS GRAFT      FRACTURE SURGERY      HIP SURGERY      RTHA    LEFT VENTRICULAR ASSIST DEVICE  2012    TONSILLECTOMY      VASCULAR SURGERY         Referring physician: Dr. Collado  Date referred to OT: 17    General Precautions: Standard, LVAD, fall  Orthopedic Precautions: N/A  Braces: N/A    Do you have any cultural, spiritual, Buddhist conflicts, given your current situation?: None     Patient History:  Living Environment  Lives With: alone  Living Arrangements: house  Living Environment Comment: Pt lives alone in 1-story house; reports her daughter and ex- are able to assist and provide transportation. PTA, pt reports  "(I) with ADLs and using rollator or W/C for mobility as needed.  Equipment Currently Used at Home: cane, straight, rollator, bedside commode, wheelchair, oxygen, orthotic device    Prior level of function:   Bed Mobility/Transfers: needs device  Grooming: independent  Bathing: independent  Upper Body Dressing: independent  Lower Body Dressing: independent  Toileting: independent  Home Management Skills: independent  Driving License: No     Subjective:  Communicated with RN prior to session.  "I'm ready to get up."    Chief Complaint: None stated  Patient/Family stated goals: Return home    Pain/Comfort  Pain Rating 1: 0/10  Pain Rating Post-Intervention 1: 0/10    Objective:  Patient found with: oxygen, telemetry (LVAD to battery power)    Cognitive Exam:  Oriented to: Person, Place, Time and Situation  Follows Commands/attention: Follows multistep commands  Communication: clear/fluent  Memory:  No Deficits noted  Safety awareness/insight to disability: intact  Coping skills/emotional control: Appropriate to situation    Visual/perceptual:  Intact    Physical Exam:  Postural examination/scapula alignment: No postural abnormalities identified  Skin integrity: Visible skin intact  Edema: None noted     Sensation:   Intact B UE    Upper Extremity Range of Motion:  Right Upper Extremity: WFL  Left Upper Extremity: WFL    Upper Extremity Strength:  Right Upper Extremity: 3+/5 throughout  Left Upper Extremity: 3+/5 throughout   Strength: WFL    Fine motor coordination:   Intact    Functional Mobility:  Bed Mobility:  Supine to Sit: Stand by Assistance  Sit to Supine: Stand by Assistance onto stretcher    Transfers:  Sit <> Stand Assistance: Stand By Assistance from EOB  Sit <> Stand Assistive Device: No Assistive Device    Functional Ambulation: Within room and hallway with SBA using RW; pt with B foot drop, AFOs not present and reports R one is broken; pt c/o mild SOB after ambulation    Activities of Daily " "Living:  UE Dressing Level of Assistance: Stand by assistance to don gown around back and LVAD shld bag  LE Dressing Level of Assistance: Stand by assistance to adjust socks    Balance:   Static Sit: NORMAL: No deviations seen in posture held statically  Dynamic Sit: NORMAL: No deviations seen in posture held dynamically  Static Stand: GOOD-: Takes MODERATE challenges from all directions inconsistently  Dynamic stand: FAIR+: Needs CLOSE SUPERVISION during gait and is able to right self with minor LOB    Therapeutic Activities and Exercises:  OT/PT eval; educated on OT role and POC; pt reports she transitioned self to LVAD battery power with (I) this morning; performed functional mobility in hallway/to stretcher to be taken for EGD; discussed D/C recs and pt reports she would not have consistent transportation to OP therapy; recommended HH which pt has had in the past and discontinued; unsure whether she would agree to resume    AM-PAC 6 CLICK ADL  How much help from another person does this patient currently need?  1 = Unable, Total/Dependent Assistance  2 = A lot, Maximum/Moderate Assistance  3 = A little, Minimum/Contact Guard/Supervision  4 = None, Modified [place]/Independent    Putting on and taking off regular lower body clothing? : 3  Bathing (including washing, rinsing, drying)?: 3  Toileting, which includes using toilet, bedpan, or urinal? : 3  Putting on and taking off regular upper body clothing?: 3  Taking care of personal grooming such as brushing teeth?: 4  Eating meals?: 4  Total Score: 20    AM-PAC Raw Score CMS "G-Code Modifier Level of Impairment Assistance   6 % Total / Unable   7 - 9 CM 80 - 100% Maximal Assist   10-14 CL 60 - 80% Moderate Assist   15 - 19 CK 40 - 60% Moderate Assist   20 - 22 CJ 20 - 40% Minimal Assist   23 CI 1-20% SBA / CGA   24 CH 0% Independent/ Mod I       Patient left on stretcher with all lines intact and RN and transport present    Assessment:  Randa Devine " is a 61 y.o. female with a medical diagnosis of GI bleed and presents with deficits listed below. At baseline, pt (I) with ADLs and would continue to benefit from OT to maximize safety and (I). Recommend HH upon D/C pending if pt agreeable.    Rehab identified problem list/impairments: Rehab identified problem list/impairments: weakness, impaired endurance, impaired self care skills, impaired functional mobilty, decreased lower extremity function, impaired cardiopulmonary response to activity    Rehab potential is good.    Activity tolerance: Good    Discharge recommendations: Discharge Facility/Level Of Care Needs: home with home health (pt may refuse)     Barriers to discharge: Barriers to Discharge: Decreased caregiver support (lives alone)    Equipment recommendations: none     GOALS:    Occupational Therapy Goals        Problem: Occupational Therapy Goal    Goal Priority Disciplines Outcome Interventions   Occupational Therapy Goal     OT, PT/OT Ongoing (interventions implemented as appropriate)    Description:  Goals to be met by: 7 days (7/26/17)     Patient will increase functional independence with ADLs by performing:    UE Dressing with [place].  LE Dressing with [place].  Grooming while standing at sink with [place].  Toileting from toilet with [place] for hygiene and clothing management.   Supine to sit with [place].  Toilet transfer to toilet with [place].  Increased functional strength to WFL for ADLs.                      PLAN:  Patient to be seen 4 x/week to address the above listed problems via self-care/home management, therapeutic activities, therapeutic exercises  Plan of Care expires: 08/19/17  Plan of Care reviewed with: patient    OT G-codes  Functional Assessment Tool Used: [facility]  Score: 20  Functional Limitation: Self care  Self Care Current Status (): CARMEN  Self Care Goal Status (): AMINA Jiang  07/19/2017

## 2017-07-19 NOTE — PLAN OF CARE
Problem: Patient Care Overview  Goal: Plan of Care Review  Outcome: Ongoing (interventions implemented as appropriate)  Pt denies Chest pain, SOB or nausea. C/o headache- CT head neg; mild relief obtained with tylenol. No falls, trauma or injury noted. VSS. 2L NC. LVAD-HW- kit dsg change every other day. H/H: 6.2 and 19.7 1 unit of blood given. NPO after MN. Plan is to do EGD this am. Plan of care reviewed with patient. No further questions at this time. No significant events. Will continue to monitor.

## 2017-07-19 NOTE — ANESTHESIA PREPROCEDURE EVALUATION
07/19/2017  Randa Devine is a 61 y.o., female.  Pre-operative evaluation for Procedure(s):  COLONOSCOPY    Randa Devine is a 61 y.o. female w/ PMHx of LVAD, chronic anticoagulation, hx of TIA, afib, hx of HIT, HTN, ESRD who presents for above procedure. Pt with HD 7/18/17.  LDA:   R AC 18 g PIV   VAD     Prev airway:   11/28/12; 1725; Direct laryngoscopy; CRNA; Endotracheal Tube; Easy; Postinduction; Hardy #2; 7.0; Cuffed; Minimal occlusive pressure; Auscultation; Grade II; Anterior larynx, Small mouth; Positive EtCO2, Bilateral breath sounds, Atraumatic/Condition of teeth unchanged; Lips; 1      Patient Active Problem List   Diagnosis    Heart replaced by heart assist device    End stage renal disease on dialysis    Chronic anticoagulation    Left ventricular assist device present    Femoral neck fracture    Anemia    Chronic combined systolic and diastolic heart failure    Fracture of lumbar spine without cord injury    TIA (transient ischemic attack)    ICD (implantable cardioverter-defibrillator), biventricular, in situ    Paroxysmal atrial fibrillation    Atrial tachycardia, paroxysmal    HIT (heparin-induced thrombocytopenia)    Gait disorder    Chronic LBP    Right leg numbness    Foot drop, bilateral    Physical deconditioning    Secondary hyperparathyroidism (of renal origin)    History of stroke without residual deficits    Syncope    Faintness    Nausea    Essential hypertension    Chronic low back pain    Cognitive impairment    Cellulitis    Fall    Erythema    Spontaneous hematoma of forearm    Hallucinations    Embolic stroke involving left posterior cerebral artery    Migraine with aura and without status migrainosus, not intractable    Cytotoxic cerebral edema    Acute ischemic vertebrobasilar artery thalamic stroke involving left-sided vessel     Cardiomegaly    Hypervolemia    Congestive heart failure    History of GI bleed    GI bleed       Review of patient's allergies indicates:   Allergen Reactions    Codeine Nausea And Vomiting    Demerol [meperidine] Nausea And Vomiting    Lortab [hydrocodone-acetaminophen] Nausea And Vomiting        Current Facility-Administered Medications on File Prior to Visit   Medication Dose Route Frequency Provider Last Rate Last Dose    0.9%  NaCl infusion (for blood administration)   Intravenous Q24H PRN Vineet Ayala MD        acetaminophen tablet 650 mg  650 mg Oral Q6H PRN Karen Bach Rolling, PA-C   650 mg at 07/18/17 2219    amlodipine tablet 10 mg  10 mg Oral Daily Karen Mikala Rolling, PA-C   10 mg at 07/19/17 0828    atorvastatin tablet 40 mg  40 mg Oral Daily Karen Mikala Rolling, PA-C   40 mg at 07/19/17 0829    calcium carbonate 200 mg calcium (500 mg) chewable tablet 500 mg  500 mg Oral QID Karen Bach Rolling, PA-C   500 mg at 07/19/17 0515    cetirizine tablet 5 mg  5 mg Oral Daily Karen Mikala Rolling, PA-C   5 mg at 07/19/17 0828    escitalopram oxalate tablet 20 mg  20 mg Oral QHS Karen Mikala Rolling, PA-C   20 mg at 07/18/17 2218    gabapentin capsule 300 mg  300 mg Oral QHS Karen Mikala Rolling, PA-C   300 mg at 07/18/17 2218    hydrALAZINE tablet 50 mg  50 mg Oral Q8H Karen Mikala Rolling, PA-C   50 mg at 07/19/17 0515    ondansetron tablet 4 mg  4 mg Oral Q8H PRN Karen Mikala Rolling, PA-C   4 mg at 07/18/17 1506    pantoprazole injection 40 mg  40 mg Intravenous Daily Karen Mikala Rolling, PA-C   40 mg at 07/19/17 0834     Current Outpatient Prescriptions on File Prior to Visit   Medication Sig Dispense Refill    acetaminophen (TYLENOL) 325 MG tablet Take 2 tablets (650 mg total) by mouth every 6 (six) hours as needed for Pain (mild pain).  0    amlodipine (NORVASC) 10 MG tablet Take 1 tablet (10 mg total) by mouth once daily. 30 tablet 11    atorvastatin  (LIPITOR) 40 MG tablet Take 1 tablet (40 mg total) by mouth once daily. 90 tablet 3    CALCIUM CARBONATE (ANTACID CALCIUM ORAL) Take 1 tablet by mouth 4 (four) times daily.       cetirizine (ZYRTEC) 5 MG tablet Take 1 tablet (5 mg total) by mouth once daily. 30 tablet 0    escitalopram oxalate (LEXAPRO) 20 MG tablet Take 1 tablet (20 mg total) by mouth every evening. 90 tablet 3    gabapentin (NEURONTIN) 300 MG capsule Take 1 capsule (300 mg total) by mouth 2 (two) times daily. 180 capsule 3    hydrALAZINE (APRESOLINE) 50 MG tablet Take 1 tablet (50 mg total) by mouth every 8 (eight) hours. 90 tablet 11    ondansetron (ZOFRAN) 4 MG tablet Take 1 tablet (4 mg total) by mouth every 8 (eight) hours as needed for Nausea. 30 tablet 3    pantoprazole (PROTONIX) 40 MG tablet Take 1 tablet (40 mg total) by mouth once daily. 60 tablet 10    warfarin (COUMADIN) 2 MG tablet 5mg tonight only () followed by 2mg/day 102 tablet 3       Past Surgical History:   Procedure Laterality Date    CARDIAC DEFIBRILLATOR PLACEMENT      CARDIAC SURGERY       SECTION      COLONOSCOPY N/A 2016    Procedure: COLONOSCOPY;  Surgeon: Mark Currie MD;  [facility] ([address] [place] [address]);  Service: Endoscopy;  Laterality: N/A;    COLONOSCOPY N/A 4/3/2017    Procedure: COLONOSCOPY;  Surgeon: Todd Longo MD;  Location: [address]);  Service: Endoscopy;  Laterality: N/A;    CORONARY ARTERY BYPASS GRAFT      FRACTURE SURGERY      HIP SURGERY      RTHA    LEFT VENTRICULAR ASSIST DEVICE  2012    TONSILLECTOMY      VASCULAR SURGERY         Social History     Social History    Marital status:      Spouse name: N/A    Number of children: N/A    Years of education: N/A     Occupational History    Not on file.     Social History Main Topics    Smoking status: Former Smoker     Packs/day: 2.00     Years: 30.00     Quit date: 10/29/2009    Smokeless tobacco: Never Used    Alcohol use Yes       Comment: 1 glass of wine a month    Drug use: No    Sexual activity: No     Other Topics Concern    Not on file     Social History Narrative    No narrative on file         Vital Signs Range (Last 24H):  Temp:  [36.2 °C (97.2 °F)-37 °C (98.6 °F)]   Pulse:  [68-88]   Resp:  [0-20]   BP: ()/(0-82)   SpO2:  [90 %-99 %]       CBC:   Recent Labs      07/18/17   1248  07/19/17   0550   WBC  6.47  3.15*   RBC  1.97*  2.28*   HGB  6.2*  7.1*   HCT  19.7*  22.2*   PLT  145*  99*   MCV  100*  97   MCH  31.5*  31.1*   MCHC  31.5*  32.0       CMP:   Recent Labs      07/18/17   1248  07/19/17   0550   NA  135*  138   K  5.3*  4.3   CL  96  105   CO2  21*  23   BUN  73*  26*   CREATININE  9.7*  4.9*   GLU  95  78   MG   --   1.9   PHOS   --   3.2   CALCIUM  8.6*  8.5*   ALBUMIN  3.3*  3.1*   PROT  6.4  5.9*   ALKPHOS  90  84   ALT  10  11   AST  20  20   BILITOT  0.7  0.9       INR  Recent Labs      07/18/17 1248  07/19/17   0550   INR  3.0*  2.6*   APTT   --   29.7           Diagnostic Studies:      EKG:  Vent. Rate : 083 BPM     Atrial Rate : 083 BPM     P-R Int : 108 ms          QRS Dur : 114 ms      QT Int : 426 ms       P-R-T Axes : 086 134 -06 degrees     QTc Int : 500 ms    Sinus rhythm  AV sequential or dual chamber electronic pacemaker  Biventricular pacemaker detected  Abnormal ECG    17-OCT-2015 05:22,  Sinus rhythm has replaced electronic atrial pacemaker  Confirmed by MARY REN MD ([id number]) on 4/19/2016 3:57:55 PM    Referred By: SELF REFERRAL           Confirmed By:MARY REN MD    2D Echo:    CONCLUSIONS     1 - [employer] LVAD; speed 2700 RPM - the aortic valve opens with each beat.     2 - Severely depressed left ventricular systolic function (EF 20-25%).     3 - Mildly depressed right ventricular systolic function .     4 - Left ventricular diastolic dysfunction.     5 - Mild aortic regurgitation.     6 - Mild tricuspid regurgitation.       This document has been electronically    SIGNED BY:  Terry Granados MD On: 06/08/2015 12:03      Pre-op Assessment    I have reviewed the Patient Summary Reports.      I have reviewed the Medications.     Review of Systems  Anesthesia Hx:  No problems with previous Anesthesia Denies Hx of Anesthetic complications  History of prior surgery of interest to airway management or planning: Previous anesthesia: General  Denies Personal Hx of Anesthesia complications.   Social:  Non-Smoker, No Alcohol Use    Hematology/Oncology:     Oncology Normal    -- Anemia:   EENT/Dental:EENT/Dental Normal   Cardiovascular:   Exercise tolerance: poor Hypertension Past MI CAD  CABG/stent  CHF + LVAD in place    Pulmonary:  Pulmonary Normal    Renal/:   Chronic Renal Disease, ESRD, Dialysis    Musculoskeletal:  Musculoskeletal Normal    Neurological:   TIA, CVA    Endocrine:  Endocrine Normal    Dermatological:  Skin Normal    Psych:  Psychiatric Normal           Physical Exam  General:  Well nourished    Airway/Jaw/Neck:  Airway Findings: Mouth Opening: Normal Tongue: Normal  General Airway Assessment: Adult  Mallampati: II  TM Distance: Normal, at least 6 cm  Jaw/Neck Findings:  Neck ROM: Normal ROM     Eyes/Ears/Nose:  EYES/EARS/NOSE FINDINGS: Normal   Dental:  Dental Findings: In tact    Chest/Lungs:  Chest/Lungs Findings: Clear to auscultation, Normal Respiratory Rate     Heart/Vascular:  Other Heart Findings: LVAD in place        Mental Status:  Mental Status Findings:  Cooperative, Alert and Oriented         Anesthesia Plan  Type of Anesthesia, risks & benefits discussed:  Anesthesia Type:  general, MAC  Patient's Preference:   Intra-op Monitoring Plan: standard ASA monitors  Intra-op Monitoring Plan Comments:   Post Op Pain Control Plan:   Post Op Pain Control Plan Comments:   Induction:   IV  Beta Blocker:  Patient is not currently on a Beta-Blocker (No further documentation required).       Informed Consent: Patient understands risks and agrees with Anesthesia plan.  Questions  answered. Anesthesia consent signed with patient.  ASA Score: 4     Day of Surgery Review of History & Physical:    H&P update referred to the provider.         Ready For Surgery From Anesthesia Perspective.     .

## 2017-07-19 NOTE — NURSING TRANSFER
Nursing Transfer Note      7/19/2017     Transfer To: 302 From PACU    Transfer via stretcher    Transfer with cardiac monitoring, O2, LVAD battery pack(black bag)    Transported by RN x 2    Medicines sent: none    Chart send with patient: Yes    Notified: Family    Patient reassessed at: 7/19/17 @ 1030    Upon arrival to floor:

## 2017-07-19 NOTE — PROGRESS NOTES
[facility]  Heart Transplant  Progress Note    Patient Name: Randa Devine  MRN: [id number]  Admission Date: 7/18/2017  Hospital Length of Stay: 1 days  Attending Physician: Sera Collado MD  Primary Care Provider: Laura Garvin DO  Principal Problem:GI bleed    Subjective:     Interval History:Pt still had bloody BMs overnight. Transfused 1 unit PRBCs. Feels better today    Continuous Infusions:   Scheduled Meds:   amlodipine  10 mg Oral Daily    atorvastatin  40 mg Oral Daily    calcium carbonate  500 mg Oral QID    cetirizine  5 mg Oral Daily    escitalopram oxalate  20 mg Oral QHS    gabapentin  300 mg Oral QHS    hydrALAZINE  50 mg Oral Q8H    pantoprazole  40 mg Intravenous Daily    polyethylene glycol  2,000 mL Oral Once    Followed by    [START ON 7/20/2017] polyethylene glycol  2,000 mL Oral Once     PRN Meds:sodium chloride, acetaminophen, ondansetron    Review of patient's allergies indicates:   Allergen Reactions    Codeine Nausea And Vomiting    Demerol [meperidine] Nausea And Vomiting    Lortab [hydrocodone-acetaminophen] Nausea And Vomiting     Objective:     Vital Signs (Most Recent):  Temp: 98.3 °F (36.8 °C) (07/19/17 1159)  Pulse: 78 (07/19/17 1500)  Resp: 16 (07/19/17 1159)  BP: (!) 80/0 (07/19/17 1200)  SpO2: 96 % (07/19/17 1159) Vital Signs (24h Range):  Temp:  [97.2 °F (36.2 °C)-98.5 °F (36.9 °C)] 98.3 °F (36.8 °C)  Pulse:  [63-78] 78  Resp:  [11-18] 16  SpO2:  [94 %-99 %] 96 %  BP: ()/(0-82) 80/0     Weight: 54.1 kg (119 lb 4 oz)  Body mass index is 22.53 kg/m².      Intake/Output Summary (Last 24 hours) at 07/19/17 1551  Last data filed at 07/19/17 1025   Gross per 24 hour   Intake           1007.5 ml   Output             2000 ml   Net           -992.5 ml       Hemodynamic Parameters:           Physical Exam   Constitutional: She is oriented to person, place, and time. She appears well-developed and well-nourished.   Neck: Normal range of motion. Neck  supple. No JVD present.   Cardiovascular: Normal rate and regular rhythm.    Normal VAD hum   Pulmonary/Chest: Effort normal and breath sounds normal. She has no wheezes. She has no rales.   Abdominal: Soft. Bowel sounds are normal. There is no tenderness.   Musculoskeletal: She exhibits no edema.   Neurological: She is alert and oriented to person, place, and time.   Skin: Skin is warm and dry.       Significant Labs:  CBC:    Recent Labs  Lab 07/19/17  0550   WBC 3.15*   RBC 2.28*   HGB 7.1*   HCT 22.2*   PLT 99*   MCV 97   MCH 31.1*   MCHC 32.0     BNP:    Recent Labs  Lab 07/19/17  0550   BNP 2,544*     CMP:    Recent Labs  Lab 07/19/17  0550   GLU 78   CALCIUM 8.5*   ALBUMIN 3.1*   PROT 5.9*      K 4.3   CO2 23      BUN 26*   CREATININE 4.9*   ALKPHOS 84   ALT 11   AST 20   BILITOT 0.9      Coagulation:     Recent Labs  Lab 07/19/17  0550   INR 2.6*   APTT 29.7     LDH:    Recent Labs  Lab 07/19/17  0550        Microbiology:  Microbiology Results (last 7 days)     Procedure Component Value Units Date/Time    Urine culture [[id number]] Collected:  07/19/17 1037    Order Status:  No result Specimen:  Urine Updated:  07/19/17 1252          BMP:   Recent Labs  Lab 07/19/17  0550   GLU 78      K 4.3      CO2 23   BUN 26*   CREATININE 4.9*   CALCIUM 8.5*   MG 1.9     Coagulation:   Recent Labs  Lab 07/19/17  0550   INR 2.6*   APTT 29.7     I have reviewed all pertinent labs within the past 24 hours.    Estimated Creatinine Clearance: 9.3 mL/min (based on Cr of 4.9).    Diagnostic Results:  I have reviewed all pertinent imaging results/findings within the past 24 hours.  Past Medical History:   Diagnosis Date    Anticoagulant long-term use     Anxiety     Atrial tachycardia, paroxysmal 4/21/2013    Blood transfusion     Cardiomyopathy     CHF (congestive heart failure)     Chronic kidney disease     Coronary artery disease     End stage renal disease     Hypertension      pulmonary    ICD (implantable cardioverter-defibrillator) battery depletion 2014    Myocardial infarction     Presence of left ventricular assist device (LVAD)     Pulmonary hypertension     Stroke        Past Surgical History:   Procedure Laterality Date    CARDIAC DEFIBRILLATOR PLACEMENT      CARDIAC SURGERY       SECTION      COLONOSCOPY N/A 2016    Procedure: COLONOSCOPY;  Surgeon: Mark Currie MD;  Location: [facility] ENDO (2ND FLR);  Service: Endoscopy;  Laterality: N/A;    COLONOSCOPY N/A 4/3/2017    Procedure: COLONOSCOPY;  Surgeon: Todd Longo MD;  Location: [facility] ENDO (2ND FLR);  Service: Endoscopy;  Laterality: N/A;    CORONARY ARTERY BYPASS GRAFT      FRACTURE SURGERY      HIP SURGERY      RTHA    LEFT VENTRICULAR ASSIST DEVICE  2012    TONSILLECTOMY      VASCULAR SURGERY         Review of patient's allergies indicates:   Allergen Reactions    Codeine Nausea And Vomiting    Demerol [meperidine] Nausea And Vomiting    Lortab [hydrocodone-acetaminophen] Nausea And Vomiting       Current Facility-Administered Medications   Medication    0.9%  NaCl infusion (for blood administration)    acetaminophen tablet 650 mg    amlodipine tablet 10 mg    atorvastatin tablet 40 mg    calcium carbonate 200 mg calcium (500 mg) chewable tablet 500 mg    cetirizine tablet 5 mg    escitalopram oxalate tablet 20 mg    gabapentin capsule 300 mg    hydrALAZINE tablet 50 mg    ondansetron tablet 4 mg    pantoprazole injection 40 mg    polyethylene glycol (GoLYTELY) solution    Followed by    [START ON 2017] polyethylene glycol (GoLYTELY) solution     Family History     Problem Relation (Age of Onset)    Arthritis Mother    Cancer Maternal Grandmother    Heart disease Father    Hypertension Father        Social History Main Topics    Smoking status: Former Smoker     Packs/day: 2.00     Years: 30.00     Quit date: 10/29/2009    Smokeless tobacco: Never Used     Alcohol use Yes      Comment: 1 glass of wine a month    Drug use: No    Sexual activity: No     Review of Systems   Constitutional: Positive for fatigue.   Respiratory: Positive for shortness of breath.    Cardiovascular: Negative for leg swelling.   Gastrointestinal: Positive for blood in stool, diarrhea, nausea and vomiting.   Neurological: Positive for weakness. Negative for light-headedness.     Objective:     Vital Signs (Most Recent):  Temp: 98.3 °F (36.8 °C) (07/19/17 1159)  Pulse: 78 (07/19/17 1500)  Resp: 16 (07/19/17 1159)  BP: (!) 80/0 (07/19/17 1200)  SpO2: 96 % (07/19/17 1159) Vital Signs (24h Range):  Temp:  [97.2 °F (36.2 °C)-98.5 °F (36.9 °C)] 98.3 °F (36.8 °C)  Pulse:  [63-78] 78  Resp:  [11-18] 16  SpO2:  [94 %-99 %] 96 %  BP: ()/(0-78) 80/0     Weight: 54.1 kg (119 lb 4 oz)  Body mass index is 22.53 kg/m².      Intake/Output Summary (Last 24 hours) at 07/19/17 1607  Last data filed at 07/19/17 1025   Gross per 24 hour   Intake           1007.5 ml   Output             2000 ml   Net           -992.5 ml       Physical Exam   Constitutional: She is oriented to person, place, and time. She appears well-developed and well-nourished.   Neck: Normal range of motion. Neck supple. No JVD present.   Cardiovascular: Normal rate and regular rhythm.  Exam reveals no gallop and no friction rub.    No murmur heard.  Normal VAD hum   Pulmonary/Chest: Effort normal and breath sounds normal. She has no wheezes. She has no rales.   Abdominal: Soft. Bowel sounds are normal. There is no tenderness.   Musculoskeletal: She exhibits no edema.   Neurological: She is alert and oriented to person, place, and time.   Skin: Skin is warm and dry.       Significant Labs:  CBC:    Recent Labs  Lab 07/19/17  0550   WBC 3.15*   RBC 2.28*   HGB 7.1*   HCT 22.2*   PLT 99*   MCV 97   MCH 31.1*   MCHC 32.0     BNP:    Recent Labs  Lab 07/19/17  0550   BNP 2,544*     CMP:    Recent Labs  Lab 07/19/17  0550   GLU 78   CALCIUM  8.5*   ALBUMIN 3.1*   PROT 5.9*      K 4.3   CO2 23      BUN 26*   CREATININE 4.9*   ALKPHOS 84   ALT 11   AST 20   BILITOT 0.9      Coagulation:     Recent Labs  Lab 07/19/17  0550   INR 2.6*   APTT 29.7     LDH:    Recent Labs  Lab 07/19/17  0550        Microbiology:  Microbiology Results (last 7 days)     Procedure Component Value Units Date/Time    Urine culture [[id number]] Collected:  07/19/17 1037    Order Status:  No result Specimen:  Urine Updated:  07/19/17 1252          BMP:     Recent Labs  Lab 07/19/17  0550   GLU 78      K 4.3      CO2 23   BUN 26*   CREATININE 4.9*   CALCIUM 8.5*   MG 1.9     Coagulation:     Recent Labs  Lab 07/19/17  0550   INR 2.6*   APTT 29.7     I have reviewed all pertinent labs within the past 24 hours.    Diagnostic Results:  I have reviewed all pertinent imaging results/findings within the past 24 hours.    Assessment and Plan:     * GI bleed    -INR 2.6 today. Hold coumadin  -GI consulted. EGD today showed Erythematous duodenopathy. C scope tomorrow  -Protonix IV         Anemia    -Acute on Chronic, due to GIB  -Transfused 1 unit PRBCs overnight with HD          Left ventricular assist device present    -HW implanted 5/2012  -Speed 2600  -Hold coumadin as above  -HTN- cont hydralazine, amlodipine        End stage renal disease on dialysis    -Nephrology consulted. On HD T, Thurs, Sat  -Had HD ovenrnight.            Karen Ibanez PA-C  Heart Transplant  [facility]

## 2017-07-19 NOTE — PROGRESS NOTES
Pt off to endoscopy; Pt VSS in no distress. Report given to FARAZ Sheehan. Pt sent with emergency bag and monitor.

## 2017-07-19 NOTE — TREATMENT PLAN
GI Treatment Plan  07/19/2017  10:29 AM    Impression:             - Small hiatal hernia.  - Normal stomach.  - Erythematous duodenopathy.  - No specimens collected.    Recommendation:        - [facility].  - Clear liquid diet now and NPO after MN (order have been placed)  - Prep for colonoscopy tomorrow (prep order has been written)  -Will continue to follow alongside primary team    Christiano Berg M.D.  Gastroenterology Fellow, PGY-IV  Pager: [phone]  [facility]

## 2017-07-19 NOTE — OR NURSING
Pt has Heartware LVAD. PT on batteries x2 with backup bag at bedside. See VAD flowsheet for readings

## 2017-07-19 NOTE — PROGRESS NOTES
Admit Note     Met with patient to assess needs. Patient is a 61 y.o.  female, admitted for GI bleed, per medical record.  Pt has an LVAD, implanted 5/9/12.     Patient admitted from emergency room on 7/18/2017.  At this time, patient presents as alert and oriented x 4, calm, cooperative and asking and answering questions appropriately.  At this time, patients caregiver is not present.    Household/Family Systems     Patient resides alone at:    [id number]  [place] [address]      Pt cell:  [phone]  Constance Dave ([facility]) cell: [phone]    Support system includes daughter and ex-.  Pt's son and his girlfriend recently lived with pt, but pt reports they now live with son's father.  Patient does not have dependents that are need of being cared for.     Patients primary caregiver is her theresa Nolasco.  During admission, patient's caregiver plans to stay at home.  Confirmed patient and patients caregivers do have access to reliable transportation.    Cognitive Status/Learning     Patient reports reading ability as 12th grade and states patient does not have difficulty with reading, writing, hearing, comprehension and learning Pt wears glasses and reports issues with short term memory.  Patient reports patient learns best by one-on-one.   Needed: No.   Highest education level: Associate/Bachelor Degree    Vocation/Disability     Working for Income: No  If no, reason not working: Patient Choice - Retired  Patient is retired from working as a nurse and .    Adherence     Patient reports a medium level of adherence to patients health care regimen. Pt reports her daughter helps to set up daily medications.  Adherence counseling and education provided. Patient verbalizes understanding.    Substance Use    Patient reports the following substance usage.    Tobacco: none, patient denies any use.  Alcohol: none, patient denies any use  Illicit Drugs/Non-prescribed  Medications: none, patient denies any use.  Patient states clear understanding of the potential impact of substance use.     Services Utilizing/ADLS    Infusion Service: Prior to admission, patient utilizing? no  Home Health: Prior to admission, patient utilizing? yes, pt has [facility] (ph: [phone], f: [phone]) for skilled nrsg services.  Pt requesting to resume HH with [facility] at d/c.  DME: Prior to admission, yes, pt reports a rollator, walker, wheelchair, BSC, cane, and home O2 with portables (via [employer] 2-4 LPM)   Pulmonary/Cardiac Rehab: Prior to admission, no  Dialysis:  Prior to admission, yes, pt dialyzes at [facility] on TTS at 6:30am  Transplant Specialty Pharmacy:  Prior to admission, no.    Prior to admission, patient reports patient was independent with ADLS and was not driving. Pt's daughter assists pt with transportation.  Patient reports patient is able to care for self at this time.  Patient indicates a willingness to care for self once medically cleared to do so.    Insurance/Medications    Insured by   Payor/Plan Subscr  Sex Relation Sub. Ins. ID Effective Group Num   1. MEDICARE - MEMAGNO DAVIES 1955 Female  [id number] 10/1/13                                    [address]   2. [employer] CROSS * MAGNO JOLLY 1955 Female  [id number] 4/1/15 [id number]                                   [address] [address]      Primary Insurance (for UNOS reporting): Public Insurance - Medicare FFS (Fee For Service)  Secondary Insurance (for UNOS reporting): Private Insurance    Patient reports patient is able to obtain and afford medications at this time and at time of discharge.      Living Will/Healthcare Power of     Patient states patient has a LW and/or HCPA.  HCPA on file names pt's dgtheresa Nolasco as primary agent and pt's ex- Carlos A as secondary agent.    Coping/Mental Health    Patient is coping adequately with the aid of  family members.  Patient denies mental health  difficulties at this time.     Discharge Planning    At time of discharge, patient plans to return to patient's home under the care of self and daughter.  Patients daughter Constance will transport patient.  Per rounds today, expected discharge date has not been medically determined at this time. Patient verbalizes understanding and is involved in treatment planning and discharge process.    Additional Concerns    Patient is being followed for needs, education, resources, information, emotional support, supportive counseling, and for supportive and skilled discharge plan of care.  providing ongoing psychosocial support, education, resources and d/c planning as needed.  SW remains available. Patient denies additional needs and/or concerns at this time. Patient verbalizes understanding and agreement with information reviewed, social work availability, and how to access available resources as needed.

## 2017-07-19 NOTE — TRANSFER OF CARE
"Anesthesia Transfer of Care Note    Patient: Randa Devine    Procedure(s) Performed: Procedure(s) (LRB):  ESOPHAGOGASTRODUODENOSCOPY (EGD) (N/A)    Patient location: PACU    Anesthesia Type: general    Transport from OR: Transported from OR on 2-3 L/min O2 by NC with adequate spontaneous ventilation    Post pain: adequate analgesia    Post assessment: no apparent anesthetic complications    Post vital signs: stable    Level of consciousness: awake    Nausea/Vomiting: no nausea/vomiting    Complications: none    Transfer of care protocol was followed      Last vitals:   Visit Vitals  BP 95/70 (BP Location: Right leg, Patient Position: Lying, BP Method: Automatic)   Pulse 76   Temp 36.6 °C (97.8 °F) (Oral)   Resp 14   Ht 5' 1" (1.549 m)   Wt 54.1 kg (119 lb 4 oz)   LMP  (LMP Unknown)   SpO2 98%   Breastfeeding? No   BMI 22.53 kg/m²     "

## 2017-07-19 NOTE — PLAN OF CARE
Problem: Physical Therapy Goal  Goal: Physical Therapy Goal  Goals to be met by: 17     Patient will increase functional independence with mobility by performin. Supine to sit with Supervision   2. Sit to stand transfer with Supervision  3. Gait  x 200 feet with Supervision using Rolling Walker.   4. Lower extremity exercise program x15 reps, with assistance as needed, in order to increase LE strength and ROM    Outcome: Ongoing (interventions implemented as appropriate)  PT evaluation complete and appropriate goals established.    Tasneem Gabriel, PT, DPT   2017  [phone]

## 2017-07-19 NOTE — DISCHARGE INSTRUCTIONS

## 2017-07-19 NOTE — PROCEDURES
Patient AAO with no family at bedside. VAD interrogation completed this AM in the event changes needed to be made. Will continue to monitor for further issues.     Pulsatile: Yes   VAD Sounds: Smooth  Problems / Issues / Alarms with VAD if any: None noted  HCT: 22  Waveforms: 4-6 with no negative deflections noted      VAD Interrogation:  TXP LVAD INTERROGATIONS 7/19/2017 7/19/2017 7/19/2017 7/19/2017 7/19/2017 7/19/2017 7/19/2017   Type Heartware - - Heartware Heartware Heartware Heartware   Flow 4.9 4.9 5.0 5.0 4.5 4.6 5.2   Speed 2600 2600 2600 2600 2600 2600 2600   PI - - - - - - -   Power (Kim) 3.5 3.4 3.5 3.5 3.5 3.4 3.6   Low Flow Alarm - - - - - - -   High Power Alarm - - - - - - -   Pulsatility - - - - - - -   }

## 2017-07-19 NOTE — SUBJECTIVE & OBJECTIVE
Interval History:Pt still had bloody BMs overnight. Transfused 1 unit PRBCs. Feels better today    Continuous Infusions:   Scheduled Meds:   amlodipine  10 mg Oral Daily    atorvastatin  40 mg Oral Daily    calcium carbonate  500 mg Oral QID    cetirizine  5 mg Oral Daily    escitalopram oxalate  20 mg Oral QHS    gabapentin  300 mg Oral QHS    hydrALAZINE  50 mg Oral Q8H    pantoprazole  40 mg Intravenous Daily    polyethylene glycol  2,000 mL Oral Once    Followed by    [START ON 7/20/2017] polyethylene glycol  2,000 mL Oral Once     PRN Meds:sodium chloride, acetaminophen, ondansetron    Review of patient's allergies indicates:   Allergen Reactions    Codeine Nausea And Vomiting    Demerol [meperidine] Nausea And Vomiting    Lortab [hydrocodone-acetaminophen] Nausea And Vomiting     Objective:     Vital Signs (Most Recent):  Temp: 98.3 °F (36.8 °C) (07/19/17 1159)  Pulse: 78 (07/19/17 1500)  Resp: 16 (07/19/17 1159)  BP: (!) 80/0 (07/19/17 1200)  SpO2: 96 % (07/19/17 1159) Vital Signs (24h Range):  Temp:  [97.2 °F (36.2 °C)-98.5 °F (36.9 °C)] 98.3 °F (36.8 °C)  Pulse:  [63-78] 78  Resp:  [11-18] 16  SpO2:  [94 %-99 %] 96 %  BP: ()/(0-82) 80/0     Weight: 54.1 kg (119 lb 4 oz)  Body mass index is 22.53 kg/m².      Intake/Output Summary (Last 24 hours) at 07/19/17 1551  Last data filed at 07/19/17 1025   Gross per 24 hour   Intake           1007.5 ml   Output             2000 ml   Net           -992.5 ml       Hemodynamic Parameters:           Physical Exam   Constitutional: She is oriented to person, place, and time. She appears well-developed and well-nourished.   Neck: Normal range of motion. Neck supple. No JVD present.   Cardiovascular: Normal rate and regular rhythm.    Normal VAD hum   Pulmonary/Chest: Effort normal and breath sounds normal. She has no wheezes. She has no rales.   Abdominal: Soft. Bowel sounds are normal. There is no tenderness.   Musculoskeletal: She exhibits no edema.    Neurological: She is alert and oriented to person, place, and time.   Skin: Skin is warm and dry.       Significant Labs:  CBC:    Recent Labs  Lab 07/19/17  0550   WBC 3.15*   RBC 2.28*   HGB 7.1*   HCT 22.2*   PLT 99*   MCV 97   MCH 31.1*   MCHC 32.0     BNP:    Recent Labs  Lab 07/19/17  0550   BNP 2,544*     CMP:    Recent Labs  Lab 07/19/17  0550   GLU 78   CALCIUM 8.5*   ALBUMIN 3.1*   PROT 5.9*      K 4.3   CO2 23      BUN 26*   CREATININE 4.9*   ALKPHOS 84   ALT 11   AST 20   BILITOT 0.9      Coagulation:     Recent Labs  Lab 07/19/17  0550   INR 2.6*   APTT 29.7     LDH:    Recent Labs  Lab 07/19/17  0550        Microbiology:  Microbiology Results (last 7 days)     Procedure Component Value Units Date/Time    Urine culture [[id number]] Collected:  07/19/17 1037    Order Status:  No result Specimen:  Urine Updated:  07/19/17 1252          BMP:   Recent Labs  Lab 07/19/17  0550   GLU 78      K 4.3      CO2 23   BUN 26*   CREATININE 4.9*   CALCIUM 8.5*   MG 1.9     Coagulation:   Recent Labs  Lab 07/19/17  0550   INR 2.6*   APTT 29.7     I have reviewed all pertinent labs within the past 24 hours.    Estimated Creatinine Clearance: 9.3 mL/min (based on Cr of 4.9).    Diagnostic Results:  I have reviewed all pertinent imaging results/findings within the past 24 hours.  Past Medical History:   Diagnosis Date    Anticoagulant long-term use     Anxiety     Atrial tachycardia, paroxysmal 4/21/2013    Blood transfusion     Cardiomyopathy     CHF (congestive heart failure)     Chronic kidney disease     Coronary artery disease     End stage renal disease     Hypertension     pulmonary    ICD (implantable cardioverter-defibrillator) battery depletion 4/25/2014    Myocardial infarction     Presence of left ventricular assist device (LVAD)     Pulmonary hypertension     Stroke        Past Surgical History:   Procedure Laterality Date    CARDIAC DEFIBRILLATOR PLACEMENT       CARDIAC SURGERY       SECTION      COLONOSCOPY N/A 2016    Procedure: COLONOSCOPY;  Surgeon: Mark Currie MD;  Location: [facility] ENDO (2ND FLR);  Service: Endoscopy;  Laterality: N/A;    COLONOSCOPY N/A 4/3/2017    Procedure: COLONOSCOPY;  Surgeon: Todd Longo MD;  Location: [facility] ENDO (2ND FLR);  Service: Endoscopy;  Laterality: N/A;    CORONARY ARTERY BYPASS GRAFT      FRACTURE SURGERY      HIP SURGERY      RTHA    LEFT VENTRICULAR ASSIST DEVICE  2012    TONSILLECTOMY      VASCULAR SURGERY         Review of patient's allergies indicates:   Allergen Reactions    Codeine Nausea And Vomiting    Demerol [meperidine] Nausea And Vomiting    Lortab [hydrocodone-acetaminophen] Nausea And Vomiting       Current Facility-Administered Medications   Medication    0.9%  NaCl infusion (for blood administration)    acetaminophen tablet 650 mg    amlodipine tablet 10 mg    atorvastatin tablet 40 mg    calcium carbonate 200 mg calcium (500 mg) chewable tablet 500 mg    cetirizine tablet 5 mg    escitalopram oxalate tablet 20 mg    gabapentin capsule 300 mg    hydrALAZINE tablet 50 mg    ondansetron tablet 4 mg    pantoprazole injection 40 mg    polyethylene glycol (GoLYTELY) solution    Followed by    [START ON 2017] polyethylene glycol (GoLYTELY) solution     Family History     Problem Relation (Age of Onset)    Arthritis Mother    Cancer Maternal Grandmother    Heart disease Father    Hypertension Father        Social History Main Topics    Smoking status: Former Smoker     Packs/day: 2.00     Years: 30.00     Quit date: 10/29/2009    Smokeless tobacco: Never Used    Alcohol use Yes      Comment: 1 glass of wine a month    Drug use: No    Sexual activity: No     Review of Systems   Constitutional: Positive for fatigue.   Respiratory: Positive for shortness of breath.    Cardiovascular: Negative for leg swelling.   Gastrointestinal: Positive for blood in stool, diarrhea,  nausea and vomiting.   Neurological: Positive for weakness. Negative for light-headedness.     Objective:     Vital Signs (Most Recent):  Temp: 98.3 °F (36.8 °C) (07/19/17 1159)  Pulse: 78 (07/19/17 1500)  Resp: 16 (07/19/17 1159)  BP: (!) 80/0 (07/19/17 1200)  SpO2: 96 % (07/19/17 1159) Vital Signs (24h Range):  Temp:  [97.2 °F (36.2 °C)-98.5 °F (36.9 °C)] 98.3 °F (36.8 °C)  Pulse:  [63-78] 78  Resp:  [11-18] 16  SpO2:  [94 %-99 %] 96 %  BP: ()/(0-78) 80/0     Weight: 54.1 kg (119 lb 4 oz)  Body mass index is 22.53 kg/m².      Intake/Output Summary (Last 24 hours) at 07/19/17 1607  Last data filed at 07/19/17 1025   Gross per 24 hour   Intake           1007.5 ml   Output             2000 ml   Net           -992.5 ml       Physical Exam   Constitutional: She is oriented to person, place, and time. She appears well-developed and well-nourished.   Neck: Normal range of motion. Neck supple. No JVD present.   Cardiovascular: Normal rate and regular rhythm.  Exam reveals no gallop and no friction rub.    No murmur heard.  Normal VAD hum   Pulmonary/Chest: Effort normal and breath sounds normal. She has no wheezes. She has no rales.   Abdominal: Soft. Bowel sounds are normal. There is no tenderness.   Musculoskeletal: She exhibits no edema.   Neurological: She is alert and oriented to person, place, and time.   Skin: Skin is warm and dry.       Significant Labs:  CBC:    Recent Labs  Lab 07/19/17  0550   WBC 3.15*   RBC 2.28*   HGB 7.1*   HCT 22.2*   PLT 99*   MCV 97   MCH 31.1*   MCHC 32.0     BNP:    Recent Labs  Lab 07/19/17  0550   BNP 2,544*     CMP:    Recent Labs  Lab 07/19/17  0550   GLU 78   CALCIUM 8.5*   ALBUMIN 3.1*   PROT 5.9*      K 4.3   CO2 23      BUN 26*   CREATININE 4.9*   ALKPHOS 84   ALT 11   AST 20   BILITOT 0.9      Coagulation:     Recent Labs  Lab 07/19/17  0550   INR 2.6*   APTT 29.7     LDH:    Recent Labs  Lab 07/19/17  0550        Microbiology:  Microbiology Results  (last 7 days)     Procedure Component Value Units Date/Time    Urine culture [[id number]] Collected:  07/19/17 1037    Order Status:  No result Specimen:  Urine Updated:  07/19/17 1252          BMP:     Recent Labs  Lab 07/19/17  0550   GLU 78      K 4.3      CO2 23   BUN 26*   CREATININE 4.9*   CALCIUM 8.5*   MG 1.9     Coagulation:     Recent Labs  Lab 07/19/17  0550   INR 2.6*   APTT 29.7     I have reviewed all pertinent labs within the past 24 hours.    Diagnostic Results:  I have reviewed all pertinent imaging results/findings within the past 24 hours.

## 2017-07-19 NOTE — ANESTHESIA POSTPROCEDURE EVALUATION
"Anesthesia Post Evaluation    Patient: Randa Devine    Procedure(s) Performed: Procedure(s) (LRB):  ESOPHAGOGASTRODUODENOSCOPY (EGD) (N/A)    Final Anesthesia Type: general  Patient location during evaluation: PACU  Patient participation: Yes- Able to Participate  Level of consciousness: awake and alert and oriented  Post-procedure vital signs: reviewed and stable  Pain management: adequate  Airway patency: patent  PONV status at discharge: No PONV  Anesthetic complications: no      Cardiovascular status: blood pressure returned to baseline and hemodynamically stable  Respiratory status: unassisted, spontaneous ventilation and room air  Hydration status: euvolemic  Follow-up not needed.        Visit Vitals  BP 96/62   Pulse 71   Temp 36.6 °C (97.8 °F) (Oral)   Resp 12   Ht 5' 1" (1.549 m)   Wt 54.1 kg (119 lb 4 oz)   LMP  (LMP Unknown)   SpO2 95%   Breastfeeding? No   BMI 22.53 kg/m²       Pain/Billy Score: Pain Assessment Performed: Yes (7/19/2017 10:34 AM)  Presence of Pain: denies (7/19/2017 10:34 AM)  Pain Rating Prior to Med Admin: 5 (7/18/2017 10:18 PM)  Pain Rating Post Med Admin: 2 (7/18/2017 11:19 PM)  Billy Score: 9 (7/19/2017 10:34 AM)      "

## 2017-07-19 NOTE — PLAN OF CARE
Problem: Occupational Therapy Goal  Goal: Occupational Therapy Goal  Goals to be met by: 7 days (7/26/17)     Patient will increase functional independence with ADLs by performing:    UE Dressing with [place].  LE Dressing with [place].  Grooming while standing at sink with [place].  Toileting from toilet with [place] for hygiene and clothing management.   Supine to sit with [place].  Toilet transfer to toilet with [place].  Increased functional strength to WFL for ADLs.    Outcome: Ongoing (interventions implemented as appropriate)  Eval and POC set 7/19/17

## 2017-07-19 NOTE — PT/OT/SLP EVAL
Physical Therapy  Evaluation    Randa Devine   MRN: [id number]   Admitting Diagnosis: GI bleed    PT Received On: 17  PT Start Time: 0850     PT Stop Time: 0907    PT Total Time (min): 17 min       Billable Minutes:  Evaluation 17 (co-eval with OT)    Diagnosis: GI bleed  LVAD inserted 2012    Past Medical History:   Diagnosis Date    Anticoagulant long-term use     Anxiety     Atrial tachycardia, paroxysmal 2013    Blood transfusion     Cardiomyopathy     CHF (congestive heart failure)     Chronic kidney disease     Coronary artery disease     End stage renal disease     Hypertension     pulmonary    ICD (implantable cardioverter-defibrillator) battery depletion 2014    Myocardial infarction     Presence of left ventricular assist device (LVAD)     Pulmonary hypertension     Stroke       Past Surgical History:   Procedure Laterality Date    CARDIAC DEFIBRILLATOR PLACEMENT      CARDIAC SURGERY       SECTION      COLONOSCOPY N/A 2016    Procedure: COLONOSCOPY;  Surgeon: Mark Currie MD;  [facility]  Service: Endoscopy;  Laterality: N/A;    COLONOSCOPY N/A 4/3/2017    Procedure: COLONOSCOPY;  Surgeon: Todd Longo MD;  [facility]  Service: Endoscopy;  Laterality: N/A;    CORONARY ARTERY BYPASS GRAFT      FRACTURE SURGERY      HIP SURGERY      RTHA    LEFT VENTRICULAR ASSIST DEVICE  2012    TONSILLECTOMY      VASCULAR SURGERY         Referring physician: Sera Collado MD  Date referred to PT: 17    General Precautions: Standard, LVAD, fall  Orthopedic Precautions: N/A   Braces: N/A       Do you have any cultural, spiritual, Anabaptist conflicts, given your current situation?: none noted     Patient History:  Lives With: alone  Living Arrangements: house  Transportation Available: family or friend will provide  Living Environment Comment: Pt lives alone in a 1SH with 0 PAPITO. Pt reports that her ex- and  "daughter are able to assist and provide transportation. Pt reports that PTA she was (I) with ADLs and household mobility, using rollator or w/c as needed for community mobility. Pt reports that she has B AFO but that they are broken.   Equipment Currently Used at Home: rollator, wheelchair, oxygen, orthotic device (also owns SPC and BSC but not currently using)    Previous Level of Function:  Ambulation Skills: needs device (for community distances )  Transfer Skills: independent  ADL Skills: independent    Subjective:  Communicated with RN prior to session.  "Can we go for a walk?"  Chief Complaint: none noted  Patient goals: return home     Pain/Comfort  Pain Rating 1: 0/10      Objective:   Patient found with: telemetry, oxygen (LVAD to battery power )     Cognitive Exam:  Oriented to: Person, Place, Time and Situation    Follows Commands/attention: Follows two-step commands  Communication: clear/fluent  Safety awareness/insight to disability: intact    Physical Exam:  Postural examination/scapula alignment: Rounded shoulder    Skin integrity: Visible skin intact  Edema: None noted     Sensation:   Intact    Lower Extremity Range of Motion:  Right Lower Extremity: WFL at hip and knee; decreased ankle DF active and passive ROM (unable to achieve neutral)  Left Lower Extremity: WFL at hip and knee; decreased ankle DF active and passive ROM (unable to achieve neutral)    Lower Extremity Strength:  Right Lower Extremity: WFL at hip and knee; trace active ankle DF   Left Lower Extremity: WFL at hip and knee; trace active ankle DF     Functional Mobility:  Bed Mobility:  Supine to Sit: Stand by Assistance  Sit to Supine: Stand by Assistance    Transfers:  Sit <> Stand Assistance: Stand By Assistance  Sit <> Stand Assistive Device: Rolling Walker    Gait:   Gait Distance: ~100 ft.   Assistance 1: Stand by Assistance  Gait Assistive Device: Rolling walker  Gait Pattern: 2-point gait  Gait Deviation(s): decreased shanti, " increased time in double stance, toes first, decreased step length, decreased velocity of limb motion, decreased weight-shifting ability (B foot drop (R>L); increased hip and knee flexion during swing to advance BLE )    Emergency bag present throughout    2L O2 in place throughout. SOB noted follow ambulation. Pt cued on pursed lip breathing. SOB improved following rest.     Balance:   Static Sit: supervision   Dynamic Sit: SBA  Static Stand: SBA  Dynamic stand: SBA    Therapeutic Activities and Exercises:  Pt educated on role of PT and PT POC. Pt verbalized understanding.   Discussed d/c recs with pt. Pt reports that she is unsure whether she would like HH therapy as she had HH in the past and did not feel that it was helpful. Pt unsure if she will have consistent transportation to OPPT.     AM-PAC 6 CLICK MOBILITY  How much help from another person does this patient currently need?   1 = Unable, Total/Dependent Assistance  2 = A lot, Maximum/Moderate Assistance  3 = A little, Minimum/Contact Guard/Supervision  4 = None, Modified [place]/Independent     Turning over in bed (including adjusting bedclothes, sheets and blankets)?: 4  Sitting down on and standing up from a chair with arms (e.g., wheelchair, bedside commode, etc.): 3  Moving from lying on back to sitting on the side of the bed?: 3  Moving to and from a bed to a chair (including a wheelchair)?: 3  Need to walk in hospital room?: 3  Climbing 3-5 steps with a railing?: 1  Total Score: 17     AM-PAC Raw Score CMS G-Code Modifier Level of Impairment Assistance   6 % Total / Unable   7 - 9 CM 80 - 100% Maximal Assist   10 - 14 CL 60 - 80% Moderate Assist   15 - 19 CK 40 - 60% Moderate Assist   20 - 22 CJ 20 - 40% Minimal Assist   23 CI 1-20% SBA / CGA   24 CH 0% Independent/ Mod I     Patient left supine on stretcher with all lines intact, call button in reach, RN notified and transport present.    Assessment:   Randa Devine is a 61 y.o.  female with a medical diagnosis of GI bleed and presents with previous LVAD insertion. Pt was able to perform functional mobility with SBA this session. B foot drop noted throughout gait with compensation at hip and knee to advance BLE. Pt attributes foot drop to previous extended stay in ICU. Pt also with impaired endurance, noted by SOB during ambulation, despite O2 in place throughout. Pt would benefit from skilled acute PT in order to address current deficits and progress functional mobility.     Rehab identified problem list/impairments: Rehab identified problem list/impairments: weakness, impaired functional mobilty, gait instability, impaired endurance, impaired balance, decreased lower extremity function, decreased ROM, decreased safety awareness, decreased coordination, impaired self care skills, impaired muscle length, impaired cardiopulmonary response to activity    Rehab potential is good.    Activity tolerance: Good    Discharge recommendations: Discharge Facility/Level Of Care Needs: home health PT (Pending pt agreement; however pt may refuse)     Barriers to discharge: Barriers to Discharge: Decreased caregiver support (lives alone)    Equipment recommendations: Equipment Needed After Discharge: orthotic device (Pt reports B AFOs are broken.)     GOALS:    Physical Therapy Goals        Problem: Physical Therapy Goal    Goal Priority Disciplines Outcome Goal Variances Interventions   Physical Therapy Goal     PT/OT, PT Ongoing (interventions implemented as appropriate)     Description:  Goals to be met by: 17     Patient will increase functional independence with mobility by performin. Supine to sit with Supervision   2. Sit to stand transfer with Supervision  3. Gait  x 200 feet with Supervision using Rolling Walker.   4. Lower extremity exercise program x15 reps, with assistance as needed, in order to increase LE strength and ROM                      PLAN:    Patient to be seen 4 x/week  to address the above listed problems via gait training, therapeutic activities, therapeutic exercises  Plan of Care expires: 08/17/17  Plan of Care reviewed with: patient        Tasneem Harvey, PT, DPT   7/19/2017  [phone]

## 2017-07-19 NOTE — PLAN OF CARE
Problem: Patient Care Overview  Goal: Plan of Care Review  Outcome: Ongoing (interventions implemented as appropriate)  Pt free of falls/traumas/injuries. Skin remains clean, dry, and intact. Pt S/P EDG; Pt to have colonoscopy. Colonoscopy pre this evening. Pt re-educated on importance of measuring accurate intake and out put; pt verbalized and demonstrates understanding. Reviewed plan of care with pt; and pt verbalized understanding.  Pt VSS in no distress will continue to monitor.

## 2017-07-19 NOTE — PROGRESS NOTES
Bedside HD completed. 1500 ml fluid removed. Dressings applied to left upper arm AVG and hemostatis achieved in 5 minutes. + thrill and bruit noted.

## 2017-07-20 ENCOUNTER — TELEPHONE (OUTPATIENT)
Dept: TRANSPLANT | Facility: CLINIC | Age: 62
End: 2017-07-20

## 2017-07-20 LAB
ANION GAP SERPL CALC-SCNC: 12 MMOL/L
APTT BLDCRRT: 30.6 SEC
BACTERIA UR CULT: NORMAL
BACTERIA UR CULT: NORMAL
BASOPHILS # BLD AUTO: 0.02 K/UL
BASOPHILS NFR BLD: 0.5 %
BASOPHILS NFR BLD: 0.5 %
BASOPHILS NFR BLD: 0.6 %
BLD PROD TYP BPU: NORMAL
BLD PROD TYP BPU: NORMAL
BLOOD UNIT EXPIRATION DATE: NORMAL
BLOOD UNIT EXPIRATION DATE: NORMAL
BLOOD UNIT TYPE CODE: 5100
BLOOD UNIT TYPE CODE: 5100
BLOOD UNIT TYPE: NORMAL
BLOOD UNIT TYPE: NORMAL
BUN SERPL-MCNC: 39 MG/DL
CALCIUM SERPL-MCNC: 8.3 MG/DL
CHLORIDE SERPL-SCNC: 103 MMOL/L
CO2 SERPL-SCNC: 22 MMOL/L
CODING SYSTEM: NORMAL
CODING SYSTEM: NORMAL
CREAT SERPL-MCNC: 6.7 MG/DL
DIFFERENTIAL METHOD: ABNORMAL
DISPENSE STATUS: NORMAL
DISPENSE STATUS: NORMAL
EOSINOPHIL # BLD AUTO: 0 K/UL
EOSINOPHIL # BLD AUTO: 0.1 K/UL
EOSINOPHIL # BLD AUTO: 0.1 K/UL
EOSINOPHIL NFR BLD: 1.1 %
EOSINOPHIL NFR BLD: 1.4 %
EOSINOPHIL NFR BLD: 1.8 %
ERYTHROCYTE [DISTWIDTH] IN BLOOD BY AUTOMATED COUNT: 20.1 %
ERYTHROCYTE [DISTWIDTH] IN BLOOD BY AUTOMATED COUNT: 20.2 %
ERYTHROCYTE [DISTWIDTH] IN BLOOD BY AUTOMATED COUNT: 20.6 %
EST. GFR  (AFRICAN AMERICAN): 7.1 ML/MIN/1.73 M^2
EST. GFR  (NON AFRICAN AMERICAN): 6.2 ML/MIN/1.73 M^2
GLUCOSE SERPL-MCNC: 77 MG/DL
HCT VFR BLD AUTO: 22.4 %
HCT VFR BLD AUTO: 24.2 %
HCT VFR BLD AUTO: 24.6 %
HGB BLD-MCNC: 7.2 G/DL
HGB BLD-MCNC: 7.8 G/DL
HGB BLD-MCNC: 8.1 G/DL
INR PPP: 2.9
LDH SERPL L TO P-CCNC: 160 U/L
LYMPHOCYTES # BLD AUTO: 0.5 K/UL
LYMPHOCYTES # BLD AUTO: 0.6 K/UL
LYMPHOCYTES # BLD AUTO: 0.8 K/UL
LYMPHOCYTES NFR BLD: 13.7 %
LYMPHOCYTES NFR BLD: 17.3 %
LYMPHOCYTES NFR BLD: 19.8 %
MAGNESIUM SERPL-MCNC: 2 MG/DL
MCH RBC QN AUTO: 29.9 PG
MCH RBC QN AUTO: 30.6 PG
MCH RBC QN AUTO: 31 PG
MCHC RBC AUTO-ENTMCNC: 32.1 G/DL
MCHC RBC AUTO-ENTMCNC: 32.2 G/DL
MCHC RBC AUTO-ENTMCNC: 32.9 G/DL
MCV RBC AUTO: 93 FL
MCV RBC AUTO: 94 FL
MCV RBC AUTO: 95 FL
MONOCYTES # BLD AUTO: 0.3 K/UL
MONOCYTES # BLD AUTO: 0.4 K/UL
MONOCYTES # BLD AUTO: 0.4 K/UL
MONOCYTES NFR BLD: 11.3 %
MONOCYTES NFR BLD: 8.2 %
MONOCYTES NFR BLD: 9.1 %
NEUTROPHILS # BLD AUTO: 2.5 K/UL
NEUTROPHILS # BLD AUTO: 2.7 K/UL
NEUTROPHILS # BLD AUTO: 2.8 K/UL
NEUTROPHILS NFR BLD: 68.8 %
NEUTROPHILS NFR BLD: 72.2 %
NEUTROPHILS NFR BLD: 73.1 %
NUM UNITS TRANS PACKED RBC: NORMAL
NUM UNITS TRANS PACKED RBC: NORMAL
PHOSPHATE SERPL-MCNC: 4.8 MG/DL
PLATELET # BLD AUTO: 100 K/UL
PLATELET # BLD AUTO: 89 K/UL
PLATELET # BLD AUTO: 96 K/UL
PMV BLD AUTO: 9 FL
PMV BLD AUTO: 9.2 FL
PMV BLD AUTO: 9.5 FL
POTASSIUM SERPL-SCNC: 4.8 MMOL/L
PROTHROMBIN TIME: 29.4 SEC
RBC # BLD AUTO: 2.41 M/UL
RBC # BLD AUTO: 2.55 M/UL
RBC # BLD AUTO: 2.61 M/UL
SODIUM SERPL-SCNC: 137 MMOL/L
WBC # BLD AUTO: 3.52 K/UL
WBC # BLD AUTO: 3.79 K/UL
WBC # BLD AUTO: 3.94 K/UL

## 2017-07-20 PROCEDURE — P9040 RBC LEUKOREDUCED IRRADIATED: HCPCS

## 2017-07-20 PROCEDURE — 63600175 PHARM REV CODE 636 W HCPCS: Performed by: PHYSICIAN ASSISTANT

## 2017-07-20 PROCEDURE — 80100014 HC HEMODIALYSIS 1:1

## 2017-07-20 PROCEDURE — 85610 PROTHROMBIN TIME: CPT

## 2017-07-20 PROCEDURE — 93750 INTERROGATION VAD IN PERSON: CPT | Performed by: PHYSICIAN ASSISTANT

## 2017-07-20 PROCEDURE — 25500020 PHARM REV CODE 255: Performed by: INTERNAL MEDICINE

## 2017-07-20 PROCEDURE — 20600001 HC STEP DOWN PRIVATE ROOM

## 2017-07-20 PROCEDURE — 84100 ASSAY OF PHOSPHORUS: CPT

## 2017-07-20 PROCEDURE — 99232 SBSQ HOSP IP/OBS MODERATE 35: CPT | Mod: ,,, | Performed by: INTERNAL MEDICINE

## 2017-07-20 PROCEDURE — 25000003 PHARM REV CODE 250: Performed by: PHYSICIAN ASSISTANT

## 2017-07-20 PROCEDURE — 85730 THROMBOPLASTIN TIME PARTIAL: CPT

## 2017-07-20 PROCEDURE — 85025 COMPLETE CBC W/AUTO DIFF WBC: CPT

## 2017-07-20 PROCEDURE — 83735 ASSAY OF MAGNESIUM: CPT

## 2017-07-20 PROCEDURE — 80048 BASIC METABOLIC PNL TOTAL CA: CPT

## 2017-07-20 PROCEDURE — 25000003 PHARM REV CODE 250: Performed by: INTERNAL MEDICINE

## 2017-07-20 PROCEDURE — 27000248 HC VAD-ADDITIONAL DAY

## 2017-07-20 PROCEDURE — 93750 INTERROGATION VAD IN PERSON: CPT | Mod: ,,, | Performed by: INTERNAL MEDICINE

## 2017-07-20 PROCEDURE — 83615 LACTATE (LD) (LDH) ENZYME: CPT

## 2017-07-20 PROCEDURE — C9113 INJ PANTOPRAZOLE SODIUM, VIA: HCPCS | Performed by: PHYSICIAN ASSISTANT

## 2017-07-20 PROCEDURE — 93005 ELECTROCARDIOGRAM TRACING: CPT

## 2017-07-20 PROCEDURE — 93010 ELECTROCARDIOGRAM REPORT: CPT | Mod: ,,, | Performed by: INTERNAL MEDICINE

## 2017-07-20 PROCEDURE — 36415 COLL VENOUS BLD VENIPUNCTURE: CPT

## 2017-07-20 PROCEDURE — 25000003 PHARM REV CODE 250: Performed by: STUDENT IN AN ORGANIZED HEALTH CARE EDUCATION/TRAINING PROGRAM

## 2017-07-20 RX ORDER — HYDROCODONE BITARTRATE AND ACETAMINOPHEN 500; 5 MG/1; MG/1
TABLET ORAL
Status: DISCONTINUED | OUTPATIENT
Start: 2017-07-20 | End: 2017-07-25

## 2017-07-20 RX ORDER — POLYETHYLENE GLYCOL 3350, SODIUM SULFATE ANHYDROUS, SODIUM BICARBONATE, SODIUM CHLORIDE, POTASSIUM CHLORIDE 236; 22.74; 6.74; 5.86; 2.97 G/4L; G/4L; G/4L; G/4L; G/4L
2000 POWDER, FOR SOLUTION ORAL 2 TIMES DAILY
Status: DISPENSED | OUTPATIENT
Start: 2017-07-20 | End: 2017-07-21

## 2017-07-20 RX ORDER — FLUTICASONE PROPIONATE 50 MCG
2 SPRAY, SUSPENSION (ML) NASAL DAILY
Status: DISCONTINUED | OUTPATIENT
Start: 2017-07-20 | End: 2017-07-26 | Stop reason: HOSPADM

## 2017-07-20 RX ORDER — SODIUM CHLORIDE 9 MG/ML
INJECTION, SOLUTION INTRAVENOUS ONCE
Status: COMPLETED | OUTPATIENT
Start: 2017-07-20 | End: 2017-07-22

## 2017-07-20 RX ADMIN — HYDRALAZINE HYDROCHLORIDE 50 MG: 50 TABLET ORAL at 01:07

## 2017-07-20 RX ADMIN — IOHEXOL 100 ML: 350 INJECTION, SOLUTION INTRAVENOUS at 11:07

## 2017-07-20 RX ADMIN — FLUTICASONE PROPIONATE 2 SPRAY: 50 SPRAY, METERED NASAL at 05:07

## 2017-07-20 RX ADMIN — CETIRIZINE HYDROCHLORIDE 5 MG: 5 TABLET, FILM COATED ORAL at 08:07

## 2017-07-20 RX ADMIN — ONDANSETRON 4 MG: 4 TABLET, FILM COATED ORAL at 05:07

## 2017-07-20 RX ADMIN — ATORVASTATIN CALCIUM 40 MG: 20 TABLET, FILM COATED ORAL at 08:07

## 2017-07-20 RX ADMIN — GABAPENTIN 300 MG: 300 CAPSULE ORAL at 09:07

## 2017-07-20 RX ADMIN — ESCITALOPRAM 20 MG: 20 TABLET, FILM COATED ORAL at 09:07

## 2017-07-20 RX ADMIN — PANTOPRAZOLE SODIUM 40 MG: 40 INJECTION, POWDER, FOR SOLUTION INTRAVENOUS at 08:07

## 2017-07-20 RX ADMIN — HYDRALAZINE HYDROCHLORIDE 50 MG: 50 TABLET ORAL at 09:07

## 2017-07-20 RX ADMIN — AMLODIPINE BESYLATE 10 MG: 10 TABLET ORAL at 08:07

## 2017-07-20 RX ADMIN — ACETAMINOPHEN 650 MG: 325 TABLET ORAL at 05:07

## 2017-07-20 RX ADMIN — HYDRALAZINE HYDROCHLORIDE 50 MG: 50 TABLET ORAL at 05:07

## 2017-07-20 RX ADMIN — POLYETHYLENE GLYCOL 3350, SODIUM SULFATE ANHYDROUS, SODIUM BICARBONATE, SODIUM CHLORIDE, POTASSIUM CHLORIDE 2000 ML: 236; 22.74; 6.74; 5.86; 2.97 POWDER, FOR SOLUTION ORAL at 06:07

## 2017-07-20 NOTE — SIGNIFICANT EVENT
Nurse called for BRBPR in the setting of preparation for colonoscopy.    Examined the patient on bed-side.   Vitals stable.   Physical Exam:   Gen: lying in bed in no distress, very pleasant lady.   ABD: Soft, NT, bs active, no distention.   HEART: S1, S1, tachy at 100.   Neuro: Alert, Oriented X 3.     Assessment:   - BRBPR in the setting of preparation for colonocopy and intake of Golytely.   - Hold off on Golytely intake, informed the GI fellow on call for tonight.   - Check HnH, INR, Type and Screen and BMP.   - Will transfuse if drop in HnH. Will follow HnH q6 Hrs.   - If evidence for UGI bleed, will also give IV Pantoprazole.   - Case discussed with pt on bd side.

## 2017-07-20 NOTE — PROGRESS NOTES
Spoke with Dr. Dan who would like to continue Golytely and get as much in patient as possible, by 0900. Patient educated and has begun drinking the prep.    0930 Dr. Dan called to have the bowel prep discontinued and plans to have patient scanned instead. Patient made aware. Will continue to monitor.    0940 CT called to put in for transport of patient for CT

## 2017-07-20 NOTE — PROGRESS NOTES
[facility]  Heart Transplant  Progress Note    Patient Name: Randa Devine  MRN: [id number]  Admission Date: 7/18/2017  Hospital Length of Stay: 2 days  Attending Physician: Sera Collado MD  Primary Care Provider: Laura Garvin DO  Principal Problem:GI bleed    Subjective:     Interval History: The patient continued to experience bloody BMs. >3 Bloody BMs overnight. Hb dropped to 6.9 last night and received a unit of PRBC. Underwent colonscopy prep however given ongoing bleeding, GI recommended CTA which was negative for any source of bleeding. Spoke with GI who are recommending a complete colonoscopy prep overnight and are aware that the patient is bleeding and will continue to bleed. However this prep will be necessary for a thorough evaluation through C-scope.     Continuous Infusions:   Scheduled Meds:   amlodipine  10 mg Oral Daily    atorvastatin  40 mg Oral Daily    calcium carbonate  500 mg Oral QID    cetirizine  5 mg Oral Daily    escitalopram oxalate  20 mg Oral QHS    fluticasone  2 spray Each Nare Daily    gabapentin  300 mg Oral QHS    hydrALAZINE  50 mg Oral Q8H    pantoprazole  40 mg Intravenous Daily    polyethylene glycol  2,000 mL Oral Once     PRN Meds:sodium chloride, sodium chloride, acetaminophen, ondansetron    Review of patient's allergies indicates:   Allergen Reactions    Codeine Nausea And Vomiting    Demerol [meperidine] Nausea And Vomiting    Lortab [hydrocodone-acetaminophen] Nausea And Vomiting     Objective:     Vital Signs (Most Recent):  Temp: 97.9 °F (36.6 °C) (07/20/17 1215)  Pulse: 80 (07/20/17 1215)  Resp: 18 (07/20/17 1215)  BP: (!) 90/0 (07/20/17 1226)  SpO2: 97 % (07/20/17 1215) Vital Signs (24h Range):  Temp:  [97.9 °F (36.6 °C)-98.7 °F (37.1 °C)] 97.9 °F (36.6 °C)  Pulse:  [] 80  Resp:  [16-18] 18  SpO2:  [86 %-98 %] 97 %  BP: ()/(0-72) 90/0     Weight: 52.3 kg (115 lb 6.4 oz)  Body mass index is 21.8 kg/m².      Intake/Output  Summary (Last 24 hours) at 07/20/17 1429  Last data filed at 07/20/17 0500   Gross per 24 hour   Intake              260 ml   Output              400 ml   Net             -140 ml       Hemodynamic Parameters:       Telemetry: no events    Physical Exam   Constitutional: She is oriented to person, place, and time. She appears well-developed and well-nourished.   Neck: Normal range of motion. Neck supple. No JVD present.   Cardiovascular: Normal rate and regular rhythm.    Normal VAD hum   Pulmonary/Chest: Effort normal and breath sounds normal. She has no wheezes. She has no rales.   Abdominal: Soft. Bowel sounds are normal. There is no tenderness.   Musculoskeletal: She exhibits no edema.   Neurological: She is alert and oriented to person, place, and time.   Skin: Skin is warm and dry.     Significant Labs:  CBC:    Recent Labs  Lab 07/20/17  1216   WBC 3.79*   RBC 2.61*   HGB 8.1*   HCT 24.6*   PLT 96*   MCV 94   MCH 31.0   MCHC 32.9     BNP:    Recent Labs  Lab 07/19/17  0550   BNP 2,544*     CMP:    Recent Labs  Lab 07/19/17  0550  07/20/17  0544   GLU 78  < > 77   CALCIUM 8.5*  < > 8.3*   ALBUMIN 3.1*  --   --    PROT 5.9*  --   --      < > 137   K 4.3  < > 4.8   CO2 23  < > 22*     < > 103   BUN 26*  < > 39*   CREATININE 4.9*  < > 6.7*   ALKPHOS 84  --   --    ALT 11  --   --    AST 20  --   --    BILITOT 0.9  --   --    < > = values in this interval not displayed.   Coagulation:     Recent Labs  Lab 07/20/17  0544   INR 2.9*   APTT 30.6     LDH:    Recent Labs  Lab 07/19/17  0550 07/20/17  0544    160     Microbiology:  Microbiology Results (last 7 days)     Procedure Component Value Units Date/Time    Urine culture [[id number]] Collected:  07/19/17 1037    Order Status:  No result Specimen:  Urine Updated:  07/19/17 1252          I have reviewed all pertinent labs within the past 24 hours.    Estimated Creatinine Clearance: 6.8 mL/min (based on Cr of 6.7).    Diagnostic Results:  I have  reviewed and interpreted all pertinent imaging results/findings within the past 24 hours.    CTA A/P (7/20/17):  No evidence of an acute GI bleed.    Multiple collateral vessels within the abdominal wall, resulting in early filling of the right iliac veins.    There is a small left-sided pleural effusion.    Additional findings include:  -Appropriately placed LVAD and pacemaker  -Atrophy of the kidneys, suggestive of ESRD  -Severe L5 compression fracture.  -Orthopedic screws within the right femoral neck.  -Atherosclerosis.    Assessment and Plan:     * GI bleed    -INR 2.9 today. Hold coumadin  -GI consulted. EGD today showed Erythematous duodenopathy.  -Protonix IV QD  -s/p CTA which did not identify source of bleeding  -NPO after MN for C-scope tomorrow - will need complete prep tonight  -serial CBC q6H        Anemia    -Acute on Chronic, due to GIB  -Transfused PRBCs 7/18, 7/20          Left ventricular assist device present    -HW implanted 5/2012  -Speed 2600  -Hold coumadin as above  -HTN- cont hydralazine, amlodipine        End stage renal disease on dialysis    -Nephrology consulted. On HD Tues, Thurs, Sat  -Had HD ovenrnight.          Case discussed with attending.    Carline Glass MD  Heart Transplant  [facility]

## 2017-07-20 NOTE — PROGRESS NOTES
H/H: 6.9 and 22. 1 unit of blood ordered per MD.   Blood consent obtained. Vitals stable. Educated pt about the S&S of blood transfusion reaction. Pt verbalized understanding. Blood started at 0115.  Will monitor the patient and recheck the labs.

## 2017-07-20 NOTE — NURSING TRANSFER
Nursing Transfer Note      7/20/2017     Transfer To: CT    Transfer via stretcher    Transfer with cardiac monitoring    Transported by transport escort    Medicines sent: none    Chart send with patient: No    Tele room notified and LVAD emergency bag with patient.

## 2017-07-20 NOTE — PROGRESS NOTES
[facility]  Gastroenterology  Progress Note    Patient Name: Randa Devine  MRN: [id number]  Admission Date: 7/18/2017  Hospital Length of Stay: 2 days  Code Status: Full Code   Attending Provider: Sera Collado MD  Consulting Provider: Christiano Berg MD  Primary Care Physician: Laura Garvin DO  Principal Problem: GI bleed      Subjective:     Interval History:   Overnight patient only completed half the prep due to ongoing bleeding.   She also received 1 unit of blood.  This morning she states that she feels tired as she was up all night using th restroom.    Review of Systems   Constitutional: Negative.    HENT: Negative.    Eyes: Negative.    Respiratory: Negative.    Cardiovascular: Negative.    Gastrointestinal: Positive for blood in stool.   Endocrine: Negative.    Genitourinary: Negative.    Musculoskeletal: Negative.    Skin: Negative.    Hematological: Negative.      Objective:     Vital Signs (Most Recent):  Temp: 97.9 °F (36.6 °C) (07/20/17 1215)  Pulse: 80 (07/20/17 1215)  Resp: 18 (07/20/17 1215)  BP: (!) 90/0 (07/20/17 1226)  SpO2: 97 % (07/20/17 1215) Vital Signs (24h Range):  Temp:  [97.9 °F (36.6 °C)-98.7 °F (37.1 °C)] 97.9 °F (36.6 °C)  Pulse:  [] 80  Resp:  [16-18] 18  SpO2:  [94 %-98 %] 97 %  BP: ()/(0-72) 90/0     Weight: 52.3 kg (115 lb 6.4 oz) (07/20/17 0400)  Body mass index is 21.8 kg/m².      Intake/Output Summary (Last 24 hours) at 07/20/17 1519  Last data filed at 07/20/17 0500   Gross per 24 hour   Intake              260 ml   Output              400 ml   Net             -140 ml       Lines/Drains/Airways     Drain                 Hemodialysis AV Graft Left upper arm 74451 days          Line                 VAD 10/29/12 1200 Heartware 1725 days          Peripheral Intravenous Line                 Peripheral IV - Single Lumen 07/18/17 1154 Right Antecubital 2 days                Physical Exam   Constitutional: She is oriented to person, place, and time.  No distress.   HENT:   Head: Normocephalic.   Eyes: Pupils are equal, round, and reactive to light.   Neck: Normal range of motion.   Cardiovascular:   LVAD hum   Pulmonary/Chest: Effort normal and breath sounds normal.   Abdominal: Soft. Bowel sounds are normal.   Musculoskeletal: Normal range of motion.   Neurological: She is alert and oriented to person, place, and time.   Skin: Skin is warm. She is not diaphoretic.       Significant Labs:  CBC:   Recent Labs  Lab 07/19/17  2317 07/20/17  0544 07/20/17  1216   WBC 4.86 3.94 3.79*   HGB 6.9* 7.8* 8.1*   HCT 22.0* 24.2* 24.6*   PLT 96* 100* 96*     CMP:   Recent Labs  Lab 07/19/17  0550  07/20/17  0544   GLU 78  < > 77   CALCIUM 8.5*  < > 8.3*   ALBUMIN 3.1*  --   --    PROT 5.9*  --   --      < > 137   K 4.3  < > 4.8   CO2 23  < > 22*     < > 103   BUN 26*  < > 39*   CREATININE 4.9*  < > 6.7*   ALKPHOS 84  --   --    ALT 11  --   --    AST 20  --   --    BILITOT 0.9  --   --    < > = values in this interval not displayed.  Coagulation:   Recent Labs  Lab 07/20/17  0544   INR 2.9*   APTT 30.6     CRP:   Recent Labs  Lab 07/19/17  0550   CRP 1.5         Significant Imaging:  Imaging results within the past 24 hours have been reviewed.    Assessment/Plan:     * GI bleed    Patient has already undergone an EGD that was negative. CTA this morning showed no active sites of bleeding although patient was actively bleeding. The next step is to prep once again for a colonoscopy as in the past she has had active bleeding in the colon.  -CLD now and NPO after MN  -Continue to monitor INR (for colonoscopy it show be less than 2.5)  -Continue to monitor H/H   -Golytely split prep-of note the prep will cause bloody bowel movements as it aggrevates the areas from which patient is bleed but she needs a good prep as colonic sources of bleeding are difficult to find.  -Colonoscopy in AM if patient can drink entire prep and INR is less than 2.5  -Will continue to follow  alongside primary team, in addition after the scope will leave an additional note with the findings and further recommendations        Normocytic anemia due to blood loss    Refer to above          Christiano Berg M.D.  Gastroenterology Fellow, PGY-IV  Pager: [phone]  [facility]

## 2017-07-20 NOTE — PLAN OF CARE
Problem: Patient Care Overview  Goal: Plan of Care Review  Outcome: Ongoing (interventions implemented as appropriate)  Pt free of falls, injury, trauma. Pt skin remains clean, dry, intact; pt status post egd. Pt npo after midnight for colonoscopy.  Pt began golytely prep per order; pt educated about accurate input and ouput and pt verbalized understanding; reviewed plan of care with patient; pt vital signs stable. Pt in no distress. Will continue to monitor

## 2017-07-20 NOTE — TELEPHONE ENCOUNTER
----- Message from Su Zarate RN sent at 7/5/2017  5:08 PM CDT -----  Regarding: cbc 7/18  Cbc at [facility] 7/18  [phone] phone  [phone]

## 2017-07-20 NOTE — TREATMENT PLAN
GI Treatment Plan  07/20/2017  4:32 PM    Colonoscopy prep has been ordered tonight.    When patient starts the prep she might have bloody BM which is expected.    The prep should not be stopped as in order to have a good colonoscopy tomorrow/find bleed a good prep is required.     Continue to monitor serial H/H and transfuse as needed.    If significant bleeding or unsure about continuing prep due to significant bleeding please notify GI.    Christiano Berg M.D.  Gastroenterology Fellow, PGY-IV  Pager: [phone]  [facility]

## 2017-07-20 NOTE — PROGRESS NOTES
Dr. Glass notified patient back from CT scan and still NPO. Patient still having bloody bowels. MD orders to page GI. Dr. Berg would like to followup with GI team and will place orders shortly.

## 2017-07-20 NOTE — TELEPHONE ENCOUNTER
Labs ordered for 7/18 were not done.  I called and they were going to draw the lab on 7/20 but pt is in the hospital now.

## 2017-07-20 NOTE — PROGRESS NOTES
HnH droped to 6.9 on HnH after taking half of the Golytely soln.     Will transfuse one unit on PRBC.

## 2017-07-20 NOTE — SUBJECTIVE & OBJECTIVE
Interval History: The patient continued to experience bloody BMs. >3 Bloody BMs overnight. Hb dropped to 6.9 last night and received a unit of PRBC. Underwent colonscopy prep however given ongoing bleeding, GI recommended CTA which was negative for any source of bleeding. Spoke with GI who are recommending a complete colonoscopy prep overnight and are aware that the patient is bleeding and will continue to bleed. However this prep will be necessary for a thorough evaluation through C-scope.     Continuous Infusions:   Scheduled Meds:   amlodipine  10 mg Oral Daily    atorvastatin  40 mg Oral Daily    calcium carbonate  500 mg Oral QID    cetirizine  5 mg Oral Daily    escitalopram oxalate  20 mg Oral QHS    fluticasone  2 spray Each Nare Daily    gabapentin  300 mg Oral QHS    hydrALAZINE  50 mg Oral Q8H    pantoprazole  40 mg Intravenous Daily    polyethylene glycol  2,000 mL Oral Once     PRN Meds:sodium chloride, sodium chloride, acetaminophen, ondansetron    Review of patient's allergies indicates:   Allergen Reactions    Codeine Nausea And Vomiting    Demerol [meperidine] Nausea And Vomiting    Lortab [hydrocodone-acetaminophen] Nausea And Vomiting     Objective:     Vital Signs (Most Recent):  Temp: 97.9 °F (36.6 °C) (07/20/17 1215)  Pulse: 80 (07/20/17 1215)  Resp: 18 (07/20/17 1215)  BP: (!) 90/0 (07/20/17 1226)  SpO2: 97 % (07/20/17 1215) Vital Signs (24h Range):  Temp:  [97.9 °F (36.6 °C)-98.7 °F (37.1 °C)] 97.9 °F (36.6 °C)  Pulse:  [] 80  Resp:  [16-18] 18  SpO2:  [86 %-98 %] 97 %  BP: ()/(0-72) 90/0     Weight: 52.3 kg (115 lb 6.4 oz)  Body mass index is 21.8 kg/m².      Intake/Output Summary (Last 24 hours) at 07/20/17 1422  Last data filed at 07/20/17 0500   Gross per 24 hour   Intake              260 ml   Output              400 ml   Net             -140 ml       Hemodynamic Parameters:       Telemetry: no events    Physical Exam   Constitutional: She is oriented to person,  place, and time. She appears well-developed and well-nourished.   Neck: Normal range of motion. Neck supple. No JVD present.   Cardiovascular: Normal rate and regular rhythm.    Normal VAD hum   Pulmonary/Chest: Effort normal and breath sounds normal. She has no wheezes. She has no rales.   Abdominal: Soft. Bowel sounds are normal. There is no tenderness.   Musculoskeletal: She exhibits no edema.   Neurological: She is alert and oriented to person, place, and time.   Skin: Skin is warm and dry.     Significant Labs:  CBC:    Recent Labs  Lab 07/20/17  1216   WBC 3.79*   RBC 2.61*   HGB 8.1*   HCT 24.6*   PLT 96*   MCV 94   MCH 31.0   MCHC 32.9     BNP:    Recent Labs  Lab 07/19/17  0550   BNP 2,544*     CMP:    Recent Labs  Lab 07/19/17  0550  07/20/17  0544   GLU 78  < > 77   CALCIUM 8.5*  < > 8.3*   ALBUMIN 3.1*  --   --    PROT 5.9*  --   --      < > 137   K 4.3  < > 4.8   CO2 23  < > 22*     < > 103   BUN 26*  < > 39*   CREATININE 4.9*  < > 6.7*   ALKPHOS 84  --   --    ALT 11  --   --    AST 20  --   --    BILITOT 0.9  --   --    < > = values in this interval not displayed.   Coagulation:     Recent Labs  Lab 07/20/17  0544   INR 2.9*   APTT 30.6     LDH:    Recent Labs  Lab 07/19/17  0550 07/20/17  0544    160     Microbiology:  Microbiology Results (last 7 days)     Procedure Component Value Units Date/Time    Urine culture [[id number]] Collected:  07/19/17 1037    Order Status:  No result Specimen:  Urine Updated:  07/19/17 1252          I have reviewed all pertinent labs within the past 24 hours.    Estimated Creatinine Clearance: 6.8 mL/min (based on Cr of 6.7).    Diagnostic Results:  I have reviewed and interpreted all pertinent imaging results/findings within the past 24 hours.

## 2017-07-20 NOTE — PLAN OF CARE
Problem: Patient Care Overview  Goal: Plan of Care Review  Outcome: Ongoing (interventions implemented as appropriate)  Pt denies Chest pain, SOB or nausea. C/o headache- CT head neg; mild relief obtained with tylenol. No falls, trauma or injury noted. VSS. 2L NC. LVAD-HW- S&W dsg change every other day.  Was NPO after MN for  EGD this am. Pt having bloody stool. Will stop Golytely. H/H: 6.9 and 22. 1 unit of blood started. Plan of care reviewed with patient. No further questions at this time. No significant events. Will continue to monitor.

## 2017-07-20 NOTE — PROGRESS NOTES
Pt is on Golytely. She is going to have C-scope wayne am. Pt is having bloody watery stool X3. She is here for GI bleed. She received 1 unit of blood on 7/18. Dr. Lopez notified. Golytely stopped. CBC, BMP, INR, PTT Type and screen ordered. GI team notified. Waiting for Lab results. Will notify the MD and follow the orders.

## 2017-07-20 NOTE — PROCEDURES
Patient aao x3  with no family at bedside. VAD interrogation completed this AM in the event changes needed to be made. Pt reports doing well with no major concerns Will continue to monitor for further issues.     Pulsatile: Yes   VAD Sounds: Smooth  Problems / Issues / Alarms with VAD if any: None noted  Waveforms: 4-7, no deflections      VAD Interrogation:  TXP LVAD INTERROGATIONS 7/20/2017 7/20/2017 7/20/2017 7/20/2017 7/19/2017 7/19/2017 7/19/2017   Type Heartware Heartware Heartware Heartware Heartware Heartware Heartware   Flow 5.4 5.3 5.2 5.3 4.9 5.1 5.0   Speed 2600 2600 2600 2600 2600 2600 2600   PI - - - - - - -   Power (Kim) 3.6 3.6 3.6 3.5 3.5 3.6 3.7   Low Flow Alarm 2.5 - - - - - -   High Power Alarm 5.5 - - - - - -   Pulsatility Intermittent pulse - Intermittent pulse - - - Pulse   }

## 2017-07-20 NOTE — ASSESSMENT & PLAN NOTE
Patient has already undergone an EGD that was negative. CTA this morning showed no active sites of bleeding although patient was actively bleeding. The next step is to prep once again for a colonoscopy as in the past she has had active bleeding in the colon.  -CLD now and NPO after MN  -Continue to monitor INR (for colonoscopy it show be less than 2.5)  -Continue to monitor H/H   -Golytely split prep-of note the prep will cause bloody bowel movements as it aggrevates the areas from which patient is bleed but she needs a good prep as colonic sources of bleeding are difficult to find.  -Colonoscopy in AM if patient can drink entire prep and INR is less than 2.5  -Will continue to follow alongside primary team, in addition after the scope will leave an additional note with the findings and further recommendations

## 2017-07-20 NOTE — PLAN OF CARE
Problem: Patient Care Overview  Goal: Plan of Care Review  Discussed Plan of Care with patient. VS stable. Ambulates well with stand-by assist. Fall precautions in place. Bleeding precautions reviewed. Bowel prep stopped this AM for CT of Abd. Clear liquid diet initiated at 1530. NPO after midnight and bowel prep will be restarted this evening and colonoscopy tomorrow. Trending H/H and stable. Pain denied. Patient remained free of falls/ injury. All questions and concerns were addressed. Will continue to monitor patient.

## 2017-07-20 NOTE — SUBJECTIVE & OBJECTIVE
Subjective:     Interval History:   Overnight patient only completed half the prep due to ongoing bleeding.   She also received 1 unit of blood.  This morning she states that she feels tired as she was up all night using th restroom.    Review of Systems   Constitutional: Negative.    HENT: Negative.    Eyes: Negative.    Respiratory: Negative.    Cardiovascular: Negative.    Gastrointestinal: Positive for blood in stool.   Endocrine: Negative.    Genitourinary: Negative.    Musculoskeletal: Negative.    Skin: Negative.    Hematological: Negative.      Objective:     Vital Signs (Most Recent):  Temp: 97.9 °F (36.6 °C) (07/20/17 1215)  Pulse: 80 (07/20/17 1215)  Resp: 18 (07/20/17 1215)  BP: (!) 90/0 (07/20/17 1226)  SpO2: 97 % (07/20/17 1215) Vital Signs (24h Range):  Temp:  [97.9 °F (36.6 °C)-98.7 °F (37.1 °C)] 97.9 °F (36.6 °C)  Pulse:  [] 80  Resp:  [16-18] 18  SpO2:  [94 %-98 %] 97 %  BP: ()/(0-72) 90/0     Weight: 52.3 kg (115 lb 6.4 oz) (07/20/17 0400)  Body mass index is 21.8 kg/m².      Intake/Output Summary (Last 24 hours) at 07/20/17 1519  Last data filed at 07/20/17 0500   Gross per 24 hour   Intake              260 ml   Output              400 ml   Net             -140 ml       Lines/Drains/Airways     Drain                 Hemodialysis AV Graft Left upper arm 52850 days          Line                 VAD 10/29/12 1200 Heartware 1725 days          Peripheral Intravenous Line                 Peripheral IV - Single Lumen 07/18/17 1154 Right Antecubital 2 days                Physical Exam   Constitutional: She is oriented to person, place, and time. No distress.   HENT:   Head: Normocephalic.   Eyes: Pupils are equal, round, and reactive to light.   Neck: Normal range of motion.   Cardiovascular:   LVAD hum   Pulmonary/Chest: Effort normal and breath sounds normal.   Abdominal: Soft. Bowel sounds are normal.   Musculoskeletal: Normal range of motion.   Neurological: She is alert and oriented to  person, place, and time.   Skin: Skin is warm. She is not diaphoretic.       Significant Labs:  CBC:   Recent Labs  Lab 07/19/17  2317 07/20/17  0544 07/20/17  1216   WBC 4.86 3.94 3.79*   HGB 6.9* 7.8* 8.1*   HCT 22.0* 24.2* 24.6*   PLT 96* 100* 96*     CMP:   Recent Labs  Lab 07/19/17  0550  07/20/17  0544   GLU 78  < > 77   CALCIUM 8.5*  < > 8.3*   ALBUMIN 3.1*  --   --    PROT 5.9*  --   --      < > 137   K 4.3  < > 4.8   CO2 23  < > 22*     < > 103   BUN 26*  < > 39*   CREATININE 4.9*  < > 6.7*   ALKPHOS 84  --   --    ALT 11  --   --    AST 20  --   --    BILITOT 0.9  --   --    < > = values in this interval not displayed.  Coagulation:   Recent Labs  Lab 07/20/17  0544   INR 2.9*   APTT 30.6     CRP:   Recent Labs  Lab 07/19/17  0550   CRP 1.5         Significant Imaging:  Imaging results within the past 24 hours have been reviewed.

## 2017-07-21 ENCOUNTER — ANESTHESIA EVENT (OUTPATIENT)
Dept: ENDOSCOPY | Facility: HOSPITAL | Age: 62
DRG: 813 | End: 2017-07-21
Payer: MEDICARE

## 2017-07-21 ENCOUNTER — OUTPATIENT CASE MANAGEMENT (OUTPATIENT)
Dept: ADMINISTRATIVE | Facility: OTHER | Age: 62
End: 2017-07-21

## 2017-07-21 ENCOUNTER — ANESTHESIA (OUTPATIENT)
Dept: ENDOSCOPY | Facility: HOSPITAL | Age: 62
DRG: 813 | End: 2017-07-21
Payer: MEDICARE

## 2017-07-21 LAB
ALBUMIN SERPL BCP-MCNC: 3.1 G/DL
ALP SERPL-CCNC: 84 U/L
ALT SERPL W/O P-5'-P-CCNC: 17 U/L
ANION GAP SERPL CALC-SCNC: 14 MMOL/L
APTT BLDCRRT: 33.2 SEC
AST SERPL-CCNC: 29 U/L
BASOPHILS # BLD AUTO: 0.02 K/UL
BASOPHILS # BLD AUTO: 0.03 K/UL
BASOPHILS NFR BLD: 0.5 %
BILIRUB DIRECT SERPL-MCNC: 0.3 MG/DL
BILIRUB SERPL-MCNC: 0.8 MG/DL
BNP SERPL-MCNC: 2242 PG/ML
BUN SERPL-MCNC: 19 MG/DL
CALCIUM SERPL-MCNC: 8.3 MG/DL
CHLORIDE SERPL-SCNC: 102 MMOL/L
CO2 SERPL-SCNC: 20 MMOL/L
CREAT SERPL-MCNC: 4.5 MG/DL
CRP SERPL-MCNC: 2.5 MG/L
DIFFERENTIAL METHOD: ABNORMAL
EOSINOPHIL # BLD AUTO: 0 K/UL
EOSINOPHIL # BLD AUTO: 0.1 K/UL
EOSINOPHIL NFR BLD: 0.5 %
EOSINOPHIL NFR BLD: 1.3 %
ERYTHROCYTE [DISTWIDTH] IN BLOOD BY AUTOMATED COUNT: 18.7 %
ERYTHROCYTE [DISTWIDTH] IN BLOOD BY AUTOMATED COUNT: 18.9 %
ERYTHROCYTE [DISTWIDTH] IN BLOOD BY AUTOMATED COUNT: 19.2 %
ERYTHROCYTE [DISTWIDTH] IN BLOOD BY AUTOMATED COUNT: 19.9 %
EST. GFR  (AFRICAN AMERICAN): 11.6 ML/MIN/1.73 M^2
EST. GFR  (NON AFRICAN AMERICAN): 10 ML/MIN/1.73 M^2
GLUCOSE SERPL-MCNC: 57 MG/DL
HCT VFR BLD AUTO: 22.3 %
HCT VFR BLD AUTO: 22.4 %
HCT VFR BLD AUTO: 23.3 %
HCT VFR BLD AUTO: 25.3 %
HGB BLD-MCNC: 7.3 G/DL
HGB BLD-MCNC: 7.4 G/DL
HGB BLD-MCNC: 7.7 G/DL
HGB BLD-MCNC: 8.3 G/DL
INR PPP: 3
LDH SERPL L TO P-CCNC: 168 U/L
LYMPHOCYTES # BLD AUTO: 0.5 K/UL
LYMPHOCYTES # BLD AUTO: 0.6 K/UL
LYMPHOCYTES # BLD AUTO: 0.7 K/UL
LYMPHOCYTES # BLD AUTO: 0.7 K/UL
LYMPHOCYTES NFR BLD: 11.5 %
LYMPHOCYTES NFR BLD: 11.9 %
LYMPHOCYTES NFR BLD: 16.2 %
LYMPHOCYTES NFR BLD: 17.8 %
MAGNESIUM SERPL-MCNC: 1.9 MG/DL
MCH RBC QN AUTO: 30.3 PG
MCH RBC QN AUTO: 30.7 PG
MCH RBC QN AUTO: 30.8 PG
MCH RBC QN AUTO: 31 PG
MCHC RBC AUTO-ENTMCNC: 32.7 G/DL
MCHC RBC AUTO-ENTMCNC: 32.8 G/DL
MCHC RBC AUTO-ENTMCNC: 33 G/DL
MCHC RBC AUTO-ENTMCNC: 33 G/DL
MCV RBC AUTO: 93 FL
MCV RBC AUTO: 94 FL
MONOCYTES # BLD AUTO: 0.2 K/UL
MONOCYTES # BLD AUTO: 0.3 K/UL
MONOCYTES # BLD AUTO: 0.3 K/UL
MONOCYTES # BLD AUTO: 0.6 K/UL
MONOCYTES NFR BLD: 6.6 %
MONOCYTES NFR BLD: 6.8 %
MONOCYTES NFR BLD: 7.7 %
MONOCYTES NFR BLD: 9.5 %
NEUTROPHILS # BLD AUTO: 2.7 K/UL
NEUTROPHILS # BLD AUTO: 3 K/UL
NEUTROPHILS # BLD AUTO: 3 K/UL
NEUTROPHILS # BLD AUTO: 4.7 K/UL
NEUTROPHILS NFR BLD: 74.3 %
NEUTROPHILS NFR BLD: 74.9 %
NEUTROPHILS NFR BLD: 76.9 %
NEUTROPHILS NFR BLD: 78.6 %
PHOSPHATE SERPL-MCNC: 3.6 MG/DL
PLATELET # BLD AUTO: 107 K/UL
PLATELET # BLD AUTO: 83 K/UL
PLATELET # BLD AUTO: 83 K/UL
PLATELET # BLD AUTO: 87 K/UL
PMV BLD AUTO: 8.6 FL
PMV BLD AUTO: 9.5 FL
PMV BLD AUTO: 9.6 FL
PMV BLD AUTO: 9.7 FL
POTASSIUM SERPL-SCNC: 3.7 MMOL/L
PREALB SERPL-MCNC: 22 MG/DL
PROT SERPL-MCNC: 5.8 G/DL
PROTHROMBIN TIME: 30 SEC
RBC # BLD AUTO: 2.41 M/UL
RBC # BLD AUTO: 2.41 M/UL
RBC # BLD AUTO: 2.5 M/UL
RBC # BLD AUTO: 2.68 M/UL
SODIUM SERPL-SCNC: 136 MMOL/L
WBC # BLD AUTO: 3.66 K/UL
WBC # BLD AUTO: 3.77 K/UL
WBC # BLD AUTO: 3.96 K/UL
WBC # BLD AUTO: 6.11 K/UL

## 2017-07-21 PROCEDURE — 27000248 HC VAD-ADDITIONAL DAY

## 2017-07-21 PROCEDURE — 63600175 PHARM REV CODE 636 W HCPCS: Performed by: PHYSICIAN ASSISTANT

## 2017-07-21 PROCEDURE — 83735 ASSAY OF MAGNESIUM: CPT

## 2017-07-21 PROCEDURE — G8988 SELF CARE GOAL STATUS: HCPCS | Mod: CI

## 2017-07-21 PROCEDURE — 84134 ASSAY OF PREALBUMIN: CPT

## 2017-07-21 PROCEDURE — 80076 HEPATIC FUNCTION PANEL: CPT

## 2017-07-21 PROCEDURE — 85730 THROMBOPLASTIN TIME PARTIAL: CPT

## 2017-07-21 PROCEDURE — 86140 C-REACTIVE PROTEIN: CPT

## 2017-07-21 PROCEDURE — 20600001 HC STEP DOWN PRIVATE ROOM

## 2017-07-21 PROCEDURE — C9113 INJ PANTOPRAZOLE SODIUM, VIA: HCPCS | Performed by: PHYSICIAN ASSISTANT

## 2017-07-21 PROCEDURE — 84100 ASSAY OF PHOSPHORUS: CPT

## 2017-07-21 PROCEDURE — G8989 SELF CARE D/C STATUS: HCPCS | Mod: CJ

## 2017-07-21 PROCEDURE — 25000003 PHARM REV CODE 250: Performed by: PHYSICIAN ASSISTANT

## 2017-07-21 PROCEDURE — 80048 BASIC METABOLIC PNL TOTAL CA: CPT

## 2017-07-21 PROCEDURE — 90935 HEMODIALYSIS ONE EVALUATION: CPT | Mod: ,,, | Performed by: INTERNAL MEDICINE

## 2017-07-21 PROCEDURE — 83880 ASSAY OF NATRIURETIC PEPTIDE: CPT

## 2017-07-21 PROCEDURE — 85610 PROTHROMBIN TIME: CPT

## 2017-07-21 PROCEDURE — 97530 THERAPEUTIC ACTIVITIES: CPT

## 2017-07-21 PROCEDURE — 36415 COLL VENOUS BLD VENIPUNCTURE: CPT

## 2017-07-21 PROCEDURE — 97535 SELF CARE MNGMENT TRAINING: CPT

## 2017-07-21 PROCEDURE — 83615 LACTATE (LD) (LDH) ENZYME: CPT

## 2017-07-21 PROCEDURE — 99232 SBSQ HOSP IP/OBS MODERATE 35: CPT | Mod: ,,, | Performed by: INTERNAL MEDICINE

## 2017-07-21 PROCEDURE — 85025 COMPLETE CBC W/AUTO DIFF WBC: CPT | Mod: 91

## 2017-07-21 RX ADMIN — CETIRIZINE HYDROCHLORIDE 5 MG: 5 TABLET, FILM COATED ORAL at 09:07

## 2017-07-21 RX ADMIN — GABAPENTIN 300 MG: 300 CAPSULE ORAL at 09:07

## 2017-07-21 RX ADMIN — ACETAMINOPHEN 650 MG: 325 TABLET ORAL at 01:07

## 2017-07-21 RX ADMIN — ACETAMINOPHEN 650 MG: 325 TABLET ORAL at 05:07

## 2017-07-21 RX ADMIN — FLUTICASONE PROPIONATE 2 SPRAY: 50 SPRAY, METERED NASAL at 09:07

## 2017-07-21 RX ADMIN — ATORVASTATIN CALCIUM 40 MG: 20 TABLET, FILM COATED ORAL at 09:07

## 2017-07-21 RX ADMIN — HYDRALAZINE HYDROCHLORIDE 50 MG: 50 TABLET ORAL at 09:07

## 2017-07-21 RX ADMIN — PANTOPRAZOLE SODIUM 40 MG: 40 INJECTION, POWDER, FOR SOLUTION INTRAVENOUS at 09:07

## 2017-07-21 RX ADMIN — ONDANSETRON 4 MG: 4 TABLET, FILM COATED ORAL at 01:07

## 2017-07-21 RX ADMIN — ESCITALOPRAM 20 MG: 20 TABLET, FILM COATED ORAL at 09:07

## 2017-07-21 RX ADMIN — HYDRALAZINE HYDROCHLORIDE 50 MG: 50 TABLET ORAL at 05:07

## 2017-07-21 NOTE — PROGRESS NOTES
[facility]  Heart Transplant  Progress Note    Patient Name: Randa Devine  MRN: [id number]  Admission Date: 7/18/2017  Hospital Length of Stay: 3 days  Attending Physician: Sera Collado MD  Primary Care Provider: Laura Garvin DO  Principal Problem:GI bleed    Subjective:     Interval History: Continued to have ongoing GI bleed overnight. Found to have INR of 3.0 today. C-scope cancelled. Started clear liquid diet. Notified by GI fellow that they may consider doing C-scope on clear liquid diet as she has already been prepped twice and will prefer not to put her through it again. Hb stable.    Continuous Infusions:   Scheduled Meds:   sodium chloride 0.9%   Intravenous Once    amlodipine  10 mg Oral Daily    atorvastatin  40 mg Oral Daily    calcium carbonate  500 mg Oral QID    cetirizine  5 mg Oral Daily    escitalopram oxalate  20 mg Oral QHS    fluticasone  2 spray Each Nare Daily    gabapentin  300 mg Oral QHS    hydrALAZINE  50 mg Oral Q8H    pantoprazole  40 mg Intravenous Daily    polyethylene glycol  2,000 mL Oral BID     PRN Meds:sodium chloride, sodium chloride, acetaminophen, ondansetron    Review of patient's allergies indicates:   Allergen Reactions    Codeine Nausea And Vomiting    Demerol [meperidine] Nausea And Vomiting    Lortab [hydrocodone-acetaminophen] Nausea And Vomiting     Objective:     Vital Signs (Most Recent):  Temp: 98.6 °F (37 °C) (07/21/17 1107)  Pulse: 79 (07/21/17 1107)  Resp: 18 (07/21/17 1107)  BP: (!) 68/0 (07/21/17 1111)  SpO2: 97 % (07/21/17 1107) Vital Signs (24h Range):  Temp:  [97.5 °F (36.4 °C)-98.6 °F (37 °C)] 98.6 °F (37 °C)  Pulse:  [71-94] 79  Resp:  [17-18] 18  SpO2:  [95 %-98 %] 97 %  BP: ()/(0-71) 68/0     Weight: 58.8 kg (129 lb 10.1 oz)  Body mass index is 24.49 kg/m².      Intake/Output Summary (Last 24 hours) at 07/21/17 1413  Last data filed at 07/21/17 0600   Gross per 24 hour   Intake             2060 ml   Output              2601 ml   Net             -541 ml       Hemodynamic Parameters:       Telemetry: no events    Physical Exam   Constitutional: She is oriented to person, place, and time. She appears well-developed and well-nourished.   Neck: Normal range of motion. Neck supple. No JVD present.   Cardiovascular: Normal rate and regular rhythm.    Normal VAD hum   Pulmonary/Chest: Effort normal and breath sounds normal. She has no wheezes. She has no rales.   Abdominal: Soft. Bowel sounds are normal. There is no tenderness.   Musculoskeletal: She exhibits no edema.   Neurological: She is alert and oriented to person, place, and time.   Skin: Skin is warm and dry.        Significant Labs:  CBC:    Recent Labs  Lab 07/21/17  1159   WBC 3.77*   RBC 2.50*   HGB 7.7*   HCT 23.3*   PLT 87*   MCV 93   MCH 30.8   MCHC 33.0     BNP:    Recent Labs  Lab 07/21/17  0502   BNP 2,242*     CMP:    Recent Labs  Lab 07/21/17  0503   GLU 57*   CALCIUM 8.3*   ALBUMIN 3.1*   PROT 5.8*      K 3.7   CO2 20*      BUN 19   CREATININE 4.5*   ALKPHOS 84   ALT 17   AST 29   BILITOT 0.8      Coagulation:     Recent Labs  Lab 07/21/17  0503   INR 3.0*   APTT 33.2*     LDH:    Recent Labs  Lab 07/19/17  0550 07/20/17  0544 07/21/17  0503    160 168     Microbiology:  Microbiology Results (last 7 days)     Procedure Component Value Units Date/Time    Urine culture [[id number]] Collected:  07/19/17 1037    Order Status:  Completed Specimen:  Urine Updated:  07/20/17 1514     Urine Culture, Routine Multiple organisms isolated. None in predominance.  Repeat if     Urine Culture, Routine clinically necessary.    Narrative:       Preferred Collection Type->Urine, Clean Catch          I have reviewed all pertinent labs within the past 24 hours.    Estimated Creatinine Clearance: 11.1 mL/min (based on Cr of 4.5).    Diagnostic Results:  I have reviewed and interpreted all pertinent imaging results/findings within the past 24 hours.    Assessment  and Plan:     * GI bleed    -INR 3.0 today. Will continue to hold coumadin  -GI consulted - appreciate their assistance  -EGD howed Erythematous duodenopathy  -CTA negative for acute source of bleed  -Colonoscopy cancelled today due to high INR and the patient did not complete prep overnight  -GI fellow mentioned they may be able to perform colonoscopy once INR falls to <2.5 and on clear liquid diet  -NPO after MN for colonoscopy tomorrow afternoon  -GI fellow to put in enema orders for AM  -Protonix IV   -serial CBC Q6H        Normocytic anemia due to blood loss    -Acute on Chronic, due to GIB  -PRBC 1U 7/18, 7/20. Will order another 1U during HD tomorrow.          Left ventricular assist device present    -HW implanted 5/2012  -Speed 2600  -Hold coumadin as above  -HTN- cont hydralazine, amlodipine  -PT/OT - ordered AFOs due to B/L foot drop. Prescription faxed to [facility] in [place]. Fax # [phone]        End stage renal disease on dialysis    -Nephrology consulted. On HD Tues, Thurs, Sat  -Had HD ovenrnight.          Case discussed with attending.    Carline Glass MD  Heart Transplant  [facility]

## 2017-07-21 NOTE — PT/OT/SLP PROGRESS
Physical Therapy  Treatment    Randa Devine   MRN: [id number]   Admitting Diagnosis: GI bleed    PT Received On: 07/21/17  PT Start Time: 1015     PT Stop Time: 1023    PT Total Time (min): 8 min       Billable Minutes:  Therapeutic Activity 8    Treatment Type: Treatment  PT/PTA: PT     PTA Visit Number: 0       General Precautions: Standard, fall, LVAD  Orthopedic Precautions: N/A   Braces: N/A    Do you have any cultural, spiritual, Yarsanism conflicts, given your current situation?: none noted     Subjective:  Communicated with RN prior to session.    Pain/Comfort  Pain Rating 1: 0/10    Objective:   Patient found with: telemetry, oxygen (LVAD)    Functional Mobility:  Bed Mobility: not performed with PT this date      Transfers:  Sit <> Stand Assistance: Stand By Assistance (x2 reps)  Sit <> Stand Assistive Device: Rolling Walker, No Assistive Device (x1 rep without AD; x1 rep with RW)  Toilet Transfer Technique: Stand Pivot (BSC to bedside chair)  Toilet Transfer Assistance: Contact Guard Assistance  Toilet Transfer Assistive Device: bedside commode    Gait: not performed this session; PT ACE wrapped R ankle for DF assist prior to OT session         Therapeutic Activities and Exercises:  Pt seen during OT session in order to wrap R ankle with ACE bandage for ankle DF assist.   PT notified by medical team re: ordering new AFOs for pt, as her current AFOs are broken. Pt reported that her previous AFOs are from [facility] in [place]. Contacted [facility] in order to obtain information about ordering new AFOs for pt. MD Glass notified that pt will need MD script in order to get new AFOs. MD Glass to fax script to [facility].   Pt notified that [facility] will contact her to set up appointment to be fitted for new AFOs as outpatient following d/c from [facility].   Attempted to re-visit pt in PM for full therapy session but pt declined 2* fatigue from colonoscopy prep overnight.     AM-PAC 6  CLICK MOBILITY  How much help from another person does this patient currently need?   1 = Unable, Total/Dependent Assistance  2 = A lot, Maximum/Moderate Assistance  3 = A little, Minimum/Contact Guard/Supervision  4 = None, Modified [place]/Independent    Turning over in bed (including adjusting bedclothes, sheets and blankets)?: 4  Sitting down on and standing up from a chair with arms (e.g., wheelchair, bedside commode, etc.): 3  Moving from lying on back to sitting on the side of the bed?: 3  Moving to and from a bed to a chair (including a wheelchair)?: 3  Need to walk in hospital room?: 3  Climbing 3-5 steps with a railing?: 1  Total Score: 17    AM-PAC Raw Score CMS G-Code Modifier Level of Impairment Assistance   6 % Total / Unable   7 - 9 CM 80 - 100% Maximal Assist   10 - 14 CL 60 - 80% Moderate Assist   15 - 19 CK 40 - 60% Moderate Assist   20 - 22 CJ 20 - 40% Minimal Assist   23 CI 1-20% SBA / CGA   24 CH 0% Independent/ Mod I     Patient left supine with all lines intact, call button in reach and MD notified.    Assessment:  Randa Devine is a 61 y.o. female with a medical diagnosis of GI bleed and presents with previous LVAD insertion in 2012. Limited PT session this date with session mostly consisting of PT obtaining info regarding ordering new AFOs to assist with B foot drop. RLE Ace wrapped prior to OT session for DF assist during functional mobility. Pt would continue to benefit from skilled acute PT in order to address current deficits and progress functional mobility.     Rehab identified problem list/impairments: Rehab identified problem list/impairments: weakness, impaired functional mobilty, gait instability, impaired endurance, impaired balance, decreased ROM, decreased lower extremity function, impaired self care skills, decreased coordination, decreased safety awareness, impaired cardiopulmonary response to activity    Rehab potential is good.    Activity tolerance:  Good    Discharge recommendations: Discharge Facility/Level Of Care Needs: home health PT     Barriers to discharge: Barriers to Discharge: Decreased caregiver support (lives alone)    Equipment recommendations: Equipment Needed After Discharge: orthotic device (B AFOs)     GOALS:    Physical Therapy Goals        Problem: Physical Therapy Goal    Goal Priority Disciplines Outcome Goal Variances Interventions   Physical Therapy Goal     PT/OT, PT Ongoing (interventions implemented as appropriate)     Description:  Goals to be met by: 17     Patient will increase functional independence with mobility by performin. Supine to sit with Supervision   2. Sit to stand transfer with Supervision  3. Gait  x 200 feet with Supervision using Rolling Walker.   4. Lower extremity exercise program x15 reps, with assistance as needed, in order to increase LE strength and ROM                      PLAN:    Patient to be seen 4 x/week  to address the above listed problems via gait training, therapeutic activities, therapeutic exercises  Plan of Care expires: 17  Plan of Care reviewed with: patient        Tasneem Harvey, PT, DPT   2017  [phone]

## 2017-07-21 NOTE — PLAN OF CARE
Problem: Physical Therapy Goal  Goal: Physical Therapy Goal  Goals to be met by: 17     Patient will increase functional independence with mobility by performin. Supine to sit with Supervision   2. Sit to stand transfer with Supervision  3. Gait  x 200 feet with Supervision using Rolling Walker.   4. Lower extremity exercise program x15 reps, with assistance as needed, in order to increase LE strength and ROM     Outcome: Ongoing (interventions implemented as appropriate)  Goals reviewed and remain appropriate. Pt progressing towards goals.    Tasneem Gabriel, PT, DPT   2017  [phone]

## 2017-07-21 NOTE — SUBJECTIVE & OBJECTIVE
Interval History: Continued to have ongoing GI bleed overnight. Found to have INR of 3.0 today. C-scope cancelled. Started clear liquid diet. Notified by GI fellow that they may consider doing C-scope on clear liquid diet as she has already been prepped twice and will prefer not to put her through it again. Hb stable.    Continuous Infusions:   Scheduled Meds:   sodium chloride 0.9%   Intravenous Once    amlodipine  10 mg Oral Daily    atorvastatin  40 mg Oral Daily    calcium carbonate  500 mg Oral QID    cetirizine  5 mg Oral Daily    escitalopram oxalate  20 mg Oral QHS    fluticasone  2 spray Each Nare Daily    gabapentin  300 mg Oral QHS    hydrALAZINE  50 mg Oral Q8H    pantoprazole  40 mg Intravenous Daily    polyethylene glycol  2,000 mL Oral BID     PRN Meds:sodium chloride, sodium chloride, acetaminophen, ondansetron    Review of patient's allergies indicates:   Allergen Reactions    Codeine Nausea And Vomiting    Demerol [meperidine] Nausea And Vomiting    Lortab [hydrocodone-acetaminophen] Nausea And Vomiting     Objective:     Vital Signs (Most Recent):  Temp: 98.6 °F (37 °C) (07/21/17 1107)  Pulse: 79 (07/21/17 1107)  Resp: 18 (07/21/17 1107)  BP: (!) 68/0 (07/21/17 1111)  SpO2: 97 % (07/21/17 1107) Vital Signs (24h Range):  Temp:  [97.5 °F (36.4 °C)-98.6 °F (37 °C)] 98.6 °F (37 °C)  Pulse:  [71-94] 79  Resp:  [17-18] 18  SpO2:  [95 %-98 %] 97 %  BP: ()/(0-71) 68/0     Weight: 58.8 kg (129 lb 10.1 oz)  Body mass index is 24.49 kg/m².      Intake/Output Summary (Last 24 hours) at 07/21/17 1413  Last data filed at 07/21/17 0600   Gross per 24 hour   Intake             2060 ml   Output             2601 ml   Net             -541 ml       Hemodynamic Parameters:       Telemetry: no events    Physical Exam   Constitutional: She is oriented to person, place, and time. She appears well-developed and well-nourished.   Neck: Normal range of motion. Neck supple. No JVD present.    Cardiovascular: Normal rate and regular rhythm.    Normal VAD hum   Pulmonary/Chest: Effort normal and breath sounds normal. She has no wheezes. She has no rales.   Abdominal: Soft. Bowel sounds are normal. There is no tenderness.   Musculoskeletal: She exhibits no edema.   Neurological: She is alert and oriented to person, place, and time.   Skin: Skin is warm and dry.        Significant Labs:  CBC:    Recent Labs  Lab 07/21/17  1159   WBC 3.77*   RBC 2.50*   HGB 7.7*   HCT 23.3*   PLT 87*   MCV 93   MCH 30.8   MCHC 33.0     BNP:    Recent Labs  Lab 07/21/17  0502   BNP 2,242*     CMP:    Recent Labs  Lab 07/21/17  0503   GLU 57*   CALCIUM 8.3*   ALBUMIN 3.1*   PROT 5.8*      K 3.7   CO2 20*      BUN 19   CREATININE 4.5*   ALKPHOS 84   ALT 17   AST 29   BILITOT 0.8      Coagulation:     Recent Labs  Lab 07/21/17  0503   INR 3.0*   APTT 33.2*     LDH:    Recent Labs  Lab 07/19/17  0550 07/20/17  0544 07/21/17  0503    160 168     Microbiology:  Microbiology Results (last 7 days)     Procedure Component Value Units Date/Time    Urine culture [[id number]] Collected:  07/19/17 1037    Order Status:  Completed Specimen:  Urine Updated:  07/20/17 1514     Urine Culture, Routine Multiple organisms isolated. None in predominance.  Repeat if     Urine Culture, Routine clinically necessary.    Narrative:       Preferred Collection Type->Urine, Clean Catch          I have reviewed all pertinent labs within the past 24 hours.    Estimated Creatinine Clearance: 11.1 mL/min (based on Cr of 4.5).    Diagnostic Results:  I have reviewed and interpreted all pertinent imaging results/findings within the past 24 hours.

## 2017-07-21 NOTE — PROGRESS NOTES
7/21/17 - Unable to complete Initial Screen for OPCM at this time as pt is having colonoscopy procedure today. Will attempt to follow up at a later time. Ina Lovell RN

## 2017-07-21 NOTE — PT/OT/SLP PROGRESS
"Occupational Therapy  Treatment    Randa Devine   MRN: [id number]   Admitting Diagnosis: GI bleed    OT Date of Treatment: 07/21/17   OT Start Time: 1013  OT Stop Time: 1050  OT Total Time (min): 37 min    Billable Minutes:  Self Care/Home Management 15 and Therapeutic Activity 10    General Precautions: Standard, LVAD, fall  Orthopedic Precautions: N/A  Braces: N/A    Do you have any cultural, spiritual, Nondenominational conflicts, given your current situation?: None    Subjective:  Communicated with RN prior to session.  "You can tell them I'm not drinking any more of that GoLytely. I want to eat."    Pain/Comfort  Pain Rating 1: 0/10  Pain Rating Post-Intervention 1: 0/10    Objective:  Patient found with: telemetry, oxygen (LVAD to wall power)     Functional Mobility:  Bed Mobility:  Supine to Sit: Supervision    Transfers:  Sit <> Stand Assistance: Stand By Assistance from EOB  Sit <> Stand Assistive Device: Rolling Walker  Toilet Transfer Technique: Stand Pivot  Toilet Transfer Assistance: Contact Guard Assistance  Toilet Transfer Assistive Device: No Assistive Device    Functional Ambulation: Within room and hallway with SBA-CGA using RW; pt Ace wrapped R foot prior to ambulation as pt's AFOs are broken    Activities of Daily Living:  UE Dressing Level of Assistance: Stand by assistance to don LVAD shld bag and gown around back  LE Dressing Level of Assistance: Supervision to don B socks and tennis shoes  Grooming Position: Standing at sink  Grooming Level of Assistance: Stand by assistance to wash hands  Toileting Where Assessed: Bedside commode  Toileting Level of Assistance: Contact guard     Balance:   Static Sit: NORMAL: No deviations seen in posture held statically  Dynamic Sit: NORMAL: No deviations seen in posture held dynamically  Static Stand: GOOD: Takes MODERATE challenges from all directions  Dynamic stand: FAIR: Needs CONTACT GUARD during gait    Therapeutic Activities and Exercises:  Pt supine in bed " "upon OT entry; supervision for LB dressing; T/F to bedside commode for toileting with CGA; switched LVAD to battery power with set-up assistance; PT wrapped R LE prior to ambulation in hallway; returned to room, stopped at sink to wash hands and returned to bedside chair    AM-PAC 6 CLICK ADL   How much help from another person does this patient currently need?   1 = Unable, Total/Dependent Assistance  2 = A lot, Maximum/Moderate Assistance  3 = A little, Minimum/Contact Guard/Supervision  4 = None, Modified [place]/Independent    Putting on and taking off regular lower body clothing? : 3  Bathing (including washing, rinsing, drying)?: 3  Toileting, which includes using toilet, bedpan, or urinal? : 3  Putting on and taking off regular upper body clothing?: 3  Taking care of personal grooming such as brushing teeth?: 4  Eating meals?: 4  Total Score: 20     AM-PAC Raw Score CMS "G-Code Modifier Level of Impairment Assistance   6 % Total / Unable   7 - 8 CM 80 - 100% Maximal Assist   9-13 CL 60 - 80% Moderate Assist   14 - 19 CK 40 - 60% Moderate Assist   20 - 22 CJ 20 - 40% Minimal Assist   23 CI 1-20% SBA / CGA   24 CH 0% Independent/ Mod I       Patient left up in chair with all lines intact and call button in reach    ASSESSMENT:  Randa Devine is a 61 y.o. female with a medical diagnosis of GI bleed and presents with good effort and participation in therapy. Pt would continue to benefit from OT to maximize safety and independence prior to D/C. Recommend  PT upon D/C.    Rehab identified problem list/impairments: Rehab identified problem list/impairments: weakness, impaired endurance, impaired self care skills, impaired functional mobilty, gait instability, decreased lower extremity function, impaired cardiopulmonary response to activity    Rehab potential is good.    Activity tolerance: Good    Discharge recommendations: Discharge Facility/Level Of Care Needs: home health PT     Barriers to " discharge: Barriers to Discharge: Decreased caregiver support (lives alone)    Equipment recommendations: orthotic device (B AFOs)     GOALS:    Occupational Therapy Goals        Problem: Occupational Therapy Goal    Goal Priority Disciplines Outcome Interventions   Occupational Therapy Goal     OT, PT/OT Ongoing (interventions implemented as appropriate)    Description:  Goals to be met by: 7 days (7/26/17)     Patient will increase functional independence with ADLs by performing:    UE Dressing with [place].  LE Dressing with [place].  Grooming while standing at sink with [place].  Toileting from toilet with [place] for hygiene and clothing management.   Supine to sit with [place].  Toilet transfer to toilet with [place].  Increased functional strength to WFL for ADLs.                      Plan:  Patient to be seen 4 x/week to address the above listed problems via self-care/home management, therapeutic activities, therapeutic exercises  Plan of Care expires: 08/19/17  Plan of Care reviewed with: patient         AMINA Perez  07/21/2017

## 2017-07-21 NOTE — PLAN OF CARE
Problem: Occupational Therapy Goal  Goal: Occupational Therapy Goal  Goals to be met by: 7 days (7/26/17)     Patient will increase functional independence with ADLs by performing:    UE Dressing with [place].  LE Dressing with [place].  Grooming while standing at sink with [place].  Toileting from toilet with [place] for hygiene and clothing management.   Supine to sit with [place].  Toilet transfer to toilet with [place].  Increased functional strength to WFL for ADLs.     Outcome: Ongoing (interventions implemented as appropriate)  Continue OT plan of care.

## 2017-07-21 NOTE — ASSESSMENT & PLAN NOTE
-INR 3.0 today. Will continue to hold coumadin  -GI consulted - appreciate their assistance  -EGD howed Erythematous duodenopathy  -CTA negative for acute source of bleed  -Colonoscopy cancelled today due to high INR and the patient did not complete prep overnight  -GI fellow mentioned they may be able to perform colonoscopy once INR falls to <2.5 and on clear liquid diet   -Protonix IV   -serial CBC Q6H

## 2017-07-21 NOTE — ASSESSMENT & PLAN NOTE
-AFOs ordered  -Prescription sent to [facility] in [place] after discussion with Tasneem from PT  -[facility] to call patient to follow up with the patient once she is discharged

## 2017-07-21 NOTE — PROGRESS NOTES
Acute bedside HD complete after 3 hours of treatment.  2L of fluid removed.  Blood returned.  Lines flushed, needles removed, and pressure held for 10 min @ each site to achieve hemostasis.

## 2017-07-21 NOTE — PLAN OF CARE
Problem: Patient Care Overview  Goal: Plan of Care Review  Outcome: Ongoing (interventions implemented as appropriate)  Pt denies Chest pain, SOB or nausea. C/o mild headache- relief obtained with tylenol. No falls, trauma or injury noted. VSS. 2L NC. LVAD-HW- [facility] dsg change every other day.  L UA fistula. + for thrill and bruit. Dialysis Tu-Thurs-Sat., done on 7/20- 2L removed. NPO since MN- Plan for C-scope in am. Golytely started. Pt having bloody liquid BMs. H&H every 6hrs. H/H: 8.3 and 25.3 from 7/21 0020.  Plan of care reviewed with patient. No further questions at this time. No significant events. Will continue to monitor.

## 2017-07-21 NOTE — TREATMENT PLAN
GI Treatment Plan  07/21/2017  5:23 PM    Unsuccessful prep 2 nights in a row with patient continuing to have signs of bleeding. At this point will proceed as follows:  -CLD today  -NPO after MN  -2 fleet enema in the AM  -Colonoscopy in the AM if INR is 2  -Also recommend to continue following patient's H/H and transfuse as needed  -Plan discussed with primary team  -Will continue to follow alongside primary team    Christiano Berg M.D.  Gastroenterology Fellow, PGY-IV  Pager: [phone]  [facility]

## 2017-07-21 NOTE — ANESTHESIA PREPROCEDURE EVALUATION
07/21/2017  Randa Devine is a 61 y.o., female.    Anesthesia Evaluation    I have reviewed the Patient Summary Reports.     I have reviewed the Medications.     Review of Systems  Anesthesia Hx:  No problems with previous Anesthesia  History of prior surgery of interest to airway management or planning: heart surgery. Previous anesthesia: General   Social:  Former Smoker, Social Alcohol Use    Hematology/Oncology:  Hematology Normal   Oncology Normal     EENT/Dental:EENT/Dental Normal   Cardiovascular:   Exercise tolerance: poor Hypertension, well controlled Past MI CAD asymptomatic  CHF    Pulmonary:  Pulmonary Normal    Renal/:   Chronic Renal Disease, Dialysis, ESRD    Hepatic/GI:  Hepatic/GI Normal    Musculoskeletal:  Musculoskeletal Normal    Neurological:   TIA, CVA, residual symptoms Headaches    Endocrine:  Endocrine Normal    Dermatological:  Skin Normal    Psych:  Psychiatric Normal           Physical Exam  General:  Well nourished    Airway/Jaw/Neck:  Airway Findings: Mouth Opening: Normal Tongue: Normal  General Airway Assessment: Adult  Mallampati: II  TM Distance: Normal, at least 6 cm  Jaw/Neck Findings:  Neck ROM: Normal ROM      Dental:  Dental Findings: In tact        Mental Status:  Mental Status Findings:  Cooperative, Alert and Oriented         Anesthesia Plan  Type of Anesthesia, risks & benefits discussed:  Anesthesia Type:  general  Patient's Preference: GA  Intra-op Monitoring Plan: standard ASA monitors  Intra-op Monitoring Plan Comments:   Post Op Pain Control Plan: IV/PO Opioids PRN  Post Op Pain Control Plan Comments:   Induction:   IV  Beta Blocker:  Patient is not currently on a Beta-Blocker (No further documentation required).       Informed Consent: Patient understands risks and agrees with Anesthesia plan.  Questions answered. Anesthesia consent signed with patient.  ASA  Score: 3     Day of Surgery Review of History & Physical:  There are no significant changes.  H&P update referred to the provider.         Ready For Surgery From Anesthesia Perspective.

## 2017-07-21 NOTE — PLAN OF CARE
Problem: Patient Care Overview  Goal: Plan of Care Review  Colonoscopy cancelled today. INR-3.0. Pt continues to refuse any golytely. Plan of care discussed with patient. Patient is free of fall/trauma/injury. Denies CP, SOB, or pain/discomfort. All questions addressed. Will continue to monitor

## 2017-07-21 NOTE — PROGRESS NOTES
"Pt is scheduled for C-Scope this am. She is getting Golytely. Pt drank half before MN. She was supposed to drink remaining half this am. Pt refusing to drink rest. She states "I cant drink. I am tired. Its nasty." Encouraged pt to drink and educated about the importance for the C-scope. Offered extra crystal light but she refuses. She states that's all she could do. She was on Golytely on 7/19 night too but c-scope was not done yesterday. All she ate was jello once yesterday afternoon. She is having bloody stool with blood clots. Dr. Lopez and on call GI notified. Will keep encouraging her.   "

## 2017-07-22 LAB
ABO + RH BLD: NORMAL
ANION GAP SERPL CALC-SCNC: 19 MMOL/L
APTT BLDCRRT: 33.7 SEC
BASOPHILS # BLD AUTO: 0.01 K/UL
BASOPHILS # BLD AUTO: 0.02 K/UL
BASOPHILS NFR BLD: 0.2 %
BASOPHILS NFR BLD: 0.4 %
BASOPHILS NFR BLD: 0.6 %
BASOPHILS NFR BLD: 0.6 %
BLD GP AB SCN CELLS X3 SERPL QL: NORMAL
BLD PROD TYP BPU: NORMAL
BLOOD UNIT EXPIRATION DATE: NORMAL
BLOOD UNIT TYPE CODE: 5100
BLOOD UNIT TYPE: NORMAL
BUN SERPL-MCNC: 32 MG/DL
CALCIUM SERPL-MCNC: 8.1 MG/DL
CHLORIDE SERPL-SCNC: 102 MMOL/L
CO2 SERPL-SCNC: 16 MMOL/L
CODING SYSTEM: NORMAL
CREAT SERPL-MCNC: 7 MG/DL
DIFFERENTIAL METHOD: ABNORMAL
DISPENSE STATUS: NORMAL
EOSINOPHIL # BLD AUTO: 0 K/UL
EOSINOPHIL # BLD AUTO: 0 K/UL
EOSINOPHIL # BLD AUTO: 0.1 K/UL
EOSINOPHIL # BLD AUTO: 0.1 K/UL
EOSINOPHIL NFR BLD: 0.2 %
EOSINOPHIL NFR BLD: 1.1 %
EOSINOPHIL NFR BLD: 1.3 %
EOSINOPHIL NFR BLD: 1.6 %
ERYTHROCYTE [DISTWIDTH] IN BLOOD BY AUTOMATED COUNT: 17.7 %
ERYTHROCYTE [DISTWIDTH] IN BLOOD BY AUTOMATED COUNT: 18 %
ERYTHROCYTE [DISTWIDTH] IN BLOOD BY AUTOMATED COUNT: 18.2 %
ERYTHROCYTE [DISTWIDTH] IN BLOOD BY AUTOMATED COUNT: 18.4 %
EST. GFR  (AFRICAN AMERICAN): 6.8 ML/MIN/1.73 M^2
EST. GFR  (NON AFRICAN AMERICAN): 5.9 ML/MIN/1.73 M^2
GLUCOSE SERPL-MCNC: 33 MG/DL
HCT VFR BLD AUTO: 21.8 %
HCT VFR BLD AUTO: 22 %
HCT VFR BLD AUTO: 22 %
HCT VFR BLD AUTO: 26.1 %
HGB BLD-MCNC: 7.1 G/DL
HGB BLD-MCNC: 8.6 G/DL
INR PPP: 2.7
LDH SERPL L TO P-CCNC: 163 U/L
LYMPHOCYTES # BLD AUTO: 0.5 K/UL
LYMPHOCYTES # BLD AUTO: 0.5 K/UL
LYMPHOCYTES # BLD AUTO: 0.6 K/UL
LYMPHOCYTES # BLD AUTO: 0.7 K/UL
LYMPHOCYTES NFR BLD: 11 %
LYMPHOCYTES NFR BLD: 12.1 %
LYMPHOCYTES NFR BLD: 16.7 %
LYMPHOCYTES NFR BLD: 19.2 %
MAGNESIUM SERPL-MCNC: 1.9 MG/DL
MCH RBC QN AUTO: 29.9 PG
MCH RBC QN AUTO: 30.7 PG
MCH RBC QN AUTO: 30.7 PG
MCH RBC QN AUTO: 31 PG
MCHC RBC AUTO-ENTMCNC: 32.3 G/DL
MCHC RBC AUTO-ENTMCNC: 32.3 G/DL
MCHC RBC AUTO-ENTMCNC: 32.6 G/DL
MCHC RBC AUTO-ENTMCNC: 33 G/DL
MCV RBC AUTO: 91 FL
MCV RBC AUTO: 94 FL
MCV RBC AUTO: 95 FL
MCV RBC AUTO: 96 FL
MONOCYTES # BLD AUTO: 0.3 K/UL
MONOCYTES # BLD AUTO: 0.3 K/UL
MONOCYTES # BLD AUTO: 0.4 K/UL
MONOCYTES # BLD AUTO: 0.4 K/UL
MONOCYTES NFR BLD: 7.9 %
MONOCYTES NFR BLD: 8.6 %
MONOCYTES NFR BLD: 8.6 %
MONOCYTES NFR BLD: 8.7 %
NEUTROPHILS # BLD AUTO: 2.2 K/UL
NEUTROPHILS # BLD AUTO: 2.3 K/UL
NEUTROPHILS # BLD AUTO: 3.3 K/UL
NEUTROPHILS # BLD AUTO: 4.4 K/UL
NEUTROPHILS NFR BLD: 70 %
NEUTROPHILS NFR BLD: 72.1 %
NEUTROPHILS NFR BLD: 78.3 %
NEUTROPHILS NFR BLD: 79.5 %
NUM UNITS TRANS PACKED RBC: NORMAL
PHOSPHATE SERPL-MCNC: 6.1 MG/DL
PLATELET # BLD AUTO: 82 K/UL
PLATELET # BLD AUTO: 84 K/UL
PLATELET # BLD AUTO: 86 K/UL
PLATELET # BLD AUTO: 87 K/UL
PMV BLD AUTO: 9 FL
PMV BLD AUTO: 9.4 FL
PMV BLD AUTO: 9.4 FL
PMV BLD AUTO: 9.9 FL
POCT GLUCOSE: 108 MG/DL (ref 70–110)
POCT GLUCOSE: 113 MG/DL (ref 70–110)
POCT GLUCOSE: 49 MG/DL (ref 70–110)
POCT GLUCOSE: 50 MG/DL (ref 70–110)
POTASSIUM SERPL-SCNC: 4.4 MMOL/L
PROTHROMBIN TIME: 27 SEC
RBC # BLD AUTO: 2.29 M/UL
RBC # BLD AUTO: 2.31 M/UL
RBC # BLD AUTO: 2.31 M/UL
RBC # BLD AUTO: 2.88 M/UL
SODIUM SERPL-SCNC: 137 MMOL/L
WBC # BLD AUTO: 3.12 K/UL
WBC # BLD AUTO: 3.13 K/UL
WBC # BLD AUTO: 4.19 K/UL
WBC # BLD AUTO: 5.55 K/UL

## 2017-07-22 PROCEDURE — 86922 COMPATIBILITY TEST ANTIGLOB: CPT

## 2017-07-22 PROCEDURE — 86900 BLOOD TYPING SEROLOGIC ABO: CPT

## 2017-07-22 PROCEDURE — P9040 RBC LEUKOREDUCED IRRADIATED: HCPCS

## 2017-07-22 PROCEDURE — 25000003 PHARM REV CODE 250: Performed by: STUDENT IN AN ORGANIZED HEALTH CARE EDUCATION/TRAINING PROGRAM

## 2017-07-22 PROCEDURE — 82962 GLUCOSE BLOOD TEST: CPT | Performed by: INTERNAL MEDICINE

## 2017-07-22 PROCEDURE — 36415 COLL VENOUS BLD VENIPUNCTURE: CPT

## 2017-07-22 PROCEDURE — 20600001 HC STEP DOWN PRIVATE ROOM

## 2017-07-22 PROCEDURE — 0DJD8ZZ INSPECTION OF LOWER INTESTINAL TRACT, VIA NATURAL OR ARTIFICIAL OPENING ENDOSCOPIC: ICD-10-PCS | Performed by: INTERNAL MEDICINE

## 2017-07-22 PROCEDURE — 84100 ASSAY OF PHOSPHORUS: CPT

## 2017-07-22 PROCEDURE — 25000003 PHARM REV CODE 250: Performed by: INTERNAL MEDICINE

## 2017-07-22 PROCEDURE — 45378 DIAGNOSTIC COLONOSCOPY: CPT | Performed by: INTERNAL MEDICINE

## 2017-07-22 PROCEDURE — D9220A PRA ANESTHESIA: Mod: CRNA,,, | Performed by: REGISTERED NURSE

## 2017-07-22 PROCEDURE — 86850 RBC ANTIBODY SCREEN: CPT

## 2017-07-22 PROCEDURE — 27000248 HC VAD-ADDITIONAL DAY

## 2017-07-22 PROCEDURE — 25000003 PHARM REV CODE 250: Performed by: REGISTERED NURSE

## 2017-07-22 PROCEDURE — 85730 THROMBOPLASTIN TIME PARTIAL: CPT

## 2017-07-22 PROCEDURE — 83615 LACTATE (LD) (LDH) ENZYME: CPT

## 2017-07-22 PROCEDURE — D9220A PRA ANESTHESIA: Mod: ANES,,, | Performed by: ANESTHESIOLOGY

## 2017-07-22 PROCEDURE — 63600175 PHARM REV CODE 636 W HCPCS: Performed by: PHYSICIAN ASSISTANT

## 2017-07-22 PROCEDURE — 85610 PROTHROMBIN TIME: CPT

## 2017-07-22 PROCEDURE — 80100014 HC HEMODIALYSIS 1:1

## 2017-07-22 PROCEDURE — 63600175 PHARM REV CODE 636 W HCPCS: Performed by: REGISTERED NURSE

## 2017-07-22 PROCEDURE — 83735 ASSAY OF MAGNESIUM: CPT

## 2017-07-22 PROCEDURE — 25000003 PHARM REV CODE 250: Performed by: PHYSICIAN ASSISTANT

## 2017-07-22 PROCEDURE — C9113 INJ PANTOPRAZOLE SODIUM, VIA: HCPCS | Performed by: PHYSICIAN ASSISTANT

## 2017-07-22 PROCEDURE — 85025 COMPLETE CBC W/AUTO DIFF WBC: CPT

## 2017-07-22 PROCEDURE — 37000009 HC ANESTHESIA EA ADD 15 MINS: Performed by: INTERNAL MEDICINE

## 2017-07-22 PROCEDURE — 37000008 HC ANESTHESIA 1ST 15 MINUTES: Performed by: INTERNAL MEDICINE

## 2017-07-22 PROCEDURE — 45378 DIAGNOSTIC COLONOSCOPY: CPT | Mod: ,,, | Performed by: INTERNAL MEDICINE

## 2017-07-22 PROCEDURE — 80048 BASIC METABOLIC PNL TOTAL CA: CPT

## 2017-07-22 PROCEDURE — 99232 SBSQ HOSP IP/OBS MODERATE 35: CPT | Mod: ,,, | Performed by: INTERNAL MEDICINE

## 2017-07-22 RX ORDER — SODIUM CHLORIDE 9 MG/ML
INJECTION, SOLUTION INTRAVENOUS ONCE
Status: COMPLETED | OUTPATIENT
Start: 2017-07-22 | End: 2017-07-22

## 2017-07-22 RX ORDER — SODIUM CHLORIDE 9 MG/ML
INJECTION, SOLUTION INTRAVENOUS
Status: DISCONTINUED | OUTPATIENT
Start: 2017-07-22 | End: 2017-07-26 | Stop reason: HOSPADM

## 2017-07-22 RX ORDER — PROPOFOL 10 MG/ML
VIAL (ML) INTRAVENOUS CONTINUOUS PRN
Status: DISCONTINUED | OUTPATIENT
Start: 2017-07-22 | End: 2017-07-22

## 2017-07-22 RX ORDER — ETOMIDATE 2 MG/ML
INJECTION INTRAVENOUS
Status: DISCONTINUED | OUTPATIENT
Start: 2017-07-22 | End: 2017-07-22

## 2017-07-22 RX ORDER — MIDAZOLAM HYDROCHLORIDE 1 MG/ML
INJECTION, SOLUTION INTRAMUSCULAR; INTRAVENOUS
Status: DISCONTINUED | OUTPATIENT
Start: 2017-07-22 | End: 2017-07-22

## 2017-07-22 RX ORDER — KETAMINE HYDROCHLORIDE 100 MG/ML
INJECTION, SOLUTION INTRAMUSCULAR; INTRAVENOUS
Status: DISCONTINUED | OUTPATIENT
Start: 2017-07-22 | End: 2017-07-22

## 2017-07-22 RX ORDER — IBUPROFEN 200 MG
16 TABLET ORAL
Status: DISCONTINUED | OUTPATIENT
Start: 2017-07-22 | End: 2017-07-26 | Stop reason: HOSPADM

## 2017-07-22 RX ORDER — HYDROCODONE BITARTRATE AND ACETAMINOPHEN 500; 5 MG/1; MG/1
TABLET ORAL
Status: DISCONTINUED | OUTPATIENT
Start: 2017-07-22 | End: 2017-07-25

## 2017-07-22 RX ADMIN — GABAPENTIN 300 MG: 300 CAPSULE ORAL at 09:07

## 2017-07-22 RX ADMIN — SODIUM PHOSPHATE, DIBASIC AND SODIUM PHOSPHATE, MONOBASIC 2 ENEMA: 7; 19 ENEMA RECTAL at 08:07

## 2017-07-22 RX ADMIN — ONDANSETRON 4 MG: 4 TABLET, FILM COATED ORAL at 09:07

## 2017-07-22 RX ADMIN — SODIUM CHLORIDE 300 ML: 0.9 INJECTION, SOLUTION INTRAVENOUS at 04:07

## 2017-07-22 RX ADMIN — FLUTICASONE PROPIONATE 2 SPRAY: 50 SPRAY, METERED NASAL at 08:07

## 2017-07-22 RX ADMIN — SODIUM CHLORIDE: 0.9 INJECTION, SOLUTION INTRAVENOUS at 11:07

## 2017-07-22 RX ADMIN — CALCIUM CARBONATE (ANTACID) CHEW TAB 500 MG 500 MG: 500 CHEW TAB at 05:07

## 2017-07-22 RX ADMIN — PANTOPRAZOLE SODIUM 40 MG: 40 INJECTION, POWDER, FOR SOLUTION INTRAVENOUS at 08:07

## 2017-07-22 RX ADMIN — ESCITALOPRAM 20 MG: 20 TABLET, FILM COATED ORAL at 09:07

## 2017-07-22 RX ADMIN — Medication 16 G: at 10:07

## 2017-07-22 RX ADMIN — ETOMIDATE 5 MG: 2 INJECTION, SOLUTION INTRAVENOUS at 12:07

## 2017-07-22 RX ADMIN — HYDRALAZINE HYDROCHLORIDE 50 MG: 50 TABLET ORAL at 06:07

## 2017-07-22 RX ADMIN — PROPOFOL 100 MCG/KG/MIN: 10 INJECTION, EMULSION INTRAVENOUS at 12:07

## 2017-07-22 RX ADMIN — KETAMINE HYDROCHLORIDE 30 MG: 100 INJECTION, SOLUTION, CONCENTRATE INTRAMUSCULAR; INTRAVENOUS at 12:07

## 2017-07-22 RX ADMIN — MIDAZOLAM HYDROCHLORIDE 2 MG: 1 INJECTION, SOLUTION INTRAMUSCULAR; INTRAVENOUS at 12:07

## 2017-07-22 NOTE — TRANSFER OF CARE
"Anesthesia Transfer of Care Note    Patient: Randa Devine    Procedure(s) Performed: Procedure(s) (LRB):  COLONOSCOPY (N/A)    Patient location: PACU    Anesthesia Type: general    Transport from OR: Transported from OR on 2-3 L/min O2 by NC with adequate spontaneous ventilation    Post pain: adequate analgesia    Post assessment: no apparent anesthetic complications    Post vital signs: stable    Level of consciousness: sedated    Nausea/Vomiting: no nausea/vomiting    Complications: none    Transfer of care protocol was followed      Last vitals:   Visit Vitals  BP 93/70   Pulse 86   Temp 36.8 °C (98.2 °F) (Oral)   Resp 18   Ht 5' 1" (1.549 m)   Wt 58.3 kg (128 lb 8.5 oz)   LMP  (LMP Unknown)   SpO2 99%   Breastfeeding? No   BMI 24.29 kg/m²     "

## 2017-07-22 NOTE — PROGRESS NOTES
Pt to Endo for colonoscopy via stretcher. LVAD emergency equipment sent with pt. Report to ICU nurse given. BG- 113

## 2017-07-22 NOTE — ASSESSMENT & PLAN NOTE
-INR 2.7 today. Will continue to hold coumadin  -GI consulted - appreciate their assistance  -EGD howed Erythematous duodenopathy  -CTA negative for acute source of bleed  -Colonoscopy in afternoon today  -s/p fleet enema  -Protonix IV   -serial CBC Q6H

## 2017-07-22 NOTE — SUBJECTIVE & OBJECTIVE
Interval History: NAEON. Reported bleeding through rectum, no stool. Noted to by hypoglycemic today, given juice and glucose tabs.    Continuous Infusions:   Scheduled Meds:   sodium chloride 0.9%   Intravenous Once    sodium chloride 0.9%   Intravenous Once    amlodipine  10 mg Oral Daily    atorvastatin  40 mg Oral Daily    calcium carbonate  500 mg Oral QID    cetirizine  5 mg Oral Daily    escitalopram oxalate  20 mg Oral QHS    fluticasone  2 spray Each Nare Daily    gabapentin  300 mg Oral QHS    hydrALAZINE  50 mg Oral Q8H    pantoprazole  40 mg Intravenous Daily     PRN Meds:sodium chloride, sodium chloride, sodium chloride, sodium chloride 0.9%, acetaminophen, glucose, ondansetron    Review of patient's allergies indicates:   Allergen Reactions    Codeine Nausea And Vomiting    Demerol [meperidine] Nausea And Vomiting    Lortab [hydrocodone-acetaminophen] Nausea And Vomiting     Objective:     Vital Signs (Most Recent):  Temp: 98.2 °F (36.8 °C) (07/22/17 1145)  Pulse: 86 (07/22/17 1145)  Resp: 18 (07/22/17 1145)  BP: 93/70 (07/22/17 1145)  SpO2: 99 % (07/22/17 1145) Vital Signs (24h Range):  Temp:  [97.8 °F (36.6 °C)-98.8 °F (37.1 °C)] 98.2 °F (36.8 °C)  Pulse:  [70-90] 86  Resp:  [16-18] 18  SpO2:  [96 %-99 %] 99 %  BP: (64-93)/(0-70) 93/70     Weight: 58.3 kg (128 lb 8.5 oz)  Body mass index is 24.29 kg/m².      Intake/Output Summary (Last 24 hours) at 07/22/17 1200  Last data filed at 07/22/17 0405   Gross per 24 hour   Intake                0 ml   Output               25 ml   Net              -25 ml       Hemodynamic Parameters:       Telemetry: no events    Physical Exam  Constitutional: She is oriented to person, place, and time. She appears well-developed and well-nourished.   Neck: Normal range of motion. Neck supple. No JVD present.   Cardiovascular: Normal rate and regular rhythm.    Normal VAD hum   Pulmonary/Chest: Effort normal and breath sounds normal. She has no wheezes. She has  no rales.   Abdominal: Soft. Bowel sounds are normal. There is no tenderness.   Musculoskeletal: She exhibits no edema.   Neurological: She is alert and oriented to person, place, and time.   Skin: Skin is warm and dry.     Significant Labs:  CBC:    Recent Labs  Lab 07/22/17  0453   WBC 3.12*   RBC 2.29*   HGB 7.1*   HCT 22.0*   PLT 86*   MCV 96   MCH 31.0   MCHC 32.3     BNP:    Recent Labs  Lab 07/21/17  0502   BNP 2,242*     CMP:    Recent Labs  Lab 07/21/17  0503 07/22/17  0453   GLU 57* 33*   CALCIUM 8.3* 8.1*   ALBUMIN 3.1*  --    PROT 5.8*  --     137   K 3.7 4.4   CO2 20* 16*    102   BUN 19 32*   CREATININE 4.5* 7.0*   ALKPHOS 84  --    ALT 17  --    AST 29  --    BILITOT 0.8  --       Coagulation:     Recent Labs  Lab 07/22/17  0453   INR 2.7*   APTT 33.7*     LDH:    Recent Labs  Lab 07/20/17  0544 07/21/17  0503 07/22/17  0453    168 163     Microbiology:  Microbiology Results (last 7 days)     Procedure Component Value Units Date/Time    Urine culture [[id number]] Collected:  07/19/17 1037    Order Status:  Completed Specimen:  Urine Updated:  07/20/17 1514     Urine Culture, Routine Multiple organisms isolated. None in predominance.  Repeat if     Urine Culture, Routine clinically necessary.    Narrative:       Preferred Collection Type->Urine, Clean Catch          I have reviewed all pertinent labs within the past 24 hours.    Estimated Creatinine Clearance: 7.1 mL/min (based on Cr of 7).    Diagnostic Results:  I have reviewed and interpreted all pertinent imaging results/findings within the past 24 hours.

## 2017-07-22 NOTE — PROGRESS NOTES
Lab called result of glucose-38. Blood sugar immediately checked at bedside-49. Pt sitting up in chair. Denies dizziness. Notified M.D. Oranje juice given.

## 2017-07-22 NOTE — PROGRESS NOTES
Arrived in Endoscopy, pt on stretcher connected to continuous cardiac monitoring with MILTON HI and 2 RNs at bedside preparing pt for colonoscopy.  Pt with Heart ware speeds 2600, 3.7 campos, 5.34. VSS. Accucheck obtained 129. Dr. Garcia at bedside, procedure to begin. Will continue to monitor.

## 2017-07-22 NOTE — ANESTHESIA RELEASE NOTE
"Anesthesia Release from PACU Note    Patient: Randa Devine    Procedure(s) Performed: Procedure(s) (LRB):  COLONOSCOPY (N/A)    Anesthesia type: general    Post pain: Adequate analgesia    Post assessment: no apparent anesthetic complications, tolerated procedure well and no evidence of recall    Last Vitals:   Visit Vitals  BP (!) 88/62   Pulse 71   Temp 36.6 °C (97.9 °F) (Temporal)   Resp 12   Ht 5' 1" (1.549 m)   Wt 58.3 kg (128 lb 8.5 oz)   LMP  (LMP Unknown)   SpO2 96%   Breastfeeding? No   BMI 24.29 kg/m²       Post vital signs: stable    Level of consciousness: awake    Nausea/Vomiting: no nausea/no vomiting    Complications: none    Airway Patency: patent    Respiratory: unassisted, spontaneous ventilation, nasal cannula    Cardiovascular: stable and blood pressure at baseline    Hydration: euvolemic  "

## 2017-07-22 NOTE — PROGRESS NOTES
07/22/17 0400 07/22/17 0405   Vital Signs   BP (!) 64/0 (!) 84/57   MAP (mmHg) --  67   BP Location Right arm Right arm   BP Method Doppler Automatic   Patient Position Lying Lying     Pt's BP and doppler listed above. Hydralazine 50 mg ordered. Notified MD Garcia, he instructed me to give the medicine. Order would be carried out. Will continue to monitor.

## 2017-07-22 NOTE — ANESTHESIA POSTPROCEDURE EVALUATION
"Anesthesia Post Evaluation    Patient: Randa Devine    Procedure(s) Performed: Procedure(s) (LRB):  COLONOSCOPY (N/A)    Final Anesthesia Type: general  Patient location during evaluation: PACU  Patient participation: Yes- Able to Participate  Level of consciousness: awake and alert  Post-procedure vital signs: reviewed and stable  Pain management: adequate  Airway patency: patent  PONV status at discharge: No PONV  Anesthetic complications: no      Cardiovascular status: hemodynamically stable  Respiratory status: unassisted, spontaneous ventilation and nasal cannula  Hydration status: euvolemic  Follow-up not needed.        Visit Vitals  BP (!) 88/62   Pulse 71   Temp 36.6 °C (97.9 °F) (Temporal)   Resp 12   Ht 5' 1" (1.549 m)   Wt 58.3 kg (128 lb 8.5 oz)   LMP  (LMP Unknown)   SpO2 96%   Breastfeeding? No   BMI 24.29 kg/m²       Pain/Billy Score: Pain Assessment Performed: Yes (7/22/2017  1:11 PM)  Presence of Pain: non-verbal indicators absent (7/22/2017  1:11 PM)  Pain Rating Prior to Med Admin: 8 (7/21/2017  5:25 PM)  Pain Rating Post Med Admin: 0 (7/21/2017  2:41 AM)      "

## 2017-07-22 NOTE — PROGRESS NOTES
07/21/17 2000 07/21/17 2001   Vital Signs   BP (!) 87/52 (!) 70/0   MAP (mmHg) 61 --    BP Location Right arm Right arm   BP Method Automatic Doppler   Patient Position Lying Lying       Pt's BP and doppler listed above. Pt has hydralazine 50 mg ordered. Spoke to on call MD. Instructed to give half of the hydralazine. Order will be followed. Will continue to monitor.

## 2017-07-22 NOTE — PATIENT INSTRUCTIONS
Discharge Summary/Instructions after an Endoscopic Procedure  Patient Name: Randa Devine  Patient MRN: [id number]  Patient YOB: 1955 Saturday, July 22, 2017  Adryan Garcia MD  RESTRICTIONS ON ACTIVITY:  - DO NOT drive a car, operate machinery, make legal/financial decisions, or   drink alcohol until the day after the procedure.    - The following day: return to full activity including work, except no heavy   lifting, straining or running for 3 days if polyps were removed.  - Diet: Eat and drink normally unless instructed otherwise.  TREATMENT FOR COMMON SIDE EFFECTS:  - Mild abdominal pain, bloating or excessive gas: rest, eat lightly and use   a heating pad.  - Sore Throat - treat with throat lozenges. Gargle with warm salt water.  SYMPTOMS TO WATCH FOR AND REPORT TO YOUR PHYSICIAN:  1. Severe abdominal pain or bloating.  2. Pain in chest.  3. Chills or fever occurring within 24 hours after a procedure.  4. A large amount of rectal bleeding, which would show as bright red,   maroon, or black stools. (A small amount of blood from the rectum is not   serious, especially if hemorrhoids are present.)  5. Because air was used during the procedure, expelling large amounts of air   from your rectum or belching is normal.  6. If a bowel prep was taken, you may not have a bowel movement for 1-3   days.  This is normal.  7. Go directly to the emergency room if you notice any of the following:   Chills and/or fever over 101 F   Persistent vomiting   Severe abdominal pain, other than gas cramps   Severe chest pain   Black, tarry stools   Any bleeding - exceeding one tablespoon  Your doctor recommends these additional instructions:  If any biopsies were performed, my office will call you in 5 to 6 business   days with any results.  You are being discharged to home.   You have a contact number available for emergencies.  The signs and symptoms   of potential delayed complications were discussed with you.  You may  return   to normal activities tomorrow.  Written discharge instructions were   provided to you.   Resume your previous diet.   Continue your present medications.   We are waiting for your pathology results.   Your physician has recommended a small bowel enteroscopy (SBE) at   appointment to be scheduled.  For questions, problems or results please call your physician - Adryan Garcia MD at Work:  [phone].  [facility], EMERGENCY ROOM PHONE NUMBER: [phone]  IF A COMPLICATION OR EMERGENCY SITUATION ARISES AND YOU ARE UNABLE TO REACH   YOUR PHYSICIAN - GO TO THE EMERGENCY ROOM.  Adryan Garcia MD  7/22/2017 1:01:14 PM  This report has been verified and signed electronically.

## 2017-07-22 NOTE — H&P
Short Stay Endoscopy History and Physical    PCP - Laura Garvin, DO    Procedure - Colonoscopy  ASA - per anesthesia  Mallampati - per anesthesia  History of Anesthesia problems - no  Family history Anesthesia problems - no   Plan of anesthesia - General    HPI:  This is a 61 y.o. female here for evaluation of : rectal bleeding    ROS:  Constitutional: No fevers, chills, No weight loss  CV: No chest pain  Pulm: No cough, No shortness of breath  GI: see HPI  Derm: No rash    Medical History:  has a past medical history of Anticoagulant long-term use; Anxiety; Atrial tachycardia, paroxysmal (2013); Blood transfusion; Cardiomyopathy; CHF (congestive heart failure); Chronic kidney disease; Coronary artery disease; End stage renal disease; Hypertension; ICD (implantable cardioverter-defibrillator) battery depletion (2014); Myocardial infarction; Presence of left ventricular assist device (LVAD); Pulmonary hypertension; and Stroke.    Surgical History:  has a past surgical history that includes Left ventricular assist device (2012); Tonsillectomy; Cardiac surgery; Coronary artery bypass graft; Hip surgery (); Fracture surgery; Vascular surgery; Cardiac defibrillator placement;  section (); Colonoscopy (N/A, 2016); and Colonoscopy (N/A, 4/3/2017).    Family History: family history includes Arthritis in her mother; Cancer in her maternal grandmother; Heart disease in her father; Hypertension in her father.. Otherwise no colon cancer, inflammatory bowel disease, or GI malignancies.    Social History:  reports that she quit smoking about 7 years ago. She has a 60.00 pack-year smoking history. She has never used smokeless tobacco. She reports that she drinks alcohol. She reports that she does not use drugs.    Review of patient's allergies indicates:   Allergen Reactions    Codeine Nausea And Vomiting    Demerol [meperidine] Nausea And Vomiting    Lortab [hydrocodone-acetaminophen]  Nausea And Vomiting       Medications:   Prescriptions Prior to Admission   Medication Sig Dispense Refill Last Dose    acetaminophen (TYLENOL) 325 MG tablet Take 2 tablets (650 mg total) by mouth every 6 (six) hours as needed for Pain (mild pain).  0 7/18/2017 at Unknown time    amlodipine (NORVASC) 10 MG tablet Take 1 tablet (10 mg total) by mouth once daily. 30 tablet 11 7/18/2017 at Unknown time    atorvastatin (LIPITOR) 40 MG tablet Take 1 tablet (40 mg total) by mouth once daily. 90 tablet 3 7/18/2017 at Unknown time    cetirizine (ZYRTEC) 5 MG tablet Take 1 tablet (5 mg total) by mouth once daily. 30 tablet 0 7/18/2017 at Unknown time    escitalopram oxalate (LEXAPRO) 20 MG tablet Take 1 tablet (20 mg total) by mouth every evening. 90 tablet 3 7/17/2017 at Unknown time    gabapentin (NEURONTIN) 300 MG capsule Take 1 capsule (300 mg total) by mouth 2 (two) times daily. 180 capsule 3 7/17/2017    hydrALAZINE (APRESOLINE) 50 MG tablet Take 1 tablet (50 mg total) by mouth every 8 (eight) hours. 90 tablet 11 7/17/2017    ondansetron (ZOFRAN) 4 MG tablet Take 1 tablet (4 mg total) by mouth every 8 (eight) hours as needed for Nausea. 30 tablet 3 7/18/2017 at Unknown time    pantoprazole (PROTONIX) 40 MG tablet Take 1 tablet (40 mg total) by mouth once daily. 60 tablet 10 7/18/2017 at Unknown time    warfarin (COUMADIN) 2 MG tablet 5mg tonight only (4/6) followed by 2mg/day 102 tablet 3 Past Week at Unknown time    CALCIUM CARBONATE (ANTACID CALCIUM ORAL) Take 1 tablet by mouth 4 (four) times daily.     at Unknown time         Physical Exam:    Vital Signs:   Vitals:    07/22/17 1145   BP: 93/70   Pulse: 86   Resp: 18   Temp: 98.2 °F (36.8 °C)       General Appearance: Well appearing in no acute distress  Eyes:    No scleral icterus  ENT: Neck supple, Lips, mucosa, and tongue normal; teeth and gums normal  Lungs: CTA bilaterally  Heart:  S1, S2 normal, no murmurs heard  Abdomen: Soft, non tender, non  distended with positive bowel sounds. No hepatosplenomegaly, ascites, or mass.  Extremities: 2+ pulses, no clubbing, cyanosis or edema  Skin: No rash      Labs:  Lab Results   Component Value Date    WBC 3.12 (L) 07/22/2017    HGB 7.1 (L) 07/22/2017    HCT 22.0 (L) 07/22/2017    PLT 86 (L) 07/22/2017    CHOL 144 05/10/2017    TRIG 81 05/10/2017    HDL 54 05/10/2017    ALT 17 07/21/2017    AST 29 07/21/2017     07/22/2017    K 4.4 07/22/2017     07/22/2017    CREATININE 7.0 (H) 07/22/2017    BUN 32 (H) 07/22/2017    CO2 16 (L) 07/22/2017    TSH 1.996 01/19/2017    INR 2.7 (H) 07/22/2017    HGBA1C 6.4 (H) 03/29/2012       I have explained the risks and benefits of endoscopy procedures to the patient including but not limited to bleeding, perforation, infection, and death.    Christiano Berg M.D.  Gastroenterology Fellow, PGY-IV  Pager: [phone]  [facility]

## 2017-07-22 NOTE — TREATMENT PLAN
GI Treatment Plan  07/22/2017  6:00 PM    Impression:             - Liquid maroon blood seen throughout the colon with some blood clots though after extensive lavaging there was no active bleeding seen.  - Two 3 mm polyps seen in the ascending and transverse colon which were not removed due to high INR.    Recommendations:        - Start a CLD  - Continue present medications  - Continue monitoring serial H/H  - Recommend EGD with push enteroscopy and if normal then drop capsule endoscopy at same time if possible (will try to tentatively set this up for Monday).                         Christiano Berg M.D.  Gastroenterology Fellow, PGY-IV  Pager: [phone]  [facility]

## 2017-07-22 NOTE — PROGRESS NOTES
Colonoscopy complete, VSS. Heart ware speed 2600/3.5/5. Pt now in PACU VSS, BP 89/60 (70), 96%, RR 12, HR 72 paced rhythm.

## 2017-07-22 NOTE — PLAN OF CARE
Problem: Patient Care Overview  Goal: Plan of Care Review  Pt remained free of injuries, falls, and trauma throughout the shift. VSS and Doppler are within limits. Pt denies pain. No complaints or discomforts noted. Pt remained NPO after midnight. Plan for colonoscopy in the AM. Plan of care reviewed with pt. Pt verbalizes understanding. Bed in lowest position. Will continue to monitor.

## 2017-07-22 NOTE — PROGRESS NOTES
[facility]  Heart Transplant  Progress Note    Patient Name: Randa Devine  MRN: [id number]  Admission Date: 7/18/2017  Hospital Length of Stay: 4 days  Attending Physician: Sera Collado MD  Primary Care Provider: Laura Garvin DO  Principal Problem:GI bleed    Subjective:     Interval History: NAEON. Reported bleeding through rectum, no stool. Noted to by hypoglycemic today, given juice and glucose tabs.    Continuous Infusions:   Scheduled Meds:   sodium chloride 0.9%   Intravenous Once    sodium chloride 0.9%   Intravenous Once    amlodipine  10 mg Oral Daily    atorvastatin  40 mg Oral Daily    calcium carbonate  500 mg Oral QID    cetirizine  5 mg Oral Daily    escitalopram oxalate  20 mg Oral QHS    fluticasone  2 spray Each Nare Daily    gabapentin  300 mg Oral QHS    hydrALAZINE  50 mg Oral Q8H    pantoprazole  40 mg Intravenous Daily     PRN Meds:sodium chloride, sodium chloride, sodium chloride, sodium chloride 0.9%, acetaminophen, glucose, ondansetron    Review of patient's allergies indicates:   Allergen Reactions    Codeine Nausea And Vomiting    Demerol [meperidine] Nausea And Vomiting    Lortab [hydrocodone-acetaminophen] Nausea And Vomiting     Objective:     Vital Signs (Most Recent):  Temp: 98.2 °F (36.8 °C) (07/22/17 1145)  Pulse: 86 (07/22/17 1145)  Resp: 18 (07/22/17 1145)  BP: 93/70 (07/22/17 1145)  SpO2: 99 % (07/22/17 1145) Vital Signs (24h Range):  Temp:  [97.8 °F (36.6 °C)-98.8 °F (37.1 °C)] 98.2 °F (36.8 °C)  Pulse:  [70-90] 86  Resp:  [16-18] 18  SpO2:  [96 %-99 %] 99 %  BP: (64-93)/(0-70) 93/70     Weight: 58.3 kg (128 lb 8.5 oz)  Body mass index is 24.29 kg/m².      Intake/Output Summary (Last 24 hours) at 07/22/17 1200  Last data filed at 07/22/17 0405   Gross per 24 hour   Intake                0 ml   Output               25 ml   Net              -25 ml       Hemodynamic Parameters:       Telemetry: no events    Physical Exam  Constitutional: She is  oriented to person, place, and time. She appears well-developed and well-nourished.   Neck: Normal range of motion. Neck supple. No JVD present.   Cardiovascular: Normal rate and regular rhythm.    Normal VAD hum   Pulmonary/Chest: Effort normal and breath sounds normal. She has no wheezes. She has no rales.   Abdominal: Soft. Bowel sounds are normal. There is no tenderness.   Musculoskeletal: She exhibits no edema.   Neurological: She is alert and oriented to person, place, and time.   Skin: Skin is warm and dry.     Significant Labs:  CBC:    Recent Labs  Lab 07/22/17 0453   WBC 3.12*   RBC 2.29*   HGB 7.1*   HCT 22.0*   PLT 86*   MCV 96   MCH 31.0   MCHC 32.3     BNP:    Recent Labs  Lab 07/21/17  0502   BNP 2,242*     CMP:    Recent Labs  Lab 07/21/17  0503 07/22/17 0453   GLU 57* 33*   CALCIUM 8.3* 8.1*   ALBUMIN 3.1*  --    PROT 5.8*  --     137   K 3.7 4.4   CO2 20* 16*    102   BUN 19 32*   CREATININE 4.5* 7.0*   ALKPHOS 84  --    ALT 17  --    AST 29  --    BILITOT 0.8  --       Coagulation:     Recent Labs  Lab 07/22/17 0453   INR 2.7*   APTT 33.7*     LDH:    Recent Labs  Lab 07/20/17  0544 07/21/17  0503 07/22/17  0453    168 163     Microbiology:  Microbiology Results (last 7 days)     Procedure Component Value Units Date/Time    Urine culture [[id number]] Collected:  07/19/17 1037    Order Status:  Completed Specimen:  Urine Updated:  07/20/17 1514     Urine Culture, Routine Multiple organisms isolated. None in predominance.  Repeat if     Urine Culture, Routine clinically necessary.    Narrative:       Preferred Collection Type->Urine, Clean Catch          I have reviewed all pertinent labs within the past 24 hours.    Estimated Creatinine Clearance: 7.1 mL/min (based on Cr of 7).    Diagnostic Results:  I have reviewed and interpreted all pertinent imaging results/findings within the past 24 hours.    Assessment and Plan:     * GI bleed    -INR 2.7 today. Will continue to hold  coumadin  -GI consulted - appreciate their assistance  -EGD howed Erythematous duodenopathy  -CTA negative for acute source of bleed  -Colonoscopy in afternoon today  -s/p fleet enema  -Protonix IV   -serial CBC Q6H        Normocytic anemia due to blood loss    -Acute on Chronic, due to GIB  -1U PRBC (7/22) during HD post-colonoscopy          Left ventricular assist device present    -HW implanted 5/2012  -Speed 2600  -Hold coumadin as above  -HTN- cont hydralazine, amlodipine        End stage renal disease on dialysis    -Nephrology consulted. On HD Tues, Thurs, Sat  -Had HD ovenrnight.        Bilateral foot-drop    -AFOs ordered  -Prescription sent to  Clinic in [place] after discussion with Tasneem from PT  -[facility] to call patient to follow up with the patient once she is discharged          Case discussed with attending.    Carline Glass MD  Heart Transplant  [facility]

## 2017-07-22 NOTE — PROGRESS NOTES
Nursing Transfer Note      7/22/2017     Transfer To: 302    Transfer via stretcher    Transfer with cardiac monitoring    Transported by RN & PCT    Medicines sent: none    Chart send with patient: Yes    Patient reassessed at: to be done by receiving RN (date, time)

## 2017-07-22 NOTE — PROGRESS NOTES
Pt transferred back to 302 from PACU via stretcher, portable tele box and portable oxygen. Pt now in bed, FARAZ Solorio notifed. Pt denies SOB, pain or discomfort. Care transferred to FARAZ Solorio.

## 2017-07-23 LAB
ANION GAP SERPL CALC-SCNC: 14 MMOL/L
APTT BLDCRRT: 29.1 SEC
BASOPHILS # BLD AUTO: 0.01 K/UL
BASOPHILS # BLD AUTO: 0.02 K/UL
BASOPHILS # BLD AUTO: 0.02 K/UL
BASOPHILS # BLD AUTO: 0.03 K/UL
BASOPHILS NFR BLD: 0.2 %
BASOPHILS NFR BLD: 0.4 %
BASOPHILS NFR BLD: 0.5 %
BASOPHILS NFR BLD: 0.7 %
BUN SERPL-MCNC: 20 MG/DL
CALCIUM SERPL-MCNC: 8.2 MG/DL
CHLORIDE SERPL-SCNC: 100 MMOL/L
CO2 SERPL-SCNC: 25 MMOL/L
CREAT SERPL-MCNC: 4.8 MG/DL
DIFFERENTIAL METHOD: ABNORMAL
EOSINOPHIL # BLD AUTO: 0.1 K/UL
EOSINOPHIL NFR BLD: 1.7 %
EOSINOPHIL NFR BLD: 2.1 %
ERYTHROCYTE [DISTWIDTH] IN BLOOD BY AUTOMATED COUNT: 17.5 %
ERYTHROCYTE [DISTWIDTH] IN BLOOD BY AUTOMATED COUNT: 17.9 %
ERYTHROCYTE [DISTWIDTH] IN BLOOD BY AUTOMATED COUNT: 17.9 %
ERYTHROCYTE [DISTWIDTH] IN BLOOD BY AUTOMATED COUNT: 18.1 %
EST. GFR  (AFRICAN AMERICAN): 10.7 ML/MIN/1.73 M^2
EST. GFR  (NON AFRICAN AMERICAN): 9.3 ML/MIN/1.73 M^2
GLUCOSE SERPL-MCNC: 76 MG/DL
HCT VFR BLD AUTO: 24.5 %
HCT VFR BLD AUTO: 24.7 %
HCT VFR BLD AUTO: 24.8 %
HCT VFR BLD AUTO: 24.8 %
HGB BLD-MCNC: 8.2 G/DL
INR PPP: 2
LDH SERPL L TO P-CCNC: 171 U/L
LYMPHOCYTES # BLD AUTO: 0.5 K/UL
LYMPHOCYTES # BLD AUTO: 0.6 K/UL
LYMPHOCYTES # BLD AUTO: 0.7 K/UL
LYMPHOCYTES # BLD AUTO: 0.8 K/UL
LYMPHOCYTES NFR BLD: 12.1 %
LYMPHOCYTES NFR BLD: 13.1 %
LYMPHOCYTES NFR BLD: 14.5 %
LYMPHOCYTES NFR BLD: 20.4 %
MAGNESIUM SERPL-MCNC: 2 MG/DL
MCH RBC QN AUTO: 30 PG
MCH RBC QN AUTO: 30.3 PG
MCH RBC QN AUTO: 30.4 PG
MCH RBC QN AUTO: 30.6 PG
MCHC RBC AUTO-ENTMCNC: 33.1 G/DL
MCHC RBC AUTO-ENTMCNC: 33.1 G/DL
MCHC RBC AUTO-ENTMCNC: 33.2 G/DL
MCHC RBC AUTO-ENTMCNC: 33.5 G/DL
MCV RBC AUTO: 91 FL
MCV RBC AUTO: 91 FL
MCV RBC AUTO: 92 FL
MCV RBC AUTO: 92 FL
MONOCYTES # BLD AUTO: 0.4 K/UL
MONOCYTES # BLD AUTO: 0.5 K/UL
MONOCYTES # BLD AUTO: 0.5 K/UL
MONOCYTES # BLD AUTO: 0.6 K/UL
MONOCYTES NFR BLD: 11 %
MONOCYTES NFR BLD: 11 %
MONOCYTES NFR BLD: 11.4 %
MONOCYTES NFR BLD: 8.8 %
NEUTROPHILS # BLD AUTO: 2.6 K/UL
NEUTROPHILS # BLD AUTO: 3.1 K/UL
NEUTROPHILS # BLD AUTO: 3.2 K/UL
NEUTROPHILS # BLD AUTO: 3.8 K/UL
NEUTROPHILS NFR BLD: 65.3 %
NEUTROPHILS NFR BLD: 73.7 %
NEUTROPHILS NFR BLD: 73.8 %
NEUTROPHILS NFR BLD: 75 %
PHOSPHATE SERPL-MCNC: 4.3 MG/DL
PLATELET # BLD AUTO: 63 K/UL
PLATELET # BLD AUTO: 72 K/UL
PLATELET # BLD AUTO: 72 K/UL
PLATELET # BLD AUTO: 75 K/UL
PMV BLD AUTO: 9.2 FL
PMV BLD AUTO: 9.4 FL
PMV BLD AUTO: 9.4 FL
PMV BLD AUTO: 9.6 FL
POTASSIUM SERPL-SCNC: 3.8 MMOL/L
PROTHROMBIN TIME: 19.8 SEC
RBC # BLD AUTO: 2.68 M/UL
RBC # BLD AUTO: 2.7 M/UL
RBC # BLD AUTO: 2.71 M/UL
RBC # BLD AUTO: 2.73 M/UL
SODIUM SERPL-SCNC: 139 MMOL/L
WBC # BLD AUTO: 4.02 K/UL
WBC # BLD AUTO: 4.2 K/UL
WBC # BLD AUTO: 4.21 K/UL
WBC # BLD AUTO: 5.19 K/UL

## 2017-07-23 PROCEDURE — 80048 BASIC METABOLIC PNL TOTAL CA: CPT

## 2017-07-23 PROCEDURE — 27000248 HC VAD-ADDITIONAL DAY

## 2017-07-23 PROCEDURE — 85730 THROMBOPLASTIN TIME PARTIAL: CPT

## 2017-07-23 PROCEDURE — 63600175 PHARM REV CODE 636 W HCPCS: Performed by: PHYSICIAN ASSISTANT

## 2017-07-23 PROCEDURE — 99232 SBSQ HOSP IP/OBS MODERATE 35: CPT | Mod: ,,, | Performed by: INTERNAL MEDICINE

## 2017-07-23 PROCEDURE — 85025 COMPLETE CBC W/AUTO DIFF WBC: CPT

## 2017-07-23 PROCEDURE — 36415 COLL VENOUS BLD VENIPUNCTURE: CPT

## 2017-07-23 PROCEDURE — 20600001 HC STEP DOWN PRIVATE ROOM

## 2017-07-23 PROCEDURE — 25000003 PHARM REV CODE 250: Performed by: PHYSICIAN ASSISTANT

## 2017-07-23 PROCEDURE — 83735 ASSAY OF MAGNESIUM: CPT

## 2017-07-23 PROCEDURE — 85610 PROTHROMBIN TIME: CPT

## 2017-07-23 PROCEDURE — C9113 INJ PANTOPRAZOLE SODIUM, VIA: HCPCS | Performed by: PHYSICIAN ASSISTANT

## 2017-07-23 PROCEDURE — 83615 LACTATE (LD) (LDH) ENZYME: CPT

## 2017-07-23 PROCEDURE — 84100 ASSAY OF PHOSPHORUS: CPT

## 2017-07-23 RX ADMIN — PANTOPRAZOLE SODIUM 40 MG: 40 INJECTION, POWDER, FOR SOLUTION INTRAVENOUS at 08:07

## 2017-07-23 RX ADMIN — ATORVASTATIN CALCIUM 40 MG: 20 TABLET, FILM COATED ORAL at 08:07

## 2017-07-23 RX ADMIN — FLUTICASONE PROPIONATE 2 SPRAY: 50 SPRAY, METERED NASAL at 08:07

## 2017-07-23 RX ADMIN — GABAPENTIN 300 MG: 300 CAPSULE ORAL at 09:07

## 2017-07-23 RX ADMIN — CALCIUM CARBONATE (ANTACID) CHEW TAB 500 MG 500 MG: 500 CHEW TAB at 08:07

## 2017-07-23 RX ADMIN — CETIRIZINE HYDROCHLORIDE 5 MG: 5 TABLET, FILM COATED ORAL at 08:07

## 2017-07-23 RX ADMIN — AMLODIPINE BESYLATE 10 MG: 10 TABLET ORAL at 08:07

## 2017-07-23 RX ADMIN — CALCIUM CARBONATE (ANTACID) CHEW TAB 500 MG 500 MG: 500 CHEW TAB at 12:07

## 2017-07-23 RX ADMIN — ESCITALOPRAM 20 MG: 20 TABLET, FILM COATED ORAL at 09:07

## 2017-07-23 NOTE — PROGRESS NOTES
Diet changed to clear liquid for procedure scheduled for tomorrow am. NPO after MN. Clarified with M.D. Pt still refused dressing change. Assessment unchanged. VSS. LVAD numbers unchanged.

## 2017-07-23 NOTE — PROGRESS NOTES
07/22/17 1929 07/22/17 1930   Vital Signs   BP (!) 71/48 (!) 60/0   MAP (mmHg) 56 --    BP Location Right arm Right arm   BP Method Automatic Doppler   Patient Position Lying Lying     Pt's BP and Doppler listed above Hydralazine 50 mg is scheduled. Notified on call MD Nina. Instructed to hold tonight dose. Will continue to monitor.

## 2017-07-23 NOTE — PROGRESS NOTES
07/23/17 0405 07/23/17 0500   Vital Signs   BP (!) 68/0 (!) 83/51   MAP (mmHg) --  62   BP Location Right arm Right arm   BP Method Doppler Automatic   Patient Position Lying Lying     Pt's BP and Doppler listed above. Pt has Hydralazine 50 mg scheduled. Notified on call MD Nina. Instructed to hold dose. Will continue to monitor.

## 2017-07-23 NOTE — PLAN OF CARE
Problem: Patient Care Overview  Goal: Plan of Care Review  Outcome: Ongoing (interventions implemented as appropriate)  Pt remained free of injuries, falls, and trauma throughout the shift. VSS and Doppler are within limits. Pt denies pain. No complaints or discomforts noted. Pt received dialysis today. Colonoscopy completed. No active bleeding throughout the night. Pt refused LVAD dressing change. Plan of care reviewed with pt. Pt verbalizes understanding. Bed in lowest position. Will continue to monitor

## 2017-07-23 NOTE — PROGRESS NOTES
Hemodialysis x 3 hours complete. 2.6 liter net fluid removal. Blood returned without difficulty. 1u prbc transfused. Needles removed and pressure held x 10 minutes until hemostasis achieved.

## 2017-07-24 LAB
ALBUMIN SERPL BCP-MCNC: 3 G/DL
ALP SERPL-CCNC: 75 U/L
ALT SERPL W/O P-5'-P-CCNC: 12 U/L
ANION GAP SERPL CALC-SCNC: 16 MMOL/L
APTT BLDCRRT: 23.9 SEC
AST SERPL-CCNC: 23 U/L
BASOPHILS # BLD AUTO: 0.01 K/UL
BASOPHILS # BLD AUTO: 0.02 K/UL
BASOPHILS NFR BLD: 0.3 %
BASOPHILS NFR BLD: 0.5 %
BASOPHILS NFR BLD: 0.6 %
BASOPHILS NFR BLD: 0.6 %
BILIRUB DIRECT SERPL-MCNC: 0.3 MG/DL
BILIRUB SERPL-MCNC: 0.6 MG/DL
BNP SERPL-MCNC: 1773 PG/ML
BUN SERPL-MCNC: 33 MG/DL
CALCIUM SERPL-MCNC: 8.1 MG/DL
CHLORIDE SERPL-SCNC: 99 MMOL/L
CO2 SERPL-SCNC: 22 MMOL/L
CREAT SERPL-MCNC: 6.9 MG/DL
CRP SERPL-MCNC: 3.9 MG/L
DIFFERENTIAL METHOD: ABNORMAL
EOSINOPHIL # BLD AUTO: 0.1 K/UL
EOSINOPHIL NFR BLD: 2.2 %
EOSINOPHIL NFR BLD: 2.4 %
EOSINOPHIL NFR BLD: 2.5 %
EOSINOPHIL NFR BLD: 3.6 %
ERYTHROCYTE [DISTWIDTH] IN BLOOD BY AUTOMATED COUNT: 16.6 %
ERYTHROCYTE [DISTWIDTH] IN BLOOD BY AUTOMATED COUNT: 17 %
ERYTHROCYTE [DISTWIDTH] IN BLOOD BY AUTOMATED COUNT: 17 %
ERYTHROCYTE [DISTWIDTH] IN BLOOD BY AUTOMATED COUNT: 17.3 %
EST. GFR  (AFRICAN AMERICAN): 6.9 ML/MIN/1.73 M^2
EST. GFR  (NON AFRICAN AMERICAN): 6 ML/MIN/1.73 M^2
GLUCOSE SERPL-MCNC: 68 MG/DL
HCT VFR BLD AUTO: 22.3 %
HCT VFR BLD AUTO: 22.7 %
HCT VFR BLD AUTO: 23.2 %
HCT VFR BLD AUTO: 23.3 %
HGB BLD-MCNC: 7.3 G/DL
HGB BLD-MCNC: 7.5 G/DL
HGB BLD-MCNC: 7.7 G/DL
HGB BLD-MCNC: 7.8 G/DL
INR PPP: 1.2
LDH SERPL L TO P-CCNC: 171 U/L
LYMPHOCYTES # BLD AUTO: 0.5 K/UL
LYMPHOCYTES # BLD AUTO: 0.6 K/UL
LYMPHOCYTES # BLD AUTO: 0.6 K/UL
LYMPHOCYTES # BLD AUTO: 0.7 K/UL
LYMPHOCYTES NFR BLD: 13.3 %
LYMPHOCYTES NFR BLD: 17.8 %
LYMPHOCYTES NFR BLD: 18.5 %
LYMPHOCYTES NFR BLD: 19.5 %
MAGNESIUM SERPL-MCNC: 2.1 MG/DL
MCH RBC QN AUTO: 29.9 PG
MCH RBC QN AUTO: 30.6 PG
MCH RBC QN AUTO: 30.7 PG
MCH RBC QN AUTO: 30.8 PG
MCHC RBC AUTO-ENTMCNC: 32.7 G/DL
MCHC RBC AUTO-ENTMCNC: 33 G/DL
MCHC RBC AUTO-ENTMCNC: 33.2 G/DL
MCHC RBC AUTO-ENTMCNC: 33.5 G/DL
MCV RBC AUTO: 91 FL
MCV RBC AUTO: 92 FL
MCV RBC AUTO: 92 FL
MCV RBC AUTO: 93 FL
MONOCYTES # BLD AUTO: 0.3 K/UL
MONOCYTES # BLD AUTO: 0.4 K/UL
MONOCYTES NFR BLD: 11.4 %
MONOCYTES NFR BLD: 7.8 %
MONOCYTES NFR BLD: 8.3 %
MONOCYTES NFR BLD: 9.8 %
NEUTROPHILS # BLD AUTO: 2.1 K/UL
NEUTROPHILS # BLD AUTO: 2.3 K/UL
NEUTROPHILS # BLD AUTO: 2.5 K/UL
NEUTROPHILS # BLD AUTO: 2.7 K/UL
NEUTROPHILS NFR BLD: 66.8 %
NEUTROPHILS NFR BLD: 67.9 %
NEUTROPHILS NFR BLD: 70.4 %
NEUTROPHILS NFR BLD: 75.5 %
PHOSPHATE SERPL-MCNC: 4.3 MG/DL
PLATELET # BLD AUTO: 67 K/UL
PLATELET # BLD AUTO: 69 K/UL
PLATELET # BLD AUTO: 75 K/UL
PLATELET # BLD AUTO: 76 K/UL
PMV BLD AUTO: 9.7 FL
PMV BLD AUTO: 9.8 FL
POCT GLUCOSE: 129 MG/DL (ref 70–110)
POTASSIUM SERPL-SCNC: 3.6 MMOL/L
PREALB SERPL-MCNC: 21 MG/DL
PROT SERPL-MCNC: 5.6 G/DL
PROTHROMBIN TIME: 12.3 SEC
RBC # BLD AUTO: 2.44 M/UL
RBC # BLD AUTO: 2.45 M/UL
RBC # BLD AUTO: 2.51 M/UL
RBC # BLD AUTO: 2.53 M/UL
SODIUM SERPL-SCNC: 137 MMOL/L
WBC # BLD AUTO: 3.07 K/UL
WBC # BLD AUTO: 3.25 K/UL
WBC # BLD AUTO: 3.61 K/UL
WBC # BLD AUTO: 3.7 K/UL

## 2017-07-24 PROCEDURE — 20600001 HC STEP DOWN PRIVATE ROOM

## 2017-07-24 PROCEDURE — 93750 INTERROGATION VAD IN PERSON: CPT | Performed by: PHYSICIAN ASSISTANT

## 2017-07-24 PROCEDURE — 99232 SBSQ HOSP IP/OBS MODERATE 35: CPT | Mod: ,,, | Performed by: INTERNAL MEDICINE

## 2017-07-24 PROCEDURE — 36415 COLL VENOUS BLD VENIPUNCTURE: CPT

## 2017-07-24 PROCEDURE — 27000248 HC VAD-ADDITIONAL DAY

## 2017-07-24 PROCEDURE — C9113 INJ PANTOPRAZOLE SODIUM, VIA: HCPCS | Performed by: PHYSICIAN ASSISTANT

## 2017-07-24 PROCEDURE — 93750 INTERROGATION VAD IN PERSON: CPT | Mod: ,,, | Performed by: NURSE PRACTITIONER

## 2017-07-24 PROCEDURE — 83615 LACTATE (LD) (LDH) ENZYME: CPT

## 2017-07-24 PROCEDURE — 99233 SBSQ HOSP IP/OBS HIGH 50: CPT | Mod: 24,,, | Performed by: THORACIC SURGERY (CARDIOTHORACIC VASCULAR SURGERY)

## 2017-07-24 PROCEDURE — 80076 HEPATIC FUNCTION PANEL: CPT

## 2017-07-24 PROCEDURE — 83880 ASSAY OF NATRIURETIC PEPTIDE: CPT

## 2017-07-24 PROCEDURE — 93750 INTERROGATION VAD IN PERSON: CPT | Mod: ,,, | Performed by: INTERNAL MEDICINE

## 2017-07-24 PROCEDURE — 85610 PROTHROMBIN TIME: CPT

## 2017-07-24 PROCEDURE — 63600175 PHARM REV CODE 636 W HCPCS: Performed by: PHYSICIAN ASSISTANT

## 2017-07-24 PROCEDURE — 80048 BASIC METABOLIC PNL TOTAL CA: CPT

## 2017-07-24 PROCEDURE — 85730 THROMBOPLASTIN TIME PARTIAL: CPT

## 2017-07-24 PROCEDURE — 83735 ASSAY OF MAGNESIUM: CPT

## 2017-07-24 PROCEDURE — 25000003 PHARM REV CODE 250: Performed by: INTERNAL MEDICINE

## 2017-07-24 PROCEDURE — 84134 ASSAY OF PREALBUMIN: CPT

## 2017-07-24 PROCEDURE — 84100 ASSAY OF PHOSPHORUS: CPT

## 2017-07-24 PROCEDURE — 25000003 PHARM REV CODE 250: Performed by: STUDENT IN AN ORGANIZED HEALTH CARE EDUCATION/TRAINING PROGRAM

## 2017-07-24 PROCEDURE — 85025 COMPLETE CBC W/AUTO DIFF WBC: CPT | Mod: 91

## 2017-07-24 PROCEDURE — 25000003 PHARM REV CODE 250: Performed by: PHYSICIAN ASSISTANT

## 2017-07-24 PROCEDURE — 86140 C-REACTIVE PROTEIN: CPT

## 2017-07-24 RX ORDER — WARFARIN 3 MG/1
3 TABLET ORAL DAILY
Status: DISCONTINUED | OUTPATIENT
Start: 2017-07-24 | End: 2017-07-26 | Stop reason: HOSPADM

## 2017-07-24 RX ORDER — POLYETHYLENE GLYCOL 3350, SODIUM SULFATE ANHYDROUS, SODIUM BICARBONATE, SODIUM CHLORIDE, POTASSIUM CHLORIDE 236; 22.74; 6.74; 5.86; 2.97 G/4L; G/4L; G/4L; G/4L; G/4L
2000 POWDER, FOR SOLUTION ORAL ONCE
Status: DISCONTINUED | OUTPATIENT
Start: 2017-07-24 | End: 2017-07-26 | Stop reason: HOSPADM

## 2017-07-24 RX ORDER — CALCIUM CARBONATE 200(500)MG
500 TABLET,CHEWABLE ORAL
Status: DISCONTINUED | OUTPATIENT
Start: 2017-07-24 | End: 2017-07-26 | Stop reason: HOSPADM

## 2017-07-24 RX ORDER — PANTOPRAZOLE SODIUM 40 MG/1
40 TABLET, DELAYED RELEASE ORAL DAILY
Status: DISCONTINUED | OUTPATIENT
Start: 2017-07-25 | End: 2017-07-26 | Stop reason: HOSPADM

## 2017-07-24 RX ADMIN — CETIRIZINE HYDROCHLORIDE 5 MG: 5 TABLET, FILM COATED ORAL at 08:07

## 2017-07-24 RX ADMIN — PANTOPRAZOLE SODIUM 40 MG: 40 INJECTION, POWDER, FOR SOLUTION INTRAVENOUS at 08:07

## 2017-07-24 RX ADMIN — CALCIUM CARBONATE (ANTACID) CHEW TAB 500 MG 500 MG: 500 CHEW TAB at 12:07

## 2017-07-24 RX ADMIN — WARFARIN SODIUM 3 MG: 3 TABLET ORAL at 04:07

## 2017-07-24 RX ADMIN — ATORVASTATIN CALCIUM 40 MG: 20 TABLET, FILM COATED ORAL at 08:07

## 2017-07-24 RX ADMIN — ESCITALOPRAM 20 MG: 20 TABLET, FILM COATED ORAL at 08:07

## 2017-07-24 RX ADMIN — CALCIUM CARBONATE (ANTACID) CHEW TAB 500 MG 500 MG: 500 CHEW TAB at 08:07

## 2017-07-24 RX ADMIN — CALCIUM CARBONATE (ANTACID) CHEW TAB 500 MG 500 MG: 500 CHEW TAB at 03:07

## 2017-07-24 RX ADMIN — FLUTICASONE PROPIONATE 2 SPRAY: 50 SPRAY, METERED NASAL at 12:07

## 2017-07-24 RX ADMIN — GABAPENTIN 300 MG: 300 CAPSULE ORAL at 08:07

## 2017-07-24 NOTE — PROGRESS NOTES
Rechecked pt's doppler like instructed. DP 66. Pt asymptomatic. No distress or complaints noted. Notified MD Estrada. No new orders given. Will continue to monitor.

## 2017-07-24 NOTE — SUBJECTIVE & OBJECTIVE
Interval History: NAEON. Reports minimal rectal bleeding yesterday afternoon. Improved significantly. Spoke with GI who recommend VCE tomorrow and no push enteroscopy today. Will start clear liquid diet and NPO after MN for VCE tomorrow.     Continuous Infusions:   Scheduled Meds:   amlodipine  10 mg Oral Daily    atorvastatin  40 mg Oral Daily    calcium carbonate  500 mg Oral QID    cetirizine  5 mg Oral Daily    escitalopram oxalate  20 mg Oral QHS    fluticasone  2 spray Each Nare Daily    gabapentin  300 mg Oral QHS    hydrALAZINE  50 mg Oral Q8H    pantoprazole  40 mg Intravenous Daily    warfarin  3 mg Oral Daily     PRN Meds:sodium chloride, sodium chloride, sodium chloride, sodium chloride 0.9%, acetaminophen, glucose, ondansetron    Review of patient's allergies indicates:   Allergen Reactions    Codeine Nausea And Vomiting    Demerol [meperidine] Nausea And Vomiting    Lortab [hydrocodone-acetaminophen] Nausea And Vomiting     Objective:     Vital Signs (Most Recent):  Temp: 98.3 °F (36.8 °C) (07/24/17 1157)  Pulse: 73 (07/24/17 1157)  Resp: 18 (07/24/17 1157)  BP: (!) 70/0 (07/24/17 1158)  SpO2: 98 % (07/24/17 0821) Vital Signs (24h Range):  Temp:  [98 °F (36.7 °C)-98.8 °F (37.1 °C)] 98.3 °F (36.8 °C)  Pulse:  [70-80] 73  Resp:  [16-18] 18  SpO2:  [94 %-99 %] 98 %  BP: (62-90)/(0-57) 70/0     Weight: 58.3 kg (128 lb 8.5 oz)  Body mass index is 24.29 kg/m².      Intake/Output Summary (Last 24 hours) at 07/24/17 1253  Last data filed at 07/24/17 0500   Gross per 24 hour   Intake              940 ml   Output                0 ml   Net              940 ml       Hemodynamic Parameters:       Telemetry: no events    Physical Exam  Constitutional: She is oriented to person, place, and time. She appears well-developed and well-nourished.   Neck: Normal range of motion. Neck supple. No JVD present.   Cardiovascular: Normal rate and regular rhythm.    Normal VAD hum   Pulmonary/Chest: Effort normal and  breath sounds normal. She has no wheezes. She has no rales.   Abdominal: Soft. Bowel sounds are normal. There is no tenderness.   Musculoskeletal: She exhibits no edema.   Neurological: She is alert and oriented to person, place, and time.   Skin: Skin is warm and dry.     Significant Labs:  CBC:    Recent Labs  Lab 07/24/17  1200   WBC 3.61*   RBC 2.45*   HGB 7.5*   HCT 22.7*   PLT 69*   MCV 93   MCH 30.6   MCHC 33.0     BNP:    Recent Labs  Lab 07/24/17  0446   BNP 1,773*     CMP:    Recent Labs  Lab 07/24/17  0446   GLU 68*   CALCIUM 8.1*   ALBUMIN 3.0*   PROT 5.6*      K 3.6   CO2 22*   CL 99   BUN 33*   CREATININE 6.9*   ALKPHOS 75   ALT 12   AST 23   BILITOT 0.6      Coagulation:     Recent Labs  Lab 07/24/17  0446   INR 1.2   APTT 23.9     LDH:    Recent Labs  Lab 07/22/17  0453 07/23/17  0604 07/24/17  0446    171 171     Microbiology:  Microbiology Results (last 7 days)     Procedure Component Value Units Date/Time    Urine culture [[id number]] Collected:  07/19/17 1037    Order Status:  Completed Specimen:  Urine Updated:  07/20/17 1514     Urine Culture, Routine Multiple organisms isolated. None in predominance.  Repeat if     Urine Culture, Routine clinically necessary.    Narrative:       Preferred Collection Type->Urine, Clean Catch          I have reviewed all pertinent labs within the past 24 hours.    Estimated Creatinine Clearance: 7.2 mL/min (based on Cr of 6.9).    Diagnostic Results:  I have reviewed and interpreted all pertinent imaging results/findings within the past 24 hours.

## 2017-07-24 NOTE — PROGRESS NOTES
[facility]  Heart Transplant  Progress Note    Patient Name: Randa Devine  MRN: [id number]  Admission Date: 7/18/2017  Hospital Length of Stay: 5 days  Attending Physician: Sera Collado MD  Primary Care Provider: Laura Garvin DO  Principal Problem:GI bleed    Subjective:     Interval History: NAEON. S/p colonoscopy which was unrevealing. Plan for clear liquid diet today and NPO after MN for Push enteroscopy tomorrow by GI. Feels well. Denies bleeding overnight.    Continuous Infusions:   Scheduled Meds:   amlodipine  10 mg Oral Daily    atorvastatin  40 mg Oral Daily    calcium carbonate  500 mg Oral QID    cetirizine  5 mg Oral Daily    escitalopram oxalate  20 mg Oral QHS    fluticasone  2 spray Each Nare Daily    gabapentin  300 mg Oral QHS    hydrALAZINE  50 mg Oral Q8H    pantoprazole  40 mg Intravenous Daily     PRN Meds:sodium chloride, sodium chloride, sodium chloride, sodium chloride 0.9%, acetaminophen, glucose, ondansetron    Review of patient's allergies indicates:   Allergen Reactions    Codeine Nausea And Vomiting    Demerol [meperidine] Nausea And Vomiting    Lortab [hydrocodone-acetaminophen] Nausea And Vomiting     Objective:     Vital Signs (Most Recent):  Temp: 98.8 °F (37.1 °C) (07/23/17 1945)  Pulse: 75 (07/23/17 1945)  Resp: 18 (07/23/17 1945)  BP: (!) 64/0 (07/23/17 1946)  SpO2: 99 % (07/23/17 1645) Vital Signs (24h Range):  Temp:  [98.3 °F (36.8 °C)-99.7 °F (37.6 °C)] 98.8 °F (37.1 °C)  Pulse:  [70-88] 75  Resp:  [16-19] 18  SpO2:  [95 %-99 %] 99 %  BP: (64-84)/(0-63) 64/0     Weight: 58.3 kg (128 lb 8.5 oz)  Body mass index is 24.29 kg/m².      Intake/Output Summary (Last 24 hours) at 07/23/17 2054  Last data filed at 07/23/17 1900   Gross per 24 hour   Intake             1160 ml   Output                0 ml   Net             1160 ml       Hemodynamic Parameters:       Telemetry: no events    Physical Exam   Constitutional: She is oriented to person,  place, and time. She appears well-developed and well-nourished.   Neck: Normal range of motion. Neck supple. No JVD present.   Cardiovascular: Normal rate and regular rhythm.    Normal VAD hum   Pulmonary/Chest: Effort normal and breath sounds normal. She has no wheezes. She has no rales.   Abdominal: Soft. Bowel sounds are normal. There is no tenderness.   Musculoskeletal: She exhibits no edema.   Neurological: She is alert and oriented to person, place, and time.   Skin: Skin is warm and dry.     Significant Labs:  CBC:    Recent Labs  Lab 07/23/17  1826   WBC 4.02   RBC 2.70*   HGB 8.2*   HCT 24.7*   PLT 72*   MCV 92   MCH 30.4   MCHC 33.2     BNP:    Recent Labs  Lab 07/21/17  0502   BNP 2,242*     CMP:    Recent Labs  Lab 07/21/17  0503  07/23/17  0604   GLU 57*  < > 76   CALCIUM 8.3*  < > 8.2*   ALBUMIN 3.1*  --   --    PROT 5.8*  --   --      < > 139   K 3.7  < > 3.8   CO2 20*  < > 25     < > 100   BUN 19  < > 20   CREATININE 4.5*  < > 4.8*   ALKPHOS 84  --   --    ALT 17  --   --    AST 29  --   --    BILITOT 0.8  --   --    < > = values in this interval not displayed.   Coagulation:     Recent Labs  Lab 07/23/17  0604   INR 2.0*   APTT 29.1     LDH:    Recent Labs  Lab 07/21/17  0503 07/22/17  0453 07/23/17  0604    163 171     Microbiology:  Microbiology Results (last 7 days)     Procedure Component Value Units Date/Time    Urine culture [[id number]] Collected:  07/19/17 1037    Order Status:  Completed Specimen:  Urine Updated:  07/20/17 1514     Urine Culture, Routine Multiple organisms isolated. None in predominance.  Repeat if     Urine Culture, Routine clinically necessary.    Narrative:       Preferred Collection Type->Urine, Clean Catch          I have reviewed all pertinent labs within the past 24 hours.    Estimated Creatinine Clearance: 10.4 mL/min (based on Cr of 4.8).    Diagnostic Results:  I have reviewed and interpreted all pertinent imaging results/findings within the past  24 hours.    Assessment and Plan:     * GI bleed    -INR 2.0 today. Will continue to hold coumadin  -GI consulted - appreciate their assistance  -EGD howed Erythematous duodenopathy  -CTA negative for acute source of bleed  -Colonoscopy in afternoon today  -s/p fleet enema  -Protonix IV   -serial CBC Q6H  -GI bleeding resolved; colonoscopy did not reveal active site of bleed. NPO after MN for push enteroscopy tomorrow by GI + VCE if needed afterwards.        Normocytic anemia due to blood loss    -Acute on Chronic, due to GIB  -1U PRBC (7/22) during HD post-colonoscopy          Left ventricular assist device present    -HW implanted 5/2012  -Speed 2600  -Hold coumadin as above  -HTN- cont hydralazine, amlodipine        End stage renal disease on dialysis    -Nephrology consulted. On HD Tues, Thurs, Sat  -Had HD ovenrnight.        Bilateral foot-drop    -AFOs ordered  -Prescription sent to [facility] in [place] after discussion with Tasneem from PT  -[facility] to call patient to follow up with the patient once she is discharged            Carline Glass MD  Heart Transplant  [facility]

## 2017-07-24 NOTE — TREATMENT PLAN
GI Treatment Plan  07/23/2017  7:10 PM    Patient stable and doing well. She states that she has not had a bloody BM since yesterday.    It is likely that with an improving INR site of bleeding has stop bleeding.    In an abundance of caution have consent patient for a VCE. Will monitor patient overnight and will finalize need for VCE as IP to evaluate the small bowel.    Christiano Berg M.D.  Gastroenterology Fellow, PGY-IV  Pager: [phone]  [facility]

## 2017-07-24 NOTE — PLAN OF CARE
Problem: Fall Risk (Adult)  Goal: Absence of Falls  Patient will demonstrate the desired outcomes by discharge/transition of care.    Outcome: Ongoing (interventions implemented as appropriate)  Patient free from fall/trauma/injury during shift. Ambulates with assistance. Calls when needs help to restroom.     Problem: Ventricular Assist Device (Adult)  Goal: Signs and Symptoms of Listed Potential Problems Will be Absent, Minimized or Managed (Ventricular Assist Device)  Signs and symptoms of listed potential problems will be absent, minimized or managed by discharge/transition of care (reference Ventricular Assist Device (Adult) CPG).    Outcome: Ongoing (interventions implemented as appropriate)  Patient refusing VAD dressing changes, states she only does them weekly and would have it done today but when offered this afternoon, patient refused. Will continue to encourage dressing change.    Problem: Patient Care Overview  Goal: Plan of Care Review  Outcome: Ongoing (interventions implemented as appropriate)  Plan of care discussed with patient. Patient is free of fall/trauma/injury. Denies CP, SOB, or pain/discomfort. Refusing golytely bowel prep for capsule endoscopy. All questions addressed. Will continue to monitor    Problem: Gastrointestinal Bleeding (Adult)  Intervention: Support/Optimize Psychosocial Response to Illness  Patient refusing bowel prep for capsule endoscopy.     Goal: Signs and Symptoms of Listed Potential Problems Will be Absent, Minimized or Managed (Gastrointestinal Bleeding)  Signs and symptoms of listed potential problems will be absent, minimized or managed by discharge/transition of care (reference Gastrointestinal Bleeding (Adult) CPG).    Outcome: Ongoing (interventions implemented as appropriate)  No active signs of GI bleed.

## 2017-07-24 NOTE — PLAN OF CARE
Problem: Patient Care Overview  Goal: Plan of Care Review  Pt remained free of injuries, falls, and trauma throughout the shift. VSS and Doppler are within limits. Pt denies pain. No complaints or discomforts noted. No active bleeding throughout the night. CBC being monitor every 6 hours.  Pt refused LVAD dressing change again tonight. Pt diet change to clear liquid. Pt is NPO after midnight for procedure in the morning. Plan of care reviewed with pt. Pt verbalizes understanding. Bed in lowest position. Will continue to monitor

## 2017-07-24 NOTE — PROGRESS NOTES
GI Follow-up Note    We were planning for video capsule study tomorrow, but after discussion patient initially consented to the procedure, but then she is now declining the procedure because she would not like to drink bowel preparation for the procedure.  We can certainly perform this procedure at a later date if patient changes her mind as an inpatient or an outpatient.  We will sign off now.    Thank you for allowing us to participate in the care of Randa Devine. Please do not hesitate to call us with questions.    Timothy Hutchins  Gastroenterology Fellow (PGY 6)

## 2017-07-24 NOTE — PT/OT/SLP PROGRESS
Occupational Therapy      Randa Devine  MRN: [id number]    Patient not seen today secondary to Patient unwilling to participate. Will follow-up as scheduled.    AMINA Perez  7/24/2017

## 2017-07-24 NOTE — PROGRESS NOTES
Daily E and M and VAD Interrogation Note    Reason for Visit: GIB  Patient is seen in follow up for management of:  [] HeartMate II  [x] Heartware [] Total artificial heart       [] ECMO           [] Other     Interval History:  [] Interval history unobtainable due to intubation.  The [x] implant 5/2012  No acute events overnight      Review of Systems:  Denies lower GIB since yesterday, abd pain n/v  All other systems reviewed and negative    Medications:  Current Facility-Administered Medications   Medication Dose Route Frequency Provider Last Rate Last Dose    0.9%  NaCl infusion (for blood administration)   Intravenous Q24H PRN Vineet Ayala MD        0.9%  NaCl infusion (for blood administration)   Intravenous Q24H PRN Emilie Lopez MD        0.9%  NaCl infusion (for blood administration)   Intravenous Q24H PRN Carline Glass MD        0.9%  NaCl infusion   Intravenous PRN Elizabeth Bo MD        acetaminophen tablet 650 mg  650 mg Oral Q6H PRN Karen Ibanez, PA-C   650 mg at 07/21/17 1725    amlodipine tablet 10 mg  10 mg Oral Daily Karen Ibanez, PA-C   10 mg at 07/23/17 0838    atorvastatin tablet 40 mg  40 mg Oral Daily Karen Ibanez, PA-C   40 mg at 07/24/17 0825    calcium carbonate 200 mg calcium (500 mg) chewable tablet 500 mg  500 mg Oral QID Karen Ibanez, PA-C   500 mg at 07/23/17 0837    cetirizine tablet 5 mg  5 mg Oral Daily Karen Ibanez, PA-C   5 mg at 07/24/17 0825    escitalopram oxalate tablet 20 mg  20 mg Oral QHS Karen Ibanez, PA-C   20 mg at 07/23/17 2144    fluticasone 50 mcg/actuation nasal spray 2 spray  2 spray Each Nare Daily Emilie Lopez MD   2 spray at 07/23/17 0837    gabapentin capsule 300 mg  300 mg Oral QHS Karen Ibanez, PA-C   300 mg at 07/23/17 2144    glucose chewable tablet 16 g  16 g Oral PRN Carline Glass MD   16 g at 07/22/17 1022    hydrALAZINE tablet 50 mg  50 mg Oral Q8H Karen  Mikala Ibanez PA-C   50 mg at 07/22/17 0621    ondansetron tablet 4 mg  4 mg Oral Q8H PRN Karen Ibanez PA-C   4 mg at 07/22/17 0926    pantoprazole injection 40 mg  40 mg Intravenous Daily Karen Mikala Ibanez PA-C   40 mg at 07/24/17 0826       Physical Examination:  Vital Signs:   Vitals:    07/24/17 1100   BP:    Pulse: 74   Resp:    Temp:      Cardiovascular:  [x] Regular rate and rhythm [] Irregular []  None (GIN) []  Other  [x]  No edema []  Edema present  [x]  Clear to auscultation  []  Rales to   []  Coarse  []  No rales but [] Pedal Pulses absent  []  Pulses  + throughout  Skin:  Incision is [x]  Clean, dry and intact.    Sternum:  [x]  Stable []  Unstable  Driveline(s):   [x]  Clean, dry and intact.     Labs:  CBC, CMP, LDH and Coags    X-Rays:  [x]  I reviewed today's Chest x-ray    Procedure:  Device Interrogation including analysis of device parameters.  Current Settings   [x]  Ventricular Assist Device  []  Total Artificial Heart interrogated  Review of device function is [x]  Stable     TXP LVAD INTERROGATIONS 7/24/2017 7/24/2017 7/24/2017 7/23/2017 7/23/2017 7/23/2017 7/23/2017   Type Heartware Heartware Heartware Heartware Heartware (No Data) Heartware   Flow 4.3 4.5 4.6 4.7 4.7 - 4.7   Speed 2600 2600 2600 2600 2600 - 2600   PI - - - - - - -   Power (Kim) 3.3 3.6 3.5 3.5 3.4 - 3.4   Low Flow Alarm - - - - - - -   High Power Alarm - - - - - - -   Pulsatility - - - - Intermittent pulse - Intermittent pulse       Assessment:  []  Primary Cardiomyopathy [x]  Congestive Heart Failure   []  Atrial Fibrillation []  Ventricular Tachycardia   []  Aftercare cardiac device [x]  Long term (current) use of anticoagulants   []  Ventilator-associated pneumonia []  Pneumonia viral, unspecified   []  Pneumonia, bacterial, unspecified []  Pneumonia, organism unspecified   [x]  Hemorrhage of GI tract, unspecified    []  Nosebleed []  Driveline infection   []  Infection VAD device          Plan:  [x]   Interval history obtained from HTS attending team member during rounding today  [x]  VAD/GIN teaching performed with patient  [x]  Mobilization / Physical Therapy ongoing  [x]  Anticoagulation []  Ongoing [x]  Held  [x]  Studies ordered-enteroscopy by GI    Total time spent was 32 minutes.  Of which more than 50 percent of the care dominated counseling and coordinating care with different team members. The VAD was interrogated and all parameters were WNL and no significant findings were found in the history. All these findings are documented in the note above.      Date of Service: 07/24/2017     Cardiac Surgery Attending E and M (VAD) Note along with VAD Interrogation    I have seen and examined the patient and agree with the findings above    I also reviewed the patients clinical course and:  [x]  Hemodynamic & Respiratory paramters  [x]  Laboratory Data  [x]  Radiological studies   Needs GI work up for bleeding.  VAD Interrogated [x]      VAD Function is normal. Changes made []  None [x]      Interrogation of Ventricular assist device was performed with physician analysis of device parameters and review of device function. I have personally reviewed the interrogation findings and agree with findings as stated.

## 2017-07-24 NOTE — PROGRESS NOTES
[facility]  Heart Transplant  Progress Note    Patient Name: Randa Devine  MRN: [id number]  Admission Date: 7/18/2017  Hospital Length of Stay: 6 days  Attending Physician: Sera Collado MD  Primary Care Provider: Laura Garvin DO  Principal Problem:GI bleed    Subjective:     Interval History: NAEON. Reports minimal rectal bleeding yesterday afternoon. Improved significantly. Spoke with GI who recommend VCE tomorrow and no push enteroscopy today. Will start clear liquid diet and NPO after MN for VCE tomorrow.     Continuous Infusions:   Scheduled Meds:   amlodipine  10 mg Oral Daily    atorvastatin  40 mg Oral Daily    calcium carbonate  500 mg Oral QID    cetirizine  5 mg Oral Daily    escitalopram oxalate  20 mg Oral QHS    fluticasone  2 spray Each Nare Daily    gabapentin  300 mg Oral QHS    hydrALAZINE  50 mg Oral Q8H    pantoprazole  40 mg Intravenous Daily    warfarin  3 mg Oral Daily     PRN Meds:sodium chloride, sodium chloride, sodium chloride, sodium chloride 0.9%, acetaminophen, glucose, ondansetron    Review of patient's allergies indicates:   Allergen Reactions    Codeine Nausea And Vomiting    Demerol [meperidine] Nausea And Vomiting    Lortab [hydrocodone-acetaminophen] Nausea And Vomiting     Objective:     Vital Signs (Most Recent):  Temp: 98.3 °F (36.8 °C) (07/24/17 1157)  Pulse: 73 (07/24/17 1157)  Resp: 18 (07/24/17 1157)  BP: (!) 70/0 (07/24/17 1158)  SpO2: 98 % (07/24/17 0821) Vital Signs (24h Range):  Temp:  [98 °F (36.7 °C)-98.8 °F (37.1 °C)] 98.3 °F (36.8 °C)  Pulse:  [70-80] 73  Resp:  [16-18] 18  SpO2:  [94 %-99 %] 98 %  BP: (62-90)/(0-57) 70/0     Weight: 58.3 kg (128 lb 8.5 oz)  Body mass index is 24.29 kg/m².      Intake/Output Summary (Last 24 hours) at 07/24/17 1253  Last data filed at 07/24/17 0500   Gross per 24 hour   Intake              940 ml   Output                0 ml   Net              940 ml       Hemodynamic Parameters:       Telemetry:  no events    Physical Exam  Constitutional: She is oriented to person, place, and time. She appears well-developed and well-nourished.   Neck: Normal range of motion. Neck supple. No JVD present.   Cardiovascular: Normal rate and regular rhythm.    Normal VAD hum   Pulmonary/Chest: Effort normal and breath sounds normal. She has no wheezes. She has no rales.   Abdominal: Soft. Bowel sounds are normal. There is no tenderness.   Musculoskeletal: She exhibits no edema.   Neurological: She is alert and oriented to person, place, and time.   Skin: Skin is warm and dry.     Significant Labs:  CBC:    Recent Labs  Lab 07/24/17  1200   WBC 3.61*   RBC 2.45*   HGB 7.5*   HCT 22.7*   PLT 69*   MCV 93   MCH 30.6   MCHC 33.0     BNP:    Recent Labs  Lab 07/24/17  0446   BNP 1,773*     CMP:    Recent Labs  Lab 07/24/17  0446   GLU 68*   CALCIUM 8.1*   ALBUMIN 3.0*   PROT 5.6*      K 3.6   CO2 22*   CL 99   BUN 33*   CREATININE 6.9*   ALKPHOS 75   ALT 12   AST 23   BILITOT 0.6      Coagulation:     Recent Labs  Lab 07/24/17  0446   INR 1.2   APTT 23.9     LDH:    Recent Labs  Lab 07/22/17  0453 07/23/17  0604 07/24/17  0446    171 171     Microbiology:  Microbiology Results (last 7 days)     Procedure Component Value Units Date/Time    Urine culture [[id number]] Collected:  07/19/17 1037    Order Status:  Completed Specimen:  Urine Updated:  07/20/17 1514     Urine Culture, Routine Multiple organisms isolated. None in predominance.  Repeat if     Urine Culture, Routine clinically necessary.    Narrative:       Preferred Collection Type->Urine, Clean Catch          I have reviewed all pertinent labs within the past 24 hours.    Estimated Creatinine Clearance: 7.2 mL/min (based on Cr of 6.9).    Diagnostic Results:  I have reviewed and interpreted all pertinent imaging results/findings within the past 24 hours.    Assessment and Plan:     * GI bleed    -INR 1.2 today. Will give coumadin 3 mg today  -GI consulted -  appreciate their assistance  -EGD howed Erythematous duodenopathy  -CTA negative for acute source of bleed  -C scope unrevealing  - VCE tomorrow - clear liquid diet today; NPO after MN.  -Protonix IV   -serial CBC Q12H        Normocytic anemia due to blood loss    -Acute on Chronic, due to GIB  -1U PRBC (7/22) during HD post-colonoscopy          Left ventricular assist device present    -HW implanted 5/2012  -Speed 2600  -start coumadin 3 mg  -INR 1.2 - no heparin bridge  -HTN- cont hydralazine, amlodipine        End stage renal disease on dialysis    -Nephrology consulted. On HD Tues, Thurs, Sat  -Had HD ovenrnight.        Bilateral foot-drop    -AFOs ordered  -Prescription sent to [facility] in [place] after discussion with Tasneem from PT  -[facility] to call patient to follow up with the patient once she is discharged            Carline Glass MD  Heart Transplant  [facility]

## 2017-07-24 NOTE — PROGRESS NOTES
07/23/17 1945 07/23/17 1946   Vital Signs   BP (!) 82/52 (!) 64/0   MAP (mmHg) 62 --    BP Location Right arm Right arm   BP Method Automatic Automatic   Patient Position Lying Lying       Pt's BP and Doppler listed above. Pt has Hydralazine 50 mg ordered. Notified MD Estrada. Instructed to hold dose and to recheck doppler in an hour. Order will be carried out. Will continue to monitor.

## 2017-07-24 NOTE — PROCEDURES
TXP LVAD INTERROGATIONS 7/24/2017 7/24/2017 7/24/2017 7/24/2017 7/23/2017 7/23/2017 7/23/2017   Type Heartware Heartware Heartware Heartware Heartware Heartware (No Data)   Flow 4.5 4.3 4.5 4.6 4.7 4.7 -   Speed 2600 2600 2600 2600 2600 2600 -   PI - - - - - - -   Power (Kim) 3.5 3.3 3.6 3.5 3.5 3.4 -   Low Flow Alarm - - - - - - -   High Power Alarm - - - - - - -   Pulsatility - - - - - Intermittent pulse -     Pt AAAO, not happy today.  No family at bedside. She reports that BRAD has too many different plans and she hasn't eaten since Monday.  She tells me her iNR is low and they won't give her coumadin yet.  SHe has a birthday party for her ex she is missing by being here waiting for  to make a plan for a plan.   Emotional support provided.  She also won't let the nurses look at or clean her driveline.  She said that every time she does, it becomes red.  Talked her into letting Kevin clean it today.      HCT 23.3  Waveform 4-7, no negative deflection noted  No alarms  LFA= 2.5  HPA=5.5

## 2017-07-24 NOTE — SUBJECTIVE & OBJECTIVE
Interval History: NAEON. S/p colonoscopy which was unrevealing. Plan for clear liquid diet today and NPO after MN for Push enteroscopy tomorrow by GI. Feels well. Denies bleeding overnight.    Continuous Infusions:   Scheduled Meds:   amlodipine  10 mg Oral Daily    atorvastatin  40 mg Oral Daily    calcium carbonate  500 mg Oral QID    cetirizine  5 mg Oral Daily    escitalopram oxalate  20 mg Oral QHS    fluticasone  2 spray Each Nare Daily    gabapentin  300 mg Oral QHS    hydrALAZINE  50 mg Oral Q8H    pantoprazole  40 mg Intravenous Daily     PRN Meds:sodium chloride, sodium chloride, sodium chloride, sodium chloride 0.9%, acetaminophen, glucose, ondansetron    Review of patient's allergies indicates:   Allergen Reactions    Codeine Nausea And Vomiting    Demerol [meperidine] Nausea And Vomiting    Lortab [hydrocodone-acetaminophen] Nausea And Vomiting     Objective:     Vital Signs (Most Recent):  Temp: 98.8 °F (37.1 °C) (07/23/17 1945)  Pulse: 75 (07/23/17 1945)  Resp: 18 (07/23/17 1945)  BP: (!) 64/0 (07/23/17 1946)  SpO2: 99 % (07/23/17 1645) Vital Signs (24h Range):  Temp:  [98.3 °F (36.8 °C)-99.7 °F (37.6 °C)] 98.8 °F (37.1 °C)  Pulse:  [70-88] 75  Resp:  [16-19] 18  SpO2:  [95 %-99 %] 99 %  BP: (64-84)/(0-63) 64/0     Weight: 58.3 kg (128 lb 8.5 oz)  Body mass index is 24.29 kg/m².      Intake/Output Summary (Last 24 hours) at 07/23/17 2054  Last data filed at 07/23/17 1900   Gross per 24 hour   Intake             1160 ml   Output                0 ml   Net             1160 ml       Hemodynamic Parameters:       Telemetry: no events    Physical Exam   Constitutional: She is oriented to person, place, and time. She appears well-developed and well-nourished.   Neck: Normal range of motion. Neck supple. No JVD present.   Cardiovascular: Normal rate and regular rhythm.    Normal VAD hum   Pulmonary/Chest: Effort normal and breath sounds normal. She has no wheezes. She has no rales.   Abdominal:  Soft. Bowel sounds are normal. There is no tenderness.   Musculoskeletal: She exhibits no edema.   Neurological: She is alert and oriented to person, place, and time.   Skin: Skin is warm and dry.     Significant Labs:  CBC:    Recent Labs  Lab 07/23/17  1826   WBC 4.02   RBC 2.70*   HGB 8.2*   HCT 24.7*   PLT 72*   MCV 92   MCH 30.4   MCHC 33.2     BNP:    Recent Labs  Lab 07/21/17  0502   BNP 2,242*     CMP:    Recent Labs  Lab 07/21/17  0503  07/23/17  0604   GLU 57*  < > 76   CALCIUM 8.3*  < > 8.2*   ALBUMIN 3.1*  --   --    PROT 5.8*  --   --      < > 139   K 3.7  < > 3.8   CO2 20*  < > 25     < > 100   BUN 19  < > 20   CREATININE 4.5*  < > 4.8*   ALKPHOS 84  --   --    ALT 17  --   --    AST 29  --   --    BILITOT 0.8  --   --    < > = values in this interval not displayed.   Coagulation:     Recent Labs  Lab 07/23/17  0604   INR 2.0*   APTT 29.1     LDH:    Recent Labs  Lab 07/21/17  0503 07/22/17  0453 07/23/17  0604    163 171     Microbiology:  Microbiology Results (last 7 days)     Procedure Component Value Units Date/Time    Urine culture [[id number]] Collected:  07/19/17 1037    Order Status:  Completed Specimen:  Urine Updated:  07/20/17 1514     Urine Culture, Routine Multiple organisms isolated. None in predominance.  Repeat if     Urine Culture, Routine clinically necessary.    Narrative:       Preferred Collection Type->Urine, Clean Catch          I have reviewed all pertinent labs within the past 24 hours.    Estimated Creatinine Clearance: 10.4 mL/min (based on Cr of 4.8).    Diagnostic Results:  I have reviewed and interpreted all pertinent imaging results/findings within the past 24 hours.

## 2017-07-24 NOTE — PROGRESS NOTES
Patient refusing dressing change right now, stating she does not feel up to it right now. Will offer in an hour.

## 2017-07-24 NOTE — PROGRESS NOTES
Patient stating she does her VAD dressing change once a week, nursing staff has her charted as every other day. Rashida, VAD coordinator, notified.

## 2017-07-24 NOTE — PROGRESS NOTES
Patient refusing golytely for capsule endoscopy. GI stating they will not do capsule if patient does not do some bowel prep. Dr. Fajardo notified, at bedside. GI notified that patient will not do golytely and stating capsule endo will be cancelled.

## 2017-07-25 ENCOUNTER — OUTPATIENT CASE MANAGEMENT (OUTPATIENT)
Dept: ADMINISTRATIVE | Facility: OTHER | Age: 62
End: 2017-07-25

## 2017-07-25 PROBLEM — K92.2 GASTROINTESTINAL HEMORRHAGE: Status: ACTIVE | Noted: 2017-07-25

## 2017-07-25 LAB
ANION GAP SERPL CALC-SCNC: 14 MMOL/L
APTT BLDCRRT: 24.6 SEC
BASOPHILS # BLD AUTO: 0.01 K/UL
BASOPHILS # BLD AUTO: 0.02 K/UL
BASOPHILS NFR BLD: 0.4 %
BASOPHILS NFR BLD: 0.6 %
BUN SERPL-MCNC: 54 MG/DL
CALCIUM SERPL-MCNC: 7.9 MG/DL
CHLORIDE SERPL-SCNC: 101 MMOL/L
CO2 SERPL-SCNC: 21 MMOL/L
CREAT SERPL-MCNC: 8.6 MG/DL
DIFFERENTIAL METHOD: ABNORMAL
DIFFERENTIAL METHOD: ABNORMAL
EOSINOPHIL # BLD AUTO: 0.1 K/UL
EOSINOPHIL # BLD AUTO: 0.1 K/UL
EOSINOPHIL NFR BLD: 2.8 %
EOSINOPHIL NFR BLD: 3 %
ERYTHROCYTE [DISTWIDTH] IN BLOOD BY AUTOMATED COUNT: 16.1 %
ERYTHROCYTE [DISTWIDTH] IN BLOOD BY AUTOMATED COUNT: 16.2 %
EST. GFR  (AFRICAN AMERICAN): 5.3 ML/MIN/1.73 M^2
EST. GFR  (NON AFRICAN AMERICAN): 4.6 ML/MIN/1.73 M^2
GLUCOSE SERPL-MCNC: 66 MG/DL
HCT VFR BLD AUTO: 21.3 %
HCT VFR BLD AUTO: 21.9 %
HGB BLD-MCNC: 7.1 G/DL
HGB BLD-MCNC: 7.3 G/DL
INR PPP: 1.1
LDH SERPL L TO P-CCNC: 158 U/L
LYMPHOCYTES # BLD AUTO: 0.3 K/UL
LYMPHOCYTES # BLD AUTO: 0.6 K/UL
LYMPHOCYTES NFR BLD: 11.9 %
LYMPHOCYTES NFR BLD: 18.6 %
MAGNESIUM SERPL-MCNC: 2 MG/DL
MCH RBC QN AUTO: 30.6 PG
MCH RBC QN AUTO: 30.7 PG
MCHC RBC AUTO-ENTMCNC: 33.3 G/DL
MCHC RBC AUTO-ENTMCNC: 33.3 G/DL
MCV RBC AUTO: 92 FL
MCV RBC AUTO: 92 FL
MONOCYTES # BLD AUTO: 0.2 K/UL
MONOCYTES # BLD AUTO: 0.3 K/UL
MONOCYTES NFR BLD: 8.7 %
MONOCYTES NFR BLD: 9.1 %
NEUTROPHILS # BLD AUTO: 1.9 K/UL
NEUTROPHILS # BLD AUTO: 2.3 K/UL
NEUTROPHILS NFR BLD: 68.8 %
NEUTROPHILS NFR BLD: 75.8 %
PHOSPHATE SERPL-MCNC: 5 MG/DL
PLATELET # BLD AUTO: 65 K/UL
PLATELET # BLD AUTO: 66 K/UL
PMV BLD AUTO: 9.6 FL
PMV BLD AUTO: 9.6 FL
POTASSIUM SERPL-SCNC: 4 MMOL/L
PROTHROMBIN TIME: 11.8 SEC
RBC # BLD AUTO: 2.32 M/UL
RBC # BLD AUTO: 2.38 M/UL
SODIUM SERPL-SCNC: 136 MMOL/L
WBC # BLD AUTO: 2.52 K/UL
WBC # BLD AUTO: 3.34 K/UL

## 2017-07-25 PROCEDURE — 80048 BASIC METABOLIC PNL TOTAL CA: CPT

## 2017-07-25 PROCEDURE — 25000003 PHARM REV CODE 250: Performed by: PHYSICIAN ASSISTANT

## 2017-07-25 PROCEDURE — 93750 INTERROGATION VAD IN PERSON: CPT | Mod: ,,, | Performed by: INTERNAL MEDICINE

## 2017-07-25 PROCEDURE — 85025 COMPLETE CBC W/AUTO DIFF WBC: CPT

## 2017-07-25 PROCEDURE — 93750 INTERROGATION VAD IN PERSON: CPT | Performed by: PHYSICIAN ASSISTANT

## 2017-07-25 PROCEDURE — 99232 SBSQ HOSP IP/OBS MODERATE 35: CPT | Mod: ,,, | Performed by: INTERNAL MEDICINE

## 2017-07-25 PROCEDURE — 83735 ASSAY OF MAGNESIUM: CPT

## 2017-07-25 PROCEDURE — 25000003 PHARM REV CODE 250: Performed by: INTERNAL MEDICINE

## 2017-07-25 PROCEDURE — 80100014 HC HEMODIALYSIS 1:1

## 2017-07-25 PROCEDURE — 93750 INTERROGATION VAD IN PERSON: CPT | Mod: GC,,, | Performed by: NURSE PRACTITIONER

## 2017-07-25 PROCEDURE — 36415 COLL VENOUS BLD VENIPUNCTURE: CPT

## 2017-07-25 PROCEDURE — 20600001 HC STEP DOWN PRIVATE ROOM

## 2017-07-25 PROCEDURE — 84100 ASSAY OF PHOSPHORUS: CPT

## 2017-07-25 PROCEDURE — 27000248 HC VAD-ADDITIONAL DAY

## 2017-07-25 PROCEDURE — 83615 LACTATE (LD) (LDH) ENZYME: CPT

## 2017-07-25 PROCEDURE — 85730 THROMBOPLASTIN TIME PARTIAL: CPT

## 2017-07-25 PROCEDURE — 85610 PROTHROMBIN TIME: CPT

## 2017-07-25 PROCEDURE — 90935 HEMODIALYSIS ONE EVALUATION: CPT | Mod: ,,, | Performed by: INTERNAL MEDICINE

## 2017-07-25 PROCEDURE — 25000003 PHARM REV CODE 250: Performed by: STUDENT IN AN ORGANIZED HEALTH CARE EDUCATION/TRAINING PROGRAM

## 2017-07-25 RX ORDER — SODIUM CHLORIDE 9 MG/ML
INJECTION, SOLUTION INTRAVENOUS
Status: DISCONTINUED | OUTPATIENT
Start: 2017-07-25 | End: 2017-07-26 | Stop reason: HOSPADM

## 2017-07-25 RX ORDER — SODIUM CHLORIDE 9 MG/ML
INJECTION, SOLUTION INTRAVENOUS ONCE
Status: DISCONTINUED | OUTPATIENT
Start: 2017-07-25 | End: 2017-07-26 | Stop reason: HOSPADM

## 2017-07-25 RX ORDER — POLYETHYLENE GLYCOL 3350 17 G/17G
17 POWDER, FOR SOLUTION ORAL DAILY PRN
Status: DISCONTINUED | OUTPATIENT
Start: 2017-07-25 | End: 2017-07-26 | Stop reason: HOSPADM

## 2017-07-25 RX ADMIN — GABAPENTIN 300 MG: 300 CAPSULE ORAL at 10:07

## 2017-07-25 RX ADMIN — WARFARIN SODIUM 3 MG: 3 TABLET ORAL at 04:07

## 2017-07-25 RX ADMIN — CALCIUM CARBONATE (ANTACID) CHEW TAB 500 MG 500 MG: 500 CHEW TAB at 07:07

## 2017-07-25 RX ADMIN — ESCITALOPRAM 20 MG: 20 TABLET, FILM COATED ORAL at 10:07

## 2017-07-25 RX ADMIN — FLUTICASONE PROPIONATE 2 SPRAY: 50 SPRAY, METERED NASAL at 08:07

## 2017-07-25 RX ADMIN — CALCIUM CARBONATE (ANTACID) CHEW TAB 500 MG 500 MG: 500 CHEW TAB at 10:07

## 2017-07-25 RX ADMIN — PANTOPRAZOLE SODIUM 40 MG: 40 TABLET, DELAYED RELEASE ORAL at 08:07

## 2017-07-25 RX ADMIN — CETIRIZINE HYDROCHLORIDE 5 MG: 5 TABLET, FILM COATED ORAL at 08:07

## 2017-07-25 RX ADMIN — ATORVASTATIN CALCIUM 40 MG: 20 TABLET, FILM COATED ORAL at 08:07

## 2017-07-25 RX ADMIN — CALCIUM CARBONATE (ANTACID) CHEW TAB 500 MG 500 MG: 500 CHEW TAB at 01:07

## 2017-07-25 RX ADMIN — ONDANSETRON 4 MG: 4 TABLET, FILM COATED ORAL at 06:07

## 2017-07-25 NOTE — HPI
Patient is a 61 y.o. female with PMHx of MI, HTN, stroke, and ESRD on HD TTS, with LVAD and currently on coumadin, who presents with complaint of bloody diarrhea for two weeks, dark red in color.  She reports that she was in her usual state of health until last night when she developed light headedness, fatigue, and SOB on exertion.  She has had GI bleeds in the past and is well known to our service.  [facility], last treatment on Saturday.

## 2017-07-25 NOTE — SUBJECTIVE & OBJECTIVE
Interval History: Refused VCE yesterday as well as the prep. Reports abdominal discomfort today and requesting to be re-evaluated for VCE. Spoke with GI who report that her Hb is stable and would have her do VCE as an outpatient. Will give miralax to make her have a Bm today. If no major issues overnight, should be stable to go home tomorrow.    Continuous Infusions:   Scheduled Meds:   sodium chloride 0.9%   Intravenous Once    amlodipine  10 mg Oral Daily    atorvastatin  40 mg Oral Daily    calcium carbonate  500 mg Oral QID (PC + HS)    cetirizine  5 mg Oral Daily    escitalopram oxalate  20 mg Oral QHS    fluticasone  2 spray Each Nare Daily    gabapentin  300 mg Oral QHS    hydrALAZINE  50 mg Oral Q8H    pantoprazole  40 mg Oral Daily    polyethylene glycol  2,000 mL Oral Once    warfarin  3 mg Oral Daily     PRN Meds:sodium chloride, sodium chloride 0.9%, sodium chloride 0.9%, acetaminophen, glucose, ondansetron, polyethylene glycol    Review of patient's allergies indicates:   Allergen Reactions    Codeine Nausea And Vomiting    Demerol [meperidine] Nausea And Vomiting    Lortab [hydrocodone-acetaminophen] Nausea And Vomiting     Objective:     Vital Signs (Most Recent):  Temp: 98 °F (36.7 °C) (07/25/17 1400)  Pulse: 69 (07/25/17 1500)  Resp: 16 (07/25/17 1400)  BP: (!) 88/49 (07/25/17 1500)  SpO2: 97 % (07/25/17 1210) Vital Signs (24h Range):  Temp:  [98 °F (36.7 °C)-98.7 °F (37.1 °C)] 98 °F (36.7 °C)  Pulse:  [62-78] 69  Resp:  [16-18] 16  SpO2:  [94 %-100 %] 97 %  BP: (62-90)/(0-61) 88/49     Weight: 58.3 kg (128 lb 8.5 oz)  Body mass index is 24.29 kg/m².      Intake/Output Summary (Last 24 hours) at 07/25/17 1509  Last data filed at 07/25/17 1400   Gross per 24 hour   Intake              820 ml   Output              500 ml   Net              320 ml       Hemodynamic Parameters:       Telemetry: no events    Physical Exam   Constitutional: She is oriented to person, place, and time. She  appears well-developed and well-nourished.   Neck: Normal range of motion. Neck supple. No JVD present.   Cardiovascular: Normal rate and regular rhythm.    Normal VAD hum   Pulmonary/Chest: Effort normal and breath sounds normal. She has no wheezes. She has no rales.   Abdominal: Soft. Bowel sounds are normal. There is no tenderness.   Musculoskeletal: She exhibits no edema.   Neurological: She is alert and oriented to person, place, and time.   Skin: Skin is warm and dry.     Significant Labs:  CBC:    Recent Labs  Lab 07/25/17  0702   WBC 3.34*   RBC 2.38*   HGB 7.3*   HCT 21.9*   PLT 65*   MCV 92   MCH 30.7   MCHC 33.3     BNP:    Recent Labs  Lab 07/24/17 0446   BNP 1,773*     CMP:    Recent Labs  Lab 07/24/17 0446 07/25/17 0522   GLU 68* 66*   CALCIUM 8.1* 7.9*   ALBUMIN 3.0*  --    PROT 5.6*  --     136   K 3.6 4.0   CO2 22* 21*   CL 99 101   BUN 33* 54*   CREATININE 6.9* 8.6*   ALKPHOS 75  --    ALT 12  --    AST 23  --    BILITOT 0.6  --       Coagulation:     Recent Labs  Lab 07/25/17  0522   INR 1.1   APTT 24.6     LDH:    Recent Labs  Lab 07/23/17  0604 07/24/17 0446 07/25/17  0522    171 158     Microbiology:  Microbiology Results (last 7 days)     Procedure Component Value Units Date/Time    Urine culture [[id number]] Collected:  07/19/17 1037    Order Status:  Completed Specimen:  Urine Updated:  07/20/17 1514     Urine Culture, Routine Multiple organisms isolated. None in predominance.  Repeat if     Urine Culture, Routine clinically necessary.    Narrative:       Preferred Collection Type->Urine, Clean Catch          I have reviewed all pertinent labs within the past 24 hours.    Estimated Creatinine Clearance: 5.8 mL/min (based on Cr of 8.6).    Diagnostic Results:  I have reviewed and interpreted all pertinent imaging results/findings within the past 24 hours.

## 2017-07-25 NOTE — PROGRESS NOTES
07/24/17 2000 07/24/17 2001   Vital Signs   BP (!) 62/45 (!) 70/0   MAP (mmHg) 50 --    BP Location Right arm Right arm   BP Method Automatic Doppler   Patient Position Lying Lying   MD Jose notified. Pt is asymptomatic and states that she feels fine. No new orders presently. Instructed to call back if Pt begins to have PI events. Will continue to monitor.

## 2017-07-25 NOTE — PLAN OF CARE
Problem: Patient Care Overview  Goal: Plan of Care Review  Outcome: Ongoing (interventions implemented as appropriate)  Pt remained free from falls/trauma/injury. Pt hypotensive overnight and remained asymptomatic, night time hydralazine held. See note for details. All other VSS. Pt on 2L home oxygen and O2 sats> 95%. LVAD sounds smooth. No alarms noted in history. Pt refusing LVAD dressing change, team made aware by day shift RN. Coumadin given during day shift, INR 1.2 Denies any CP, SOB, palpitations, dizziness, pain and discomfort. Turning/repositioning independently in bed. Plan of care reviewed with patient and all questions answered, verbalizes understanding. No acute distress noted. Will continue to monitor.

## 2017-07-25 NOTE — PT/OT/SLP PROGRESS
Physical Therapy      Randa Devine  MRN: [id number]    Patient not seen today secondary to Dialysis. Will follow-up at next scheduled session as able.    Tasneem Gabriel, PT, DPT   7/25/2017  [phone]

## 2017-07-25 NOTE — PROGRESS NOTES
7/25/17 - Unable to complete Initial Screen for OPCM as pt in currently inpatient at [facility]. CM will attempt to contact when she has been d/c'd to home. Ina Lovell RN

## 2017-07-25 NOTE — SUBJECTIVE & OBJECTIVE
Interval History: Follow up for HD. HD to begin soon. No BM in 4 days.     Review of patient's allergies indicates:   Allergen Reactions    Codeine Nausea And Vomiting    Demerol [meperidine] Nausea And Vomiting    Lortab [hydrocodone-acetaminophen] Nausea And Vomiting     Current Facility-Administered Medications   Medication Frequency    0.9%  NaCl infusion (for blood administration) Q24H PRN    0.9%  NaCl infusion PRN    0.9%  NaCl infusion PRN    0.9%  NaCl infusion Once    acetaminophen tablet 650 mg Q6H PRN    amlodipine tablet 10 mg Daily    atorvastatin tablet 40 mg Daily    calcium carbonate 200 mg calcium (500 mg) chewable tablet 500 mg QID (PC + HS)    cetirizine tablet 5 mg Daily    escitalopram oxalate tablet 20 mg QHS    fluticasone 50 mcg/actuation nasal spray 2 spray Daily    gabapentin capsule 300 mg QHS    glucose chewable tablet 16 g PRN    hydrALAZINE tablet 50 mg Q8H    ondansetron tablet 4 mg Q8H PRN    pantoprazole EC tablet 40 mg Daily    polyethylene glycol (GoLYTELY) solution Once    polyethylene glycol packet 17 g Daily PRN    warfarin (COUMADIN) tablet 3 mg Daily       Objective:     Vital Signs (Most Recent):  Temp: 98.1 °F (36.7 °C) (07/25/17 0816)  Pulse: 71 (07/25/17 1210)  Resp: 18 (07/25/17 1210)  BP: (!) 74/0 (07/25/17 1211)  SpO2: 97 % (07/25/17 1210)  O2 Device (Oxygen Therapy): nasal cannula (07/25/17 1210) Vital Signs (24h Range):  Temp:  [98.1 °F (36.7 °C)-98.7 °F (37.1 °C)] 98.1 °F (36.7 °C)  Pulse:  [69-78] 71  Resp:  [16-18] 18  SpO2:  [94 %-100 %] 97 %  BP: (62-85)/(0-69) 74/0     Weight: 58.3 kg (128 lb 8.5 oz) (07/22/17 0405)  Body mass index is 24.29 kg/m².  Body surface area is 1.58 meters squared.    I/O last 3 completed shifts:  In: 620 [P.O.:620]  Out: 300 [Urine:300]    Physical Exam   Constitutional: She appears well-developed and well-nourished.   Cardiovascular:   LVAD in place.    Pulmonary/Chest: Effort normal and breath sounds normal.  She has no wheezes.       Significant Labs:  All labs within the past 24 hours have been reviewed.     Significant Imaging:  Labs: Reviewed

## 2017-07-25 NOTE — ASSESSMENT & PLAN NOTE
-EGD, CTA, Cscope unrevealing. 1 unit of PRBC 7/22. GI following.    -No epogen with dialysis.

## 2017-07-25 NOTE — PLAN OF CARE
Problem: Fall Risk (Adult)  Goal: Absence of Falls  Patient will demonstrate the desired outcomes by discharge/transition of care.    Outcome: Ongoing (interventions implemented as appropriate)  Patient free from fall/trauma/injury during shift. Ambulates with assistance. Calls when needs help to restroom.      Problem: Ventricular Assist Device (Adult)  Goal: Signs and Symptoms of Listed Potential Problems Will be Absent, Minimized or Managed (Ventricular Assist Device)  Signs and symptoms of listed potential problems will be absent, minimized or managed by discharge/transition of care (reference Ventricular Assist Device (Adult) CPG).    Outcome: Ongoing (interventions implemented as appropriate)  Patient refusing VAD dressing changes. Will continue to encourage dressing change.     Problem: Patient Care Overview  Goal: Plan of Care Review  Outcome: Ongoing (interventions implemented as appropriate)  Plan of care discussed with patient. Patient is free of fall/trauma/injury. Denies CP, SOB, or pain/discomfort. Undergoing dialysis. All questions addressed. Will continue to monitor     Problem: Gastrointestinal Bleeding (Adult)  Goal: Signs and Symptoms of Listed Potential Problems Will be Absent, Minimized or Managed (Gastrointestinal Bleeding)  Signs and symptoms of listed potential problems will be absent, minimized or managed by discharge/transition of care (reference Gastrointestinal Bleeding (Adult) CPG).    Outcome: Ongoing (interventions implemented as appropriate)  No active signs of GI bleed.

## 2017-07-25 NOTE — PROGRESS NOTES
[facility]  Nephrology  Progress Note    Patient Name: Randa Devine  MRN: [id number]  Admission Date: 7/18/2017  Hospital Length of Stay: 7 days  Attending Provider: Tosin Fajardo MD   Primary Care Physician: Laura Garvin DO  Principal Problem:GI bleed    Subjective:     HPI: Patient is a 61 y.o. female with PMHx of MI, HTN, stroke, and ESRD on HD TTS, with LVAD and currently on coumadin, who presents with complaint of bloody diarrhea for two weeks, dark red in color.  She reports that she was in her usual state of health until last night when she developed light headedness, fatigue, and SOB on exertion.  She has had GI bleeds in the past and is well known to our service.  She dialyzes at [facility] on [facility], last treatment on Saturday.    Interval History: Follow up for HD. HD to begin soon. No BM in 4 days.     Review of patient's allergies indicates:   Allergen Reactions    Codeine Nausea And Vomiting    Demerol [meperidine] Nausea And Vomiting    Lortab [hydrocodone-acetaminophen] Nausea And Vomiting     Current Facility-Administered Medications   Medication Frequency    0.9%  NaCl infusion (for blood administration) Q24H PRN    0.9%  NaCl infusion PRN    0.9%  NaCl infusion PRN    0.9%  NaCl infusion Once    acetaminophen tablet 650 mg Q6H PRN    amlodipine tablet 10 mg Daily    atorvastatin tablet 40 mg Daily    calcium carbonate 200 mg calcium (500 mg) chewable tablet 500 mg QID (PC + HS)    cetirizine tablet 5 mg Daily    escitalopram oxalate tablet 20 mg QHS    fluticasone 50 mcg/actuation nasal spray 2 spray Daily    gabapentin capsule 300 mg QHS    glucose chewable tablet 16 g PRN    hydrALAZINE tablet 50 mg Q8H    ondansetron tablet 4 mg Q8H PRN    pantoprazole EC tablet 40 mg Daily    polyethylene glycol (GoLYTELY) solution Once    polyethylene glycol packet 17 g Daily PRN    warfarin (COUMADIN) tablet 3 mg Daily       Objective:     Vital  Signs (Most Recent):  Temp: 98.1 °F (36.7 °C) (07/25/17 0816)  Pulse: 71 (07/25/17 1210)  Resp: 18 (07/25/17 1210)  BP: (!) 74/0 (07/25/17 1211)  SpO2: 97 % (07/25/17 1210)  O2 Device (Oxygen Therapy): nasal cannula (07/25/17 1210) Vital Signs (24h Range):  Temp:  [98.1 °F (36.7 °C)-98.7 °F (37.1 °C)] 98.1 °F (36.7 °C)  Pulse:  [69-78] 71  Resp:  [16-18] 18  SpO2:  [94 %-100 %] 97 %  BP: (62-85)/(0-69) 74/0     Weight: 58.3 kg (128 lb 8.5 oz) (07/22/17 0405)  Body mass index is 24.29 kg/m².  Body surface area is 1.58 meters squared.    I/O last 3 completed shifts:  In: 620 [P.O.:620]  Out: 300 [Urine:300]    Physical Exam   Constitutional: She appears well-developed and well-nourished.   Cardiovascular:   LVAD in place.    Pulmonary/Chest: Effort normal and breath sounds normal. She has no wheezes.       Significant Labs:  All labs within the past 24 hours have been reviewed.     Significant Imaging:  Labs: Reviewed    Assessment/Plan:     End stage renal disease on dialysis    -HD TTS 3.15 hrs/tx at [facility] in [place].   -Electrolytes and acid/base stable.   -Continue TTS schedule while stay. Continue TUMS. Increase TUMS or switch binder if phos continue to rise. HD today net UF 2 L as tolerated. Dialysis bath per protocol.           Normocytic anemia due to blood loss    -EGD, CTA, Cscope unrevealing. 1 unit of PRBC 7/22. GI following.    -No epogen with dialysis.                Thank you for your consult. I will follow-up with patient. Please contact us if you have any additional questions.    Lita Remy NP  Nephrology  [facility]

## 2017-07-25 NOTE — PROGRESS NOTES
Daily E and M and VAD Interrogation Note    Reason for Visit: GIB  Patient is seen in follow up for management of:  [] HeartMate II  [x] Heartware [] Total artificial heart       [] ECMO           [] Other     Interval History:  [] Interval history unobtainable due to intubation.  The [x] implant 5/2012  No acute events overnight      Review of Systems:  Pt was sleeping no distress  All other systems reviewed and negative    Medications:  Current Facility-Administered Medications   Medication Dose Route Frequency Provider Last Rate Last Dose    0.9%  NaCl infusion (for blood administration)   Intravenous Q24H PRN Vineet Ayala MD        0.9%  NaCl infusion (for blood administration)   Intravenous Q24H PRN Emilie Lopez MD        0.9%  NaCl infusion (for blood administration)   Intravenous Q24H PRN Carline Glass MD        0.9%  NaCl infusion   Intravenous PRN Elizabeth Bo MD        acetaminophen tablet 650 mg  650 mg Oral Q6H PRN KAREN Dubon-C   650 mg at 07/21/17 1725    amlodipine tablet 10 mg  10 mg Oral Daily Karen Ibanez PA-C   10 mg at 07/23/17 0838    atorvastatin tablet 40 mg  40 mg Oral Daily Karen Ibanez, PA-C   40 mg at 07/25/17 0825    calcium carbonate 200 mg calcium (500 mg) chewable tablet 500 mg  500 mg Oral QID (PC + HS) Carline Glass MD   500 mg at 07/24/17 2000    cetirizine tablet 5 mg  5 mg Oral Daily KAREN Dubon-C   5 mg at 07/25/17 0826    escitalopram oxalate tablet 20 mg  20 mg Oral QHS KAREN Dubon-C   20 mg at 07/24/17 2012    fluticasone 50 mcg/actuation nasal spray 2 spray  2 spray Each Nare Daily Emilie Lopez MD   2 spray at 07/25/17 0827    gabapentin capsule 300 mg  300 mg Oral QHS DOUG DubonC   300 mg at 07/24/17 2012    glucose chewable tablet 16 g  16 g Oral PRN Carline Glass MD   16 g at 07/22/17 1022    hydrALAZINE tablet 50 mg  50 mg Oral Q8H Karen Bach  LUMA Ibanez   Stopped at 07/24/17 2200    ondansetron tablet 4 mg  4 mg Oral Q8H PRN Karen Mikalacharity Ibanez PA-C   4 mg at 07/25/17 0604    pantoprazole EC tablet 40 mg  40 mg Oral Daily Carline Glass MD   40 mg at 07/25/17 0826    polyethylene glycol (GoLYTELY) solution  2,000 mL Oral Once Timothy Hutchins MD        warfarin (COUMADIN) tablet 3 mg  3 mg Oral Daily Sera Collado MD   3 mg at 07/24/17 1609       Physical Examination:  Vital Signs:   Vitals:    07/25/17 0817   BP: (!) 70/0   Pulse:    Resp:    Temp:      Cardiovascular:  [x] Regular rate and rhythm. VAD sounds smooth   [x]  No edema []  Edema present  [x]  Clear to auscultation  []  Rales to   []  Coarse  []  No rales but [] Pedal Pulses absent  []  Pulses  + throughout  Skin:  Incision is [x]  Clean, dry and intact.    Sternum:  [x]  Stable []  Unstable  Driveline(s):   [x]  Clean, dry and intact.     Labs:  CBC, CMP, LDH and Coags    X-Rays:  [x]  I reviewed today's Chest x-ray    Procedure:  Device Interrogation including analysis of device parameters.  Current Settings   [x]  Ventricular Assist Device  []  Total Artificial Heart interrogated  Review of device function is [x]  Stable     TXP LVAD INTERROGATIONS 7/25/2017 7/25/2017 7/25/2017 7/24/2017 7/24/2017 7/24/2017 7/24/2017   Type Heartware Heartware Heartware Heartware Heartware Heartware Heartware   Flow 4.3 4.5 4.6 4.5 4.6 4.5 4.3   Speed 2600 2600 2600 2600 2600 2600 2600   PI - - - - - - -   Power (Kim) 3.3 3.4 3.3 3.4 3.5 3.5 3.3   Low Flow Alarm - - - - - - -   High Power Alarm - - - - - - -   Pulsatility - - - - - - -       Assessment:  []  Primary Cardiomyopathy [x]  Congestive Heart Failure   []  Atrial Fibrillation []  Ventricular Tachycardia   []  Aftercare cardiac device [x]  Long term (current) use of anticoagulants   []  Ventilator-associated pneumonia []  Pneumonia viral, unspecified   []  Pneumonia, bacterial, unspecified []  Pneumonia, organism unspecified    [x]  Hemorrhage of GI tract, unspecified    []  Nosebleed []  Driveline infection   []  Infection VAD device          Plan:  [x]  Interval history obtained from HTS attending team member during rounding today  [x]  VAD/GIN teaching performed with patient  [x]  Mobilization / Physical Therapy ongoing  [x]  Anticoagulation []  Ongoing [x]  Held  [x]  Studies ordered-VCE ordered by GI      Total time spent was 32 minutes.  Of which more than 50 percent of the care dominated counseling and coordinating care with different team members. The VAD was interrogated and all parameters were WNL and no significant findings were found in the history. All these findings are documented in the note above.      Date of Service: 07/25/2017

## 2017-07-25 NOTE — ASSESSMENT & PLAN NOTE
-HD TTS 3.15 hrs/tx at [facility] in [place].   -Electrolytes and acid/base stable.   -Continue TTS schedule while stay. Continue TUMS. Increase TUMS or switch binder if phos continue to rise. HD today net UF 2 L as tolerated. Dialysis bath per protocol.

## 2017-07-25 NOTE — PROCEDURES
TXP LVAD INTERROGATIONS 7/25/2017 7/25/2017 7/25/2017 7/24/2017 7/24/2017 7/24/2017 7/24/2017   Type Heartware Heartware Heartware Heartware Heartware Heartware Heartware   Flow 4.3 4.5 4.6 4.5 4.6 4.5 4.3   Speed 2600 2600 2600 2600 2600 2600 2600   PI - - - - - - -   Power (Kim) 3.3 3.4 3.3 3.4 3.5 3.5 3.3   Low Flow Alarm - - - - - - -   High Power Alarm - - - - - - -   Pulsatility - - - - - - -     Pt asleep, no family at bedside  HCT 21.9  Waveform 3.5-5, no negative deflections noted  LFA=2.5  HPA=5.5  Pulsatile  No alarms noted in history

## 2017-07-25 NOTE — PROGRESS NOTES
HD complete. 1.315 liter net fluid removal. System clotting in venous chamber with 15 minutes remaining. Blood returned without difficulty. Needles removed and pressure held x 15 minutes until hemostasis achieved. Gauze pressure dressing applied and secured with tape. Positive bruit/thrill noted.

## 2017-07-26 VITALS
WEIGHT: 128.5 LBS | HEART RATE: 89 BPM | BODY MASS INDEX: 24.26 KG/M2 | HEIGHT: 61 IN | SYSTOLIC BLOOD PRESSURE: 62 MMHG | OXYGEN SATURATION: 98 % | RESPIRATION RATE: 16 BRPM | TEMPERATURE: 98 F

## 2017-07-26 LAB
ALBUMIN SERPL BCP-MCNC: 2.8 G/DL
ALP SERPL-CCNC: 70 U/L
ALT SERPL W/O P-5'-P-CCNC: 10 U/L
ANION GAP SERPL CALC-SCNC: 10 MMOL/L
APTT BLDCRRT: 25.2 SEC
AST SERPL-CCNC: 18 U/L
BASOPHILS # BLD AUTO: 0.02 K/UL
BASOPHILS NFR BLD: 0.6 %
BILIRUB DIRECT SERPL-MCNC: 0.2 MG/DL
BILIRUB SERPL-MCNC: 0.5 MG/DL
BNP SERPL-MCNC: 831 PG/ML
BUN SERPL-MCNC: 35 MG/DL
CALCIUM SERPL-MCNC: 8.1 MG/DL
CHLORIDE SERPL-SCNC: 104 MMOL/L
CO2 SERPL-SCNC: 25 MMOL/L
CREAT SERPL-MCNC: 5.6 MG/DL
CRP SERPL-MCNC: 3.2 MG/L
DIFFERENTIAL METHOD: ABNORMAL
EOSINOPHIL # BLD AUTO: 0.1 K/UL
EOSINOPHIL NFR BLD: 2.5 %
ERYTHROCYTE [DISTWIDTH] IN BLOOD BY AUTOMATED COUNT: 16.2 %
EST. GFR  (AFRICAN AMERICAN): 8.9 ML/MIN/1.73 M^2
EST. GFR  (NON AFRICAN AMERICAN): 7.7 ML/MIN/1.73 M^2
GLUCOSE SERPL-MCNC: 69 MG/DL
HCT VFR BLD AUTO: 21.7 %
HGB BLD-MCNC: 7.1 G/DL
INR PPP: 1.3
LDH SERPL L TO P-CCNC: 148 U/L
LYMPHOCYTES # BLD AUTO: 0.6 K/UL
LYMPHOCYTES NFR BLD: 19.4 %
MAGNESIUM SERPL-MCNC: 1.9 MG/DL
MCH RBC QN AUTO: 30.7 PG
MCHC RBC AUTO-ENTMCNC: 32.7 G/DL
MCV RBC AUTO: 94 FL
MONOCYTES # BLD AUTO: 0.4 K/UL
MONOCYTES NFR BLD: 11.1 %
NEUTROPHILS # BLD AUTO: 2.1 K/UL
NEUTROPHILS NFR BLD: 66.4 %
PHOSPHATE SERPL-MCNC: 3.2 MG/DL
PLATELET # BLD AUTO: 76 K/UL
PMV BLD AUTO: 9.9 FL
POTASSIUM SERPL-SCNC: 3.9 MMOL/L
PREALB SERPL-MCNC: 21 MG/DL
PROT SERPL-MCNC: 5.4 G/DL
PROTHROMBIN TIME: 13.8 SEC
RBC # BLD AUTO: 2.31 M/UL
SODIUM SERPL-SCNC: 139 MMOL/L
WBC # BLD AUTO: 3.15 K/UL

## 2017-07-26 PROCEDURE — 85610 PROTHROMBIN TIME: CPT

## 2017-07-26 PROCEDURE — 84100 ASSAY OF PHOSPHORUS: CPT

## 2017-07-26 PROCEDURE — 80076 HEPATIC FUNCTION PANEL: CPT

## 2017-07-26 PROCEDURE — 83880 ASSAY OF NATRIURETIC PEPTIDE: CPT

## 2017-07-26 PROCEDURE — 80048 BASIC METABOLIC PNL TOTAL CA: CPT

## 2017-07-26 PROCEDURE — 97530 THERAPEUTIC ACTIVITIES: CPT

## 2017-07-26 PROCEDURE — 84134 ASSAY OF PREALBUMIN: CPT

## 2017-07-26 PROCEDURE — 83615 LACTATE (LD) (LDH) ENZYME: CPT

## 2017-07-26 PROCEDURE — 83735 ASSAY OF MAGNESIUM: CPT

## 2017-07-26 PROCEDURE — 25000003 PHARM REV CODE 250: Performed by: STUDENT IN AN ORGANIZED HEALTH CARE EDUCATION/TRAINING PROGRAM

## 2017-07-26 PROCEDURE — 27000248 HC VAD-ADDITIONAL DAY

## 2017-07-26 PROCEDURE — 97116 GAIT TRAINING THERAPY: CPT

## 2017-07-26 PROCEDURE — 36415 COLL VENOUS BLD VENIPUNCTURE: CPT

## 2017-07-26 PROCEDURE — 85730 THROMBOPLASTIN TIME PARTIAL: CPT

## 2017-07-26 PROCEDURE — 85025 COMPLETE CBC W/AUTO DIFF WBC: CPT

## 2017-07-26 PROCEDURE — 86140 C-REACTIVE PROTEIN: CPT

## 2017-07-26 PROCEDURE — 25000003 PHARM REV CODE 250: Performed by: PHYSICIAN ASSISTANT

## 2017-07-26 PROCEDURE — 99232 SBSQ HOSP IP/OBS MODERATE 35: CPT | Mod: ,,, | Performed by: INTERNAL MEDICINE

## 2017-07-26 RX ORDER — GABAPENTIN 300 MG/1
300 CAPSULE ORAL NIGHTLY
Qty: 180 CAPSULE | Refills: 3
Start: 2017-07-26

## 2017-07-26 RX ORDER — WARFARIN 2 MG/1
TABLET ORAL
Qty: 102 TABLET | Refills: 3 | Status: ON HOLD
Start: 2017-07-26 | End: 2017-08-07

## 2017-07-26 RX ADMIN — CETIRIZINE HYDROCHLORIDE 5 MG: 5 TABLET, FILM COATED ORAL at 08:07

## 2017-07-26 RX ADMIN — POLYETHYLENE GLYCOL 3350 17 G: 17 POWDER, FOR SOLUTION ORAL at 08:07

## 2017-07-26 RX ADMIN — FLUTICASONE PROPIONATE 2 SPRAY: 50 SPRAY, METERED NASAL at 08:07

## 2017-07-26 RX ADMIN — CALCIUM CARBONATE (ANTACID) CHEW TAB 500 MG 500 MG: 500 CHEW TAB at 01:07

## 2017-07-26 RX ADMIN — POLYETHYLENE GLYCOL 3350 17 G: 17 POWDER, FOR SOLUTION ORAL at 12:07

## 2017-07-26 RX ADMIN — ATORVASTATIN CALCIUM 40 MG: 20 TABLET, FILM COATED ORAL at 08:07

## 2017-07-26 RX ADMIN — ACETAMINOPHEN 650 MG: 325 TABLET ORAL at 12:07

## 2017-07-26 RX ADMIN — PANTOPRAZOLE SODIUM 40 MG: 40 TABLET, DELAYED RELEASE ORAL at 08:07

## 2017-07-26 RX ADMIN — CALCIUM CARBONATE (ANTACID) CHEW TAB 500 MG 500 MG: 500 CHEW TAB at 08:07

## 2017-07-26 NOTE — PROGRESS NOTES
Patient discharged home per MD orders. Tele removed. IV access removed and intact x 1. Vital signs stable. VAD numbers WNL. No apparent distress noted at this time. Patient given and explained medication list and prescriptions. Patient verbalizes complete understanding of all discharge instructions and follow up care. All questions answered to their satisfaction. Patient given printed AVS. Patient family at bedside. Patient denied pt escort and family member wheeled pt from unit. All pt belongings in their possession.

## 2017-07-26 NOTE — PLAN OF CARE
Problem: Patient Care Overview  Goal: Plan of Care Review  Outcome: Ongoing (interventions implemented as appropriate)  Pt remained free from falls/trauma/injury. Pt hypotensive overnight and remained asymptomatic, night time hydralazine held. All other VSS. Pt on 2L home oxygen and O2 sats> 95%. LVAD sounds smooth. No alarms noted in history. Pt continues to refuse dressing change despite teaching of risk for drive line infection from RN. Coumadin given during day shift, INR 1.1 Denies any CP, SOB, palpitations, dizziness, pain and discomfort. Turning/repositioning independently in bed. Plan of care reviewed with patient and all questions answered, verbalizes understanding. No acute distress noted. Will continue to monitor.

## 2017-07-26 NOTE — PLAN OF CARE
Problem: Physical Therapy Goal  Goal: Physical Therapy Goal  Goals to be met by: 17     Patient will increase functional independence with mobility by performin. Supine to sit with Supervision   2. Sit to stand transfer with Supervision  3. Gait  x 200 feet with Supervision using Rolling Walker.   4. Lower extremity exercise program x15 reps, with assistance as needed, in order to increase LE strength and ROM     Outcome: Ongoing (interventions implemented as appropriate)  Goals reviewed and remain appropriate. Pt progressing towards goals.    Tasneem Gabriel, PT, DPT   2017  [phone]

## 2017-07-26 NOTE — PROGRESS NOTES
[facility]  Heart Transplant  Progress Note    Patient Name: Randa Devine  MRN: [id number]  Admission Date: 7/18/2017  Hospital Length of Stay: 7 days  Attending Physician: Tosin Fajardo MD  Primary Care Provider: Laura Garvin DO  Principal Problem:GI bleed    Subjective:     Interval History: Refused VCE yesterday as well as the prep. Reports abdominal discomfort today and requesting to be re-evaluated for VCE. Spoke with GI who report that her Hb is stable and would have her do VCE as an outpatient. Will give miralax to make her have a Bm today. If no major issues overnight, should be stable to go home tomorrow.    Continuous Infusions:   Scheduled Meds:   sodium chloride 0.9%   Intravenous Once    amlodipine  10 mg Oral Daily    atorvastatin  40 mg Oral Daily    calcium carbonate  500 mg Oral QID (PC + HS)    cetirizine  5 mg Oral Daily    escitalopram oxalate  20 mg Oral QHS    fluticasone  2 spray Each Nare Daily    gabapentin  300 mg Oral QHS    hydrALAZINE  50 mg Oral Q8H    pantoprazole  40 mg Oral Daily    polyethylene glycol  2,000 mL Oral Once    warfarin  3 mg Oral Daily     PRN Meds:sodium chloride, sodium chloride 0.9%, sodium chloride 0.9%, acetaminophen, glucose, ondansetron, polyethylene glycol    Review of patient's allergies indicates:   Allergen Reactions    Codeine Nausea And Vomiting    Demerol [meperidine] Nausea And Vomiting    Lortab [hydrocodone-acetaminophen] Nausea And Vomiting     Objective:     Vital Signs (Most Recent):  Temp: 98 °F (36.7 °C) (07/25/17 1400)  Pulse: 69 (07/25/17 1500)  Resp: 16 (07/25/17 1400)  BP: (!) 88/49 (07/25/17 1500)  SpO2: 97 % (07/25/17 1210) Vital Signs (24h Range):  Temp:  [98 °F (36.7 °C)-98.7 °F (37.1 °C)] 98 °F (36.7 °C)  Pulse:  [62-78] 69  Resp:  [16-18] 16  SpO2:  [94 %-100 %] 97 %  BP: (62-90)/(0-61) 88/49     Weight: 58.3 kg (128 lb 8.5 oz)  Body mass index is 24.29 kg/m².      Intake/Output Summary (Last 24  hours) at 07/25/17 1509  Last data filed at 07/25/17 1400   Gross per 24 hour   Intake              820 ml   Output              500 ml   Net              320 ml       Hemodynamic Parameters:       Telemetry: no events    Physical Exam   Constitutional: She is oriented to person, place, and time. She appears well-developed and well-nourished.   Neck: Normal range of motion. Neck supple. No JVD present.   Cardiovascular: Normal rate and regular rhythm.    Normal VAD hum   Pulmonary/Chest: Effort normal and breath sounds normal. She has no wheezes. She has no rales.   Abdominal: Soft. Bowel sounds are normal. There is no tenderness.   Musculoskeletal: She exhibits no edema.   Neurological: She is alert and oriented to person, place, and time.   Skin: Skin is warm and dry.     Significant Labs:  CBC:    Recent Labs  Lab 07/25/17  0702   WBC 3.34*   RBC 2.38*   HGB 7.3*   HCT 21.9*   PLT 65*   MCV 92   MCH 30.7   MCHC 33.3     BNP:    Recent Labs  Lab 07/24/17  0446   BNP 1,773*     CMP:    Recent Labs  Lab 07/24/17  0446 07/25/17  0522   GLU 68* 66*   CALCIUM 8.1* 7.9*   ALBUMIN 3.0*  --    PROT 5.6*  --     136   K 3.6 4.0   CO2 22* 21*   CL 99 101   BUN 33* 54*   CREATININE 6.9* 8.6*   ALKPHOS 75  --    ALT 12  --    AST 23  --    BILITOT 0.6  --       Coagulation:     Recent Labs  Lab 07/25/17  0522   INR 1.1   APTT 24.6     LDH:    Recent Labs  Lab 07/23/17  0604 07/24/17  0446 07/25/17  0522    171 158     Microbiology:  Microbiology Results (last 7 days)     Procedure Component Value Units Date/Time    Urine culture [[id number]] Collected:  07/19/17 1037    Order Status:  Completed Specimen:  Urine Updated:  07/20/17 1514     Urine Culture, Routine Multiple organisms isolated. None in predominance.  Repeat if     Urine Culture, Routine clinically necessary.    Narrative:       Preferred Collection Type->Urine, Clean Catch          I have reviewed all pertinent labs within the past 24  hours.    Estimated Creatinine Clearance: 5.8 mL/min (based on Cr of 8.6).    Diagnostic Results:  I have reviewed and interpreted all pertinent imaging results/findings within the past 24 hours.    Assessment and Plan:     * GI bleed    -INR 1.1 today. Will continue w/ coumadin. Non-heparin bridge due to recurrent GIB  -GI consulted - appreciate their assistance  -EGD howed Erythematous duodenopathy  -CTA negative for acute source of bleed  -Colonoscopy unrevealing  - c/w protonix  - GI recommend outpatient VCE. They will set it up as per GI fellow        Normocytic anemia due to blood loss    -Acute on Chronic, due to GIB  -1U PRBC (7/22) during HD post-colonoscopy          Left ventricular assist device present    -HW implanted 5/2012  -Speed 2600  -Hold coumadin as above  -HTN- cont hydralazine, amlodipine        End stage renal disease on dialysis    -Nephrology consulted. On HD Tues, Thurs, Sat  -Had HD ovenrnight.        Bilateral foot-drop    -AFOs ordered  -Prescription sent to  Clinic in [place] after discussion with Tasneem from PT  - Clinic to call patient to follow up with the patient once she is discharged          D/c home tomorrow if symptomatically better and able to tolerate diet.    Carline Glass MD  Heart Transplant  [facility]

## 2017-07-26 NOTE — PT/OT/SLP PROGRESS
"Physical Therapy  Treatment    Randa Devine   MRN: [id number]   Admitting Diagnosis: GI bleed    PT Received On: 07/26/17  PT Start Time: 1106     PT Stop Time: 1140    PT Total Time (min): 34 min       Billable Minutes:  Gait [id number] and Therapeutic Activity 14    Treatment Type: Treatment  PT/PTA: PT     PTA Visit Number: 0       General Precautions: Standard, fall, LVAD  Orthopedic Precautions: N/A   Braces: N/A    Do you have any cultural, spiritual, Denominational conflicts, given your current situation?: none noted     Subjective:  Communicated with RN prior to session.  "I'm going home today."    Pain/Comfort  Pain Rating 1: 0/10    Objective:   Patient found with: telemetry, oxygen (LVAD)    Functional Mobility:  Bed Mobility:   Supine to Sit:  (not performed 2* pt [facility] before and after treatment session)    Transfers:  Sit <> Stand Assistance: Stand By Assistance  Sit <> Stand Assistive Device: Rolling Walker    Gait:   Gait Distance: 256 ft.   Assistance 1: Stand by Assistance  Gait Assistive Device: Rolling walker  Gait Pattern: swing-through gait  Gait Deviation(s): decreased shanti, increased time in double stance, decreased velocity of limb motion, decreased step length, decreased toe-to-floor clearance, toes first (B foot drop; increased hip and knee flexion to advance BLE)   2L portable O2 in place throughout.   Emergency bag present through    Standing rest break x2 to talk with medical teams in [facility]    Balance:   Static Sit: supervision  Dynamic Sit: SBA  Static Stand: SBA  Dynamic stand: SBA     Therapeutic Activities and Exercises:  Pt found on wall power. Switched from wall>battery power with set-up assist. Donned consolidation bag with SBA.   Donned B socks with supervision and shoes with modA.   RLE ACE wrapped for dorsiflexion and eversion assist during gait. Mild improvement in ankle DF noted during gait.   Pt instructed to call  Clinic to set up appointment to get new AFOs. Pt given " [facility] phone number to schedule appointment. MD previously faxed over script.     AM-PAC 6 CLICK MOBILITY  How much help from another person does this patient currently need?   1 = Unable, Total/Dependent Assistance  2 = A lot, Maximum/Moderate Assistance  3 = A little, Minimum/Contact Guard/Supervision  4 = None, Modified Riddle/Independent    Turning over in bed (including adjusting bedclothes, sheets and blankets)?: 4  Sitting down on and standing up from a chair with arms (e.g., wheelchair, bedside commode, etc.): 3  Moving from lying on back to sitting on the side of the bed?: 4  Moving to and from a bed to a chair (including a wheelchair)?: 3  Need to walk in hospital room?: 3  Climbing 3-5 steps with a railing?: 2  Total Score: 19    AM-PAC Raw Score CMS G-Code Modifier Level of Impairment Assistance   6 % Total / Unable   7 - 9 CM 80 - 100% Maximal Assist   10 - 14 CL 60 - 80% Moderate Assist   15 - 19 CK 40 - 60% Moderate Assist   20 - 22 CJ 20 - 40% Minimal Assist   23 CI 1-20% SBA / CGA   24 CH 0% Independent/ Mod I     Patient left up in chair with all lines intact and call button in reach.    Assessment:  Randa Devine is a 61 y.o. female with a medical diagnosis of GI bleed and presents with previous LVAD in place, inserted 5/2012. Pt progressed functional mobility this session, as she was able to increase ambulation distance. Mild improvement noted in R ankle DF with ACE wrapped applied prior to mobility; however, pt continues to need new AFOs for greater improvement in LE function. Pt would continue to benefit from skilled acute PT in order to address current deficits and progress functional mobility.    Rehab identified problem list/impairments: Rehab identified problem list/impairments: weakness, impaired functional mobilty, gait instability, impaired endurance, decreased safety awareness, impaired cardiopulmonary response to activity, impaired balance, decreased lower  extremity function, impaired self care skills    Rehab potential is good.    Activity tolerance: Good    Discharge recommendations: Discharge Facility/Level Of Care Needs: home health PT     Barriers to discharge: Barriers to Discharge: Decreased caregiver support (lives alone)    Equipment recommendations: Equipment Needed After Discharge: orthotic device (B AFOs)     GOALS:    Physical Therapy Goals        Problem: Physical Therapy Goal    Goal Priority Disciplines Outcome Goal Variances Interventions   Physical Therapy Goal     PT/OT, PT Ongoing (interventions implemented as appropriate)     Description:  Goals to be met by: 17     Patient will increase functional independence with mobility by performin. Supine to sit with Supervision   2. Sit to stand transfer with Supervision  3. Gait  x 200 feet with Supervision using Rolling Walker.   4. Lower extremity exercise program x15 reps, with assistance as needed, in order to increase LE strength and ROM                      PLAN:    Patient to be seen 4 x/week  to address the above listed problems via gait training, therapeutic activities, therapeutic exercises  Plan of Care expires: 17  Plan of Care reviewed with: patient        Tasneem Harvey, PT, DPT   2017  [phone]

## 2017-07-26 NOTE — DISCHARGE SUMMARY
Discharge Summary  Heart Transplant Service      Admit Date: 7/18/2017    Discharge Date:  7/26/2017    Attending Physician: Tosin Fajardo MD    Discharge Physician: Carline Glass MD    Principal Diagnoses: GI bleed  Indication for Admission: GI bleed    Discharged Condition: Good    Hospital Course:   61 y.o. woman with PMH of CAD s/p CABG, chronic combined CHF / ICM (EF 10-15%) s/p Heartware as DT (05/2012) and SJM CRT-D (DDR ), ESRD on HD via left arm AVF (TTS), h/o GIB secondary to AVMs s/p APC (now only on coumadin), Paroxysmal AF, HTN, and Embolic thalamic CVA (01/2017) presents to ED with acute on chronic anemia and hematochezia. She underwent EGD/CTA/Colonoscopy which did not reveal an active source of bleeding. Her coumadin was held since admission and once her INR became subtherapeutic, her GIB resolved. GI planned for an inpatient VCE however the patient refused to undergo prep for it. Her coumadin was resumed and is set to follow up with GI outpatient on 8/3/17 for outpatient VCE.    Outpatient Plan:  Continue to take all medications as prescribed  Holding amlodipine and hydralazine for now as the patient's MAPs have been in the low 60s for the last 5 days and did not require these medicines - will reevaluate in HF clinic the need to continue them  Low Na diet  F/U with GI for VCE as outpatient  F/U at the VAD/HF clinic in 2 weeks    Diet: 2 gm Na diet with coumadin restrictions    Activity: Ad ese    Disposition: Home or Self Care     Discharge Medications:      Medication List      CHANGE how you take these medications    gabapentin 300 MG capsule  Commonly known as:  NEURONTIN  Take 1 capsule (300 mg total) by mouth every evening.  What changed:  when to take this     warfarin 2 MG tablet  Commonly known as:  COUMADIN  3mg MWF, 2mg all other days  What changed:  additional instructions        CONTINUE taking these medications    acetaminophen 325 MG tablet  Commonly known as:   TYLENOL  Take 2 tablets (650 mg total) by mouth every 6 (six) hours as needed for Pain (mild pain).     ANTACID CALCIUM ORAL     atorvastatin 40 MG tablet  Commonly known as:  LIPITOR  Take 1 tablet (40 mg total) by mouth once daily.     cetirizine 5 MG tablet  Commonly known as:  ZYRTEC  Take 1 tablet (5 mg total) by mouth once daily.     escitalopram oxalate 20 MG tablet  Commonly known as:  LEXAPRO  Take 1 tablet (20 mg total) by mouth every evening.     ondansetron 4 MG tablet  Commonly known as:  ZOFRAN  Take 1 tablet (4 mg total) by mouth every 8 (eight) hours as needed for Nausea.     pantoprazole 40 MG tablet  Commonly known as:  PROTONIX  Take 1 tablet (40 mg total) by mouth once daily.        STOP taking these medications    amlodipine 10 MG tablet  Commonly known as:  NORVASC           Where to Get Your Medications      Information about where to get these medications is not yet available    Ask your nurse or doctor about these medications  · gabapentin 300 MG capsule  · warfarin 2 MG tablet       Follow Up:  Follow-up Information     Tosin Fajardo MD In 2 weeks.    Specialty:  Cardiology  Why:  hospitalization for GI Bleed. LVAD HW.   Contact information:  [address]  [facility] [address]  [phone]             [facility] - Gastroenterology On 8/3/2017.    Specialty:  Gastroenterology  Why:  Video capsule endoscopy  Contact information:  [address]aren [address]  [facility] [address] [phone]  Additional information:  [facility] - 4th Floor               Case dicussed with Dr. Fajardo.    Carline Glass MD  Cardiology Fellow  Pager: [phone]  7/26/2017 2:21 PM

## 2017-07-26 NOTE — PLAN OF CARE
[facility]   Heart Transplant/VAD Clinic   [address] [place] [address]   [phone] after hours          [phone] fax     VAD HOME  HEALTH ORDERS      Admit to Home Health    Diagnosis:   Patient Active Problem List   Diagnosis    Heart replaced by heart assist device    End stage renal disease on dialysis    Chronic anticoagulation    Left ventricular assist device present    Femoral neck fracture    Normocytic anemia due to blood loss    Chronic combined systolic and diastolic heart failure    Fracture of lumbar spine without cord injury    TIA (transient ischemic attack)    ICD (implantable cardioverter-defibrillator), biventricular, in situ    Paroxysmal atrial fibrillation    Atrial tachycardia, paroxysmal    HIT (heparin-induced thrombocytopenia)    Gait disorder    Chronic LBP    Right leg numbness    Bilateral foot-drop    Physical deconditioning    Secondary hyperparathyroidism (of renal origin)    History of stroke without residual deficits    Syncope    Faintness    Nausea    Essential hypertension    Chronic low back pain    Cognitive impairment    Cellulitis    Fall    Erythema    Spontaneous hematoma of forearm    Hallucinations    Embolic stroke involving left posterior cerebral artery    Migraine with aura and without status migrainosus, not intractable    Cytotoxic cerebral edema    Acute ischemic vertebrobasilar artery thalamic stroke involving left-sided vessel    Cardiomegaly    Hypervolemia    Congestive heart failure    History of GI bleed    GI bleed    AICD discharge    Gastrointestinal hemorrhage       Patient is homebound due to:  LVAD with GI bleeding, debility    Diet: Low Fat, Low cholesterol, 2Gm Na, Coumadin restrictions.    Acitivities: No Swimming, bathing, vacuuming, contact sports.    Fresh implants= Sternal Precautions    Nursing:   SN to complete comprehensive assessment  including routine vital signs. Instruct on disease process and s/s of complications to report to MD. Review/verify medication list sent home with the patient at time of discharge  and instruct patient/caregiver as needed. Frequency may be adjusted depending on start of care date.    **LVAD driveline exit site dressing change is to be completed per LVAD patient/caregiver only**.    Notify MD if SBP > 160 or < 90; DBP > 90 or < 50; HR > 120 or < 50; Temp > 101; Weight gain >3lbs in 1 day or 5lbs in 1 week; Doppler > 90.  Other:        LABS:  SN to perform labs: PT/INR per Coumadin clinic [phone].   Follow up INR date: 7/28/17  No Finger Sticks      CONSULTS:      Physical Therapy to evaluate and treat. Evaluate for home safety and equipment needs; Establish/upgrade home exercise program. Perform / instruct on therapeutic exercises, gait training, transfer training, and Range of Motion.    Occupational Therapy to evaluate and treat. Evaluate home environment for safety and equipment needs. Perform/Instruct on transfers, ADL training, ROM, and therapeutic exercises.    Send initial Home Health orders to [facility] attending physician on call.  Send follow up questions to VAD clinic MD [phone] or [phone].    Carline Glass MD  Cardiology Fellow  Pager: [phone]  7/26/2017 2:09 PM

## 2017-07-26 NOTE — PROGRESS NOTES
[facility]  Heart Transplant  Progress Note    Patient Name: Randa Devine  MRN: [id number]  Admission Date: 7/18/2017  Hospital Length of Stay: 8 days  Attending Physician: Tosin Fajardo MD  Primary Care Provider: Laura Garvin DO  Principal Problem:GI bleed    Subjective:     Interval History: NAEON. Had a BM today which was residual blood. H&H stable. Denies any abdominal discomfort and tolerating diet. Plan to d/c home today and to follow up with GI for VCE and HF clinic in 2 weeks.    Continuous Infusions:   Scheduled Meds:   sodium chloride 0.9%   Intravenous Once    amlodipine  10 mg Oral Daily    atorvastatin  40 mg Oral Daily    calcium carbonate  500 mg Oral QID (PC + HS)    cetirizine  5 mg Oral Daily    escitalopram oxalate  20 mg Oral QHS    fluticasone  2 spray Each Nare Daily    gabapentin  300 mg Oral QHS    hydrALAZINE  50 mg Oral Q8H    pantoprazole  40 mg Oral Daily    polyethylene glycol  2,000 mL Oral Once    warfarin  3 mg Oral Daily     PRN Meds:sodium chloride, sodium chloride 0.9%, sodium chloride 0.9%, acetaminophen, glucose, ondansetron, polyethylene glycol    Review of patient's allergies indicates:   Allergen Reactions    Codeine Nausea And Vomiting    Demerol [meperidine] Nausea And Vomiting    Lortab [hydrocodone-acetaminophen] Nausea And Vomiting     Objective:     Vital Signs (Most Recent):  Temp: 98.2 °F (36.8 °C) (07/26/17 1200)  Pulse: 89 (07/26/17 1200)  Resp: 16 (07/26/17 1200)  BP: (!) 62/0 (07/26/17 1259)  SpO2: 98 % (07/26/17 1200) Vital Signs (24h Range):  Temp:  [97.8 °F (36.6 °C)-98.9 °F (37.2 °C)] 98.2 °F (36.8 °C)  Pulse:  [67-96] 89  Resp:  [16-18] 16  SpO2:  [97 %-100 %] 98 %  BP: (60-90)/(0-58) 62/0     Weight: 58.3 kg (128 lb 8.5 oz)  Body mass index is 24.29 kg/m².      Intake/Output Summary (Last 24 hours) at 07/26/17 1418  Last data filed at 07/26/17 0500   Gross per 24 hour   Intake             1100 ml   Output             1915  ml   Net             -815 ml       Hemodynamic Parameters:       Telemetry: no events    Physical Exam   Constitutional: She is oriented to person, place, and time. She appears well-developed and well-nourished.   Neck: Normal range of motion. Neck supple. No JVD present.   Cardiovascular: Normal rate and regular rhythm.    Normal VAD hum   Pulmonary/Chest: Effort normal and breath sounds normal. She has no wheezes. She has no rales.   Abdominal: Soft. Bowel sounds are normal. There is no tenderness.   Musculoskeletal: She exhibits no edema.   Neurological: She is alert and oriented to person, place, and time.   Skin: Skin is warm and dry.        Significant Labs:  CBC:    Recent Labs  Lab 07/26/17 0815   WBC 3.15*   RBC 2.31*   HGB 7.1*   HCT 21.7*   PLT 76*   MCV 94   MCH 30.7   MCHC 32.7     BNP:    Recent Labs  Lab 07/26/17 0514   *     CMP:    Recent Labs  Lab 07/26/17 0514   GLU 69*   CALCIUM 8.1*   ALBUMIN 2.8*   PROT 5.4*      K 3.9   CO2 25      BUN 35*   CREATININE 5.6*   ALKPHOS 70   ALT 10   AST 18   BILITOT 0.5      Coagulation:     Recent Labs  Lab 07/26/17  0514   INR 1.3*   APTT 25.2     LDH:    Recent Labs  Lab 07/24/17  0446 07/25/17  0522 07/26/17  0514    158 148     Microbiology:  Microbiology Results (last 7 days)     Procedure Component Value Units Date/Time    Urine culture [[id number]] Collected:  07/19/17 1037    Order Status:  Completed Specimen:  Urine Updated:  07/20/17 1514     Urine Culture, Routine Multiple organisms isolated. None in predominance.  Repeat if     Urine Culture, Routine clinically necessary.    Narrative:       Preferred Collection Type->Urine, Clean Catch          I have reviewed all pertinent labs within the past 24 hours.    Estimated Creatinine Clearance: 8.9 mL/min (based on Cr of 5.6).    Diagnostic Results:  I have reviewed and interpreted all pertinent imaging results/findings within the past 24 hours.    Assessment and Plan:     *  GI bleed    -Resolved  -GI consulted - appreciate their assistance  -EGD howed Erythematous duodenopathy  -CTA negative for acute source of bleed  -Colonoscopy unrevealing  -VCE planned on 8/3/17 by GI  -H&H stable        Normocytic anemia due to blood loss    -Acute on Chronic, due to GIB  -1U PRBC (7/22) during HD post-colonoscopy          Left ventricular assist device present    -HW implanted 5/2012  -Speed 2600  -c/w coumadin - no heparin bridge, will monitor INR outpatient  -HTN- cont hydralazine, amlodipine        End stage renal disease on dialysis    -Nephrology consulted. On HD Tues, Thurs, Sat  -Had HD ovenrnight.        Bilateral foot-drop    -AFOs ordered  -Prescription sent to  Clinic in [place] after discussion with Tasneem from PT  - Clinic to call patient to follow up with the patient once she is discharged          D/c home today.    Carline Glass MD  Heart Transplant  [facility]

## 2017-07-26 NOTE — PROGRESS NOTES
Per note: Mrs. Zavaleta will be discharged after admission for recurrent GIB.  PMH includes CVA, ESRD on HD, and multiple GIBs due to AVMs.  EGD revealed erythematous duodenopathy, but no acute source of bleeding.  She refused a VCE, and will undergo outpatient VCE instead since H/H is stable today.  She will be discharged with an INR goal of 2-3, with a bridge up to physician discretion.  She remains off all antiplatelet therapy.  Discharge dose remains 3mg MWF, and 2mg all other days.  F/u INR recommended on Friday.  Medications were reviewed with the patient.  Per Allegra, INR via fingerstick 7/28. Left message for patient.

## 2017-07-26 NOTE — SUBJECTIVE & OBJECTIVE
Interval History: ORTEGA. Had a BM today which was residual blood. H&H stable. Denies any abdominal discomfort and tolerating diet. Plan to d/c home today and to follow up with GI for VCE and HF clinic in 2 weeks.    Continuous Infusions:   Scheduled Meds:   sodium chloride 0.9%   Intravenous Once    amlodipine  10 mg Oral Daily    atorvastatin  40 mg Oral Daily    calcium carbonate  500 mg Oral QID (PC + HS)    cetirizine  5 mg Oral Daily    escitalopram oxalate  20 mg Oral QHS    fluticasone  2 spray Each Nare Daily    gabapentin  300 mg Oral QHS    hydrALAZINE  50 mg Oral Q8H    pantoprazole  40 mg Oral Daily    polyethylene glycol  2,000 mL Oral Once    warfarin  3 mg Oral Daily     PRN Meds:sodium chloride, sodium chloride 0.9%, sodium chloride 0.9%, acetaminophen, glucose, ondansetron, polyethylene glycol    Review of patient's allergies indicates:   Allergen Reactions    Codeine Nausea And Vomiting    Demerol [meperidine] Nausea And Vomiting    Lortab [hydrocodone-acetaminophen] Nausea And Vomiting     Objective:     Vital Signs (Most Recent):  Temp: 98.2 °F (36.8 °C) (07/26/17 1200)  Pulse: 89 (07/26/17 1200)  Resp: 16 (07/26/17 1200)  BP: (!) 62/0 (07/26/17 1259)  SpO2: 98 % (07/26/17 1200) Vital Signs (24h Range):  Temp:  [97.8 °F (36.6 °C)-98.9 °F (37.2 °C)] 98.2 °F (36.8 °C)  Pulse:  [67-96] 89  Resp:  [16-18] 16  SpO2:  [97 %-100 %] 98 %  BP: (60-90)/(0-58) 62/0     Weight: 58.3 kg (128 lb 8.5 oz)  Body mass index is 24.29 kg/m².      Intake/Output Summary (Last 24 hours) at 07/26/17 1418  Last data filed at 07/26/17 0500   Gross per 24 hour   Intake             1100 ml   Output             1915 ml   Net             -815 ml       Hemodynamic Parameters:       Telemetry: no events    Physical Exam   Constitutional: She is oriented to person, place, and time. She appears well-developed and well-nourished.   Neck: Normal range of motion. Neck supple. No JVD present.   Cardiovascular: Normal  rate and regular rhythm.    Normal VAD hum   Pulmonary/Chest: Effort normal and breath sounds normal. She has no wheezes. She has no rales.   Abdominal: Soft. Bowel sounds are normal. There is no tenderness.   Musculoskeletal: She exhibits no edema.   Neurological: She is alert and oriented to person, place, and time.   Skin: Skin is warm and dry.        Significant Labs:  CBC:    Recent Labs  Lab 07/26/17  0815   WBC 3.15*   RBC 2.31*   HGB 7.1*   HCT 21.7*   PLT 76*   MCV 94   MCH 30.7   MCHC 32.7     BNP:    Recent Labs  Lab 07/26/17 0514   *     CMP:    Recent Labs  Lab 07/26/17 0514   GLU 69*   CALCIUM 8.1*   ALBUMIN 2.8*   PROT 5.4*      K 3.9   CO2 25      BUN 35*   CREATININE 5.6*   ALKPHOS 70   ALT 10   AST 18   BILITOT 0.5      Coagulation:     Recent Labs  Lab 07/26/17 0514   INR 1.3*   APTT 25.2     LDH:    Recent Labs  Lab 07/24/17  0446 07/25/17  0522 07/26/17  0514    158 148     Microbiology:  Microbiology Results (last 7 days)     Procedure Component Value Units Date/Time    Urine culture [[id number]] Collected:  07/19/17 1037    Order Status:  Completed Specimen:  Urine Updated:  07/20/17 1514     Urine Culture, Routine Multiple organisms isolated. None in predominance.  Repeat if     Urine Culture, Routine clinically necessary.    Narrative:       Preferred Collection Type->Urine, Clean Catch          I have reviewed all pertinent labs within the past 24 hours.    Estimated Creatinine Clearance: 8.9 mL/min (based on Cr of 5.6).    Diagnostic Results:  I have reviewed and interpreted all pertinent imaging results/findings within the past 24 hours.

## 2017-07-26 NOTE — PROGRESS NOTES
Mrs. Zavaleta will be discharged after admission for recurrent GIB.  PMH includes CVA, ESRD on HD, and multiple GIBs due to AVMs.  EGD revealed erythematous duodenopathy, but no acute source of bleeding.  She refused a VCE, and will undergo outpatient VCE instead since H/H is stable today.  She will be discharged with an INR goal of 2-3, with a bridge up to physician discretion.  She remains off all antiplatelet therapy.  Discharge dose remains 3mg MWF, and 3mg all other days.  F/u INR recommended on Friday.  Medications were reviewed with the patient.       Randa Devine   Home Medication Instructions EDIE:[id number]    Printed on:07/26/17 3304   Medication Information                      acetaminophen (TYLENOL) 325 MG tablet  Take 2 tablets (650 mg total) by mouth every 6 (six) hours as needed for Pain (mild pain).             amlodipine (NORVASC) 10 MG tablet  Take 1 tablet (10 mg total) by mouth once daily.             atorvastatin (LIPITOR) 40 MG tablet  Take 1 tablet (40 mg total) by mouth once daily.             CALCIUM CARBONATE (ANTACID CALCIUM ORAL)  Take 1 tablet by mouth 4 (four) times daily.              cetirizine (ZYRTEC) 5 MG tablet  Take 1 tablet (5 mg total) by mouth once daily.             escitalopram oxalate (LEXAPRO) 20 MG tablet  Take 1 tablet (20 mg total) by mouth every evening.             gabapentin (NEURONTIN) 300 MG capsule  Take 1 capsule (300 mg total) by mouth every evening.             ondansetron (ZOFRAN) 4 MG tablet  Take 1 tablet (4 mg total) by mouth every 8 (eight) hours as needed for Nausea.             pantoprazole (PROTONIX) 40 MG tablet  Take 1 tablet (40 mg total) by mouth once daily.             warfarin (COUMADIN) 2 MG tablet  3mg MWF, 2mg all other days

## 2017-07-26 NOTE — PROGRESS NOTES
DISCHARGE    SW to pt's room for d/c plan. Pt presents as aaox3 with calm and pleasant affect. SW confirmed pt is in agreement with plan to d/c home today with home health via [facility]  ph [phone], fax [phone]. SW faxed referral and confirmed receipt. Pt denies transportation issues. No other needs reported at this time. SW providing psychosocial and counseling support, education, resources, and d/c planning as needed. SW remains available.

## 2017-07-26 NOTE — ASSESSMENT & PLAN NOTE
-HW implanted 5/2012  -Speed 2600  -c/w coumadin - no heparin bridge, will monitor INR outpatient  -HTN- cont hydralazine, amlodipine

## 2017-07-26 NOTE — ASSESSMENT & PLAN NOTE
-Resolved  -GI consulted - appreciate their assistance  -EGD howed Erythematous duodenopathy  -CTA negative for acute source of bleed  -Colonoscopy unrevealing  -VCE planned on 8/3/17 by GI  -H&H stable

## 2017-07-27 ENCOUNTER — SOCIAL WORK (OUTPATIENT)
Dept: TRANSPLANT | Facility: CLINIC | Age: 62
End: 2017-07-27

## 2017-07-27 ENCOUNTER — OUTPATIENT CASE MANAGEMENT (OUTPATIENT)
Dept: ADMINISTRATIVE | Facility: OTHER | Age: 62
End: 2017-07-27

## 2017-07-27 NOTE — PROGRESS NOTES
7/27/17 - Attempted phone contact with pt with no answer. Voice mail left on home phone. Will attempt to follow up at a later time. Ina Lovell RN

## 2017-07-27 NOTE — PT/OT/SLP DISCHARGE
Occupational Therapy Discharge Summary    Randa Devine  MRN: [id number]   GI bleed   Patient Discharged from acute Occupational Therapy on 7/26/17.  Please refer to prior OT note dated on 7/21/17 for functional status.     Assessment:   Patient was discharge unexpectedly.  Information required to complete and accurate discharge summary is unknown.  Refer to therapy initial evaluation and last progress note for initial and most recent functional status and goal achievement.  Recommendations made may be found in medical record. Patient appropriate for care in another setting.  GOALS:    Occupational Therapy Goals     Not on file          Multidisciplinary Problems (Resolved)        Problem: Occupational Therapy Goal    Goal Priority Disciplines Outcome Interventions   Occupational Therapy Goal   (Resolved)     OT, PT/OT Outcome(s) achieved    Description:  Goals to be met by: 7 days (7/26/17)     Patient will increase functional independence with ADLs by performing:    UE Dressing with [place].  LE Dressing with [place].  Grooming while standing at sink with [place].  Toileting from toilet with [place] for hygiene and clothing management.   Supine to sit with [place].  Toilet transfer to toilet with [place].  Increased functional strength to WFL for ADLs.                    Reasons for Discontinuation of Therapy Services  Transfer to alternate level of care.      Plan:  Patient Discharged to: Home with Home Health Service     AMINA Perez  7/27/2017

## 2017-07-27 NOTE — PROGRESS NOTES
BETHANIE received phone call from pt that [facility] did not have her prescription for new orthotics.     [facility]: ph. [phone], fx: [phone]    BETHANIE able to find prescription in [facility] done by PM&R physician, Dr. Vasques. BETHANIE faxed prescription and demographics to [facility], confirmed receipt with Mercedes at [facility]. BETHANIE left a message with pt to call [facility] to schedule an appointment to be fitted.    BETHANIE remains available for any further needs.

## 2017-07-28 ENCOUNTER — PATIENT OUTREACH (OUTPATIENT)
Dept: ADMINISTRATIVE | Facility: CLINIC | Age: 62
End: 2017-07-28

## 2017-07-28 ENCOUNTER — OUTPATIENT CASE MANAGEMENT (OUTPATIENT)
Dept: ADMINISTRATIVE | Facility: OTHER | Age: 62
End: 2017-07-28

## 2017-07-28 ENCOUNTER — ANTI-COAG VISIT (OUTPATIENT)
Dept: CARDIOLOGY | Facility: CLINIC | Age: 62
End: 2017-07-28

## 2017-07-28 DIAGNOSIS — Z95.811 LEFT VENTRICULAR ASSIST DEVICE PRESENT: ICD-10-CM

## 2017-07-28 LAB — INR PPP: 1.9

## 2017-07-28 NOTE — PROGRESS NOTES
Verbal result taken from __Allegra_______. PT/INR __1.9_____ Date drawn_7/28_______ Hardcopy to be faxed.

## 2017-07-28 NOTE — PROGRESS NOTES
C3 nurse attempted to contact patient. No answer. The following message was left for the patient to return the call:  Good morning I am a nurse calling on behalf of [facility] from the Care Coordination Center.  This is a Transitional Care Call for Randa Devine . When you have a moment please contact us at [phone] or [phone] Monday through Friday, between the hours of 8 am to 4 pm. We look forward to speaking with you. On behalf of [facility] have a nice day.    The patient does not have a scheduled HOSFU appointment within 7-14 days post hospital discharge date 7\26\17. Message sent to Physician staff to assist with HOSFU appointment scheduling.

## 2017-07-28 NOTE — PROGRESS NOTES
7/28/17 - Phone contact with pt this AM for completion of Initial Screen for OPCM. She is feeling OK since recent hospital d/c. She will continue to dialyze on Tues, Thurs and Sat and has resumed dialysis since hospital d/c. Her appetite is OK and she denies any issues with nutrition. She reports difficulty with medical care costs and CM messaged her the Patient Financial Services phone number. CM explained about need to have income information with her when she calls and that the application has to be resubmitted every 30 days. She verbalized understanding. She reports being unable to complete a Medication Reconciliation, stating her daughter assists her with med administration and with med costs. CM explained about Patient Pharmacy Assistance Program, but she declined referral, stating she is comfortable with how things are at this time. She is unable to find her amlodipine. CM advised D/C Summary from recent hospitalization states she is to d/c amlodipine, so her daughter possibly took it out of her meds so she would not take it. She verbalized understanding. CM will follow up in one week for completion of Nursing Assessment for OPCM. Ina Lovell RN

## 2017-07-28 NOTE — PATIENT INSTRUCTIONS
When You Have Gastrointestinal (GI) Bleeding    Blood in your vomit or stool can be a sign of gastrointestinal (GI) bleeding. GI bleeding can be scary. But the cause may not be serious. You should always see a doctor if GI bleeding occurs.  The GI tract  The GI tract is the path through which food travels in the body. Food passes from the mouth down the esophagus (the tube from the mouth to the stomach). Food begins to break down in the stomach. It then moves through the duodenum, the first part of the small intestine. Nutrients are absorbed as food travels through the small intestine. What is left passes into the colon (large intestine) as waste. The colon removes water from the waste. Waste continues from the colon to the rectum (where stool is stored). Waste then leaves the body through the anus.  Causes of GI bleeding  GI bleeding can be caused by many different problems. Some of the more common causes include:  · Swollen veins in the anus (hemorrhoids)  · Swollen veins in the esophagus (varices)  · Sore on the lining of the GI tract (ulcer)  · Cuts or scrapes in the mouth or throat  · Infection caused by germs such as bacteria or parasites  · Food allergies, such as milk allergy in young children  · Medicines  · Inflammation of the GI tract (gastritis or esophagitis)  · Colitis (Crohn's disease or ulcerative colitis  · Cancer (tumors or polyps)  · Abnormal pouches in the colon (diverticula)  · Tears in the esophagus or anus  · Nosebleed  · Abnormal blood vessels in the GI tract (angiodysplasia)  Diagnosing the cause of blood in stool  If blood is coming out in your stool, you may have a lower GI tract problem or a very fast upper GI tract bleed. Bleeding from the GI tract can be bright red. Or it may look dark and tarry. Tests may also find blood in your stool that cant be seen with the eye (occult blood). To find out the cause, tests that may be ordered include:  · Blood tests. A blood sample is taken and  sent to a lab for exam.  · Hemoccult test. Checks a stool sample for blood.  · Stool culture. Checks a stool sample for bacteria or parasites.  · X-ray, ultrasound, or CT scan. Imaging tests that take pictures of the digestive tract.  · Colonoscopy or sigmoidoscopy. This test uses a flexible tube with a tiny camera. The tube is inserted through your anus into your rectum to see the inside of your colon. Your provider can also take a tiny tissue sample (biopsy) and treat a bleeding source  Diagnosing the cause of blood in vomit  If you are vomiting blood or something that looks like coffee grounds, you may have an upper GI tract problem. To find the cause, tests that may be done include:  · Upper Endoscopy. A flexible tube with a tiny camera is inserted through your mouth and throat to see inside your upper GI tract. This lets your provider take a tiny tissue sample (biopsy) and treat a bleeding source.  · Nasogastric lavage. This can tell if you have upper GI or lower GI bleeding.  · X-ray, ultrasound, or CT scan. Imaging tests that take pictures of your digestive tract.  · Upper GI series. X-rays of the upper part of your GI tract taken from inside your body.  · Enteroscopy. This sends a flexible tube or a small, swallowed capsule camera into your small intestine.  When to call your healthcare provider  Call your healthcare provider right away if you have any of the following:  · Bleeding from your mouth or anus that can't be stopped  · Fever of 100.4°F (38.0°) or higher  · Bleeding along with feeling lightheaded or dizzy  · Signs of fluid loss (dehydration). These include a dry, sticky mouth, decreased urine output; and very dark urine.  · Belly (abdominal) pain   Date Last Reviewed: 7/1/2016  © 5246-1926 "[employer]. [address] [place] [address], [place] [address]. All rights reserved. This information is not intended as a substitute for professional medical care. Always follow your healthcare  professional's instructions.

## 2017-07-29 NOTE — PT/OT/SLP DISCHARGE
Physical Therapy Discharge Summary    Randa Devine  MRN: [id number]   GI bleed   Patient Discharged from acute Physical Therapy on 17.  Please refer to prior PT noted date on 17 for functional status.     Assessment:   Patient appropriate for care in another setting.  GOALS:    Physical Therapy Goals     Not on file          Multidisciplinary Problems (Resolved)        Problem: Physical Therapy Goal    Goal Priority Disciplines Outcome Goal Variances Interventions   Physical Therapy Goal   (Resolved)     PT/OT, PT Outcome(s) achieved     Description:  Goals to be met by: 17     Patient will increase functional independence with mobility by performin. Supine to sit with Supervision   2. Sit to stand transfer with Supervision  3. Gait  x 200 feet with Supervision using Rolling Walker.   4. Lower extremity exercise program x15 reps, with assistance as needed, in order to increase LE strength and ROM                    Reasons for Discontinuation of Therapy Services  Transfer to alternate level of care.      Plan:  Patient Discharged to: Home with Home Health Service.    Tasneem Gabriel, PT, DPT   2017  [phone]

## 2017-08-01 ENCOUNTER — HOSPITAL ENCOUNTER (INPATIENT)
Facility: HOSPITAL | Age: 62
LOS: 6 days | Discharge: HOME HEALTH | DRG: 813 | End: 2017-08-07
Attending: EMERGENCY MEDICINE | Admitting: INTERNAL MEDICINE
Payer: MEDICARE

## 2017-08-01 ENCOUNTER — TELEPHONE (OUTPATIENT)
Dept: TRANSPLANT | Facility: CLINIC | Age: 62
End: 2017-08-01

## 2017-08-01 DIAGNOSIS — Z95.811 LVAD (LEFT VENTRICULAR ASSIST DEVICE) PRESENT: ICD-10-CM

## 2017-08-01 DIAGNOSIS — Z99.2 END STAGE RENAL DISEASE ON DIALYSIS: ICD-10-CM

## 2017-08-01 DIAGNOSIS — K92.2 GI BLEED: ICD-10-CM

## 2017-08-01 DIAGNOSIS — T82.9XXA LEFT VENTRICULAR ASSIST DEVICE (LVAD) COMPLICATION, INITIAL ENCOUNTER: Primary | ICD-10-CM

## 2017-08-01 DIAGNOSIS — Z99.2 HEMODIALYSIS STATUS: ICD-10-CM

## 2017-08-01 DIAGNOSIS — Z79.01 LONG TERM CURRENT USE OF ANTICOAGULANT THERAPY: ICD-10-CM

## 2017-08-01 DIAGNOSIS — Z95.811 LEFT VENTRICULAR ASSIST DEVICE PRESENT: ICD-10-CM

## 2017-08-01 DIAGNOSIS — I50.9 CHF (CONGESTIVE HEART FAILURE): ICD-10-CM

## 2017-08-01 DIAGNOSIS — N18.6 END STAGE RENAL DISEASE ON DIALYSIS: ICD-10-CM

## 2017-08-01 DIAGNOSIS — Z79.01 CHRONIC ANTICOAGULATION: ICD-10-CM

## 2017-08-01 DIAGNOSIS — R06.02 SHORTNESS OF BREATH: ICD-10-CM

## 2017-08-01 DIAGNOSIS — D62 ANEMIA ASSOCIATED WITH ACUTE BLOOD LOSS: ICD-10-CM

## 2017-08-01 DIAGNOSIS — K92.2 GASTROINTESTINAL HEMORRHAGE, UNSPECIFIED GASTROINTESTINAL HEMORRHAGE TYPE: ICD-10-CM

## 2017-08-01 LAB
ABO + RH BLD: NORMAL
ALBUMIN SERPL BCP-MCNC: 2.6 G/DL
ALP SERPL-CCNC: 98 U/L
ALT SERPL W/O P-5'-P-CCNC: 13 U/L
ANION GAP SERPL CALC-SCNC: 9 MMOL/L
ANISOCYTOSIS BLD QL SMEAR: SLIGHT
AST SERPL-CCNC: 27 U/L
BASOPHILS # BLD AUTO: 0.01 K/UL
BASOPHILS NFR BLD: 0.3 %
BILIRUB SERPL-MCNC: 0.4 MG/DL
BLD GP AB SCN CELLS X3 SERPL QL: NORMAL
BUN SERPL-MCNC: 27 MG/DL
CALCIUM SERPL-MCNC: 8.1 MG/DL
CHLORIDE SERPL-SCNC: 100 MMOL/L
CO2 SERPL-SCNC: 28 MMOL/L
CREAT SERPL-MCNC: 5.4 MG/DL
DIFFERENTIAL METHOD: ABNORMAL
EOSINOPHIL # BLD AUTO: 0.1 K/UL
EOSINOPHIL NFR BLD: 1.9 %
ERYTHROCYTE [DISTWIDTH] IN BLOOD BY AUTOMATED COUNT: 17.4 %
EST. GFR  (AFRICAN AMERICAN): 9.3 ML/MIN/1.73 M^2
EST. GFR  (NON AFRICAN AMERICAN): 8 ML/MIN/1.73 M^2
GLUCOSE SERPL-MCNC: 147 MG/DL
HCT VFR BLD AUTO: 15.3 %
HGB BLD-MCNC: 4.8 G/DL
HYPOCHROMIA BLD QL SMEAR: ABNORMAL
INR PPP: 1.3
LACTATE SERPL-SCNC: 0.8 MMOL/L
LYMPHOCYTES # BLD AUTO: 0.5 K/UL
LYMPHOCYTES NFR BLD: 16.8 %
MAGNESIUM SERPL-MCNC: 1.7 MG/DL
MCH RBC QN AUTO: 31 PG
MCHC RBC AUTO-ENTMCNC: 31.4 G/DL
MCV RBC AUTO: 99 FL
MONOCYTES # BLD AUTO: 0.2 K/UL
MONOCYTES NFR BLD: 7.4 %
NEUTROPHILS # BLD AUTO: 2.3 K/UL
NEUTROPHILS NFR BLD: 73.6 %
OVALOCYTES BLD QL SMEAR: ABNORMAL
PHOSPHATE SERPL-MCNC: 1.7 MG/DL
PLATELET # BLD AUTO: 94 K/UL
PLATELET BLD QL SMEAR: ABNORMAL
PMV BLD AUTO: 10 FL
POIKILOCYTOSIS BLD QL SMEAR: SLIGHT
POLYCHROMASIA BLD QL SMEAR: ABNORMAL
POTASSIUM SERPL-SCNC: 4.4 MMOL/L
PROT SERPL-MCNC: 5.2 G/DL
PROTHROMBIN TIME: 13.3 SEC
RBC # BLD AUTO: 1.55 M/UL
SODIUM SERPL-SCNC: 137 MMOL/L
TROPONIN I SERPL DL<=0.01 NG/ML-MCNC: 0.1 NG/ML
TSH SERPL DL<=0.005 MIU/L-ACNC: 3.94 UIU/ML
WBC # BLD AUTO: 3.1 K/UL

## 2017-08-01 PROCEDURE — 25000003 PHARM REV CODE 250: Performed by: EMERGENCY MEDICINE

## 2017-08-01 PROCEDURE — 83605 ASSAY OF LACTIC ACID: CPT

## 2017-08-01 PROCEDURE — 25000003 PHARM REV CODE 250: Performed by: INTERNAL MEDICINE

## 2017-08-01 PROCEDURE — 85025 COMPLETE CBC W/AUTO DIFF WBC: CPT

## 2017-08-01 PROCEDURE — 87040 BLOOD CULTURE FOR BACTERIA: CPT

## 2017-08-01 PROCEDURE — 84484 ASSAY OF TROPONIN QUANT: CPT

## 2017-08-01 PROCEDURE — 63600175 PHARM REV CODE 636 W HCPCS: Performed by: PHYSICIAN ASSISTANT

## 2017-08-01 PROCEDURE — P9038 RBC IRRADIATED: HCPCS

## 2017-08-01 PROCEDURE — 25000003 PHARM REV CODE 250: Performed by: PHYSICIAN ASSISTANT

## 2017-08-01 PROCEDURE — 27201040 HC RC 50 FILTER

## 2017-08-01 PROCEDURE — 99285 EMERGENCY DEPT VISIT HI MDM: CPT | Mod: 25

## 2017-08-01 PROCEDURE — C9113 INJ PANTOPRAZOLE SODIUM, VIA: HCPCS | Performed by: PHYSICIAN ASSISTANT

## 2017-08-01 PROCEDURE — 93005 ELECTROCARDIOGRAM TRACING: CPT

## 2017-08-01 PROCEDURE — 96374 THER/PROPH/DIAG INJ IV PUSH: CPT

## 2017-08-01 PROCEDURE — 85610 PROTHROMBIN TIME: CPT

## 2017-08-01 PROCEDURE — A4216 STERILE WATER/SALINE, 10 ML: HCPCS | Performed by: STUDENT IN AN ORGANIZED HEALTH CARE EDUCATION/TRAINING PROGRAM

## 2017-08-01 PROCEDURE — 27000248 HC VAD-ADDITIONAL DAY

## 2017-08-01 PROCEDURE — 83735 ASSAY OF MAGNESIUM: CPT

## 2017-08-01 PROCEDURE — 63600175 PHARM REV CODE 636 W HCPCS: Performed by: EMERGENCY MEDICINE

## 2017-08-01 PROCEDURE — 20600001 HC STEP DOWN PRIVATE ROOM

## 2017-08-01 PROCEDURE — 25000003 PHARM REV CODE 250: Performed by: STUDENT IN AN ORGANIZED HEALTH CARE EDUCATION/TRAINING PROGRAM

## 2017-08-01 PROCEDURE — 86850 RBC ANTIBODY SCREEN: CPT

## 2017-08-01 PROCEDURE — 94761 N-INVAS EAR/PLS OXIMETRY MLT: CPT

## 2017-08-01 PROCEDURE — 84443 ASSAY THYROID STIM HORMONE: CPT

## 2017-08-01 PROCEDURE — 93010 ELECTROCARDIOGRAM REPORT: CPT | Mod: 76,,, | Performed by: INTERNAL MEDICINE

## 2017-08-01 PROCEDURE — 93010 ELECTROCARDIOGRAM REPORT: CPT | Mod: ,,, | Performed by: INTERNAL MEDICINE

## 2017-08-01 PROCEDURE — 80053 COMPREHEN METABOLIC PANEL: CPT

## 2017-08-01 PROCEDURE — 99285 EMERGENCY DEPT VISIT HI MDM: CPT | Mod: ,,, | Performed by: EMERGENCY MEDICINE

## 2017-08-01 PROCEDURE — 86900 BLOOD TYPING SEROLOGIC ABO: CPT

## 2017-08-01 PROCEDURE — 86922 COMPATIBILITY TEST ANTIGLOB: CPT

## 2017-08-01 PROCEDURE — 84100 ASSAY OF PHOSPHORUS: CPT

## 2017-08-01 RX ORDER — PANTOPRAZOLE SODIUM 40 MG/10ML
40 INJECTION, POWDER, LYOPHILIZED, FOR SOLUTION INTRAVENOUS 2 TIMES DAILY
Status: DISCONTINUED | OUTPATIENT
Start: 2017-08-01 | End: 2017-08-04

## 2017-08-01 RX ORDER — GABAPENTIN 300 MG/1
300 CAPSULE ORAL 3 TIMES DAILY
Status: DISCONTINUED | OUTPATIENT
Start: 2017-08-01 | End: 2017-08-08 | Stop reason: HOSPADM

## 2017-08-01 RX ORDER — ONDANSETRON 2 MG/ML
4 INJECTION INTRAMUSCULAR; INTRAVENOUS
Status: COMPLETED | OUTPATIENT
Start: 2017-08-01 | End: 2017-08-01

## 2017-08-01 RX ORDER — ACETAMINOPHEN 325 MG/1
650 TABLET ORAL ONCE
Status: COMPLETED | OUTPATIENT
Start: 2017-08-01 | End: 2017-08-01

## 2017-08-01 RX ORDER — HYDROCODONE BITARTRATE AND ACETAMINOPHEN 500; 5 MG/1; MG/1
TABLET ORAL
Status: DISCONTINUED | OUTPATIENT
Start: 2017-08-01 | End: 2017-08-08 | Stop reason: HOSPADM

## 2017-08-01 RX ORDER — PANTOPRAZOLE SODIUM 40 MG/1
40 TABLET, DELAYED RELEASE ORAL DAILY
Status: DISCONTINUED | OUTPATIENT
Start: 2017-08-02 | End: 2017-08-01

## 2017-08-01 RX ORDER — SODIUM CHLORIDE 0.9 % (FLUSH) 0.9 %
3 SYRINGE (ML) INJECTION EVERY 8 HOURS
Status: DISCONTINUED | OUTPATIENT
Start: 2017-08-01 | End: 2017-08-08 | Stop reason: HOSPADM

## 2017-08-01 RX ORDER — ACETAMINOPHEN 650 MG/20.3ML
650 LIQUID ORAL
Status: COMPLETED | OUTPATIENT
Start: 2017-08-01 | End: 2017-08-01

## 2017-08-01 RX ORDER — GABAPENTIN 300 MG/1
300 CAPSULE ORAL NIGHTLY
Status: DISCONTINUED | OUTPATIENT
Start: 2017-08-01 | End: 2017-08-01

## 2017-08-01 RX ORDER — BUTALBITAL, ACETAMINOPHEN AND CAFFEINE 50; 325; 40 MG/1; MG/1; MG/1
1 TABLET ORAL
Status: DISCONTINUED | OUTPATIENT
Start: 2017-08-01 | End: 2017-08-01

## 2017-08-01 RX ORDER — ESCITALOPRAM OXALATE 10 MG/1
20 TABLET ORAL NIGHTLY
Status: DISCONTINUED | OUTPATIENT
Start: 2017-08-01 | End: 2017-08-08 | Stop reason: HOSPADM

## 2017-08-01 RX ORDER — ATORVASTATIN CALCIUM 20 MG/1
40 TABLET, FILM COATED ORAL DAILY
Status: DISCONTINUED | OUTPATIENT
Start: 2017-08-02 | End: 2017-08-08 | Stop reason: HOSPADM

## 2017-08-01 RX ADMIN — PANTOPRAZOLE SODIUM 40 MG: 40 INJECTION, POWDER, FOR SOLUTION INTRAVENOUS at 10:08

## 2017-08-01 RX ADMIN — ONDANSETRON 4 MG: 2 INJECTION INTRAMUSCULAR; INTRAVENOUS at 04:08

## 2017-08-01 RX ADMIN — ACETAMINOPHEN 650 MG: 160 SOLUTION ORAL at 05:08

## 2017-08-01 RX ADMIN — Medication 3 ML: at 10:08

## 2017-08-01 RX ADMIN — GABAPENTIN 300 MG: 300 CAPSULE ORAL at 10:08

## 2017-08-01 RX ADMIN — ACETAMINOPHEN 650 MG: 325 TABLET ORAL at 11:08

## 2017-08-01 RX ADMIN — ESCITALOPRAM 20 MG: 5 TABLET, FILM COATED ORAL at 10:08

## 2017-08-01 NOTE — ED PROVIDER NOTES
Encounter Date: 2017    SCRIBE #1 NOTE: I, Meghna Pearce, am scribing for, and in the presence of, Dr. Arugello.       History     Chief Complaint   Patient presents with    Fatigue     dialysis today, muscle cramping, weakness sob, lvad pt     Time seen by provider: 3:48 PM    This is a 61 y.o. female with a history of CHF, MI, CAD, atrial tachycardia, cardiomyopathy, ICD, pulmonary hypertension, and end stage renal disease who presents with complaint of SOB for 7 days which worsened last night.  The patient indicates that she was supposed to receive dialysis today but due to hypotension she was not able to be dialyzed.  The patient denies associated fevers or productive cough but indicates maroon bloody stools, fatigue, nausea currently, and muscle cramping.  She indicates her last dialysis treatment was 5 days ago. The patient reports that she uses home O2 and history of smoking tobacco.      The history is provided by the patient.     Review of patient's allergies indicates:   Allergen Reactions    Codeine Nausea And Vomiting    Demerol [meperidine] Nausea And Vomiting    Lortab [hydrocodone-acetaminophen] Nausea And Vomiting     Past Medical History:   Diagnosis Date    Anticoagulant long-term use     Anxiety     Atrial tachycardia, paroxysmal 2013    Blood transfusion     Cardiomyopathy     CHF (congestive heart failure)     Chronic kidney disease     Coronary artery disease     End stage renal disease     Hypertension     pulmonary    ICD (implantable cardioverter-defibrillator) battery depletion 2014    Myocardial infarction     Presence of left ventricular assist device (LVAD)     Pulmonary hypertension     Stroke      Past Surgical History:   Procedure Laterality Date    CARDIAC DEFIBRILLATOR PLACEMENT      CARDIAC SURGERY       SECTION      COLONOSCOPY N/A 2016    Procedure: COLONOSCOPY;  Surgeon: Mark Currie MD;  Location: [facility] ([address] [place] [address]);  Service:  Endoscopy;  Laterality: N/A;    COLONOSCOPY N/A 4/3/2017    Procedure: COLONOSCOPY;  Surgeon: Todd Longo MD;  [facility]  Service: Endoscopy;  Laterality: N/A;    COLONOSCOPY N/A 7/22/2017    Procedure: COLONOSCOPY;  Surgeon: Adryan Garcia MD;  [facility]  Service: Endoscopy;  Laterality: N/A;    CORONARY ARTERY BYPASS GRAFT      FRACTURE SURGERY      HIP SURGERY  12/12    RTHA    LEFT VENTRICULAR ASSIST DEVICE  5/2012    TONSILLECTOMY      VASCULAR SURGERY       Family History   Problem Relation Age of Onset    Arthritis Mother     Heart disease Father     Hypertension Father     Cancer Maternal Grandmother     Breast cancer Neg Hx     Colon cancer Neg Hx     Ovarian cancer Neg Hx      Social History   Substance Use Topics    Smoking status: Former Smoker     Packs/day: 2.00     Years: 30.00     Quit date: 10/29/2009    Smokeless tobacco: Never Used    Alcohol use Yes      Comment: 1 glass of wine a month     Review of Systems   Constitutional: Positive for fatigue. Negative for fever.   HENT: Negative for sore throat.    Eyes: Negative for visual disturbance.   Respiratory: Positive for shortness of breath. Negative for cough (Denies productive cough).    Gastrointestinal: Positive for anal bleeding, blood in stool (Maroon color) and nausea. Negative for rectal pain and vomiting.   Genitourinary: Negative for dysuria.   Musculoskeletal: Negative for joint swelling and neck stiffness.        Muscle cramping.   Skin: Negative for pallor.   Neurological: Negative for headaches.   Hematological: Bruises/bleeds easily.   Psychiatric/Behavioral: Negative for confusion and decreased concentration.       Physical Exam     Initial Vitals [08/01/17 1440]   BP Pulse Resp Temp SpO2   -- 86 18 98.9 °F (37.2 °C) (!) 94 %      MAP       --         Physical Exam    Vitals reviewed.  Constitutional:   Chronically ill-appearing 61-year-old woman in mild discomfort, pale    HENT:   Head: Normocephalic and atraumatic.   Mildly anhydrous mucous membranes noted   Eyes: EOM are normal. Pupils are equal, round, and reactive to light.   Neck: No tracheal deviation present.   Cardiovascular:   Mechanical heart sounds noted throughout   Pulmonary/Chest: No stridor. No respiratory distress. She has no wheezes.   Abdominal:   Obese, soft, no evidence of erythema to LVAD insertion site at right lower abdomen   Musculoskeletal: Normal range of motion. She exhibits no edema.   Neurological: She is alert and oriented to person, place, and time.   Skin: Skin is warm and dry.   Psychiatric: She has a normal mood and affect. Thought content normal.         ED Course   Procedures  Labs Reviewed   CBC W/ AUTO DIFFERENTIAL - Abnormal; Notable for the following:        Result Value    WBC 3.10 (*)     RBC 1.55 (*)     Hemoglobin 4.8 (*)     Hematocrit 15.3 (*)     MCV 99 (*)     MCHC 31.4 (*)     RDW 17.4 (*)     Platelets 94 (*)     Lymph # 0.5 (*)     Mono # 0.2 (*)     Gran% 73.6 (*)     Lymph% 16.8 (*)     Platelet Estimate Decreased (*)     All other components within normal limits    Narrative:      HGB/HCT critical result(s) called and verbal readback obtained from   LORIE HOGAN RN, 08/01/2017 16:12   COMPREHENSIVE METABOLIC PANEL - Abnormal; Notable for the following:     Glucose 147 (*)     BUN, Bld 27 (*)     Creatinine 5.4 (*)     Calcium 8.1 (*)     Total Protein 5.2 (*)     Albumin 2.6 (*)     eGFR if  9.3 (*)     eGFR if non  8.0 (*)     All other components within normal limits   PROTIME-INR - Abnormal; Notable for the following:     Prothrombin Time 13.3 (*)     INR 1.3 (*)     All other components within normal limits   TROPONIN I - Abnormal; Notable for the following:     Troponin I 0.101 (*)     All other components within normal limits   PHOSPHORUS - Abnormal; Notable for the following:     Phosphorus 1.7 (*)     All other components within normal  limits   CULTURE, BLOOD   CULTURE, BLOOD   LACTIC ACID, PLASMA   TSH   MAGNESIUM   TYPE & SCREEN   PREPARE RBC SOFT     EKG Readings: (Independently Interpreted)   Paced rhythm. Right axis deviation at a rate of 74 BPM.       X-Rays:   Independently Interpreted Readings:   Chest X-Ray: Post sternotomy wiring noted and intact.  Pacer box and wiring intact. LVAD wiring intact without evidence of acute injury.     Medical Decision Making:   History:   Old Medical Records: I decided to obtain old medical records.  Differential Diagnosis:   GI bleed, severe anemia, heart failure, pulmonary edema, hemodialysis status, COPD exacerbation  Independently Interpreted Test(s):   I have ordered and independently interpreted X-rays - see prior notes.  I have ordered and independently interpreted EKG Reading(s) - see prior notes  Clinical Tests:   Lab Tests: Ordered and Reviewed  Radiological Study: Ordered and Reviewed  Medical Tests: Ordered and Reviewed  Other:   I have discussed this case with another health care provider.            Scribe Attestation:   Scribe #1: I performed the above scribed service and the documentation accurately describes the services I performed. I attest to the accuracy of the note.    Attending Attestation:           Physician Attestation for Scribe:  Physician Attestation Statement for Scribe #1: I, Dr. Arguello, reviewed documentation, as scribed by Meghna Pearce in my presence, and it is both accurate and complete.         Attending ED Notes:   Emergency department evaluation today reveals evidence of severe anemia with hemoglobin of 4.8.  The patient has been urgently evaluated by the heart transplant service.  The patient has been consented for blood products.  She'll be admitted to the heart transplant service in fair condition to undergo transfusion and hemodialysis in fair condition.          ED Course     Clinical Impression:   The primary encounter diagnosis was LVAD (left ventricular assist  device) present. Diagnoses of Shortness of breath, Gastrointestinal hemorrhage, unspecified gastrointestinal hemorrhage type, Anemia associated with acute blood loss, and Hemodialysis status were also pertinent to this visit.    Disposition:   Disposition: Admitted  Condition: Fair  [facility]                        Noman Arguello MD  08/03/17 3074

## 2017-08-01 NOTE — TELEPHONE ENCOUNTER
----- Message from Flip Orta sent at 8/1/2017  9:27 AM CDT -----  Contact: pt daughter/Constance  Please call pt daughter at [phone]. She has some medical concerns. Patient is having SOB, weakness and nausea    Thank you

## 2017-08-01 NOTE — PROGRESS NOTES
Per allergia from home health stated nurse contacted patient and patient stated she was having shortness of breath and was waiting on a family member to bring her to the hospital. Nurse than went out to patient house to check status no answer at door..

## 2017-08-01 NOTE — ED TRIAGE NOTES
"Pt reports weakness Xseveral weeks but just recently increased while at dialysis-did not finish dialysis. Pt states her BP continued dropping at dialysis "50/49 until they couldn't find it anymore" Pt was instructed to come to the ED. Pt reports fatigue and continued bleeding in stool. Was due for scope on Thursday. Pt was admitted into hospital and left AMA 1 week ago. Pt states headache since this morning. Pt reports right leg pain and left arm pain. Pt reports nausea with no vomitting. Pt states shes been having heart palpitations and SOB.   "

## 2017-08-01 NOTE — ED NOTES
LOC: The patient is awake, alert and aware of environment with an appropriate affect, the patient is oriented x 4 and speaking appropriately.  APPEARANCE: Patient resting comfortably and in no acute distress, patient is clean and well groomed, patient's clothing is properly fastened.  SKIN: The skin is warm and dry, patient has normal skin turgor and moist mucus membranes, skin intact, no breakdown, bruising noted to RUE.  MUSKULOSKELETAL: Patient moving all extremities well, no obvious swelling or deformities noted.  RESPIRATORY: Airway is open and patent, respirations are spontaneous, patient has a normal effort and rate on 3.5L O2 via NC. Breath sounds are clear and equal bilaterally.  CARDIAC: LVAD auscultated. No peripheral edema. Capillary refill 2seconds.   ABDOMEN: Soft, generalized tenderness to palpation, no distention noted. Bowel sounds present. Dressing intact over heartwire for LVAD dated 7/30.   NEURO: No neuro deficits, hand grasp equal, no drift noted, no facial droop noted. Speech is clear.

## 2017-08-01 NOTE — PROGRESS NOTES
Per [facility] called and stated they where unable to contact patient on yesterday 7/31 will go out today 8/1.  New order faxed.

## 2017-08-01 NOTE — TELEPHONE ENCOUNTER
Pt called not feeling well.  Ptc/o weakness, headache with no fever, sob with 2 lb weight gain in 2 days.  Pt was unable to have dialysis as usual as her bp was low.  Pt is having dark and bloody stools.  Pt instructed to go to the ed.  She will come here to [facility] ed.  Dr Ackerman notified.  The charge nurse in the ed notified.  He will inform the physicians.

## 2017-08-01 NOTE — PROGRESS NOTES
Per Yuong from home health stated patient nurse is in a staff meeting will give me a call back regarding 7/31 INR . 8/1

## 2017-08-02 LAB
ALBUMIN SERPL BCP-MCNC: 2.8 G/DL
ALP SERPL-CCNC: 101 U/L
ALT SERPL W/O P-5'-P-CCNC: 16 U/L
ANION GAP SERPL CALC-SCNC: 11 MMOL/L
APTT BLDCRRT: 23.9 SEC
AST SERPL-CCNC: 36 U/L
BASOPHILS # BLD AUTO: 0.02 K/UL
BASOPHILS # BLD AUTO: 0.02 K/UL
BASOPHILS NFR BLD: 0.5 %
BASOPHILS NFR BLD: 0.5 %
BILIRUB DIRECT SERPL-MCNC: 0.3 MG/DL
BILIRUB SERPL-MCNC: 0.8 MG/DL
BLD PROD TYP BPU: NORMAL
BLD PROD TYP BPU: NORMAL
BLOOD UNIT EXPIRATION DATE: NORMAL
BLOOD UNIT EXPIRATION DATE: NORMAL
BLOOD UNIT TYPE CODE: 5100
BLOOD UNIT TYPE CODE: 5100
BLOOD UNIT TYPE: NORMAL
BLOOD UNIT TYPE: NORMAL
BNP SERPL-MCNC: 1139 PG/ML
BUN SERPL-MCNC: 33 MG/DL
CALCIUM SERPL-MCNC: 8.1 MG/DL
CHLORIDE SERPL-SCNC: 101 MMOL/L
CO2 SERPL-SCNC: 27 MMOL/L
CODING SYSTEM: NORMAL
CODING SYSTEM: NORMAL
CREAT SERPL-MCNC: 6.1 MG/DL
CRP SERPL-MCNC: 1.5 MG/L
DIFFERENTIAL METHOD: ABNORMAL
DIFFERENTIAL METHOD: ABNORMAL
DISPENSE STATUS: NORMAL
DISPENSE STATUS: NORMAL
EOSINOPHIL # BLD AUTO: 0.1 K/UL
EOSINOPHIL # BLD AUTO: 0.1 K/UL
EOSINOPHIL NFR BLD: 2.7 %
EOSINOPHIL NFR BLD: 2.7 %
ERYTHROCYTE [DISTWIDTH] IN BLOOD BY AUTOMATED COUNT: 16.9 %
ERYTHROCYTE [DISTWIDTH] IN BLOOD BY AUTOMATED COUNT: 16.9 %
EST. GFR  (AFRICAN AMERICAN): 8 ML/MIN/1.73 M^2
EST. GFR  (NON AFRICAN AMERICAN): 6.9 ML/MIN/1.73 M^2
GLUCOSE SERPL-MCNC: 79 MG/DL
HCT VFR BLD AUTO: 25 %
HCT VFR BLD AUTO: 25 %
HGB BLD-MCNC: 8.1 G/DL
HGB BLD-MCNC: 8.1 G/DL
INR PPP: 1.2
LDH SERPL L TO P-CCNC: 263 U/L
LYMPHOCYTES # BLD AUTO: 0.8 K/UL
LYMPHOCYTES # BLD AUTO: 0.8 K/UL
LYMPHOCYTES NFR BLD: 18.9 %
LYMPHOCYTES NFR BLD: 18.9 %
MAGNESIUM SERPL-MCNC: 1.9 MG/DL
MCH RBC QN AUTO: 31.6 PG
MCH RBC QN AUTO: 31.6 PG
MCHC RBC AUTO-ENTMCNC: 32.4 G/DL
MCHC RBC AUTO-ENTMCNC: 32.4 G/DL
MCV RBC AUTO: 98 FL
MCV RBC AUTO: 98 FL
MONOCYTES # BLD AUTO: 0.5 K/UL
MONOCYTES # BLD AUTO: 0.5 K/UL
MONOCYTES NFR BLD: 12.5 %
MONOCYTES NFR BLD: 12.5 %
NEUTROPHILS # BLD AUTO: 2.7 K/UL
NEUTROPHILS # BLD AUTO: 2.7 K/UL
NEUTROPHILS NFR BLD: 64.9 %
NEUTROPHILS NFR BLD: 64.9 %
NUM UNITS TRANS PACKED RBC: NORMAL
NUM UNITS TRANS PACKED RBC: NORMAL
PHOSPHATE SERPL-MCNC: 3.1 MG/DL
PLATELET # BLD AUTO: 96 K/UL
PLATELET # BLD AUTO: 96 K/UL
PMV BLD AUTO: 9.7 FL
PMV BLD AUTO: 9.7 FL
POTASSIUM SERPL-SCNC: 4.8 MMOL/L
PREALB SERPL-MCNC: 22 MG/DL
PROT SERPL-MCNC: 5.3 G/DL
PROTHROMBIN TIME: 12.1 SEC
RBC # BLD AUTO: 2.56 M/UL
RBC # BLD AUTO: 2.56 M/UL
SODIUM SERPL-SCNC: 139 MMOL/L
WBC # BLD AUTO: 4.08 K/UL
WBC # BLD AUTO: 4.08 K/UL

## 2017-08-02 PROCEDURE — 97802 MEDICAL NUTRITION INDIV IN: CPT

## 2017-08-02 PROCEDURE — 83615 LACTATE (LD) (LDH) ENZYME: CPT

## 2017-08-02 PROCEDURE — 25000003 PHARM REV CODE 250: Performed by: EMERGENCY MEDICINE

## 2017-08-02 PROCEDURE — 84134 ASSAY OF PREALBUMIN: CPT

## 2017-08-02 PROCEDURE — 93750 INTERROGATION VAD IN PERSON: CPT | Mod: ,,, | Performed by: THORACIC SURGERY (CARDIOTHORACIC VASCULAR SURGERY)

## 2017-08-02 PROCEDURE — G8989 SELF CARE D/C STATUS: HCPCS | Mod: CH

## 2017-08-02 PROCEDURE — 85025 COMPLETE CBC W/AUTO DIFF WBC: CPT

## 2017-08-02 PROCEDURE — A4216 STERILE WATER/SALINE, 10 ML: HCPCS | Performed by: STUDENT IN AN ORGANIZED HEALTH CARE EDUCATION/TRAINING PROGRAM

## 2017-08-02 PROCEDURE — 93750 INTERROGATION VAD IN PERSON: CPT | Performed by: PHYSICIAN ASSISTANT

## 2017-08-02 PROCEDURE — 99223 1ST HOSP IP/OBS HIGH 75: CPT | Mod: ,,, | Performed by: INTERNAL MEDICINE

## 2017-08-02 PROCEDURE — 84100 ASSAY OF PHOSPHORUS: CPT

## 2017-08-02 PROCEDURE — 85610 PROTHROMBIN TIME: CPT

## 2017-08-02 PROCEDURE — 85730 THROMBOPLASTIN TIME PARTIAL: CPT

## 2017-08-02 PROCEDURE — G8988 SELF CARE GOAL STATUS: HCPCS | Mod: CH

## 2017-08-02 PROCEDURE — P9038 RBC IRRADIATED: HCPCS

## 2017-08-02 PROCEDURE — 20600001 HC STEP DOWN PRIVATE ROOM

## 2017-08-02 PROCEDURE — 83880 ASSAY OF NATRIURETIC PEPTIDE: CPT

## 2017-08-02 PROCEDURE — G8987 SELF CARE CURRENT STATUS: HCPCS | Mod: CH

## 2017-08-02 PROCEDURE — 36415 COLL VENOUS BLD VENIPUNCTURE: CPT

## 2017-08-02 PROCEDURE — 80048 BASIC METABOLIC PNL TOTAL CA: CPT

## 2017-08-02 PROCEDURE — 83735 ASSAY OF MAGNESIUM: CPT

## 2017-08-02 PROCEDURE — 86140 C-REACTIVE PROTEIN: CPT

## 2017-08-02 PROCEDURE — 25000003 PHARM REV CODE 250: Performed by: PHYSICIAN ASSISTANT

## 2017-08-02 PROCEDURE — 80076 HEPATIC FUNCTION PANEL: CPT

## 2017-08-02 PROCEDURE — 25000003 PHARM REV CODE 250: Performed by: INTERNAL MEDICINE

## 2017-08-02 PROCEDURE — 27000248 HC VAD-ADDITIONAL DAY

## 2017-08-02 PROCEDURE — 99233 SBSQ HOSP IP/OBS HIGH 50: CPT | Mod: ,,, | Performed by: INTERNAL MEDICINE

## 2017-08-02 PROCEDURE — 63600175 PHARM REV CODE 636 W HCPCS: Performed by: PHYSICIAN ASSISTANT

## 2017-08-02 PROCEDURE — 99233 SBSQ HOSP IP/OBS HIGH 50: CPT | Mod: 24,,, | Performed by: THORACIC SURGERY (CARDIOTHORACIC VASCULAR SURGERY)

## 2017-08-02 PROCEDURE — 25000003 PHARM REV CODE 250: Performed by: STUDENT IN AN ORGANIZED HEALTH CARE EDUCATION/TRAINING PROGRAM

## 2017-08-02 PROCEDURE — 99223 1ST HOSP IP/OBS HIGH 75: CPT | Mod: GC,,, | Performed by: INTERNAL MEDICINE

## 2017-08-02 PROCEDURE — 97161 PT EVAL LOW COMPLEX 20 MIN: CPT

## 2017-08-02 PROCEDURE — 97165 OT EVAL LOW COMPLEX 30 MIN: CPT

## 2017-08-02 PROCEDURE — 93750 INTERROGATION VAD IN PERSON: CPT | Mod: ,,, | Performed by: INTERNAL MEDICINE

## 2017-08-02 PROCEDURE — C9113 INJ PANTOPRAZOLE SODIUM, VIA: HCPCS | Performed by: PHYSICIAN ASSISTANT

## 2017-08-02 RX ORDER — POLYETHYLENE GLYCOL 3350, SODIUM SULFATE ANHYDROUS, SODIUM BICARBONATE, SODIUM CHLORIDE, POTASSIUM CHLORIDE 236; 22.74; 6.74; 5.86; 2.97 G/4L; G/4L; G/4L; G/4L; G/4L
2000 POWDER, FOR SOLUTION ORAL ONCE
Status: COMPLETED | OUTPATIENT
Start: 2017-08-02 | End: 2017-08-02

## 2017-08-02 RX ORDER — SODIUM CHLORIDE 9 MG/ML
INJECTION, SOLUTION INTRAVENOUS ONCE
Status: COMPLETED | OUTPATIENT
Start: 2017-08-02 | End: 2017-08-03

## 2017-08-02 RX ORDER — GABAPENTIN 300 MG/1
300 CAPSULE ORAL ONCE
Status: COMPLETED | OUTPATIENT
Start: 2017-08-02 | End: 2017-08-02

## 2017-08-02 RX ORDER — ALBUMIN HUMAN 250 G/1000ML
12.5 SOLUTION INTRAVENOUS
Status: DISCONTINUED | OUTPATIENT
Start: 2017-08-02 | End: 2017-08-08 | Stop reason: HOSPADM

## 2017-08-02 RX ORDER — WARFARIN 3 MG/1
3 TABLET ORAL DAILY
Status: DISCONTINUED | OUTPATIENT
Start: 2017-08-02 | End: 2017-08-04

## 2017-08-02 RX ORDER — SODIUM CHLORIDE 9 MG/ML
INJECTION, SOLUTION INTRAVENOUS
Status: DISCONTINUED | OUTPATIENT
Start: 2017-08-02 | End: 2017-08-08 | Stop reason: HOSPADM

## 2017-08-02 RX ADMIN — GABAPENTIN 300 MG: 300 CAPSULE ORAL at 05:08

## 2017-08-02 RX ADMIN — GABAPENTIN 300 MG: 300 CAPSULE ORAL at 10:08

## 2017-08-02 RX ADMIN — Medication 3 ML: at 05:08

## 2017-08-02 RX ADMIN — WARFARIN SODIUM 3 MG: 3 TABLET ORAL at 05:08

## 2017-08-02 RX ADMIN — GABAPENTIN 300 MG: 300 CAPSULE ORAL at 12:08

## 2017-08-02 RX ADMIN — ESCITALOPRAM 20 MG: 5 TABLET, FILM COATED ORAL at 10:08

## 2017-08-02 RX ADMIN — PANTOPRAZOLE SODIUM 40 MG: 40 INJECTION, POWDER, FOR SOLUTION INTRAVENOUS at 08:08

## 2017-08-02 RX ADMIN — PANTOPRAZOLE SODIUM 40 MG: 40 INJECTION, POWDER, FOR SOLUTION INTRAVENOUS at 10:08

## 2017-08-02 RX ADMIN — Medication 3 ML: at 10:08

## 2017-08-02 RX ADMIN — GABAPENTIN 300 MG: 300 CAPSULE ORAL at 01:08

## 2017-08-02 RX ADMIN — Medication 3 ML: at 02:08

## 2017-08-02 RX ADMIN — ATORVASTATIN CALCIUM 40 MG: 20 TABLET, FILM COATED ORAL at 08:08

## 2017-08-02 RX ADMIN — POLYETHYLENE GLYCOL 3350, SODIUM SULFATE ANHYDROUS, SODIUM BICARBONATE, SODIUM CHLORIDE, POTASSIUM CHLORIDE 2000 ML: 236; 22.74; 6.74; 5.86; 2.97 POWDER, FOR SOLUTION ORAL at 01:08

## 2017-08-02 NOTE — CONSULTS
[facility]  Gastroenterology  Consult Note    Patient Name: Randa Devine  MRN: [id number]  Admission Date: 8/1/2017  Hospital Length of Stay: 1 days  Code Status: Full Code   Attending Provider: Caitlyn Ackerman MD   Consulting Provider: Jennifer Ren MD  Primary Care Physician: Laura Garvin DO  Principal Problem:<principal problem not specified>    Inpatient consult to Gastroenterology  Consult performed by: JENNIFER REN  Consult ordered by: LEVON MONROE        Subjective:     HPI:  61 y.o. woman admitted for fatigue for 1 week found to have low Hgb at 4.8.     Patient mentioed 1 week Hx of fatigue and some SOB she has maroon/dark stool. She denied any chest pain and denied any other complains.     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 She underwent EGD 7/19/CTA 7/20/Colonoscopy 7/22 which did not reveal an active source of bleeding. Her coumadin was held since admission and once her INR became subtherapeutic, her GIB resolved. We had planned for an inpatient VCE however the patient refused to undergo prep for it. Her coumadin was resumed and is set to follow up with GI outpatient on 8/3/17 for outpatient VCE.     Also reviewed numerous other scopes in the past.    Past Medical History:   Diagnosis Date    Anticoagulant long-term use     Anxiety     Atrial tachycardia, paroxysmal 4/21/2013    Blood transfusion     Cardiomyopathy     CHF (congestive heart failure)     Chronic kidney disease     Coronary artery disease     End stage renal disease     Hypertension     pulmonary    ICD (implantable cardioverter-defibrillator)  battery depletion 2014    Myocardial infarction     Presence of left ventricular assist device (LVAD)     Pulmonary hypertension     Stroke        Past Surgical History:   Procedure Laterality Date    CARDIAC DEFIBRILLATOR PLACEMENT      CARDIAC SURGERY       SECTION      COLONOSCOPY N/A 2016    Procedure: COLONOSCOPY;  Surgeon: Mark Currie MD;  Location: [facility] (2ND FLR);  Service: Endoscopy;  Laterality: N/A;    COLONOSCOPY N/A 4/3/2017    Procedure: COLONOSCOPY;  Surgeon: Todd Longo MD;  Location: [facility] ENDO (2ND FLR);  Service: Endoscopy;  Laterality: N/A;    COLONOSCOPY N/A 2017    Procedure: COLONOSCOPY;  Surgeon: Adryan Garcia MD;  Location: [facility] ENDO (2ND FLR);  Service: Endoscopy;  Laterality: N/A;    CORONARY ARTERY BYPASS GRAFT      FRACTURE SURGERY      HIP SURGERY      RTHA    LEFT VENTRICULAR ASSIST DEVICE  2012    TONSILLECTOMY      VASCULAR SURGERY         Review of patient's allergies indicates:   Allergen Reactions    Codeine Nausea And Vomiting    Demerol [meperidine] Nausea And Vomiting    Lortab [hydrocodone-acetaminophen] Nausea And Vomiting     Family History     Problem Relation (Age of Onset)    Arthritis Mother    Cancer Maternal Grandmother    Heart disease Father    Hypertension Father        Social History Main Topics    Smoking status: Former Smoker     Packs/day: 2.00     Years: 30.00     Quit date: 10/29/2009    Smokeless tobacco: Never Used    Alcohol use Yes      Comment: 1 glass of wine a month    Drug use: No    Sexual activity: No     Review of Systems   Constitutional: Positive for fatigue. Negative for chills and fever.   HENT: Negative for trouble swallowing.    Eyes: Negative for visual disturbance.   Cardiovascular: Negative for chest pain and palpitations.   Gastrointestinal: Positive for blood in stool. Negative for abdominal pain, nausea and vomiting.   Neurological: Negative for headaches.   Hematological:  Negative for adenopathy. Bruises/bleeds easily.   Psychiatric/Behavioral: Negative for agitation and behavioral problems.     Objective:     Vital Signs (Most Recent):  Temp: 97.4 °F (36.3 °C) (08/02/17 1513)  Pulse: 78 (08/02/17 1551)  Resp: 16 (08/02/17 1513)  BP: (!) 76/0 (08/02/17 1100)  SpO2: 97 % (08/02/17 1513) Vital Signs (24h Range):  Temp:  [97.4 °F (36.3 °C)-98.7 °F (37.1 °C)] 97.4 °F (36.3 °C)  Pulse:  [64-78] 78  Resp:  [16-20] 16  SpO2:  [97 %-100 %] 97 %  BP: (68-84)/(0-53) 76/0     Weight: 58.9 kg (129 lb 13.6 oz) (08/02/17 1513)  Body mass index is 25.36 kg/m².      Intake/Output Summary (Last 24 hours) at 08/02/17 1553  Last data filed at 08/02/17 1300   Gross per 24 hour   Intake             1158 ml   Output                0 ml   Net             1158 ml       Lines/Drains/Airways     Drain                 Hemodialysis AV Graft Left upper arm 85681 days          Line                 VAD 10/29/12 1200 Heartware 1738 days          Peripheral Intravenous Line                 Peripheral IV - Single Lumen 08/01/17 1552 Right Antecubital 1 day         Peripheral IV - Single Lumen 08/01/17 1725 Right Hand less than 1 day                Physical Exam   Constitutional: She is oriented to person, place, and time. She appears well-developed and well-nourished.   HENT:   Head: Normocephalic and atraumatic.   Eyes: Conjunctivae are normal. No scleral icterus.   Neck: Normal range of motion. Neck supple.   Cardiovascular:   Murmur heard.  Pulmonary/Chest: Effort normal and breath sounds normal.   Abdominal: Soft. Bowel sounds are normal. She exhibits no distension. There is no tenderness. There is no rebound and no guarding.   Neurological: She is alert and oriented to person, place, and time.   Skin: Skin is warm and dry.   Psychiatric: She has a normal mood and affect. Her behavior is normal.       Significant Labs:  All pertinent lab results from the last 24 hours have been reviewed.    Significant  Imaging:  Imaging results within the past 24 hours have been reviewed.    Assessment/Plan:     LVAD (left ventricular assist device) present    Management per primary team         GI bleed    61 y.o. woman admitted for fatigue for 1 week found to have low Hgb at 4.8.     Patient mentioed 1 week Hx of fatigue and some SOB she has maroon/dark stool.      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 She underwent EGD 7/19, CTA 7/20, Colonoscopy 7/22 which did not reveal an active source of bleeding. Her coumadin was held since admission and once her INR became subtherapeutic, her GIB resolved. We had planned for an inpatient VCE however the patient refused to undergo prep for it. Her coumadin was resumed and is set to follow up with GI outpatient on 8/3/17 for outpatient VCE.     Also reviewed numerous other scopes in the past.    Recommend:  1. Clear liquid diet today and NPO after MN  2. Bedside commode  3. Tuck's wipes to bedside  4. Golytely 1/2 gallon:      1/2 gallon between 4-10pm (8oz q15min)     Plan for video capsule study tomorrow           Thank you for your consult. I will follow-up with patient. Please contact us if you have any additional questions.    Timothy Hutchins MD  Gastroenterology  [facility]

## 2017-08-02 NOTE — CONSULTS
Consults     Nephrology Consult       Admission Date: 2017    Team: ESRD on Hemodialysis/Peritoneal Dialysis  Lita Remy NP    History of the Present Illness     61 y.o. female with significant past medical listed below who was sent from her dialysis to hospital for shortness of breath and hypotension found to have severe anemia on admit due to recurrent GI bleed. Recent admission - for also GIB found no source of bleeding on EGD/CTA/Colonoscopy. Plan for VCE outpatient on 8/3/17. On admit, Hgb 4.8>8.1 after blood transfusions. Nephrology consult for HD at bedside due to LVAD. Patient receives HD at TTS at [facility] in [place] on [facility].     Review of Systems  Constitutional: neg anorexia, positive  weakness, neg fever  Respiratory: neg cough neg shortness of breath  Cardiovascular: neg chest pain, neg palpitations, neg claudication  Gastrointestinal: neg nausea neg vomiting neg abdominal pain, neg change in bowel habits      Past Medical History: Patient has a past medical history of Anticoagulant long-term use; Anxiety; Atrial tachycardia, paroxysmal (2013); Blood transfusion; Cardiomyopathy; CHF (congestive heart failure); Chronic kidney disease; Coronary artery disease; End stage renal disease; Hypertension; ICD (implantable cardioverter-defibrillator) battery depletion (2014); Myocardial infarction; Presence of left ventricular assist device (LVAD); Pulmonary hypertension; and Stroke.    Past Surgical History: Patient has a past surgical history that includes Left ventricular assist device (2012); Tonsillectomy; Cardiac surgery; Coronary artery bypass graft; Hip surgery (); Fracture surgery; Vascular surgery; Cardiac defibrillator placement;  section (); Colonoscopy (N/A, 2016); Colonoscopy (N/A, 4/3/2017); and Colonoscopy (N/A, 2017).    Social History: Patient reports that she quit smoking about 7 years ago. She has a 60.00 pack-year smoking  history. She has never used smokeless tobacco. She reports that she drinks alcohol. She reports that she does not use drugs.    Family History: family history includes Arthritis in her mother; Cancer in her maternal grandmother; Heart disease in her father; Hypertension in her father.    Medications:    atorvastatin  40 mg Oral Daily    escitalopram oxalate  20 mg Oral QHS    gabapentin  300 mg Oral TID    pantoprazole  40 mg Intravenous BID    sodium chloride 0.9%  3 mL Intravenous Q8H    warfarin  3 mg Oral Daily       Allergies: Patient is allergic to codeine; demerol [meperidine]; and lortab [hydrocodone-acetaminophen].    Physical Exam:     Vital Signs (Most Recent):  Temp: 97.4 °F (36.3 °C) (08/02/17 1513)  Pulse: 78 (08/02/17 1551)  Resp: 16 (08/02/17 1513)  BP: (!) 76/0 (08/02/17 1100)  SpO2: 97 % (08/02/17 1513) Vital Signs Range (Last 24H):  Temp:  [97.4 °F (36.3 °C)-98.7 °F (37.1 °C)]   Pulse:  [64-78]   Resp:  [16-20]   BP: (68-84)/(0-53)   SpO2:  [97 %-100 %]    Body mass index is 25.36 kg/m².     General: well developed, pale, no acute distress  Lungs: normal respiratory effort  Heart: LVAD in place   Extremities: neg edema     Labs and Diagnostic Results:       Recent Labs  Lab 08/01/17  1551 08/02/17  0758   WBC 3.10* 4.08  4.08   HGB 4.8* 8.1*  8.1*   HCT 15.3* 25.0*  25.0*   PLT 94* 96*  96*       Recent Labs  Lab 08/01/17  1551 08/02/17  0528    139   K 4.4 4.8    101   CO2 28 27   BUN 27* 33*   CREATININE 5.4* 6.1*   * 79   CALCIUM 8.1* 8.1*   MG 1.7 1.9   PHOS 1.7* 3.1       Recent Labs  Lab 07/28/17 08/01/17  1551 08/02/17  0528   ALKPHOS  --  98 101   ALT  --  13 16   AST  --  27 36   ALBUMIN  --  2.6* 2.8*   PROT  --  5.2* 5.3*   BILITOT  --  0.4 0.8   INR 1.9 1.3* 1.2      Lab Results   Component Value Date    .0 (H) 05/24/2016    CALCIUM 8.1 (L) 08/02/2017    CAION 1.09 11/27/2012    PHOS 3.1 08/02/2017       Assessment/Plan:     Active Hospital Problems     Diagnosis  POA    LVAD (left ventricular assist device) present [Z95.811]  Not Applicable    GI bleed [K92.2]  Yes      Resolved Hospital Problems    Diagnosis Date Resolved POA   No resolved problems to display.     Problems addressed today  ESRD on IHD TTS  - Will provide dialysis for metabolic clearance and volume in am.  - Target UF 2-3 L as tolerated  - Dialysate will be adjusted to current labs      Anemia of CKD and GI Bleed  -No epogen due to LVAD. INR 1.3. GI following. For VCE tomorrow.       Mineral Bone Disease in CKD   -  renal diet. No phos binder for now.     Lita Remy, MAAME  Nephrology

## 2017-08-02 NOTE — ASSESSMENT & PLAN NOTE
61 y.o. woman admitted for fatigue for 1 week found to have low Hgb at 4.8.     Patient mentioed 1 week Hx of fatigue and some SOB she has maroon/dark stool.      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 She underwent EGD 7/19, CTA 7/20, Colonoscopy 7/22 which did not reveal an active source of bleeding. Her coumadin was held since admission and once her INR became subtherapeutic, her GIB resolved. We had planned for an inpatient VCE however the patient refused to undergo prep for it. Her coumadin was resumed and is set to follow up with GI outpatient on 8/3/17 for outpatient VCE.     Also reviewed numerous other scopes in the past.    Recommend:  1. Clear liquid diet today and NPO after MN  2. Bedside commode  3. Tuck's wipes to bedside  4. Golytely 1/2 gallon:      1/2 gallon between 4-10pm (8oz q15min)     Plan for video capsule study tomorrow

## 2017-08-02 NOTE — PROGRESS NOTES
Daily E and M and VAD Interrogation Note    Reason for Visit: Low H&H/GIB  Patient is seen in follow up for management of:  [] HeartMate II  [x] Heartware [] Total artificial heart       [] ECMO           [] Other     Interval History:  [] Interval history unobtainable due to intubation.  The [x] implant 05/2012 as DT  No Events overnight.      Review of Systems:  Denies CP, SOB, abd pain, N/V  All other systems reviewed and negative    Medications:  Current Facility-Administered Medications   Medication Dose Route Frequency Provider Last Rate Last Dose    0.9%  NaCl infusion (for blood administration)   Intravenous Q24H PRN Noman Arguello MD        atorvastatin tablet 40 mg  40 mg Oral Daily KAREN Carroll   40 mg at 08/02/17 0832    escitalopram oxalate tablet 20 mg  20 mg Oral QHS KAREN Carroll   20 mg at 08/01/17 2211    gabapentin capsule 300 mg  300 mg Oral TID Noman Arguello MD   300 mg at 08/02/17 1312    pantoprazole injection 40 mg  40 mg Intravenous BID KAREN Carroll   40 mg at 08/02/17 0831    sodium chloride 0.9% flush 3 mL  3 mL Intravenous Q8H Terese Jaramillo MD   3 mL at 08/02/17 1400       Physical Examination:  Vital Signs:   Vitals:    08/02/17 1100   BP: (!) 76/0   Pulse: 72   Resp: 16   Temp: 98 °F (36.7 °C)     Cardiovascular:  [x] Regular rate and rhythm. VAD sounds smooth  [x]  No edema []  Edema present  [x]  Clear to auscultation  []  Rales to  []  Coarse  []  No rales but   [] Pedal Pulses absent  []  Pulses + throughout  Skin:  Incision is [x]  Clean, dry and intact.    Sternum:  [x]  Stable []  Unstable  Driveline(s):   [x]  Clean, dry and intact.       Labs:  CBC, CMP, LDH and Coags    X-Rays:  []  I reviewed today's Chest x-ray    Procedure:  Device Interrogation including analysis of device parameters.  Current Settings  [x]  Ventricular Assist Device  []  Total Artificial Heart interrogated  Review of device function is [x]   Stable     TXP LVAD INTERROGATIONS 8/2/2017 8/2/2017 8/2/2017 8/1/2017 8/1/2017 7/26/2017 7/26/2017   Type Heartware Heartware Heartware Heartware Heartware Heartware Heartware   Flow 5.6 5.9 5.7 5.7 2.3 5.0 4.5   Speed 2600 2600 2600 2600 2600 2600 2600   PI - - - - - - -   Power (Kim) 3.1 3.1 3.6 3.7 3.6 3.7 3.4   Low Flow Alarm - - - - - 2.5 2.5   High Power Alarm - - - - - 5.5 5.5   Pulsatility - - - - - Intermittent pulse Intermittent pulse       Assessment:  []  Primary Cardiomyopathy []  Congestive Heart Failure   []  Atrial Fibrillation []  Ventricular Tachycardia   []  Aftercare cardiac device []  Long term (current) use of anticoagulants   []  Ventilator-associated pneumonia []  Pneumonia viral, unspecified   []  Pneumonia, bacterial, unspecified []  Pneumonia, organism unspecified   [x]  Hemorrhage of GI tract, unspecified    []  Nosebleed []  Driveline infection   []  Infection VAD device          Plan:  [x]  Interval history obtained from HTS attending team member during rounding today  [x]  VAD/GIN teaching performed with patient  [x]  Mobilization / Physical Therapy ongoing  [x]  Anticoagulation [x]  Ongoing []  Held  [x]  Studies ordered-VCE  -Transfusion of PRBC  -H&H improving  - No VAD alarms       Total time spent was 32 minutes.  Of which more than 50 percent of the care dominated counseling and coordinating care with different team members. The VAD was interrogated and all parameters were WNL and no significant findings were found in the history. All these findings are documented in the note above.      Date of Service: 08/02/2017     Cardiac Surgery Attending E and M (VAD) Note along with VAD Interrogation    I have seen and examined the patient and agree with the findings above    I also reviewed the patients clinical course and:  [x]  Hemodynamic & Respiratory parameters  [x]  Laboratory Data  [x]  Radiological studies     VAD Interrogated [x]      VAD Function is normal. Changes made []   none [x]      Interrogation of Ventricular assist device was performed with physician analysis of device parameters and review of device function. I have personally reviewed the interrogation findings and agree with findings as stated.

## 2017-08-02 NOTE — H&P
Cardiology History & Physical  Attending Physician: Caitlyn Ackerman MD  Chief Complaint: Fatigue     HPI:   61 y.o. woman admitted for fatigue for 1 week found to have low Hgb at 4.8.    Patient mebtioed 1 week Hx of fatigue and some SOB she has maroon/dark stool. She denied any chest pain and denied any other complains. She missed her dialysis today as her BP was low.    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 She underwent EGD/CTA/Colonoscopy which did not reveal an active source of bleeding. Her coumadin was held since admission and once her INR became subtherapeutic, her GIB resolved. GI planned for an inpatient VCE however the patient refused to undergo prep for it. Her coumadin was resumed and is set to follow up with GI outpatient on 8/3/17 for outpatient VCE.    Patient will be admitted for possible VCE this admission. Will hold coumadin also.    ROS:    Constitution: Negative for fever, chills, weight loss or gain.   HENT: Negative for sore throat, rhinorrhea, or headache.  Eyes: Negative for blurred or double vision.   Cardiovascular: See above  Pulmonary: Positive for SOB   Gastrointestinal: Negative for abdominal pain, nausea, vomiting, or diarrhea.   : Negative for dysuria.   Neurological: Negative for focal weakness or sensory changes.  PMH:     Past Medical History:   Diagnosis Date    Anticoagulant long-term use     Anxiety     Atrial tachycardia, paroxysmal 4/21/2013    Blood transfusion     Cardiomyopathy     CHF (congestive heart failure)     Chronic kidney disease     Coronary artery disease     End stage renal disease      Hypertension     pulmonary    ICD (implantable cardioverter-defibrillator) battery depletion 2014    Myocardial infarction     Presence of left ventricular assist device (LVAD)     Pulmonary hypertension     Stroke      Past Surgical History:   Procedure Laterality Date    CARDIAC DEFIBRILLATOR PLACEMENT      CARDIAC SURGERY       SECTION      COLONOSCOPY N/A 2016    Procedure: COLONOSCOPY;  Surgeon: Mark Currie MD;  Location: [facility] ENDO (2ND FLR);  Service: Endoscopy;  Laterality: N/A;    COLONOSCOPY N/A 4/3/2017    Procedure: COLONOSCOPY;  Surgeon: Todd Longo MD;  Location: [facility] ENDO (2ND FLR);  Service: Endoscopy;  Laterality: N/A;    COLONOSCOPY N/A 2017    Procedure: COLONOSCOPY;  Surgeon: Adryan Garcia MD;  Location: [facility] ENDO (2ND FLR);  Service: Endoscopy;  Laterality: N/A;    CORONARY ARTERY BYPASS GRAFT      FRACTURE SURGERY      HIP SURGERY      RTHA    LEFT VENTRICULAR ASSIST DEVICE  2012    TONSILLECTOMY      VASCULAR SURGERY       Allergies:     Review of patient's allergies indicates:   Allergen Reactions    Codeine Nausea And Vomiting    Demerol [meperidine] Nausea And Vomiting    Lortab [hydrocodone-acetaminophen] Nausea And Vomiting     Medications:     No current facility-administered medications on file prior to encounter.      Current Outpatient Prescriptions on File Prior to Encounter   Medication Sig Dispense Refill    acetaminophen (TYLENOL) 325 MG tablet Take 2 tablets (650 mg total) by mouth every 6 (six) hours as needed for Pain (mild pain).  0    atorvastatin (LIPITOR) 40 MG tablet Take 1 tablet (40 mg total) by mouth once daily. 90 tablet 3    CALCIUM CARBONATE (ANTACID CALCIUM ORAL) Take 1 tablet by mouth 4 (four) times daily.       cetirizine (ZYRTEC) 5 MG tablet Take 1 tablet (5 mg total) by mouth once daily. 30 tablet 0    escitalopram oxalate (LEXAPRO) 20 MG tablet Take 1 tablet (20 mg total) by mouth every evening. 90  tablet 3    gabapentin (NEURONTIN) 300 MG capsule Take 1 capsule (300 mg total) by mouth every evening. 180 capsule 3    ondansetron (ZOFRAN) 4 MG tablet Take 1 tablet (4 mg total) by mouth every 8 (eight) hours as needed for Nausea. 30 tablet 3    pantoprazole (PROTONIX) 40 MG tablet Take 1 tablet (40 mg total) by mouth once daily. 60 tablet 10    warfarin (COUMADIN) 2 MG tablet 3mg MWF, 2mg all other days 102 tablet 3       Inpatient Medications   Continuous Infusions:   Scheduled Meds:   [START ON 8/2/2017] atorvastatin  40 mg Oral Daily    escitalopram oxalate  20 mg Oral QHS    gabapentin  300 mg Oral TID    pantoprazole  40 mg Intravenous BID    sodium chloride 0.9%  3 mL Intravenous Q8H     PRN Meds:sodium chloride     Social History:     Social History   Substance Use Topics    Smoking status: Former Smoker     Packs/day: 2.00     Years: 30.00     Quit date: 10/29/2009    Smokeless tobacco: Never Used    Alcohol use Yes      Comment: 1 glass of wine a month     Family History:     Family History   Problem Relation Age of Onset    Arthritis Mother     Heart disease Father     Hypertension Father     Cancer Maternal Grandmother     Breast cancer Neg Hx     Colon cancer Neg Hx     Ovarian cancer Neg Hx      Physical Exam:     Vitals:  Temp:  [98.7 °F (37.1 °C)-98.9 °F (37.2 °C)]   Pulse:  [74-86]   Resp:  [16-18]   BP: (68-84)/(0-53)   SpO2:  [94 %-100 %]   I/O's:  No intake or output data in the 24 hours ending 08/01/17 1906     Constitutional: NAD, conversant  HEENT: Sclera anicteric, PERRLA, EOMI  Neck: 10-12 cm JVD, no carotid bruits  CV: RRR, no murmur, normal S1/S2  Pulm: CTAB, no wheezes, rales, or ronchi  GI: Abdomen soft, NTND, +BS  Extremities: Trace LE edema, warm and well perfused  Skin: No ecchymosis, erythema, or ulcers  Psych: AOx3, appropriate affect  Neuro: CNII-XII intact, no focal deficits    Labs:       Recent Labs  Lab 07/26/17  0514 08/01/17  1551    137   K 3.9  4.4    100   CO2 25 28   BUN 35* 27*   CREATININE 5.6* 5.4*   ANIONGAP 10 9       Recent Labs  Lab 07/26/17  0514 08/01/17  1551   AST 18 27   ALT 10 13   ALKPHOS 70 98   BILITOT 0.5 0.4   BILIDIR 0.2  --    ALBUMIN 2.8* 2.6*       Recent Labs  Lab 07/26/17  0514 08/01/17  1551   TROPONINI  --  0.101*   *  --       Recent Labs  Lab 07/26/17  0815 08/01/17  1551   WBC 3.15* 3.10*   HGB 7.1* 4.8*   HCT 21.7* 15.3*   PLT 76* 94*   GRAN 66.4  2.1 73.6*  2.3       Recent Labs  Lab 08/01/17  1551   INR 1.3*     Lab Results   Component Value Date    CHOL 144 05/10/2017    HDL 54 05/10/2017    LDLCALC 73.8 05/10/2017    TRIG 81 05/10/2017     Lab Results   Component Value Date    HGBA1C 6.4 (H) 03/29/2012        Micro:  Blood Cultures  Lab Results   Component Value Date    LABBLOO No growth after 5 days. 05/30/2016    LABBLOO No growth after 5 days. 05/30/2016    LABBLOO No growth after 5 days. 05/22/2016    LABBLOO No growth after 5 days. 05/22/2016    LABBLOO No growth after 5 days. 12/10/2015     Urine Cultures  Lab Results   Component Value Date    LABURIN  07/19/2017     Multiple organisms isolated. None in predominance.  Repeat if    LABURIN clinically necessary. 07/19/2017    LABURIN No significant growth 10/17/2015    LABURIN NO SIGNIFICANT GROWTH 05/08/2012       Imaging:         EF   Date Value Ref Range Status   03/20/2017 10 (A) 55 - 65    02/01/2017 10 (A) 55 - 65    01/11/2017 15 (A) 55 - 65    05/24/2016 15 (A) 55 - 65    06/08/2015 20 (A) 55 - 65            Assessment:   61 y.o. woman admitted for fatigue for 1 week found to have low Hgb at 4.8.Patient mebtioed 1 week Hx of fatigue and some SOB she has maroon/dark stool. She denied any chest pain and denied any other complains. She missed her dialysis today as her BP was low.  Plan:     Anemia  -Hgb 4.8 will transfuse with 2 units of blood.  -hx lower GIB secondary to cecal AVM s/p APC 4/2016   -GI consulted for VCE patient will be NPO after  midnight  -Will hold anticoagulations fort now.   -Clear liquid diet    -Protonix 40 IV bid      S/P LVAD HeartWare HVAD Implanted   -Coumadin on hold for GI bleeding   -LDH daily   -Speed set at 2600  -volume overloaded on exam: nephrology consulted, HD mostly tomorrow      ESRD- on HD  -Nephrology consulted for HD     Hypertension  -will monitor closely off medications for now.     Paroxysmal Atrial Fib  -Anticoagulations on hold for GI bleeding.      Plan was discussed with [facility] fellow Dr Brent Avalos    Attending addendum to follow     Terese Jaramillo MD  Cardiology Fellow  Pager: [phone]

## 2017-08-02 NOTE — PROGRESS NOTES
Admit Note     Met with patient to assess needs. Patient is a 61 y.o.  female, admitted for GI bleed, per medical record.  Pt has an LVAD, implanted 5/9/12.     Patient admitted on 8/1/2017.  At this time, patient presents as alert and oriented x 4, calm, cooperative and asking and answering questions appropriately.  At this time, patients caregiver is not present.    Household/Family Systems     Patient resides alone at:    [id number]  [place] [address]      Pt cell:  [phone]  Constance Dave ([facility]) cell: [phone]    Support system includes daughter and ex-.   Patient does not have dependents that are need of being cared for.     Patients primary caregiver is her jennifer Nolasco.  During admission, patient's caregiver plans to stay at home.  Confirmed patient and patients caregivers do have access to reliable transportation.    Cognitive Status/Learning     Patient reports reading ability as 12th grade and states patient does not have difficulty with reading, writing, hearing, comprehension and learning Pt wears glasses and reports issues with short term memory.  Patient reports patient learns best by one-on-one.   Needed: No.   Highest education level: Associate/Bachelor Degree    Vocation/Disability     Working for Income: No  If no, reason not working: Patient Choice - Retired  Patient is retired from working as a nurse and .    Adherence     Patient reports a medium level of adherence to patients health care regimen. Pt reports her daughter helps to set up daily medications.  Adherence counseling and education provided. Patient verbalizes understanding.    Substance Use    Patient reports the following substance usage.    Tobacco: none, patient denies any use.  Alcohol: none, patient denies any use  Illicit Drugs/Non-prescribed Medications: none, patient denies any use.  Patient states clear understanding of the potential impact of substance use.      Services Utilizing/ADLS    Infusion Service: Prior to admission, patient utilizing? no  Home Health: Prior to admission, patient utilizing? yes, pt has [facility] (ph: [phone], f: [phone]) for skilled nrsg services.  Pt requesting to resume HH with STAT at d/c.  DME: Prior to admission, yes, pt reports a rollator, walker, wheelchair, BSC, cane, and home O2 with portables (via [employer] 2-4 LPM)   Pulmonary/Cardiac Rehab: Prior to admission, no  Dialysis:  Prior to admission, yes, pt dialyzes at [facility] on TTS at 6:30am  Transplant Specialty Pharmacy:  Prior to admission, no.    Prior to admission, patient reports patient was independent with ADLS and was not driving. Pt's daughter assists pt with transportation.  Patient reports patient is able to care for self at this time.  Patient indicates a willingness to care for self once medically cleared to do so.    Insurance/Medications    Insured by   Payor/Plan Subscr  Sex Relation Sub. Ins. ID Effective Group Num   1. [place]ARE - [place]* MAGNO JOLLY 1955 Female  [id number] 10/1/13                                    [address]   2. BLUE CROSS BL* MAGNO JOLLY 1955 Female  [id number] 4/1/15 [id number]                                   [address] [address]      Primary Insurance (for UNOS reporting): Public Insurance - Medicare FFS (Fee For Service)  Secondary Insurance (for UNOS reporting): Private Insurance    Patient reports patient is able to obtain and afford medications at this time and at time of discharge.      Living Will/Healthcare Power of     Patient states patient has a LW and/or HCPA.  HCPA on file names pt's dgtr Constance as primary agent and pt's ex- Carlos A as secondary agent.    Coping/Mental Health    Patient is coping adequately with the aid of  family members.  Patient denies mental health difficulties at this time.     Discharge Planning    At time of discharge, patient plans to return to patient's home under the  care of self and daughter.  Patients daughter Constance will transport patient.  Per rounds today, expected discharge date has not been medically determined at this time. Patient verbalizes understanding and is involved in treatment planning and discharge process.    Additional Concerns    Patient is being followed for needs, education, resources, information, emotional support, supportive counseling, and for supportive and skilled discharge plan of care.  providing ongoing psychosocial support, education, resources and d/c planning as needed.  SW remains available. Patient denies additional needs and/or concerns at this time. Patient verbalizes understanding and agreement with information reviewed, social work availability, and how to access available resources as needed.

## 2017-08-02 NOTE — PROGRESS NOTES
Assessed pt after first unit of blood.  Denies SOB.  Feels well.  SBP 70's.  Will continue with second unit of blood and will recheck H/H.  NPO now as after midnight.

## 2017-08-02 NOTE — PLAN OF CARE
Problem: Physical Therapy Goal  Goal: Physical Therapy Goal  Goals to be met by: 17     Patient will increase functional independence with mobility by performin. Supine to sit with Modified [place]  2. Sit to stand transfer with Supervision  3. Gait  x 300 feet with Supervision using Rolling Walker or appropriate AD.   4. Lower extremity exercise program x15 reps, with supervision, in order to increase LE strength and (I) with functional mobility.     Outcome: Ongoing (interventions implemented as appropriate)  PT evaluation complete and appropriate goals established.    Tasneem Gabriel, PT, DPT   2017  [phone]

## 2017-08-02 NOTE — PROGRESS NOTES
Pt has an order to transfuse 2 units of blood. Type an screen done. Blood consent obtained. Transfusion started at 2035 at 75mL/hr. Received an order from Dr Jaramillo to administer slow. Will administer at the same rate. Educated pt about the s&s of blood transfusion. Will administer 1 unit of blood. Reassess pt, will notify MD and administer another unit of blood.

## 2017-08-02 NOTE — NURSING TRANSFER
Nursing Transfer Note      8/1/2017     Transfer From: ED to room 240    Transfer via stretcher    Transfer with  to O2, cardiac monitoring    Transported by Tranporter    Medicines sent: No    Chart send with patient: Yes    Notified: MD aware    Patient reassessed at: 8/1 2000    Upon arrival to floor: cardiac monitor applied, patient oriented to room, call bell in reach and bed in lowest position    Pt is AAOX4. LVAD pt- HW. She denies headache dizziness , nausea or chest pain. Tele monitor applied. 3L NC. VAD inventory done.  H/H: 4.8/15.3  2 Units of blood ordered. Will monitor the patient and follow the orders.

## 2017-08-02 NOTE — ASSESSMENT & PLAN NOTE
Nutrition Diagnosis:  Impaired nutrient utilization    Related to (etiology):   Renal disease impact on ability of kidneys to excrete nutrients     Signs and Symptoms (as evidenced by):   Pt need for hemodialysis    Interventions/Recommendations (treatment strategy):  As medically appropriate, advance diet to renal cardiac    Nutrition Diagnosis Status:   New

## 2017-08-02 NOTE — SUBJECTIVE & OBJECTIVE
Past Medical History:   Diagnosis Date    Anticoagulant long-term use     Anxiety     Atrial tachycardia, paroxysmal 2013    Blood transfusion     Cardiomyopathy     CHF (congestive heart failure)     Chronic kidney disease     Coronary artery disease     End stage renal disease     Hypertension     pulmonary    ICD (implantable cardioverter-defibrillator) battery depletion 2014    Myocardial infarction     Presence of left ventricular assist device (LVAD)     Pulmonary hypertension     Stroke        Past Surgical History:   Procedure Laterality Date    CARDIAC DEFIBRILLATOR PLACEMENT      CARDIAC SURGERY       SECTION      COLONOSCOPY N/A 2016    Procedure: COLONOSCOPY;  Surgeon: Mark Currie MD;  Location: [facility] ENDO (2ND FLR);  Service: Endoscopy;  Laterality: N/A;    COLONOSCOPY N/A 4/3/2017    Procedure: COLONOSCOPY;  Surgeon: Todd Longo MD;  Location: [facility] ENDO (2ND FLR);  Service: Endoscopy;  Laterality: N/A;    COLONOSCOPY N/A 2017    Procedure: COLONOSCOPY;  Surgeon: Adryan Garcia MD;  Location: [facility] ENDO (2ND FLR);  Service: Endoscopy;  Laterality: N/A;    CORONARY ARTERY BYPASS GRAFT      FRACTURE SURGERY      HIP SURGERY      RTHA    LEFT VENTRICULAR ASSIST DEVICE  2012    TONSILLECTOMY      VASCULAR SURGERY         Review of patient's allergies indicates:   Allergen Reactions    Codeine Nausea And Vomiting    Demerol [meperidine] Nausea And Vomiting    Lortab [hydrocodone-acetaminophen] Nausea And Vomiting     Family History     Problem Relation (Age of Onset)    Arthritis Mother    Cancer Maternal Grandmother    Heart disease Father    Hypertension Father        Social History Main Topics    Smoking status: Former Smoker     Packs/day: 2.00     Years: 30.00     Quit date: 10/29/2009    Smokeless tobacco: Never Used    Alcohol use Yes      Comment: 1 glass of wine a month    Drug use: No    Sexual activity: No     Review of  Systems   Constitutional: Positive for fatigue. Negative for chills and fever.   HENT: Negative for trouble swallowing.    Eyes: Negative for visual disturbance.   Cardiovascular: Negative for chest pain and palpitations.   Gastrointestinal: Positive for blood in stool. Negative for abdominal pain, nausea and vomiting.   Neurological: Negative for headaches.   Hematological: Negative for adenopathy. Bruises/bleeds easily.   Psychiatric/Behavioral: Negative for agitation and behavioral problems.     Objective:     Vital Signs (Most Recent):  Temp: 97.4 °F (36.3 °C) (08/02/17 1513)  Pulse: 78 (08/02/17 1551)  Resp: 16 (08/02/17 1513)  BP: (!) 76/0 (08/02/17 1100)  SpO2: 97 % (08/02/17 1513) Vital Signs (24h Range):  Temp:  [97.4 °F (36.3 °C)-98.7 °F (37.1 °C)] 97.4 °F (36.3 °C)  Pulse:  [64-78] 78  Resp:  [16-20] 16  SpO2:  [97 %-100 %] 97 %  BP: (68-84)/(0-53) 76/0     Weight: 58.9 kg (129 lb 13.6 oz) (08/02/17 1513)  Body mass index is 25.36 kg/m².      Intake/Output Summary (Last 24 hours) at 08/02/17 1553  Last data filed at 08/02/17 1300   Gross per 24 hour   Intake             1158 ml   Output                0 ml   Net             1158 ml       Lines/Drains/Airways     Drain                 Hemodialysis AV Graft Left upper arm 69911 days          Line                 VAD 10/29/12 1200 Heartware 1738 days          Peripheral Intravenous Line                 Peripheral IV - Single Lumen 08/01/17 1552 Right Antecubital 1 day         Peripheral IV - Single Lumen 08/01/17 1725 Right Hand less than 1 day                Physical Exam   Constitutional: She is oriented to person, place, and time. She appears well-developed and well-nourished.   HENT:   Head: Normocephalic and atraumatic.   Eyes: Conjunctivae are normal. No scleral icterus.   Neck: Normal range of motion. Neck supple.   Cardiovascular:   Murmur heard.  Pulmonary/Chest: Effort normal and breath sounds normal.   Abdominal: Soft. Bowel sounds are normal. She  exhibits no distension. There is no tenderness. There is no rebound and no guarding.   Neurological: She is alert and oriented to person, place, and time.   Skin: Skin is warm and dry.   Psychiatric: She has a normal mood and affect. Her behavior is normal.       Significant Labs:  All pertinent lab results from the last 24 hours have been reviewed.    Significant Imaging:  Imaging results within the past 24 hours have been reviewed.

## 2017-08-02 NOTE — HPI
61 y.o. woman admitted for fatigue for 1 week found to have low Hgb at 4.8.     Patient mentioed 1 week Hx of fatigue and some SOB she has maroon/dark stool. She denied any chest pain and denied any other complains.     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 She underwent EGD 7/19/CTA 7/20/Colonoscopy 7/22 which did not reveal an active source of bleeding. Her coumadin was held since admission and once her INR became subtherapeutic, her GIB resolved. We had planned for an inpatient VCE however the patient refused to undergo prep for it. Her coumadin was resumed and is set to follow up with GI outpatient on 8/3/17 for outpatient VCE.     Also reviewed numerous other scopes in the past.

## 2017-08-02 NOTE — PROGRESS NOTES
[facility]  Adult Nutrition  Progress Note    SUMMARY     Recommendations    Recommendation/Intervention:   1. Advance diet to renal cardiac as medically able and tolerated   2. If unable to advance diet in 48-72 hours, please consult RD for nutrition support recommendations   3. RD to monitor and follow up  Goals: Pt to recieve adequate nutrition within 48-72 hours  Nutrition Goal Status: new         Reason for Assessment    Reason for Assessment: physician consult  Diagnosis: cardiac disease, renal disease  Relevent Medical History: ESRD on HD, chf, cad, htn   Interdisciplinary Rounds: did not attend     General Information Comments: Pt NPO today for endoscopy, back to clear liquids 2' GI bleed, Pt in chair at visit, no wt loss or appetite changes PTA, reports being hungry, no questions about renal or cardiac diet    Nutrition Discharge Planning: pt to consume optimal nutrition for condition    Nutrition Prescription Ordered    Current Diet Order: NPO      Evaluation of Received Nutrients/Fluid Intake    Energy Calories Required: not meeting needs  % Kcal Needs: 0     Protein Required: not meeting needs  % Protein Needs: 0       % Intake of Estimated Energy Needs: 0 - 25 %  % Meal Intake: NPO     Nutrition Risk Screen     Nutrition Risk Screen: no indicators present    Nutrition/Diet History    Patient Reported Diet/Restrictions/Preferences: renal, low salt, heart healthy              Factors Affecting Nutritional Intake: NPO                Labs/Tests/Procedures/Meds    Diagnostic Test/Procedure Review: reviewed, pertinent  Pertinent Labs Reviewed: reviewed, pertinent  Pertinent Labs Comments: BUN 33, Cr 6.1, GFR 8, alb 2.8  Pertinent Medications Reviewed: reviewed, pertinent  Pertinent Medications Comments: coumadin, statin    Physical Findings    Overall Physical Appearance: overweight, nourished     Oral/Mouth Cavity: WDL  Skin: intact    Anthropometrics    Temp: 97.4 °F (36.3 °C)      Height: 5' (152.4 cm)  Weight Method: Stated  Weight: 58.9 kg (129 lb 13.6 oz)     Ideal Body Weight (IBW), Female: 100 lb     % Ideal Body Weight, Female (lb): 129.85 lb  BMI (Calculated): 25.4  BMI Grade: 25 - 29.9 - overweight                            Estimated/Assessed Needs    Weight Used For Calorie Calculations: 58.9 kg (129 lb 13.6 oz)   Height (cm): 152.4 cm  Energy Calorie Requirements (kcal):  (1302-1411kcal/day (1.2-1.3 PAL))  Energy Need Method: [place]-St Jeor        RMR ([place]-St. Jeor Equation): 1085.5        Weight Used For Protein Calculations: 58.9 kg (129 lb 13.6 oz)  Protein Requirements: 65-77g/day (1.1-1.3 g/kg)     Fluid Need Method: RDA Method     Assessment and Plan    End stage renal disease on dialysis    Nutrition Diagnosis:  Impaired nutrient utilization    Related to (etiology):   Renal disease impact on ability of kidneys to excrete nutrients     Signs and Symptoms (as evidenced by):   Pt need for hemodialysis    Interventions/Recommendations (treatment strategy):  As medically appropriate, advance diet to renal cardiac    Nutrition Diagnosis Status:   New              Monitor and Evaluation    Food and Nutrient Intake: energy intake, food and beverage intake  Food and Nutrient Adminstration: diet order  Knowledge/Beliefs/Attitudes: food and nutrition knowledge/skill  Physical Activity and Function: nutrition-related ADLs and IADLs  Anthropometric Measurements: weight, body mass index, weight change  Biochemical Data, Medical Tests and Procedures: gastrointestinal profile, electrolyte and renal panel, glucose/endocrine profile, inflammatory profile, lipid profile  Nutrition-Focused Physical Findings: overall appearance    Nutrition Risk    Level of Risk:  (2x/week)    Nutrition Follow-Up       Monika Mckeon RD

## 2017-08-02 NOTE — PLAN OF CARE
Problem: Patient Care Overview  Goal: Individualization & Mutuality  Outcome: Ongoing (interventions implemented as appropriate)  Patient progressing toward all goals. Plan of care discussed with patient, no questions at this time. Patient ambulating independently, fall precautions in place. NPO for capsule study in AM; VS & LVAD numbers WNL. Will monitor.

## 2017-08-02 NOTE — PLAN OF CARE
Problem: Occupational Therapy Goal  Goal: Occupational Therapy Goal  Outcome: Outcome(s) achieved Date Met: 08/02/17  Sage and D/C OT 8/2/17

## 2017-08-02 NOTE — PROGRESS NOTES
H/H: 4.8/15.3 pt is receiving 2 units of blood. Unable to change Hct below 20 on HW monitor. [place] VAD coordinator notified.

## 2017-08-02 NOTE — PT/OT/SLP EVAL
Physical Therapy  Evaluation    Randa Devine   MRN: [id number]   Admitting Diagnosis: anemia     PT Received On: 17  PT Start Time: 924     PT Stop Time: 948    PT Total Time (min): 24 min       Billable Minutes:  Evaluation 24  (co-eval with OT)    Diagnosis: anemia  LVAD inserted 2012    Past Medical History:   Diagnosis Date    Anticoagulant long-term use     Anxiety     Atrial tachycardia, paroxysmal 2013    Blood transfusion     Cardiomyopathy     CHF (congestive heart failure)     Chronic kidney disease     Coronary artery disease     End stage renal disease     Hypertension     pulmonary    ICD (implantable cardioverter-defibrillator) battery depletion 2014    Myocardial infarction     Presence of left ventricular assist device (LVAD)     Pulmonary hypertension     Stroke       Past Surgical History:   Procedure Laterality Date    CARDIAC DEFIBRILLATOR PLACEMENT      CARDIAC SURGERY       SECTION      COLONOSCOPY N/A 2016    Procedure: COLONOSCOPY;  Surgeon: Mark Currie MD;  Location: [facility] ENDO (2ND FLR);  Service: Endoscopy;  Laterality: N/A;    COLONOSCOPY N/A 4/3/2017    Procedure: COLONOSCOPY;  Surgeon: Todd Longo MD;  Location: [facility] ENDO (2ND FLR);  Service: Endoscopy;  Laterality: N/A;    COLONOSCOPY N/A 2017    Procedure: COLONOSCOPY;  Surgeon: Adryan Garcia MD;  Location: [facility] ENDO (2ND FLR);  Service: Endoscopy;  Laterality: N/A;    CORONARY ARTERY BYPASS GRAFT      FRACTURE SURGERY      HIP SURGERY      RTHA    LEFT VENTRICULAR ASSIST DEVICE  2012    TONSILLECTOMY      VASCULAR SURGERY         Referring physician: Terese Jaramillo MD  Date referred to PT: 17    General Precautions: Standard, fall, LVAD  Orthopedic Precautions: N/A   Braces:  (owns AFOs but pt reports that they are currently broken and not at bedside)       Do you have any cultural, spiritual, Worship conflicts, given your current situation?:  none noted     Patient History:  Lives With: alone  Living Arrangements: house  Transportation Available: family or friend will provide  Living Environment Comment: Pt lives alone in a 1SH with 0 PAPITO. Pt reports that her ex- and daughter are able to assist and provide transportation. Pt reports that PTA she was (I) with ADLs and household mobility, using rollator or w/c for community mobility as needed.   Equipment Currently Used at Home: rollator, orthotic device, oxygen, wheelchair (owns SPC and BSC but not currently using )    Previous Level of Function:  Ambulation Skills: needs device  Transfer Skills: independent  ADL Skills: independent    Subjective:  Communicated with RN prior to session.  Pt agreeable to therapy.   Chief Complaint: none noted  Patient goals: return home     Pain/Comfort  Pain Rating 1: 0/10      Objective:   Patient found with: telemetry, oxygen (LVAD)     Cognitive Exam:  Oriented to: Person, Place, Time and Situation    Follows Commands/attention: Follows two-step commands  Communication: clear/fluent  Safety awareness/insight to disability: intact    Physical Exam:  Postural examination/scapula alignment: Rounded shoulder    Skin integrity: Visible skin intact  Edema: None noted     Sensation:   Intact    Lower Extremity Range of Motion:  Right Lower Extremity: WFL at hip and knee; decreased ankle DF active and passive ROM (unable to achieve neutral)  Left Lower Extremity: WFL at hip and knee; decreased ankle DF active and passive ROM (unable to achieve neutral)     Lower Extremity Strength:  Right Lower Extremity: WFL at hip and knee; trace active ankle DF   Left Lower Extremity: WFL at hip and knee; trace active ankle DF     Functional Mobility:  Bed Mobility:  Supine to Sit: Supervision, With side rail (with HOB elevated)    Transfers:  Sit <> Stand Assistance: Stand By Assistance  Sit <> Stand Assistive Device: Rolling Walker  Toilet Transfer Technique: Stand Pivot  Toilet  Transfer Assistance: Supervision  Toilet Transfer Assistive Device: Rolling Walker    Gait:   Gait Distance: ~240 ft.   Assistance 1: Stand by Assistance (close SBA)  Gait Assistive Device: Rolling walker  Gait Pattern: swing-through gait  Gait Deviation(s): decreased shanti, increased time in double stance, decreased velocity of limb motion, decreased step length, toes first (B foot drop (R>L) with increased hip and knee flexion to advance LE; mild knee hyperextension noted BLE; no heel strike BLE)    Balance:   Static Sit: supervision   Dynamic Sit: supervision   Static Stand: SBA-supervision   Dynamic stand: SBA    Therapeutic Activities and Exercises:  Pt educated on role of PT and PT POC. Pt verbalized understanding. Pt familiar with acute PT services and known to this PT.   Pt recently admitted to [facility] for GI bleed. During admission, MD faxed script to  Clinic in [place] in order for pt to obtain new AFOs. Pt reported that appointment at  [facility] is scheduled for next Wednesday (8/9/17). Pt instructed to bring old AFOs to  Clinic appointment. Pt educated on importance of getting and wearing AFOs for improved gait mechanics, maintaining joint integrity, and decreasing fall risk. Pt verbalized understanding.     AM-PAC 6 CLICK MOBILITY  How much help from another person does this patient currently need?   1 = Unable, Total/Dependent Assistance  2 = A lot, Maximum/Moderate Assistance  3 = A little, Minimum/Contact Guard/Supervision  4 = None, Modified [place]/Independent    Turning over in bed (including adjusting bedclothes, sheets and blankets)?: 4  Sitting down on and standing up from a chair with arms (e.g., wheelchair, bedside commode, etc.): 3  Moving from lying on back to sitting on the side of the bed?: 4  Moving to and from a bed to a chair (including a wheelchair)?: 3  Need to walk in hospital room?: 3  Climbing 3-5 steps with a railing?: 2  Total Score: 19     AM-PAC Raw Score  CMS G-Code Modifier Level of Impairment Assistance   6 % Total / Unable   7 - 9 CM 80 - 100% Maximal Assist   10 - 14 CL 60 - 80% Moderate Assist   15 - 19 CK 40 - 60% Moderate Assist   20 - 22 CJ 20 - 40% Minimal Assist   23 CI 1-20% SBA / CGA   24 CH 0% Independent/ Mod I     Patient left up in chair with all lines intact and call button in reach.    Assessment:   Randa Devine is a 61 y.o. female with a medical diagnosis of anemia and presents with previous LVAD inserted 5/2012. Pt was able to perform functional mobility without physical assist, but requires RW and close SBA during ambulation, as pt with B foot drop and at risk of fall. Noted altered gait mechanics 2* B foot drop; pt educated on the importance of having AFOs to correct foot drop. Pt would continue to benefit from skilled acute PT in order to address current deficits and progress functional mobility. Recommend HHPT upon d/c for improved safety and (I) in the home environment.     Rehab identified problem list/impairments: Rehab identified problem list/impairments: weakness, impaired functional mobilty, impaired balance, impaired endurance, impaired self care skills, gait instability, decreased lower extremity function, decreased safety awareness, impaired cardiopulmonary response to activity    Rehab potential is good.    Activity tolerance: Good    Discharge recommendations: Discharge Facility/Level Of Care Needs: home health PT     Barriers to discharge: Barriers to Discharge: Decreased caregiver support (lives alone)    Equipment recommendations: Equipment Needed After Discharge: orthotic device (needs B AFOs fixed; script faxed to  Clinic during previous admission and pt reports that she has appointment scheduled for next week)     GOALS:    Physical Therapy Goals        Problem: Physical Therapy Goal    Goal Priority Disciplines Outcome Goal Variances Interventions   Physical Therapy Goal     PT/OT, PT Ongoing (interventions  implemented as appropriate)     Description:  Goals to be met by: 17     Patient will increase functional independence with mobility by performin. Supine to sit with Modified [place]  2. Sit to stand transfer with Supervision  3. Gait  x 300 feet with Supervision using Rolling Walker or appropriate AD.   4. Lower extremity exercise program x15 reps, with supervision, in order to increase LE strength and (I) with functional mobility.                      PLAN:    Patient to be seen 3 x/week to address the above listed problems via gait training, therapeutic activities, therapeutic exercises  Plan of Care expires: 17  Plan of Care reviewed with: patient        Tasneem Vasquezard, PT, DPT   2017  [phone]

## 2017-08-02 NOTE — PROCEDURES
TXP LVAD INTERROGATIONS 8/2/2017 8/2/2017 8/2/2017 8/1/2017 8/1/2017 7/26/2017 7/26/2017   Type Heartware Heartware Heartware Heartware Heartware Heartware Heartware   Flow 5.6 5.9 5.7 5.7 2.3 5.0 4.5   Speed 2600 2600 2600 2600 2600 2600 2600   PI - - - - - - -   Power (Kim) 3.1 3.1 3.6 3.7 3.6 3.7 3.4   Low Flow Alarm - - - - - 2.5 2.5   High Power Alarm - - - - - 5.5 5.5   Pulsatility - - - - - Intermittent pulse Intermittent pulse      VAD interrogation completed per Nicolasa Currie  VAD sounds smooth  No alarms noted in history  intermittently pulsatile  Waveform 5-7, no negative deflections noted

## 2017-08-02 NOTE — PLAN OF CARE
Problem: Patient Care Overview  Goal: Plan of Care Review  Outcome: Ongoing (interventions implemented as appropriate)  Pt denies Chest pain, SOB or nausea. C/o headache- tylenol given. Mild relief obtained. Fall risk- No falls, trauma or injury noted. VSS. H/H: 4.8/15.3 while admission. 2 units of blood administered. LVAD- HW- S&W twice a week. VAD numbers WNL. NPO since MN.GI consult. Possible Video capsule endoscopy in am. Plan of care reviewed with patient. No further questions at this time. Will continue to monitor.

## 2017-08-03 LAB
ALBUMIN SERPL BCP-MCNC: 3 G/DL
ALP SERPL-CCNC: 121 U/L
ALT SERPL W/O P-5'-P-CCNC: 18 U/L
ANION GAP SERPL CALC-SCNC: 16 MMOL/L
APTT BLDCRRT: 24.4 SEC
AST SERPL-CCNC: 34 U/L
BASOPHILS # BLD AUTO: 0.02 K/UL
BASOPHILS NFR BLD: 0.5 %
BILIRUB SERPL-MCNC: 0.6 MG/DL
BUN SERPL-MCNC: 38 MG/DL
CALCIUM SERPL-MCNC: 8.3 MG/DL
CHLORIDE SERPL-SCNC: 101 MMOL/L
CO2 SERPL-SCNC: 24 MMOL/L
CREAT SERPL-MCNC: 7.2 MG/DL
DIFFERENTIAL METHOD: ABNORMAL
EOSINOPHIL # BLD AUTO: 0.1 K/UL
EOSINOPHIL NFR BLD: 3.4 %
ERYTHROCYTE [DISTWIDTH] IN BLOOD BY AUTOMATED COUNT: 17.4 %
EST. GFR  (AFRICAN AMERICAN): 6.6 ML/MIN/1.73 M^2
EST. GFR  (NON AFRICAN AMERICAN): 5.7 ML/MIN/1.73 M^2
GLUCOSE SERPL-MCNC: 72 MG/DL
HCT VFR BLD AUTO: 24.1 %
HGB BLD-MCNC: 7.8 G/DL
INR PPP: 1.1
LDH SERPL L TO P-CCNC: 282 U/L
LYMPHOCYTES # BLD AUTO: 0.6 K/UL
LYMPHOCYTES NFR BLD: 15.9 %
MAGNESIUM SERPL-MCNC: 2.1 MG/DL
MCH RBC QN AUTO: 31.3 PG
MCHC RBC AUTO-ENTMCNC: 32.4 G/DL
MCV RBC AUTO: 97 FL
MONOCYTES # BLD AUTO: 0.4 K/UL
MONOCYTES NFR BLD: 10.7 %
NEUTROPHILS # BLD AUTO: 2.7 K/UL
NEUTROPHILS NFR BLD: 69.2 %
PHOSPHATE SERPL-MCNC: 3.6 MG/DL
PLATELET # BLD AUTO: 91 K/UL
PMV BLD AUTO: 9.8 FL
POTASSIUM SERPL-SCNC: 4.4 MMOL/L
PROT SERPL-MCNC: 5.8 G/DL
PROTHROMBIN TIME: 11.6 SEC
RBC # BLD AUTO: 2.49 M/UL
SODIUM SERPL-SCNC: 141 MMOL/L
WBC # BLD AUTO: 3.83 K/UL

## 2017-08-03 PROCEDURE — 27000248 HC VAD-ADDITIONAL DAY

## 2017-08-03 PROCEDURE — 80100014 HC HEMODIALYSIS 1:1

## 2017-08-03 PROCEDURE — C9113 INJ PANTOPRAZOLE SODIUM, VIA: HCPCS | Performed by: PHYSICIAN ASSISTANT

## 2017-08-03 PROCEDURE — 99232 SBSQ HOSP IP/OBS MODERATE 35: CPT | Mod: ,,, | Performed by: INTERNAL MEDICINE

## 2017-08-03 PROCEDURE — 25000003 PHARM REV CODE 250: Performed by: PHYSICIAN ASSISTANT

## 2017-08-03 PROCEDURE — 25000003 PHARM REV CODE 250: Performed by: STUDENT IN AN ORGANIZED HEALTH CARE EDUCATION/TRAINING PROGRAM

## 2017-08-03 PROCEDURE — 85025 COMPLETE CBC W/AUTO DIFF WBC: CPT

## 2017-08-03 PROCEDURE — 93750 INTERROGATION VAD IN PERSON: CPT | Mod: ,,, | Performed by: INTERNAL MEDICINE

## 2017-08-03 PROCEDURE — 25000242 PHARM REV CODE 250 ALT 637 W/ HCPCS: Performed by: INTERNAL MEDICINE

## 2017-08-03 PROCEDURE — 93750 INTERROGATION VAD IN PERSON: CPT | Performed by: PHYSICIAN ASSISTANT

## 2017-08-03 PROCEDURE — 20600001 HC STEP DOWN PRIVATE ROOM

## 2017-08-03 PROCEDURE — 85610 PROTHROMBIN TIME: CPT

## 2017-08-03 PROCEDURE — 25000003 PHARM REV CODE 250: Performed by: EMERGENCY MEDICINE

## 2017-08-03 PROCEDURE — 36415 COLL VENOUS BLD VENIPUNCTURE: CPT

## 2017-08-03 PROCEDURE — 63600175 PHARM REV CODE 636 W HCPCS: Performed by: PHYSICIAN ASSISTANT

## 2017-08-03 PROCEDURE — 99233 SBSQ HOSP IP/OBS HIGH 50: CPT | Mod: ,,, | Performed by: INTERNAL MEDICINE

## 2017-08-03 PROCEDURE — 83615 LACTATE (LD) (LDH) ENZYME: CPT

## 2017-08-03 PROCEDURE — 25000003 PHARM REV CODE 250: Performed by: NURSE PRACTITIONER

## 2017-08-03 PROCEDURE — 84100 ASSAY OF PHOSPHORUS: CPT

## 2017-08-03 PROCEDURE — 85730 THROMBOPLASTIN TIME PARTIAL: CPT

## 2017-08-03 PROCEDURE — 63600175 PHARM REV CODE 636 W HCPCS: Performed by: NURSE PRACTITIONER

## 2017-08-03 PROCEDURE — 80053 COMPREHEN METABOLIC PANEL: CPT

## 2017-08-03 PROCEDURE — A4216 STERILE WATER/SALINE, 10 ML: HCPCS | Performed by: STUDENT IN AN ORGANIZED HEALTH CARE EDUCATION/TRAINING PROGRAM

## 2017-08-03 PROCEDURE — P9047 ALBUMIN (HUMAN), 25%, 50ML: HCPCS | Performed by: NURSE PRACTITIONER

## 2017-08-03 PROCEDURE — 83735 ASSAY OF MAGNESIUM: CPT

## 2017-08-03 RX ORDER — FLUTICASONE PROPIONATE 50 MCG
2 SPRAY, SUSPENSION (ML) NASAL DAILY
Status: DISCONTINUED | OUTPATIENT
Start: 2017-08-03 | End: 2017-08-08 | Stop reason: HOSPADM

## 2017-08-03 RX ADMIN — FLUTICASONE PROPIONATE 2 SPRAY: 50 SPRAY, METERED NASAL at 08:08

## 2017-08-03 RX ADMIN — Medication 3 ML: at 09:08

## 2017-08-03 RX ADMIN — WARFARIN SODIUM 3 MG: 3 TABLET ORAL at 04:08

## 2017-08-03 RX ADMIN — ALBUMIN (HUMAN) 12.5 G: 12.5 SOLUTION INTRAVENOUS at 06:08

## 2017-08-03 RX ADMIN — PANTOPRAZOLE SODIUM 40 MG: 40 INJECTION, POWDER, FOR SOLUTION INTRAVENOUS at 09:08

## 2017-08-03 RX ADMIN — ESCITALOPRAM 20 MG: 5 TABLET, FILM COATED ORAL at 09:08

## 2017-08-03 RX ADMIN — GABAPENTIN 300 MG: 300 CAPSULE ORAL at 09:08

## 2017-08-03 RX ADMIN — FLUTICASONE PROPIONATE 2 SPRAY: 50 SPRAY, METERED NASAL at 01:08

## 2017-08-03 RX ADMIN — GABAPENTIN 300 MG: 300 CAPSULE ORAL at 02:08

## 2017-08-03 RX ADMIN — PANTOPRAZOLE SODIUM 40 MG: 40 INJECTION, POWDER, FOR SOLUTION INTRAVENOUS at 08:08

## 2017-08-03 RX ADMIN — SODIUM CHLORIDE 350 ML: 0.9 INJECTION, SOLUTION INTRAVENOUS at 06:08

## 2017-08-03 RX ADMIN — GABAPENTIN 300 MG: 300 CAPSULE ORAL at 06:08

## 2017-08-03 RX ADMIN — Medication 3 ML: at 02:08

## 2017-08-03 RX ADMIN — Medication 3 ML: at 06:08

## 2017-08-03 RX ADMIN — ATORVASTATIN CALCIUM 40 MG: 20 TABLET, FILM COATED ORAL at 10:08

## 2017-08-03 NOTE — PLAN OF CARE
Problem: Patient Care Overview  Goal: Plan of Care Review  Outcome: Ongoing (interventions implemented as appropriate)  Pt denies Chest pain, SOB or nausea. Fall risk- No falls, trauma or injury noted. VSS. H/H: 8.0/25.0  LVAD- HW- S&W twice a week. VAD numbers WNL. NPO since MN.GI consult. Video capsule endoscopy in am. Dialysis in am. Plan of care reviewed with patient. No further questions at this time. Will continue to monitor.

## 2017-08-03 NOTE — PROCEDURES
Patient aao x3 with no family at bedside. VAD interrogation completed this AM in the event changes needed to be made. Will continue to monitor for further issues.     Pulsatile: Yes, intermittent   VAD Sounds: Smooth  Problems / Issues / Alarms with VAD if any: None noted  HCT: 24.1  Waveforms: 5-8, no deflections.      VAD Interrogation:  TXP LVAD INTERROGATIONS 8/3/2017 8/3/2017 8/3/2017 8/3/2017 8/2/2017 8/2/2017 8/2/2017   Type Heartware Heartware Heartware Heartware Heartware Heartware Heartware   Flow 6.1 5.6 5.3 6.1 5.7 5.6 5.9   Speed 2600 2600 2600 2600 2600 2600 2600   PI - - - - - - -   Power (Kim) 3.8 3.9 3.7 3.8 3.7 3.1 3.1   Low Flow Alarm 2.5 - - - - - -   High Power Alarm 5.5 - - - - - -   Pulsatility Intermittent pulse - - - - - -   }

## 2017-08-03 NOTE — PLAN OF CARE
Problem: Patient Care Overview  Goal: Individualization & Mutuality  Outcome: Ongoing (interventions implemented as appropriate)  Patient progressing toward all goals. Plan of care discussed with patient, no questions at this time. Patient ambulating with assist, fall precautions in place. Video capsule study in progress; HD ordered for later; VS & LVAD numbers WNL. Will monitor.

## 2017-08-03 NOTE — ASSESSMENT & PLAN NOTE
-H/H today is stable  -INR is Subtherapeutic.   Continue Coumadin, Goal INR 2.0-3.0.  -Patient is NOT a candidate for heparin bridge.  -Has received blood transfusions this admission  -GI has been consulted and is following  -Procedures performed this admission: n/a  -Plan is for continued monitoring with no interventions planned at this time  -Continue pantoprazole 40 mg IV BID

## 2017-08-03 NOTE — ASSESSMENT & PLAN NOTE
-HeartWare HVAD Implanted 5/9/12 as DT  -HTS Primary  -Continue Coumadin, Goal INR 2.0-3.0. Subtherapeutic today.   -Antiplatelets Not on ASA  -LDH is stable overall today. Will continue to monitor daily.  -Speed set at 2700 rpm  -Interrogation notable for N/A not connected to system monitor  -Not listed for OHTx

## 2017-08-03 NOTE — SUBJECTIVE & OBJECTIVE
Interval History: Continued to have dark red stools with prep overnight. Emesis this morning when trying to complete prep. Otherwise no complaints.     Continuous Infusions:   Scheduled Meds:   sodium chloride 0.9%   Intravenous Once    atorvastatin  40 mg Oral Daily    escitalopram oxalate  20 mg Oral QHS    fluticasone  2 spray Each Nare Daily    gabapentin  300 mg Oral TID    pantoprazole  40 mg Intravenous BID    sodium chloride 0.9%  3 mL Intravenous Q8H    warfarin  3 mg Oral Daily     PRN Meds:sodium chloride, sodium chloride 0.9%, albumin human 25%    Review of patient's allergies indicates:   Allergen Reactions    Codeine Nausea And Vomiting    Demerol [meperidine] Nausea And Vomiting    Lortab [hydrocodone-acetaminophen] Nausea And Vomiting     Objective:     Vital Signs (Most Recent):  Temp: 98.4 °F (36.9 °C) (08/03/17 0715)  Pulse: 73 (08/03/17 1100)  Resp: 16 (08/03/17 0715)  BP: (!) 72/0 (08/03/17 1045)  SpO2: 96 % (08/03/17 0715) Vital Signs (24h Range):  Temp:  [97.4 °F (36.3 °C)-98.4 °F (36.9 °C)] 98.4 °F (36.9 °C)  Pulse:  [55-96] 73  Resp:  [16-18] 16  SpO2:  [96 %-99 %] 96 %  BP: (72-80)/(0) 72/0     Weight: 58.9 kg (129 lb 13.6 oz)  Body mass index is 25.36 kg/m².      Intake/Output Summary (Last 24 hours) at 08/03/17 1130  Last data filed at 08/02/17 2100   Gross per 24 hour   Intake              270 ml   Output                0 ml   Net              270 ml       Hemodynamic Parameters:       Telemetry: NAEO    Physical Exam   Constitutional: She is oriented to person, place, and time. She appears well-developed and well-nourished.   HENT:   Head: Normocephalic and atraumatic.   Neck: Normal range of motion. Neck supple.   Cardiovascular: Normal rate and regular rhythm.    vad hum smooth   Pulmonary/Chest: Effort normal and breath sounds normal. No respiratory distress. She has no wheezes.   Abdominal: Soft. Bowel sounds are normal. She exhibits no distension. There is no tenderness.  There is no guarding.   Musculoskeletal: Normal range of motion. She exhibits no edema.   Neurological: She is alert and oriented to person, place, and time.   Skin: Skin is warm and dry.   Psychiatric: She has a normal mood and affect. Her behavior is normal.   Nursing note and vitals reviewed.      Significant Labs:  CBC:    Recent Labs  Lab 08/03/17 0517   WBC 3.83*   RBC 2.49*   HGB 7.8*   HCT 24.1*   PLT 91*   MCV 97   MCH 31.3*   MCHC 32.4     BNP:    Recent Labs  Lab 08/02/17 0528   BNP 1,139*     CMP:    Recent Labs  Lab 08/03/17 0517   GLU 72   CALCIUM 8.3*   ALBUMIN 3.0*   PROT 5.8*      K 4.4   CO2 24      BUN 38*   CREATININE 7.2*   ALKPHOS 121   ALT 18   AST 34   BILITOT 0.6      Coagulation:     Recent Labs  Lab 08/03/17 0517   INR 1.1   APTT 24.4     LDH:    Recent Labs  Lab 08/02/17 0528 08/03/17 0517   * 282*     Microbiology:  Microbiology Results (last 7 days)     Procedure Component Value Units Date/Time    Blood culture #2 [[id number]] Collected:  08/01/17 1710    Order Status:  Completed Specimen:  Blood from Peripheral, Forearm, Right Updated:  08/02/17 2012     Blood Culture, Routine No Growth to date     Blood Culture, Routine No Growth to date    Narrative:       Blood Culture #2    Blood culture #1 [[id number]] Collected:  08/01/17 1636    Order Status:  Completed Specimen:  Blood from Peripheral, Antecubital, Right Updated:  08/02/17 1812     Blood Culture, Routine No Growth to date     Blood Culture, Routine No Growth to date    Narrative:       Blood Culture #1          I have reviewed all pertinent labs within the past 24 hours.    Estimated Creatinine Clearance: 6.8 mL/min (based on Cr of 7.2).    Diagnostic Results:  I have reviewed and interpreted all pertinent imaging results/findings within the past 24 hours.

## 2017-08-03 NOTE — PROGRESS NOTES
[facility]  Heart Transplant  Progress Note    Patient Name: Randa Devine  MRN: [id number]  Admission Date: 8/1/2017  Hospital Length of Stay: 2 days  Attending Physician: Caitlyn Ackerman MD  Primary Care Provider: Laura Garvin DO  Principal Problem:<principal problem not specified>    Subjective:     Interval History: Continued to have dark red stools with prep overnight. Emesis this morning when trying to complete prep. Otherwise no complaints.     Continuous Infusions:   Scheduled Meds:   sodium chloride 0.9%   Intravenous Once    atorvastatin  40 mg Oral Daily    escitalopram oxalate  20 mg Oral QHS    fluticasone  2 spray Each Nare Daily    gabapentin  300 mg Oral TID    pantoprazole  40 mg Intravenous BID    sodium chloride 0.9%  3 mL Intravenous Q8H    warfarin  3 mg Oral Daily     PRN Meds:sodium chloride, sodium chloride 0.9%, albumin human 25%    Review of patient's allergies indicates:   Allergen Reactions    Codeine Nausea And Vomiting    Demerol [meperidine] Nausea And Vomiting    Lortab [hydrocodone-acetaminophen] Nausea And Vomiting     Objective:     Vital Signs (Most Recent):  Temp: 98.4 °F (36.9 °C) (08/03/17 0715)  Pulse: 73 (08/03/17 1100)  Resp: 16 (08/03/17 0715)  BP: (!) 72/0 (08/03/17 1045)  SpO2: 96 % (08/03/17 0715) Vital Signs (24h Range):  Temp:  [97.4 °F (36.3 °C)-98.4 °F (36.9 °C)] 98.4 °F (36.9 °C)  Pulse:  [55-96] 73  Resp:  [16-18] 16  SpO2:  [96 %-99 %] 96 %  BP: (72-80)/(0) 72/0     Weight: 58.9 kg (129 lb 13.6 oz)  Body mass index is 25.36 kg/m².      Intake/Output Summary (Last 24 hours) at 08/03/17 1130  Last data filed at 08/02/17 2100   Gross per 24 hour   Intake              270 ml   Output                0 ml   Net              270 ml       Hemodynamic Parameters:       Telemetry: NAEO    Physical Exam   Constitutional: She is oriented to person, place, and time. She appears well-developed and well-nourished.   HENT:   Head: Normocephalic  and atraumatic.   Neck: Normal range of motion. Neck supple.   Cardiovascular: Normal rate and regular rhythm.    vad hum smooth   Pulmonary/Chest: Effort normal and breath sounds normal. No respiratory distress. She has no wheezes.   Abdominal: Soft. Bowel sounds are normal. She exhibits no distension. There is no tenderness. There is no guarding.   Musculoskeletal: Normal range of motion. She exhibits no edema.   Neurological: She is alert and oriented to person, place, and time.   Skin: Skin is warm and dry.   Psychiatric: She has a normal mood and affect. Her behavior is normal.   Nursing note and vitals reviewed.      Significant Labs:  CBC:    Recent Labs  Lab 08/03/17 0517   WBC 3.83*   RBC 2.49*   HGB 7.8*   HCT 24.1*   PLT 91*   MCV 97   MCH 31.3*   MCHC 32.4     BNP:    Recent Labs  Lab 08/02/17 0528   BNP 1,139*     CMP:    Recent Labs  Lab 08/03/17 0517   GLU 72   CALCIUM 8.3*   ALBUMIN 3.0*   PROT 5.8*      K 4.4   CO2 24      BUN 38*   CREATININE 7.2*   ALKPHOS 121   ALT 18   AST 34   BILITOT 0.6      Coagulation:     Recent Labs  Lab 08/03/17 0517   INR 1.1   APTT 24.4     LDH:    Recent Labs  Lab 08/02/17 0528 08/03/17 0517   * 282*     Microbiology:  Microbiology Results (last 7 days)     Procedure Component Value Units Date/Time    Blood culture #2 [[id number]] Collected:  08/01/17 1710    Order Status:  Completed Specimen:  Blood from Peripheral, Forearm, Right Updated:  08/02/17 2012     Blood Culture, Routine No Growth to date     Blood Culture, Routine No Growth to date    Narrative:       Blood Culture #2    Blood culture #1 [[id number]] Collected:  08/01/17 1636    Order Status:  Completed Specimen:  Blood from Peripheral, Antecubital, Right Updated:  08/02/17 1812     Blood Culture, Routine No Growth to date     Blood Culture, Routine No Growth to date    Narrative:       Blood Culture #1          I have reviewed all pertinent labs within the past 24  hours.    Estimated Creatinine Clearance: 6.8 mL/min (based on Cr of 7.2).    Diagnostic Results:  I have reviewed and interpreted all pertinent imaging results/findings within the past 24 hours.    Assessment and Plan:     LVAD (left ventricular assist device) present    -HeartWare HVAD Implanted 5/9/12 as DT  -HTS Primary  -Continue Coumadin, Goal INR 2.0-3.0. Subtherapeutic today.   -Antiplatelets Not on ASA  -LDH is stable overall today. Will continue to monitor daily.  -Speed set at 2700 rpm  -Interrogation notable for N/A not connected to system monitor  -Not listed for OHTx          GI bleed    -H/H today is stable  -INR is Subtherapeutic.   Continue Coumadin, Goal INR 2.0-3.0.  -Patient is NOT a candidate for heparin bridge.  -Has received blood transfusions this admission  -GI has been consulted and is following  -Procedures performed this admission: n/a  -Plan is for continued monitoring with no interventions planned at this time  -Continue pantoprazole 40 mg IV BID                Arlene Dunn PA-C  Heart Transplant  [facility]

## 2017-08-03 NOTE — PROGRESS NOTES
[facility]     Patient currently on hemodialysis for removal of uremic toxins and volume.       Done with golytely. Having frequent BMs slowing down. VCE today.       Recent Labs  Lab 08/01/17  1551 08/02/17  0758 08/03/17  0517   WBC 3.10* 4.08  4.08 3.83*   HGB 4.8* 8.1*  8.1* 7.8*   HCT 15.3* 25.0*  25.0* 24.1*   PLT 94* 96*  96* 91*       Recent Labs  Lab 08/01/17  1551 08/02/17  0528 08/03/17  0517    139 141   K 4.4 4.8 4.4    101 101   CO2 28 27 24   BUN 27* 33* 38*   CREATININE 5.4* 6.1* 7.2*   * 79 72   CALCIUM 8.1* 8.1* 8.3*   MG 1.7 1.9 2.1   PHOS 1.7* 3.1 3.6       Recent Labs  Lab 08/01/17  1551 08/02/17  0528 08/03/17  0517   ALKPHOS 98 101 121   ALT 13 16 18   AST 27 36 34   ALBUMIN 2.6* 2.8* 3.0*   PROT 5.2* 5.3* 5.8*   BILITOT 0.4 0.8 0.6   INR 1.3* 1.2 1.1      Lab Results   Component Value Date    .0 (H) 05/24/2016    CALCIUM 8.3 (L) 08/03/2017    CAION 1.09 11/27/2012    PHOS 3.6 08/03/2017       Exam  AAOx 3  Lungs clear  Heart S1S2, no murmurs  Extremities  No edema  Access SCOTT fistula good bruit/thrill      ASSESSMENT/PLAN:  ESRD on IHD TTS  - Will provide dialysis for metabolic clearance and volume today  - Given BMs- Target UF 2 L as tolerated  - Dialysate will be adjusted to current labs      Anemia of CKD and GI Bleed  -No epogen due to LVAD. INR 1.1. GI following. For VCE today.      Mineral Bone Disease in CKD   -  renal diet. No phos binder for now.      Lita Remy, MAAME  Nephrology

## 2017-08-04 ENCOUNTER — OUTPATIENT CASE MANAGEMENT (OUTPATIENT)
Dept: ADMINISTRATIVE | Facility: OTHER | Age: 62
End: 2017-08-04

## 2017-08-04 PROBLEM — R06.02 SHORTNESS OF BREATH: Status: ACTIVE | Noted: 2017-08-04

## 2017-08-04 LAB
ABO + RH BLD: NORMAL
ALBUMIN SERPL BCP-MCNC: 3 G/DL
ALBUMIN SERPL BCP-MCNC: 3.1 G/DL
ALBUMIN SERPL BCP-MCNC: 3.1 G/DL
ALLENS TEST: ABNORMAL
ALP SERPL-CCNC: 103 U/L
ALP SERPL-CCNC: 104 U/L
ALP SERPL-CCNC: 105 U/L
ALT SERPL W/O P-5'-P-CCNC: 15 U/L
ALT SERPL W/O P-5'-P-CCNC: 15 U/L
ALT SERPL W/O P-5'-P-CCNC: 16 U/L
ANION GAP SERPL CALC-SCNC: 10 MMOL/L
ANION GAP SERPL CALC-SCNC: 9 MMOL/L
APTT BLDCRRT: 25.8 SEC
AST SERPL-CCNC: 28 U/L
AST SERPL-CCNC: 28 U/L
AST SERPL-CCNC: 32 U/L
BASOPHILS # BLD AUTO: 0.01 K/UL
BASOPHILS # BLD AUTO: 0.02 K/UL
BASOPHILS NFR BLD: 0.4 %
BASOPHILS NFR BLD: 0.8 %
BILIRUB DIRECT SERPL-MCNC: 0.2 MG/DL
BILIRUB SERPL-MCNC: 0.6 MG/DL
BLD GP AB SCN CELLS X3 SERPL QL: NORMAL
BNP SERPL-MCNC: 1956 PG/ML
BUN SERPL-MCNC: 14 MG/DL
BUN SERPL-MCNC: 21 MG/DL
CALCIUM SERPL-MCNC: 8.2 MG/DL
CALCIUM SERPL-MCNC: 8.2 MG/DL
CHLORIDE SERPL-SCNC: 107 MMOL/L
CHLORIDE SERPL-SCNC: 107 MMOL/L
CO2 SERPL-SCNC: 24 MMOL/L
CO2 SERPL-SCNC: 25 MMOL/L
CREAT SERPL-MCNC: 3.8 MG/DL
CREAT SERPL-MCNC: 4.9 MG/DL
CRP SERPL-MCNC: 3.1 MG/L
DELSYS: ABNORMAL
DIFFERENTIAL METHOD: ABNORMAL
DIFFERENTIAL METHOD: ABNORMAL
EOSINOPHIL # BLD AUTO: 0.1 K/UL
EOSINOPHIL # BLD AUTO: 0.1 K/UL
EOSINOPHIL NFR BLD: 2.1 %
EOSINOPHIL NFR BLD: 2.9 %
ERYTHROCYTE [DISTWIDTH] IN BLOOD BY AUTOMATED COUNT: 17 %
ERYTHROCYTE [DISTWIDTH] IN BLOOD BY AUTOMATED COUNT: 17 %
EST. GFR  (AFRICAN AMERICAN): 10.4 ML/MIN/1.73 M^2
EST. GFR  (AFRICAN AMERICAN): 14.2 ML/MIN/1.73 M^2
EST. GFR  (NON AFRICAN AMERICAN): 12.3 ML/MIN/1.73 M^2
EST. GFR  (NON AFRICAN AMERICAN): 9 ML/MIN/1.73 M^2
GLUCOSE SERPL-MCNC: 122 MG/DL
GLUCOSE SERPL-MCNC: 59 MG/DL
HCT VFR BLD AUTO: 22.1 %
HCT VFR BLD AUTO: 22.2 %
HCT VFR BLD CALC: 17 %PCV (ref 36–54)
HGB BLD-MCNC: 7.1 G/DL
HGB BLD-MCNC: 7.1 G/DL
INR PPP: 1.3
LDH SERPL L TO P-CCNC: 229 U/L
LDH SERPL L TO P-CCNC: 328 U/L
LYMPHOCYTES # BLD AUTO: 0.4 K/UL
LYMPHOCYTES # BLD AUTO: 0.5 K/UL
LYMPHOCYTES NFR BLD: 15.6 %
LYMPHOCYTES NFR BLD: 18.3 %
MAGNESIUM SERPL-MCNC: 2 MG/DL
MAGNESIUM SERPL-MCNC: 2 MG/DL
MCH RBC QN AUTO: 31.8 PG
MCH RBC QN AUTO: 32.3 PG
MCHC RBC AUTO-ENTMCNC: 32 G/DL
MCHC RBC AUTO-ENTMCNC: 32.1 G/DL
MCV RBC AUTO: 100 FL
MCV RBC AUTO: 101 FL
MODE: ABNORMAL
MONOCYTES # BLD AUTO: 0.3 K/UL
MONOCYTES # BLD AUTO: 0.4 K/UL
MONOCYTES NFR BLD: 12.6 %
MONOCYTES NFR BLD: 14 %
NEUTROPHILS # BLD AUTO: 1.6 K/UL
NEUTROPHILS # BLD AUTO: 1.8 K/UL
NEUTROPHILS NFR BLD: 65.4 %
NEUTROPHILS NFR BLD: 67.5 %
PHOSPHATE SERPL-MCNC: 3 MG/DL
PLATELET # BLD AUTO: 68 K/UL
PLATELET # BLD AUTO: 79 K/UL
PMV BLD AUTO: 9.7 FL
PMV BLD AUTO: 9.7 FL
POTASSIUM SERPL-SCNC: 4.4 MMOL/L
POTASSIUM SERPL-SCNC: 5 MMOL/L
PREALB SERPL-MCNC: 21 MG/DL
PROT SERPL-MCNC: 5.5 G/DL
PROT SERPL-MCNC: 5.5 G/DL
PROT SERPL-MCNC: 5.7 G/DL
PROTHROMBIN TIME: 13.2 SEC
RBC # BLD AUTO: 2.2 M/UL
RBC # BLD AUTO: 2.23 M/UL
SAMPLE: ABNORMAL
SITE: ABNORMAL
SODIUM SERPL-SCNC: 141 MMOL/L
SODIUM SERPL-SCNC: 141 MMOL/L
WBC # BLD AUTO: 2.43 K/UL
WBC # BLD AUTO: 2.78 K/UL

## 2017-08-04 PROCEDURE — A4216 STERILE WATER/SALINE, 10 ML: HCPCS | Performed by: STUDENT IN AN ORGANIZED HEALTH CARE EDUCATION/TRAINING PROGRAM

## 2017-08-04 PROCEDURE — 36415 COLL VENOUS BLD VENIPUNCTURE: CPT

## 2017-08-04 PROCEDURE — 99232 SBSQ HOSP IP/OBS MODERATE 35: CPT | Mod: ,,, | Performed by: INTERNAL MEDICINE

## 2017-08-04 PROCEDURE — 93750 INTERROGATION VAD IN PERSON: CPT | Mod: ,,, | Performed by: INTERNAL MEDICINE

## 2017-08-04 PROCEDURE — C9113 INJ PANTOPRAZOLE SODIUM, VIA: HCPCS | Performed by: PHYSICIAN ASSISTANT

## 2017-08-04 PROCEDURE — 93010 ELECTROCARDIOGRAM REPORT: CPT | Mod: ,,, | Performed by: INTERNAL MEDICINE

## 2017-08-04 PROCEDURE — 20000000 HC ICU ROOM

## 2017-08-04 PROCEDURE — 27201040 HC RC 50 FILTER

## 2017-08-04 PROCEDURE — 85025 COMPLETE CBC W/AUTO DIFF WBC: CPT

## 2017-08-04 PROCEDURE — 25000003 PHARM REV CODE 250: Performed by: STUDENT IN AN ORGANIZED HEALTH CARE EDUCATION/TRAINING PROGRAM

## 2017-08-04 PROCEDURE — 93750 INTERROGATION VAD IN PERSON: CPT | Performed by: STUDENT IN AN ORGANIZED HEALTH CARE EDUCATION/TRAINING PROGRAM

## 2017-08-04 PROCEDURE — 85610 PROTHROMBIN TIME: CPT

## 2017-08-04 PROCEDURE — 63600175 PHARM REV CODE 636 W HCPCS: Performed by: PHYSICIAN ASSISTANT

## 2017-08-04 PROCEDURE — 83880 ASSAY OF NATRIURETIC PEPTIDE: CPT

## 2017-08-04 PROCEDURE — 36430 TRANSFUSION BLD/BLD COMPNT: CPT

## 2017-08-04 PROCEDURE — 86900 BLOOD TYPING SEROLOGIC ABO: CPT

## 2017-08-04 PROCEDURE — P9038 RBC IRRADIATED: HCPCS

## 2017-08-04 PROCEDURE — 91110 GI TRC IMG INTRAL ESOPH-ILE: CPT | Mod: 26,GC,, | Performed by: INTERNAL MEDICINE

## 2017-08-04 PROCEDURE — 80053 COMPREHEN METABOLIC PANEL: CPT | Mod: 91

## 2017-08-04 PROCEDURE — 97803 MED NUTRITION INDIV SUBSEQ: CPT | Performed by: NUTRITIONIST

## 2017-08-04 PROCEDURE — 93750 INTERROGATION VAD IN PERSON: CPT | Mod: ,,, | Performed by: THORACIC SURGERY (CARDIOTHORACIC VASCULAR SURGERY)

## 2017-08-04 PROCEDURE — 99233 SBSQ HOSP IP/OBS HIGH 50: CPT | Mod: 24,,, | Performed by: THORACIC SURGERY (CARDIOTHORACIC VASCULAR SURGERY)

## 2017-08-04 PROCEDURE — 25000003 PHARM REV CODE 250: Performed by: PHYSICIAN ASSISTANT

## 2017-08-04 PROCEDURE — 25000003 PHARM REV CODE 250: Performed by: EMERGENCY MEDICINE

## 2017-08-04 PROCEDURE — 86922 COMPATIBILITY TEST ANTIGLOB: CPT

## 2017-08-04 PROCEDURE — 84100 ASSAY OF PHOSPHORUS: CPT

## 2017-08-04 PROCEDURE — 83615 LACTATE (LD) (LDH) ENZYME: CPT | Mod: 91

## 2017-08-04 PROCEDURE — 83735 ASSAY OF MAGNESIUM: CPT | Mod: 91

## 2017-08-04 PROCEDURE — 25000003 PHARM REV CODE 250: Performed by: INTERNAL MEDICINE

## 2017-08-04 PROCEDURE — 86140 C-REACTIVE PROTEIN: CPT

## 2017-08-04 PROCEDURE — 83615 LACTATE (LD) (LDH) ENZYME: CPT

## 2017-08-04 PROCEDURE — 86850 RBC ANTIBODY SCREEN: CPT

## 2017-08-04 PROCEDURE — 93005 ELECTROCARDIOGRAM TRACING: CPT

## 2017-08-04 PROCEDURE — 85730 THROMBOPLASTIN TIME PARTIAL: CPT

## 2017-08-04 PROCEDURE — 27000248 HC VAD-ADDITIONAL DAY

## 2017-08-04 PROCEDURE — 80053 COMPREHEN METABOLIC PANEL: CPT

## 2017-08-04 PROCEDURE — 85014 HEMATOCRIT: CPT

## 2017-08-04 PROCEDURE — 80076 HEPATIC FUNCTION PANEL: CPT

## 2017-08-04 PROCEDURE — 84134 ASSAY OF PREALBUMIN: CPT

## 2017-08-04 PROCEDURE — 83735 ASSAY OF MAGNESIUM: CPT

## 2017-08-04 RX ORDER — HYDROCODONE BITARTRATE AND ACETAMINOPHEN 500; 5 MG/1; MG/1
TABLET ORAL
Status: DISCONTINUED | OUTPATIENT
Start: 2017-08-04 | End: 2017-08-08 | Stop reason: HOSPADM

## 2017-08-04 RX ORDER — PANTOPRAZOLE SODIUM 40 MG/1
40 TABLET, DELAYED RELEASE ORAL DAILY
Status: DISCONTINUED | OUTPATIENT
Start: 2017-08-05 | End: 2017-08-08 | Stop reason: HOSPADM

## 2017-08-04 RX ORDER — WARFARIN 1 MG/1
2 TABLET ORAL DAILY
Status: DISCONTINUED | OUTPATIENT
Start: 2017-08-04 | End: 2017-08-05

## 2017-08-04 RX ADMIN — ESCITALOPRAM 20 MG: 5 TABLET, FILM COATED ORAL at 09:08

## 2017-08-04 RX ADMIN — Medication 3 ML: at 05:08

## 2017-08-04 RX ADMIN — PANTOPRAZOLE SODIUM 40 MG: 40 INJECTION, POWDER, FOR SOLUTION INTRAVENOUS at 08:08

## 2017-08-04 RX ADMIN — WARFARIN SODIUM 2 MG: 2 TABLET ORAL at 04:08

## 2017-08-04 RX ADMIN — FLUTICASONE PROPIONATE 2 SPRAY: 50 SPRAY, METERED NASAL at 08:08

## 2017-08-04 RX ADMIN — GABAPENTIN 300 MG: 300 CAPSULE ORAL at 09:08

## 2017-08-04 RX ADMIN — ATORVASTATIN CALCIUM 40 MG: 20 TABLET, FILM COATED ORAL at 08:08

## 2017-08-04 RX ADMIN — Medication 3 ML: at 01:08

## 2017-08-04 RX ADMIN — GABAPENTIN 300 MG: 300 CAPSULE ORAL at 01:08

## 2017-08-04 RX ADMIN — Medication 3 ML: at 09:08

## 2017-08-04 RX ADMIN — GABAPENTIN 300 MG: 300 CAPSULE ORAL at 05:08

## 2017-08-04 NOTE — PLAN OF CARE
Problem: Patient Care Overview  Goal: Plan of Care Review  Outcome: Ongoing (interventions implemented as appropriate)  Pt denies Chest pain, SOB or nausea. Fall risk- No falls, trauma or injury noted. VSS. H/H: 7.8/24.1  LVAD- HW- S&W twice a week. Pt refused VAD dsg change- states that her daughter will do it for her. VAD numbers WNL. NPO since MN.GI consult. Video capsule endoscopy done . Dialysis done - 2L fluid removed. Plan of care reviewed with patient. No further questions at this time. Will continue to monitor.

## 2017-08-04 NOTE — PROGRESS NOTES
8/4/17 - Observed in EMR pt has been re-admitted to [facility]. CM will continue to monitor EMR and make contact with pt upon her return home. Ina Lovell RN

## 2017-08-04 NOTE — NURSING
Stat HCT is 17. Decreased from 22. Notified Dr. Avalos. Awaiting further orders. Will continue to monitor.

## 2017-08-04 NOTE — PLAN OF CARE
Problem: Patient Care Overview  Goal: Plan of Care Review  Outcome: Ongoing (interventions implemented as appropriate)  Recommendations     Recommendation/Intervention:   1. Advance diet to renal cardiac as medically able and tolerated   2. If unable to advance diet in 48-72 hours, please consult RD for nutrition support recommendations  3. RD to monitor and follow up     Goals: Pt to recieve adequate nutrition within 48-72 hours  Nutrition Goal Status: Ongoing    Assessment and Plan           End stage renal disease on dialysis     Nutrition Diagnosis:  Impaired nutrient utilization     Related to (etiology):   Renal disease impact on ability of kidneys to excrete nutrients                     Signs and Symptoms (as evidenced by):   Pt need for hemodialysis     Interventions/Recommendations (treatment strategy):  As medically appropriate, advance diet to renal cardiac     Nutrition Diagnosis Status:   New

## 2017-08-04 NOTE — NURSING
Pt doppler is 46 and flow increased to 10liters/min. Pt is asymptomatic w/o any complaints. Notified KAREN Rosas. No further orders given. Will continue to monitor.

## 2017-08-04 NOTE — PROGRESS NOTES
Daily E and M and VAD Interrogation Note    Reason for Visit: Low H&H/GIB  Patient is seen in follow up for management of:  [] HeartMate II  [x] Heartware [] Total artificial heart       [] ECMO           [] Other     Interval History:  [] Interval history unobtainable due to intubation.  The [x] implant 05/2012 as DT  No Events overnight.      Review of Systems:  Denies CP, SOB, abd pain, N/V  All other systems reviewed and negative    Medications:  Current Facility-Administered Medications   Medication Dose Route Frequency Provider Last Rate Last Dose    0.9%  NaCl infusion (for blood administration)   Intravenous Q24H PRN Noman Arguello MD        0.9%  NaCl infusion   Intravenous PRN Lita Remy NP        albumin human 25% bottle 12.5 g  12.5 g Intravenous PRN Lita Remy NP   12.5 g at 08/03/17 1817    atorvastatin tablet 40 mg  40 mg Oral Daily KAREN Carroll   40 mg at 08/04/17 0849    escitalopram oxalate tablet 20 mg  20 mg Oral QHS KAREN Carroll   20 mg at 08/03/17 2103    fluticasone 50 mcg/actuation nasal spray 2 spray  2 spray Each Nare Daily Morena Grace MD   2 spray at 08/04/17 0849    gabapentin capsule 300 mg  300 mg Oral TID Noman Arguello MD   300 mg at 08/04/17 1359    [START ON 8/5/2017] pantoprazole EC tablet 40 mg  40 mg Oral Daily Caitlyn Ackerman MD        sodium chloride 0.9% flush 3 mL  3 mL Intravenous Q8H Terese Jaramillo MD   3 mL at 08/04/17 1359    warfarin (COUMADIN) tablet 2 mg  2 mg Oral Daily Caitlyn Ackerman MD   2 mg at 08/04/17 1641       Physical Examination:  Vital Signs:   Vitals:    08/04/17 1600   BP: (!) 76/0   Pulse: 72   Resp:    Temp: 97.8 °F (36.6 °C)     Cardiovascular:  [x] Regular rate and rhythm. VAD sounds smooth  [x]  No edema []  Edema present  [x]  Clear to auscultation  []  Rales to  []  Coarse  []  No rales but   [] Pedal Pulses absent  []  Pulses + throughout  Skin:  Incision is [x]  Clean,  dry and intact.    Sternum:  [x]  Stable []  Unstable  Driveline(s):   [x]  Clean, dry and intact.       Labs:  CBC, CMP, LDH and Coags    X-Rays:  []  I reviewed today's Chest x-ray    Procedure:  Device Interrogation including analysis of device parameters.  Current Settings  [x]  Ventricular Assist Device  []  Total Artificial Heart interrogated  Review of device function is [x]  Stable     TXP LVAD INTERROGATIONS 8/4/2017 8/4/2017 8/4/2017 8/4/2017 8/4/2017 8/3/2017 8/3/2017   Type Heartware Heartware Heartware Heartware Heartware Heartware Heartware   Flow 10.0 7.6 10.0 - 6.1 6.4 5.8   Speed 2600 2600 2600 - 2600 2600 2600   PI - - - - - - -   Power (Kim) 5.7 4.2 4.5 - 3.9 3.7 3.8   Low Flow Alarm - - - - - - 2.5   High Power Alarm - - - - - - 5.5   Pulsatility - - - - - - Intermittent pulse       Assessment:  []  Primary Cardiomyopathy []  Congestive Heart Failure   []  Atrial Fibrillation []  Ventricular Tachycardia   []  Aftercare cardiac device []  Long term (current) use of anticoagulants   []  Ventilator-associated pneumonia []  Pneumonia viral, unspecified   []  Pneumonia, bacterial, unspecified []  Pneumonia, organism unspecified   [x]  Hemorrhage of GI tract, unspecified    []  Nosebleed []  Driveline infection   []  Infection VAD device          Plan:  [x]  Interval history obtained from HTS attending team member during rounding today  [x]  VAD/GIN teaching performed with patient  [x]  Mobilization / Physical Therapy ongoing  [x]  Anticoagulation [x]  Ongoing []  Held  [x]  Studies ordered-VCE  -Transfusion of PRBC  -H&H improving  - High flow alarms today, doppler 76  -VCE  Pending- risk of stroke/pump thrombosis supercedes risk of rebleed and decided to restart coumadin-INR goal of 1.3-2.3       Total time spent was 32 minutes.  Of which more than 50 percent of the care dominated counseling and coordinating care with different team members. The VAD was interrogated and all parameters were WNL and  no significant findings were found in the history. All these findings are documented in the note above.      Date of Service: 08/04/2017       Cardiac Surgery Attending E and M (VAD) Note along with VAD Interrogation    I have seen and examined the patient and agree with the findings above    I also reviewed the patients clinical course and:  [x]  Hemodynamic & Respiratory parameters  [x]  Laboratory Data  [x]  Radiological studies     VAD Interrogated [x]      VAD Function is normal. Changes made []  none [x]      Interrogation of Ventricular assist device was performed with physician analysis of device parameters and review of device function. I have personally reviewed the interrogation findings and agree with findings as stated.   Results still not available of capsule endoscopy, per patient today BM had dark blood rather than bright blood that she had noticed prior.

## 2017-08-04 NOTE — PROGRESS NOTES
"High power alarms noted--power 5.8.  Pt reports that her LVAD doesn't normally alarm so she wasn't sure what to do.  Also reports that her "heart feels funny", but denies any c/o SOB, dizziness, lightheadedness, or H/A.  L. Marco, LVAD coordinator, notified; stated to increase power alarm to 7 and notify MD.  Orders implemented as instructed.  DP 74.  FARAZ Felix notified.  Will continue to monitor.   "

## 2017-08-04 NOTE — SUBJECTIVE & OBJECTIVE
Interval History: patient hungry this morning, no SOB    Continuous Infusions:   Scheduled Meds:   atorvastatin  40 mg Oral Daily    escitalopram oxalate  20 mg Oral QHS    fluticasone  2 spray Each Nare Daily    gabapentin  300 mg Oral TID    [START ON 8/5/2017] pantoprazole  40 mg Oral Daily    sodium chloride 0.9%  3 mL Intravenous Q8H    warfarin  2 mg Oral Daily     PRN Meds:sodium chloride, sodium chloride 0.9%, albumin human 25%    Review of patient's allergies indicates:   Allergen Reactions    Codeine Nausea And Vomiting    Demerol [meperidine] Nausea And Vomiting    Lortab [hydrocodone-acetaminophen] Nausea And Vomiting     Objective:     Vital Signs (Most Recent):  Temp: 98.6 °F (37 °C) (08/04/17 0846)  Pulse: 72 (08/04/17 1100)  Resp: 16 (08/04/17 0846)  BP: (!) 83/60 (08/04/17 0846)  SpO2: 95 % (08/04/17 0846) Vital Signs (24h Range):  Temp:  [98.1 °F (36.7 °C)-98.6 °F (37 °C)] 98.6 °F (37 °C)  Pulse:  [66-78] 72  Resp:  [16-18] 16  SpO2:  [95 %-99 %] 95 %  BP: ()/(0-64) 83/60     Weight: 58.9 kg (129 lb 13.6 oz)  Body mass index is 25.36 kg/m².      Intake/Output Summary (Last 24 hours) at 08/04/17 1325  Last data filed at 08/04/17 0600   Gross per 24 hour   Intake              830 ml   Output             2650 ml   Net            -1820 ml       Hemodynamic Parameters:           Physical Exam   Constitutional: She is oriented to person, place, and time. She appears well-developed and well-nourished.   HENT:   Head: Normocephalic and atraumatic.   Neck: Normal range of motion. Neck supple.   Cardiovascular: Normal rate and regular rhythm.    vad hum smooth   Pulmonary/Chest: Effort normal and breath sounds normal. No respiratory distress. She has no wheezes.   Abdominal: Soft. Bowel sounds are normal. She exhibits no distension. There is no tenderness. There is no guarding.   Musculoskeletal: Normal range of motion. She exhibits no edema.   Neurological: She is alert and oriented to  person, place, and time.   Skin: Skin is warm and dry.   Psychiatric: She has a normal mood and affect. Her behavior is normal.   Nursing note and vitals reviewed.      Significant Labs:  CBC:    Recent Labs  Lab 08/04/17 0359   WBC 2.78*   RBC 2.23*   HGB 7.1*   HCT 22.2*   PLT 79*   *   MCH 31.8*   MCHC 32.0     BNP:    Recent Labs  Lab 08/04/17 0359   BNP 1,956*     CMP:    Recent Labs  Lab 08/04/17 0359   GLU 59*   CALCIUM 8.2*   ALBUMIN 3.1*  3.1*   PROT 5.5*  5.5*      K 4.4   CO2 24      BUN 14   CREATININE 3.8*   ALKPHOS 104  103   ALT 15  16   AST 28  28   BILITOT 0.6  0.6      Coagulation:     Recent Labs  Lab 08/04/17 0359   INR 1.3*   APTT 25.8     LDH:    Recent Labs  Lab 08/02/17 0528 08/03/17 0517 08/04/17 0359   * 282* 229     Microbiology:  Microbiology Results (last 7 days)     Procedure Component Value Units Date/Time    Blood culture #2 [[id number]] Collected:  08/01/17 1710    Order Status:  Completed Specimen:  Blood from Peripheral, Forearm, Right Updated:  08/03/17 2012     Blood Culture, Routine No Growth to date     Blood Culture, Routine No Growth to date     Blood Culture, Routine No Growth to date    Narrative:       Blood Culture #2    Blood culture #1 [[id number]] Collected:  08/01/17 1636    Order Status:  Completed Specimen:  Blood from Peripheral, Antecubital, Right Updated:  08/03/17 1812     Blood Culture, Routine No Growth to date     Blood Culture, Routine No Growth to date     Blood Culture, Routine No Growth to date    Narrative:       Blood Culture #1          I have reviewed all pertinent labs within the past 24 hours.    Estimated Creatinine Clearance: 12.8 mL/min (based on Cr of 3.8).    Diagnostic Results:  I have reviewed and interpreted all pertinent imaging results/findings within the past 24 hours.

## 2017-08-04 NOTE — PLAN OF CARE
Problem: Patient Care Overview  Goal: Plan of Care Review  Pt remains free from any falls, injuries or traumas. Bed low and locked. Call bell within reach. sr up x2. Currently, she receives dialysis on Tuesday, Thursday and Saturday through a lt. Upper arm graft. Also, she has a LVAD intact; dressing changes are done 2x/weekly. Her daughter is only allowed to performed the dressing change.

## 2017-08-04 NOTE — PROGRESS NOTES
[facility]  Adult Nutrition  Progress Note     SUMMARY      Recommendations     Recommendation/Intervention:   1. Advance diet to renal cardiac as medically able and tolerated   2. If unable to advance diet in 48-72 hours, please consult RD for nutrition support recommendations  3. RD to monitor and follow up    Goals: Pt to recieve adequate nutrition within 48-72 hours  Nutrition Goal Status: Ongoing           Reason for Assessment     Reason for Assessment: RD Follow up   Diagnosis: cardiac disease, renal disease  Relevent Medical History: ESRD on HD, chf, cad, htn   Interdisciplinary Rounds: did not attend     General Information Comments: Pt diet was advanced and today again NPO.  Spoke with patient who states she is tolerating food and not sure why NPO again today     Nutrition Discharge Planning: pt to consume optimal nutrition for condition     Nutrition Prescription Ordered     Current Diet Order: NPO        Evaluation of Received Nutrients/Fluid Intake     Energy Calories Required: not meeting needs  % Kcal Needs: 0     Protein Required: not meeting needs  % Protein Needs: 0        % Intake of Estimated Energy Needs: 0 - 25 %  % Meal Intake: NPO     Nutrition Risk Screen     Nutrition Risk Screen: no indicators present     Nutrition/Diet History     Patient Reported Diet/Restrictions/Preferences: renal, low salt, heart healthy     Factors Affecting Nutritional Intake: NPO     Labs/Tests/Procedures/Meds     Pertinent Labs Reviewed: reviewed, pertinent  Pertinent Labs Comments:  Pertinent Medications Reviewed: reviewed, pertinent  Pertinent Medications Comments: coumadin, statin     Physical Findings     Overall Physical Appearance: overweight, nourished     Oral/Mouth Cavity: WDL  Skin: intact     Anthropometrics     Temp: 97.4 °F (36.3 °C)     Height: 5' (152.4 cm)  Weight Method: Stated  Weight: 58.9 kg (129 lb 13.6 oz)     Ideal Body Weight (IBW), Female: 100 lb     % Ideal Body  Weight, Female (lb): 129.85 lb  BMI (Calculated): 25.4  BMI Grade: 25 - 29.9 - overweigh     Estimated/Assessed Needs     Weight Used For Calorie Calculations: 58.9 kg (129 lb 13.6 oz)   Height (cm): 152.4 cm  Energy Calorie Requirements (kcal):  (1302-1411kcal/day (1.2-1.3 PAL))  Energy Need Method: Greene-St Jeor        RMR (Greene-St. Jeor Equation): 1085.5        Weight Used For Protein Calculations: 58.9 kg (129 lb 13.6 oz)  Protein Requirements: 65-77g/day (1.1-1.3 g/kg)     Fluid Need Method: RDA Method     Assessment and Plan         End stage renal disease on dialysis     Nutrition Diagnosis:  Impaired nutrient utilization     Related to (etiology):   Renal disease impact on ability of kidneys to excrete nutrients                     Signs and Symptoms (as evidenced by):   Pt need for hemodialysis     Interventions/Recommendations (treatment strategy):  As medically appropriate, advance diet to renal cardiac     Nutrition Diagnosis Status:   New                Monitor and Evaluation     Food and Nutrient Intake: energy intake, food and beverage intake  Food and Nutrient Adminstration: diet order  Knowledge/Beliefs/Attitudes: food and nutrition knowledge/skill  Physical Activity and Function: nutrition-related ADLs and IADLs  Anthropometric Measurements: weight, body mass index, weight change  Biochemical Data, Medical Tests and Procedures: gastrointestinal profile, electrolyte and renal panel, glucose/endocrine profile, inflammatory profile, lipid profile  Nutrition-Focused Physical Findings: overall appearance     Nutrition Risk     Level of Risk:  (2x/week)     Nutrition Follow-Up

## 2017-08-04 NOTE — PROGRESS NOTES
Pt has a [employer]- soap and water dsg change twice a week. Last done was on  7/30 by her daughter. Pt refused dsg change. She states only her daughter will change the dsg. She is not letting anyone else to change her dsg. Keep encouraging pt for the dsg change but she refuses. Educated pt about the risk for infection. Pt verbalized understanding. Dr. Grace notified.

## 2017-08-04 NOTE — NURSING
"Called to the room by the patient stating "her heart feels like she has a cold". Eneida, LVAD RN at the bedside. pt flow and power remains increased.  In addition,  and Ella are at the bedside. Stat labs and ekg obtained. Bedside echo performed by Dr. Jaramillo. Awaiting further orders. Will continue to monitor.   "

## 2017-08-04 NOTE — PROCEDURES
Patient awake with no family at bedside. VAD interrogation completed this AM in the event changes needed to be made. Will continue to monitor for further issues.     Pulsatile: Yes, intermittent   VAD Sounds: Smooth  Problems / Issues / Alarms with VAD if any: None noted  HCT: 22.2  Waveforms: 5-8      VAD Interrogation:  TXP LVAD INTERROGATIONS 8/4/2017 8/4/2017 8/4/2017 8/4/2017 8/3/2017 8/3/2017 8/3/2017   Type Heartware Heartware Heartware Heartware Heartware Heartware Heartware   Flow 7.6 10.0 - 6.1 6.4 5.8 6.0   Speed 2600 2600 - 2600 2600 2600 2600   PI - - - - - - -   Power (Kim) 4.2 4.5 - 3.9 3.7 3.8 3.9   Low Flow Alarm - - - - - 2.5 -   High Power Alarm - - - - - 5.5 -   Pulsatility - - - - - Intermittent pulse -   }

## 2017-08-04 NOTE — TREATMENT PLAN
GI Follow-up Note    Preliminary video capsule read, blood noted in the colon, see full report to follow, will plan for colonoscopy on Monday, clear liquid diet on Sunday and NPO Sunday night.    Please do not hesitate to call us with questions.    Timothy Hutchins  Gastroenterology Fellow (PGY 6)

## 2017-08-04 NOTE — PROGRESS NOTES
VAD coordinator to bedside to assess pt after being notified per Asya RUTH of pt elevated watt alarms.  Upon interrogation of HVAD, flows noted to be >10, speed 2600, power 6.1.  Pump sounds loud, vibrating, but not grinding.  Dr. Ackerman and Dr. Avalos notified and Dr. Avalos to bedside.  HVAD waveforms sent to [employer] and STAT labs/Echo ordered.       Log files received and reviewed with MAGUI Lerner, clinical specialist of [employer] and Dr. Avalos  Concern for anemia vs pump thrombosis.  Per Dr. Avalos, pt to move CMICU.  Explained all of the above to pt and pt verbalizes understanding and in agreement of plan.

## 2017-08-04 NOTE — NURSING
Notified KAREN Rosas on patient high flow alarms. Asymptomatic w/o any complaints. Doppler is 76. No further orders given. Will continue to monitor.

## 2017-08-04 NOTE — PLAN OF CARE
Acute bedside HD treatment complete after 3.5 hours.  2L of fluid removed.  Blood returned.  Lines flushed and needles removed.  Pressure held for 10 min @ each site to achieve hemostasis.  Report given to FARAZ Griffiths.

## 2017-08-04 NOTE — PROGRESS NOTES
[facility]  Heart Transplant  Progress Note    Patient Name: Randa Devine  MRN: [id number]  Admission Date: 8/1/2017  Hospital Length of Stay: 3 days  Attending Physician: Caitlyn Ackerman MD  Primary Care Provider: Laura Garvin DO  Principal Problem:<principal problem not specified>    Subjective:     Interval History: patient hungry this morning, no SOB    Continuous Infusions:   Scheduled Meds:   atorvastatin  40 mg Oral Daily    escitalopram oxalate  20 mg Oral QHS    fluticasone  2 spray Each Nare Daily    gabapentin  300 mg Oral TID    [START ON 8/5/2017] pantoprazole  40 mg Oral Daily    sodium chloride 0.9%  3 mL Intravenous Q8H    warfarin  2 mg Oral Daily     PRN Meds:sodium chloride, sodium chloride 0.9%, albumin human 25%    Review of patient's allergies indicates:   Allergen Reactions    Codeine Nausea And Vomiting    Demerol [meperidine] Nausea And Vomiting    Lortab [hydrocodone-acetaminophen] Nausea And Vomiting     Objective:     Vital Signs (Most Recent):  Temp: 98.6 °F (37 °C) (08/04/17 0846)  Pulse: 72 (08/04/17 1100)  Resp: 16 (08/04/17 0846)  BP: (!) 83/60 (08/04/17 0846)  SpO2: 95 % (08/04/17 0846) Vital Signs (24h Range):  Temp:  [98.1 °F (36.7 °C)-98.6 °F (37 °C)] 98.6 °F (37 °C)  Pulse:  [66-78] 72  Resp:  [16-18] 16  SpO2:  [95 %-99 %] 95 %  BP: ()/(0-64) 83/60     Weight: 58.9 kg (129 lb 13.6 oz)  Body mass index is 25.36 kg/m².      Intake/Output Summary (Last 24 hours) at 08/04/17 1325  Last data filed at 08/04/17 0600   Gross per 24 hour   Intake              830 ml   Output             2650 ml   Net            -1820 ml       Hemodynamic Parameters:           Physical Exam   Constitutional: She is oriented to person, place, and time. She appears well-developed and well-nourished.   HENT:   Head: Normocephalic and atraumatic.   Neck: Normal range of motion. Neck supple.   Cardiovascular: Normal rate and regular rhythm.    vad hum smooth    Pulmonary/Chest: Effort normal and breath sounds normal. No respiratory distress. She has no wheezes.   Abdominal: Soft. Bowel sounds are normal. She exhibits no distension. There is no tenderness. There is no guarding.   Musculoskeletal: Normal range of motion. She exhibits no edema.   Neurological: She is alert and oriented to person, place, and time.   Skin: Skin is warm and dry.   Psychiatric: She has a normal mood and affect. Her behavior is normal.   Nursing note and vitals reviewed.      Significant Labs:  CBC:    Recent Labs  Lab 08/04/17 0359   WBC 2.78*   RBC 2.23*   HGB 7.1*   HCT 22.2*   PLT 79*   *   MCH 31.8*   MCHC 32.0     BNP:    Recent Labs  Lab 08/04/17 0359   BNP 1,956*     CMP:    Recent Labs  Lab 08/04/17 0359   GLU 59*   CALCIUM 8.2*   ALBUMIN 3.1*  3.1*   PROT 5.5*  5.5*      K 4.4   CO2 24      BUN 14   CREATININE 3.8*   ALKPHOS 104  103   ALT 15  16   AST 28  28   BILITOT 0.6  0.6      Coagulation:     Recent Labs  Lab 08/04/17 0359   INR 1.3*   APTT 25.8     LDH:    Recent Labs  Lab 08/02/17  0528 08/03/17  0517 08/04/17 0359   * 282* 229     Microbiology:  Microbiology Results (last 7 days)     Procedure Component Value Units Date/Time    Blood culture #2 [[id number]] Collected:  08/01/17 1710    Order Status:  Completed Specimen:  Blood from Peripheral, Forearm, Right Updated:  08/03/17 2012     Blood Culture, Routine No Growth to date     Blood Culture, Routine No Growth to date     Blood Culture, Routine No Growth to date    Narrative:       Blood Culture #2    Blood culture #1 [[id number]] Collected:  08/01/17 1636    Order Status:  Completed Specimen:  Blood from Peripheral, Antecubital, Right Updated:  08/03/17 1812     Blood Culture, Routine No Growth to date     Blood Culture, Routine No Growth to date     Blood Culture, Routine No Growth to date    Narrative:       Blood Culture #1          I have reviewed all pertinent labs within the past 24  hours.    Estimated Creatinine Clearance: 12.8 mL/min (based on Cr of 3.8).    Diagnostic Results:  I have reviewed and interpreted all pertinent imaging results/findings within the past 24 hours.    Assessment and Plan:     LVAD (left ventricular assist device) present    -[employer] HVAD Implanted 5/9/12 as DT  -HTS Primary  -Continue Coumadin, Goal INR 1.3-2.3.   -Antiplatelets Not on ASA  -LDH is stable overall today. Will continue to monitor daily.  -Speed set at 2700 rpm  -Interrogation notable for N/A not connected to system monitor  -Not listed for OHTx          GI bleed    -H/H today is downtrending, monitor   Continue Coumadin  -Patient is NOT a candidate for heparin bridge.  -Has received blood transfusions this admission  -GI has been consulted and is following  -Procedures performed this admission: n/a  -Plan is for continued monitoring with no interventions planned at this time  -Continue pantoprazole 40 mg IV BID                KAREN Torres  Heart Transplant  [facility]

## 2017-08-05 LAB
ALBUMIN SERPL BCP-MCNC: 2.9 G/DL
ALBUMIN SERPL BCP-MCNC: 3 G/DL
ALP SERPL-CCNC: 97 U/L
ALP SERPL-CCNC: 98 U/L
ALT SERPL W/O P-5'-P-CCNC: 16 U/L
ALT SERPL W/O P-5'-P-CCNC: 22 U/L
ANION GAP SERPL CALC-SCNC: 10 MMOL/L
ANION GAP SERPL CALC-SCNC: 10 MMOL/L
ANION GAP SERPL CALC-SCNC: 13 MMOL/L
APTT BLDCRRT: 25.6 SEC
AST SERPL-CCNC: 114 U/L
AST SERPL-CCNC: 57 U/L
BASOPHILS # BLD AUTO: 0.01 K/UL
BASOPHILS # BLD AUTO: 0.02 K/UL
BASOPHILS # BLD AUTO: 0.02 K/UL
BASOPHILS NFR BLD: 0.1 %
BASOPHILS NFR BLD: 0.4 %
BASOPHILS NFR BLD: 0.4 %
BILIRUB SERPL-MCNC: 0.8 MG/DL
BILIRUB SERPL-MCNC: 1.6 MG/DL
BLD PROD TYP BPU: NORMAL
BLD PROD TYP BPU: NORMAL
BLOOD UNIT EXPIRATION DATE: NORMAL
BLOOD UNIT EXPIRATION DATE: NORMAL
BLOOD UNIT TYPE CODE: 5100
BLOOD UNIT TYPE CODE: 5100
BLOOD UNIT TYPE: NORMAL
BLOOD UNIT TYPE: NORMAL
BUN SERPL-MCNC: 16 MG/DL
BUN SERPL-MCNC: 28 MG/DL
BUN SERPL-MCNC: 37 MG/DL
CALCIUM SERPL-MCNC: 7.9 MG/DL
CALCIUM SERPL-MCNC: 8.6 MG/DL
CALCIUM SERPL-MCNC: 8.7 MG/DL
CHLORIDE SERPL-SCNC: 107 MMOL/L
CO2 SERPL-SCNC: 20 MMOL/L
CO2 SERPL-SCNC: 22 MMOL/L
CO2 SERPL-SCNC: 23 MMOL/L
CODING SYSTEM: NORMAL
CODING SYSTEM: NORMAL
CREAT SERPL-MCNC: 3.2 MG/DL
CREAT SERPL-MCNC: 5.3 MG/DL
CREAT SERPL-MCNC: 6.1 MG/DL
DIFFERENTIAL METHOD: ABNORMAL
DISPENSE STATUS: NORMAL
DISPENSE STATUS: NORMAL
EOSINOPHIL # BLD AUTO: 0 K/UL
EOSINOPHIL # BLD AUTO: 0.1 K/UL
EOSINOPHIL # BLD AUTO: 0.1 K/UL
EOSINOPHIL NFR BLD: 0.5 %
EOSINOPHIL NFR BLD: 1.1 %
EOSINOPHIL NFR BLD: 1.8 %
ERYTHROCYTE [DISTWIDTH] IN BLOOD BY AUTOMATED COUNT: 17.6 %
ERYTHROCYTE [DISTWIDTH] IN BLOOD BY AUTOMATED COUNT: 17.9 %
ERYTHROCYTE [DISTWIDTH] IN BLOOD BY AUTOMATED COUNT: 18 %
EST. GFR  (AFRICAN AMERICAN): 17.5 ML/MIN/1.73 M^2
EST. GFR  (AFRICAN AMERICAN): 8 ML/MIN/1.73 M^2
EST. GFR  (AFRICAN AMERICAN): 9.5 ML/MIN/1.73 M^2
EST. GFR  (NON AFRICAN AMERICAN): 15.1 ML/MIN/1.73 M^2
EST. GFR  (NON AFRICAN AMERICAN): 6.9 ML/MIN/1.73 M^2
EST. GFR  (NON AFRICAN AMERICAN): 8.2 ML/MIN/1.73 M^2
FACT X PPP CHRO-ACNC: 0.11 IU/ML
GLUCOSE SERPL-MCNC: 106 MG/DL
GLUCOSE SERPL-MCNC: 145 MG/DL
GLUCOSE SERPL-MCNC: 96 MG/DL
HCT VFR BLD AUTO: 24.7 %
HCT VFR BLD AUTO: 27.1 %
HCT VFR BLD AUTO: 29 %
HGB BLD-MCNC: 8.4 G/DL
HGB BLD-MCNC: 9 G/DL
HGB BLD-MCNC: 9.7 G/DL
INR PPP: 1.4
LDH SERPL L TO P-CCNC: 1470 U/L
LYMPHOCYTES # BLD AUTO: 0.4 K/UL
LYMPHOCYTES # BLD AUTO: 0.4 K/UL
LYMPHOCYTES # BLD AUTO: 0.7 K/UL
LYMPHOCYTES NFR BLD: 14.6 %
LYMPHOCYTES NFR BLD: 5.8 %
LYMPHOCYTES NFR BLD: 7.7 %
MAGNESIUM SERPL-MCNC: 2.6 MG/DL
MCH RBC QN AUTO: 31.1 PG
MCH RBC QN AUTO: 31.7 PG
MCH RBC QN AUTO: 31.9 PG
MCHC RBC AUTO-ENTMCNC: 33.2 G/DL
MCHC RBC AUTO-ENTMCNC: 33.4 G/DL
MCHC RBC AUTO-ENTMCNC: 34 G/DL
MCV RBC AUTO: 92 FL
MCV RBC AUTO: 95 FL
MCV RBC AUTO: 96 FL
METHEMOGLOBIN: 1.5 % (ref 0–3)
MONOCYTES # BLD AUTO: 0.6 K/UL
MONOCYTES # BLD AUTO: 0.7 K/UL
MONOCYTES # BLD AUTO: 0.8 K/UL
MONOCYTES NFR BLD: 11 %
MONOCYTES NFR BLD: 12.4 %
MONOCYTES NFR BLD: 15.9 %
NEUTROPHILS # BLD AUTO: 3.5 K/UL
NEUTROPHILS # BLD AUTO: 3.6 K/UL
NEUTROPHILS # BLD AUTO: 6.1 K/UL
NEUTROPHILS NFR BLD: 70.6 %
NEUTROPHILS NFR BLD: 74.9 %
NEUTROPHILS NFR BLD: 82.3 %
NUM UNITS TRANS PACKED RBC: NORMAL
NUM UNITS TRANS PACKED RBC: NORMAL
PLATELET # BLD AUTO: 50 K/UL
PLATELET # BLD AUTO: 64 K/UL
PLATELET # BLD AUTO: 66 K/UL
PMV BLD AUTO: 10.2 FL
PMV BLD AUTO: 10.6 FL
PMV BLD AUTO: 9.9 FL
POTASSIUM SERPL-SCNC: 4.1 MMOL/L
POTASSIUM SERPL-SCNC: 4.9 MMOL/L
POTASSIUM SERPL-SCNC: 5.3 MMOL/L
PROT SERPL-MCNC: 5.9 G/DL
PROT SERPL-MCNC: 6.5 G/DL
PROTHROMBIN TIME: 14.8 SEC
RBC # BLD AUTO: 2.7 M/UL
RBC # BLD AUTO: 2.82 M/UL
RBC # BLD AUTO: 3.06 M/UL
SODIUM SERPL-SCNC: 139 MMOL/L
SODIUM SERPL-SCNC: 140 MMOL/L
SODIUM SERPL-SCNC: 140 MMOL/L
WBC # BLD AUTO: 4.65 K/UL
WBC # BLD AUTO: 5.08 K/UL
WBC # BLD AUTO: 7.38 K/UL

## 2017-08-05 PROCEDURE — 63600175 PHARM REV CODE 636 W HCPCS: Performed by: INTERNAL MEDICINE

## 2017-08-05 PROCEDURE — 80100014 HC HEMODIALYSIS 1:1

## 2017-08-05 PROCEDURE — 83735 ASSAY OF MAGNESIUM: CPT

## 2017-08-05 PROCEDURE — 63600367 HC NITRIC OXIDE PER HOUR

## 2017-08-05 PROCEDURE — 25000003 PHARM REV CODE 250: Performed by: INTERNAL MEDICINE

## 2017-08-05 PROCEDURE — 99291 CRITICAL CARE FIRST HOUR: CPT | Mod: GC,,, | Performed by: INTERNAL MEDICINE

## 2017-08-05 PROCEDURE — 85520 HEPARIN ASSAY: CPT

## 2017-08-05 PROCEDURE — 83615 LACTATE (LD) (LDH) ENZYME: CPT

## 2017-08-05 PROCEDURE — 25000003 PHARM REV CODE 250: Performed by: STUDENT IN AN ORGANIZED HEALTH CARE EDUCATION/TRAINING PROGRAM

## 2017-08-05 PROCEDURE — 80053 COMPREHEN METABOLIC PANEL: CPT

## 2017-08-05 PROCEDURE — 63600175 PHARM REV CODE 636 W HCPCS: Performed by: STUDENT IN AN ORGANIZED HEALTH CARE EDUCATION/TRAINING PROGRAM

## 2017-08-05 PROCEDURE — 27000221 HC OXYGEN, UP TO 24 HOURS

## 2017-08-05 PROCEDURE — 27000248 HC VAD-ADDITIONAL DAY

## 2017-08-05 PROCEDURE — 80048 BASIC METABOLIC PNL TOTAL CA: CPT

## 2017-08-05 PROCEDURE — 85730 THROMBOPLASTIN TIME PARTIAL: CPT

## 2017-08-05 PROCEDURE — 94761 N-INVAS EAR/PLS OXIMETRY MLT: CPT

## 2017-08-05 PROCEDURE — 90935 HEMODIALYSIS ONE EVALUATION: CPT | Mod: ,,, | Performed by: INTERNAL MEDICINE

## 2017-08-05 PROCEDURE — P9038 RBC IRRADIATED: HCPCS

## 2017-08-05 PROCEDURE — 85610 PROTHROMBIN TIME: CPT

## 2017-08-05 PROCEDURE — 25000242 PHARM REV CODE 250 ALT 637 W/ HCPCS: Performed by: INTERNAL MEDICINE

## 2017-08-05 PROCEDURE — 25000003 PHARM REV CODE 250: Performed by: PHYSICIAN ASSISTANT

## 2017-08-05 PROCEDURE — 99900035 HC TECH TIME PER 15 MIN (STAT)

## 2017-08-05 PROCEDURE — 36430 TRANSFUSION BLD/BLD COMPNT: CPT

## 2017-08-05 PROCEDURE — A4216 STERILE WATER/SALINE, 10 ML: HCPCS | Performed by: STUDENT IN AN ORGANIZED HEALTH CARE EDUCATION/TRAINING PROGRAM

## 2017-08-05 PROCEDURE — 25000003 PHARM REV CODE 250: Performed by: EMERGENCY MEDICINE

## 2017-08-05 PROCEDURE — 80053 COMPREHEN METABOLIC PANEL: CPT | Mod: 91

## 2017-08-05 PROCEDURE — 85025 COMPLETE CBC W/AUTO DIFF WBC: CPT | Mod: 91

## 2017-08-05 PROCEDURE — 83051 HEMOGLOBIN PLASMA: CPT

## 2017-08-05 PROCEDURE — 20000000 HC ICU ROOM

## 2017-08-05 RX ORDER — SODIUM CHLORIDE 9 MG/ML
INJECTION, SOLUTION INTRAVENOUS
Status: DISCONTINUED | OUTPATIENT
Start: 2017-08-05 | End: 2017-08-08 | Stop reason: HOSPADM

## 2017-08-05 RX ORDER — SODIUM CHLORIDE 9 MG/ML
INJECTION, SOLUTION INTRAVENOUS ONCE
Status: DISCONTINUED | OUTPATIENT
Start: 2017-08-05 | End: 2017-08-08 | Stop reason: HOSPADM

## 2017-08-05 RX ORDER — HEPARIN SODIUM 10000 [USP'U]/100ML
400 INJECTION, SOLUTION INTRAVENOUS CONTINUOUS
Status: DISCONTINUED | OUTPATIENT
Start: 2017-08-05 | End: 2017-08-08 | Stop reason: HOSPADM

## 2017-08-05 RX ORDER — ACETAMINOPHEN 325 MG/1
650 TABLET ORAL ONCE
Status: COMPLETED | OUTPATIENT
Start: 2017-08-05 | End: 2017-08-05

## 2017-08-05 RX ORDER — MORPHINE SULFATE 2 MG/ML
1 INJECTION, SOLUTION INTRAMUSCULAR; INTRAVENOUS ONCE
Status: COMPLETED | OUTPATIENT
Start: 2017-08-05 | End: 2017-08-05

## 2017-08-05 RX ORDER — ONDANSETRON 2 MG/ML
4 INJECTION INTRAMUSCULAR; INTRAVENOUS EVERY 4 HOURS PRN
Status: DISCONTINUED | OUTPATIENT
Start: 2017-08-05 | End: 2017-08-08 | Stop reason: HOSPADM

## 2017-08-05 RX ORDER — ONDANSETRON 2 MG/ML
4 INJECTION INTRAMUSCULAR; INTRAVENOUS ONCE
Status: COMPLETED | OUTPATIENT
Start: 2017-08-05 | End: 2017-08-05

## 2017-08-05 RX ORDER — HYDRALAZINE HYDROCHLORIDE 20 MG/ML
10 INJECTION INTRAMUSCULAR; INTRAVENOUS ONCE
Status: COMPLETED | OUTPATIENT
Start: 2017-08-05 | End: 2017-08-05

## 2017-08-05 RX ORDER — HEPARIN SODIUM,PORCINE/D5W 25000/250
4 INTRAVENOUS SOLUTION INTRAVENOUS CONTINUOUS
Status: DISCONTINUED | OUTPATIENT
Start: 2017-08-05 | End: 2017-08-05

## 2017-08-05 RX ADMIN — PANTOPRAZOLE SODIUM 40 MG: 40 TABLET, DELAYED RELEASE ORAL at 08:08

## 2017-08-05 RX ADMIN — GABAPENTIN 300 MG: 300 CAPSULE ORAL at 09:08

## 2017-08-05 RX ADMIN — GABAPENTIN 300 MG: 300 CAPSULE ORAL at 08:08

## 2017-08-05 RX ADMIN — ONDANSETRON 4 MG: 2 INJECTION INTRAMUSCULAR; INTRAVENOUS at 02:08

## 2017-08-05 RX ADMIN — ONDANSETRON 4 MG: 2 INJECTION INTRAMUSCULAR; INTRAVENOUS at 07:08

## 2017-08-05 RX ADMIN — FLUTICASONE PROPIONATE 2 SPRAY: 50 SPRAY, METERED NASAL at 10:08

## 2017-08-05 RX ADMIN — Medication 3 ML: at 10:08

## 2017-08-05 RX ADMIN — EPINEPHRINE 0.04 MCG/KG/MIN: 1 INJECTION PARENTERAL at 01:08

## 2017-08-05 RX ADMIN — MORPHINE SULFATE 1 MG: 2 INJECTION, SOLUTION INTRAMUSCULAR; INTRAVENOUS at 01:08

## 2017-08-05 RX ADMIN — HEPARIN SODIUM AND DEXTROSE 400 UNITS/HR: 10000; 5 INJECTION INTRAVENOUS at 11:08

## 2017-08-05 RX ADMIN — ATORVASTATIN CALCIUM 40 MG: 20 TABLET, FILM COATED ORAL at 08:08

## 2017-08-05 RX ADMIN — Medication 3 ML: at 02:08

## 2017-08-05 RX ADMIN — ESCITALOPRAM 20 MG: 5 TABLET, FILM COATED ORAL at 09:08

## 2017-08-05 RX ADMIN — GABAPENTIN 300 MG: 300 CAPSULE ORAL at 02:08

## 2017-08-05 RX ADMIN — ACETAMINOPHEN 650 MG: 325 TABLET ORAL at 08:08

## 2017-08-05 RX ADMIN — ACETAMINOPHEN 650 MG: 325 TABLET ORAL at 05:08

## 2017-08-05 RX ADMIN — HYDRALAZINE HYDROCHLORIDE 10 MG: 20 INJECTION INTRAMUSCULAR; INTRAVENOUS at 08:08

## 2017-08-05 RX ADMIN — ONDANSETRON 4 MG: 2 INJECTION INTRAMUSCULAR; INTRAVENOUS at 08:08

## 2017-08-05 NOTE — PROGRESS NOTES
Dialysis complete.  Blood returned.   Hemostasis complete.   Held pressure to    Left upper arm A/v site for  10  Minutes to each site.   Guaze and tape applied to needle site.   No active bleeding noted.   + thrill and bruit.    Pt tolerated hemodialysis without diff.   Pt ran 3   Hrs on hemodialysis machine.   Took off   2000  Net volume.   Used     f160  Dialyzer.

## 2017-08-05 NOTE — NURSING
"High watt alarm for 9.1 around 2030 (1951) on VAD screen. At that time, flow 2597, campos 9.1, flow 12. Pt reports "feeling fine", no changes in VS. Notified VAD coordinator on call, Eneida. Eneida said to call her if watt baseline number starts increasing and she said to notify MD on call and that she would notify Dr. Ackerman. RN notified Dr. Jaramillo with HTS with update. WCTM.  "

## 2017-08-05 NOTE — PROGRESS NOTES
"Pt symptomatic when EPI gtt started. HR increased and sustained in 130-140s. Pt complaining of racing heart and very uncomfortable feeling. EKG performed SVT reading noted. STAT BMP and Mg drawn. Epi titrated down by .01 intervals over 5 minutes each time with no relief in arrhythmia or symptoms. Epi temporarily turned off secondary to change in condition and pt asking to "please turn that off". Dr. Varghese with HTS notified and aware of situation. Awaiting new orders at this time. Will continue to monitor.  "

## 2017-08-05 NOTE — NURSING
VAD continuously alarm for high campos >9. Flows > 10, RPM 2600. VS remain unchanged. Pt reports feeling the same. Notified VAD coordinator Eneida, she said to increase high power alarm to 11.5 and notify on-call MD with change. Hct updated on screen as well. Will notify Dr. Jaramillo. [facility].

## 2017-08-05 NOTE — PROGRESS NOTES
Pt arrived via stretcher.  Placed on cardiac monitor and b/p check every 15 minutes.  Left upper arm Dialysis access prep with prevantic swab.  maintenace hemodialysis started per orders.

## 2017-08-05 NOTE — PROGRESS NOTES
Cardiology Brief Progress Note    61 y.o. woman admitted for fatigue for 1 week found to have low Hgb at 4.8.Today she is having high flows on LVAD.  -Transfer to CMICU for close monitor.   -Transfuse 2 units of blood slowly.  -Waiting for LDH repeat.      Terese Jaramillo MD  Cardiology Fellow  Pager: [phone]

## 2017-08-05 NOTE — PROGRESS NOTES
LOS: 4 days     24h events and S:  Randa Devine is a 61 y.o. female with power spike overnight. The patient feels nauseous and is vomiting this morning.    O:  Vitals:  Temp: 97.8 °F (36.6 °C) (08/05/17 0800)  Pulse: 69 (08/05/17 1533)  Resp: (!) 22 (08/05/17 1533)  BP: 118/86 (08/05/17 1545)  SpO2: 96 % (08/05/17 1533)    Ins/Outs:    Intake/Output Summary (Last 24 hours) at 08/05/17 1556  Last data filed at 08/05/17 1500   Gross per 24 hour   Intake            971.4 ml   Output                0 ml   Net            971.4 ml       Physical Exam:  General: alert and oriented, conversant  Heart: hum of the VAD, no r/g appreciated  Lungs: CTAB  Abdomen: soft, NT, ND, +BS  Vascular: 2+ radial pulse, no edema; cool extremities    Medications:   sodium chloride 0.9%   Intravenous Once    atorvastatin  40 mg Oral Daily    escitalopram oxalate  20 mg Oral QHS    fluticasone  2 spray Each Nare Daily    gabapentin  300 mg Oral TID    pantoprazole  40 mg Oral Daily    sodium chloride 0.9%  3 mL Intravenous Q8H    warfarin  2 mg Oral Daily      epinephrine infusion (NON-TITRATING) Stopped (08/05/17 1500)    heparin (porcine) in 5 % dex 400 Units/hr (08/05/17 1500)    nitric oxide gas       sodium chloride, sodium chloride, sodium chloride 0.9%, sodium chloride 0.9%, albumin human 25%, ondansetron    Review of patient's allergies indicates:   Allergen Reactions    Codeine Nausea And Vomiting    Demerol [meperidine] Nausea And Vomiting    Lortab [hydrocodone-acetaminophen] Nausea And Vomiting       Labs:  CBC with Diff:     Recent Labs  Lab 08/04/17  0359 08/04/17  1721 08/04/17  1724 08/05/17  0452   WBC 2.78* 2.43*  --  5.08   HGB 7.1* 7.1*  --  9.0*   HCT 22.2* 22.1* 17* 27.1*   PLT 79* 68*  --  66*   LYMPH 18.3  0.5* 15.6*  0.4*  --  14.6*  0.7*   MONO 12.6  0.4 14.0  0.3  --  12.4  0.6   EOSINOPHIL 2.9 2.1  --  1.8       COAG:    Recent Labs  Lab 08/03/17  0517 08/04/17  0359 08/05/17  0452   APTT 24.4  25.8 25.6   INR 1.1 1.3* 1.4*       CMP:   Recent Labs  Lab 08/02/17  0528 08/03/17  0517 08/04/17  0359 08/04/17  1721 08/05/17  0017 08/05/17  1339   GLU 79 72 59* 122* 145* 106   CALCIUM 8.1* 8.3* 8.2* 8.2* 7.9* 8.6*   ALBUMIN 2.8* 3.0* 3.1*  3.1* 3.0* 2.9*  --    PROT 5.3* 5.8* 5.5*  5.5* 5.7* 5.9*  --     141 141 141 139 140   K 4.8 4.4 4.4 5.0 4.9 5.3*   CO2 27 24 24 25 22* 20*    101 107 107 107 107   BUN 33* 38* 14 21* 28* 37*   CREATININE 6.1* 7.2* 3.8* 4.9* 5.3* 6.1*   ALKPHOS 101 121 104  103 105 97  --    ALT 16 18 15  16 15 16  --    AST 36 34 28  28 32 57*  --    BILITOT 0.8 0.6 0.6  0.6 0.6 0.8  --    MG 1.9 2.1 2.0 2.0  --  2.6   PHOS 3.1 3.6 3.0  --   --   --      Estimated Creatinine Clearance: 8 mL/min (based on Cr of 6.1).    .  Recent Labs  Lab 08/01/17  1551  08/04/17  0359   TROPONINI 0.101*  --   --    BNP  --   < > 1,956*   < > = values in this interval not displayed.      Diagnostic Results:  Ejection Fractions   EF   Date Value Ref Range Status   03/20/2017 10 (A) 55 - 65    02/01/2017 10 (A) 55 - 65    01/11/2017 15 (A) 55 - 65         Infectious disease labs:  Microbiology Results (last 7 days)     Procedure Component Value Units Date/Time    Blood culture #2 [[id number]] Collected:  08/01/17 1710    Order Status:  Completed Specimen:  Blood from Peripheral, Forearm, Right Updated:  08/04/17 2012     Blood Culture, Routine No Growth to date     Blood Culture, Routine No Growth to date     Blood Culture, Routine No Growth to date     Blood Culture, Routine No Growth to date    Narrative:       Blood Culture #2    Blood culture #1 [[id number]] Collected:  08/01/17 1636    Order Status:  Completed Specimen:  Blood from Peripheral, Antecubital, Right Updated:  08/04/17 1812     Blood Culture, Routine No Growth to date     Blood Culture, Routine No Growth to date     Blood Culture, Routine No Growth to date     Blood Culture, Routine No Growth to date    Narrative:       Blood  Culture #1          Assessment and Plan:  Randa Devine is a 61 y.o. female with PMH of LVAD and ESRD ([facility] HD) who presented with anemia with GI w/u in process who was transferred to the CMICU on 8/4 for power spike and concern for LVAD thrombosis.     LVAD thrombosis  - resume AC with low dose IV UFH (benefits > risks given mortality of pump thrombosis)  - discussion between patient and attending: patient DNR  - start epinephrine and NO (discussed with CTS)  - CBC, lactate, LDH    GIB, anemia, thrombocytopenia, hemolysis  - prelim VCE read: blood in the colon  - GI plans for CScope Monday  - CLD on Sunday and NPO Sunday night    ESRD  - dialysis TuThSat  - Trialysis line in place if CRRT in lieu of HD    HTN  - IV hydralazine given for severe HTN this morning  - CTH was negative (patient had HAs)  - no scheduled antihypertensives for now    FEN/PPX:  IVFs: no cIVFs  Replete electrolytes prn  Nutrition: diet as above  PPX: IV UFH    Chuck Estrada MD

## 2017-08-05 NOTE — PLAN OF CARE
Problem: Patient Care Overview  Goal: Plan of Care Review  Outcome: Ongoing (interventions implemented as appropriate)  VAD alarmed multiple times for high campos (11-13, see notes), but no alarms since 0300. Times updated to correct time on VAD around 0200 bc they were about 1.5 hours behind. Received 2 units PRBCs, AM labs drawn afterwards. See flowsheets for vital signs and assessments. See below for updates on progress made.       Neuro: AAOx4, Slept about 3 hours so far    Cardiovascular: paced rhythm around 69, VAD flows >10, rpm 2600, campos currently 6-7      Pulmonary: 2L NC    Gastrointestinal: dark red, soft BM x1, good appetite    Genitourinary: oliguric, HD days are TTS    Endocrine: awaiting AM lab results    Infusions: none    Patient progressing towards goals as tolerated, plan of care communicated and reviewed with Randa Devine. All concerns addressed. Will continue to monitor.

## 2017-08-05 NOTE — PLAN OF CARE
Problem: Patient Care Overview  Goal: Plan of Care Review  Outcome: Ongoing (interventions implemented as appropriate)   VS and assessment per flow sheet, pt remains on heparin gtt at 400 U/hr, HD completed, pt code status changed to DNR, plan of care reviewed with Randa Devine and family, all concerns addressed, will continue to monitor.

## 2017-08-05 NOTE — SIGNIFICANT EVENT
High watt alarmed multiple times over past 5 mins for campos greater than 11.5. Highest value was 13 campos. Campos will decrease to 9.5, but will start to increase again to around 12.5. Baseline in now between 10-11. RPMs and flows remain unchanged at 2600 and >10 (12). VS unchanged. Pt reports feeling the same. Reported this to VAD coordinator Eneida and she said she would text Dr. Ackerman and that I may need to increase high power alarm to 16 (max). Notified Dr. Jaramillo with update as well. WCTM.

## 2017-08-05 NOTE — PROCEDURES
Central Line (Trialysis) Placement Procedure Note:   Number of lumens: 3  Indication: hypotension  Post procedure diagnosis: same   Estimated blood loss: 5ml   Consent:   Prior to starting the procedure, informed consent was obtained with risks outlined including, but not limited to: pneumothorax, hemothorax, infection, bleeding, and thrombosis.   Prior to starting, all those in the room washed their hands and donned caps and masks, which were worn throughout the procedure. A time-out was performed. The insertion site and surrounding areas were generously scrubbed with chlorhexadine. Using sterile technique, the central line kit was opened and a whole body sterile drape was applied to the patient. The insertion site was anesthetized with lidocaine 1% without epinephrine. Using a Mini Stick kit under ultrasound guidance, the Right  IJ Vein was cannulated and dark red, non-pulsatile blood was aspirated into the syringe. Then, using sterile Seldinger technique, a J-type guidewire was advanced through the Mini Stick sheath into the vein however as the wire was advanced there was resistance. A new mini stick needle was again used to access the vein revealing dark red blood, but again resistance was felt. The wire was visualized in the vessel via ultrasound. Due to resistance the attempt on the right was aborted. The left neck was then prepped and draped. Ultrasound was used to visualize the IJ on the left, however this appeared thrombosed. No attempt was made. The left leg was then prepped and draped. The left femoral vein was localized with ultrasound. The insertion site was anesthetized with lidocaine 1% without epinephrine. Using a Mini Stick kit under ultrasound guidance, the left femoral vein was cannulated and dark red, non-pulsatile blood was aspirated into the syringe. Then, using sterile Seldinger technique, a J-type guidewire was advanced through the Mini Stick sheath into the vein   The sheath was removed and  a small incision was made at the insertion site to allow for advancement of a dilator. After the track was successfully dilated, the catheter was advanced over the guidewire to the skin and the guidewire was removed intact. Dark red blood was aspirated from each of the lumens and sterile saline was injected into each of the ports. The catheter was sewn into place and the site was again cleaned with chlorhexadine. A biopatch was applied to the insertion site and a sterile central line dressing was applied. A chest XRAY was done to confirm no pneumothorax due to right neck stickThe patient tolerated the procedure well.       Signed:  Pietro Avalos DO   Transplant Cardiology

## 2017-08-05 NOTE — PROGRESS NOTES
[facility] HEMODIALYSIS NOTE     Patient currently on hemodialysis for removal of uremic toxins .     Patient seen and evaluated on hemodialysis, tolerating treatment, see HD flowsheet for vitals and assessments.      No Hypotension, chest pain, shortness of breath, cramping, nausea or vomiting.

## 2017-08-05 NOTE — SIGNIFICANT EVENT
VAD alarming for high campos >11.5, other numbers unchanged, VSS. Notified Dr. Jaramillo, and he said to increase alarm to 12 and to call him back if still alarming after 30 mins. WCTM.     Alarm alert changed to 12. Time updated on controller and monitor to correct time. The time in the alarm log was wrong by about 1.5 hours.

## 2017-08-06 LAB
ALBUMIN SERPL BCP-MCNC: 2.8 G/DL
ALP SERPL-CCNC: 96 U/L
ALT SERPL W/O P-5'-P-CCNC: 21 U/L
ANION GAP SERPL CALC-SCNC: 10 MMOL/L
ANISOCYTOSIS BLD QL SMEAR: ABNORMAL
APTT BLDCRRT: 28.3 SEC
AST SERPL-CCNC: 113 U/L
BACTERIA BLD CULT: NORMAL
BACTERIA BLD CULT: NORMAL
BASO STIPL BLD QL SMEAR: ABNORMAL
BASOPHILS # BLD AUTO: 0.01 K/UL
BASOPHILS # BLD AUTO: 0.02 K/UL
BASOPHILS # BLD AUTO: 0.02 K/UL
BASOPHILS NFR BLD: 0.3 %
BASOPHILS NFR BLD: 0.4 %
BASOPHILS NFR BLD: 0.5 %
BILIRUB SERPL-MCNC: 1.3 MG/DL
BUN SERPL-MCNC: 23 MG/DL
CALCIUM SERPL-MCNC: 9 MG/DL
CHLORIDE SERPL-SCNC: 107 MMOL/L
CO2 SERPL-SCNC: 23 MMOL/L
CREAT SERPL-MCNC: 4.1 MG/DL
DIFFERENTIAL METHOD: ABNORMAL
EOSINOPHIL # BLD AUTO: 0.1 K/UL
EOSINOPHIL NFR BLD: 1.5 %
EOSINOPHIL NFR BLD: 1.9 %
EOSINOPHIL NFR BLD: 2.5 %
ERYTHROCYTE [DISTWIDTH] IN BLOOD BY AUTOMATED COUNT: 17.7 %
ERYTHROCYTE [DISTWIDTH] IN BLOOD BY AUTOMATED COUNT: 18 %
ERYTHROCYTE [DISTWIDTH] IN BLOOD BY AUTOMATED COUNT: 18 %
EST. GFR  (AFRICAN AMERICAN): 12.9 ML/MIN/1.73 M^2
EST. GFR  (NON AFRICAN AMERICAN): 11.2 ML/MIN/1.73 M^2
FACT X PPP CHRO-ACNC: <0.1 IU/ML
FACT X PPP CHRO-ACNC: <0.1 IU/ML
GIANT PLATELETS BLD QL SMEAR: PRESENT
GLUCOSE SERPL-MCNC: 115 MG/DL
HCT VFR BLD AUTO: 23.7 %
HCT VFR BLD AUTO: 23.9 %
HCT VFR BLD AUTO: 25.2 %
HGB BLD-MCNC: 7.6 G/DL
HGB BLD-MCNC: 8 G/DL
HGB BLD-MCNC: 8.4 G/DL
HGB FREE PLAS-MCNC: 390 MG/DL (ref 0–9.7)
INR PPP: 1.5
LACTATE SERPL-SCNC: 0.9 MMOL/L
LDH SERPL L TO P-CCNC: 1513 U/L
LYMPHOCYTES # BLD AUTO: 0.3 K/UL
LYMPHOCYTES # BLD AUTO: 0.3 K/UL
LYMPHOCYTES # BLD AUTO: 0.5 K/UL
LYMPHOCYTES NFR BLD: 11.4 %
LYMPHOCYTES NFR BLD: 6.6 %
LYMPHOCYTES NFR BLD: 8.5 %
MAGNESIUM SERPL-MCNC: 2.3 MG/DL
MCH RBC QN AUTO: 31 PG
MCH RBC QN AUTO: 31 PG
MCH RBC QN AUTO: 31.9 PG
MCHC RBC AUTO-ENTMCNC: 32.1 G/DL
MCHC RBC AUTO-ENTMCNC: 33.3 G/DL
MCHC RBC AUTO-ENTMCNC: 33.5 G/DL
MCV RBC AUTO: 93 FL
MCV RBC AUTO: 96 FL
MCV RBC AUTO: 97 FL
MONOCYTES # BLD AUTO: 0.5 K/UL
MONOCYTES # BLD AUTO: 0.6 K/UL
MONOCYTES # BLD AUTO: 0.6 K/UL
MONOCYTES NFR BLD: 11.4 %
MONOCYTES NFR BLD: 12.2 %
MONOCYTES NFR BLD: 13.8 %
NEUTROPHILS # BLD AUTO: 3 K/UL
NEUTROPHILS # BLD AUTO: 3.1 K/UL
NEUTROPHILS # BLD AUTO: 3.8 K/UL
NEUTROPHILS NFR BLD: 74.6 %
NEUTROPHILS NFR BLD: 75.9 %
NEUTROPHILS NFR BLD: 78.3 %
OVALOCYTES BLD QL SMEAR: ABNORMAL
PHOSPHATE SERPL-MCNC: 3.6 MG/DL
PLATELET # BLD AUTO: 43 K/UL
PLATELET # BLD AUTO: 45 K/UL
PLATELET # BLD AUTO: 48 K/UL
PLATELET BLD QL SMEAR: ABNORMAL
PLATELET BLD QL SMEAR: ABNORMAL
PMV BLD AUTO: 10.3 FL
PMV BLD AUTO: 10.6 FL
PMV BLD AUTO: 10.9 FL
POIKILOCYTOSIS BLD QL SMEAR: SLIGHT
POLYCHROMASIA BLD QL SMEAR: ABNORMAL
POTASSIUM SERPL-SCNC: 4.6 MMOL/L
PROT SERPL-MCNC: 6.2 G/DL
PROTHROMBIN TIME: 15 SEC
RBC # BLD AUTO: 2.45 M/UL
RBC # BLD AUTO: 2.58 M/UL
RBC # BLD AUTO: 2.63 M/UL
SODIUM SERPL-SCNC: 140 MMOL/L
TOXIC GRANULES BLD QL SMEAR: PRESENT
WBC # BLD AUTO: 3.99 K/UL
WBC # BLD AUTO: 4.14 K/UL
WBC # BLD AUTO: 4.82 K/UL

## 2017-08-06 PROCEDURE — 63600367 HC NITRIC OXIDE PER HOUR

## 2017-08-06 PROCEDURE — 27000248 HC VAD-ADDITIONAL DAY

## 2017-08-06 PROCEDURE — 85025 COMPLETE CBC W/AUTO DIFF WBC: CPT

## 2017-08-06 PROCEDURE — 25000003 PHARM REV CODE 250: Performed by: EMERGENCY MEDICINE

## 2017-08-06 PROCEDURE — 83615 LACTATE (LD) (LDH) ENZYME: CPT

## 2017-08-06 PROCEDURE — 85520 HEPARIN ASSAY: CPT

## 2017-08-06 PROCEDURE — 83735 ASSAY OF MAGNESIUM: CPT

## 2017-08-06 PROCEDURE — A4216 STERILE WATER/SALINE, 10 ML: HCPCS | Performed by: STUDENT IN AN ORGANIZED HEALTH CARE EDUCATION/TRAINING PROGRAM

## 2017-08-06 PROCEDURE — 20000000 HC ICU ROOM

## 2017-08-06 PROCEDURE — 27000221 HC OXYGEN, UP TO 24 HOURS

## 2017-08-06 PROCEDURE — 83605 ASSAY OF LACTIC ACID: CPT

## 2017-08-06 PROCEDURE — 99291 CRITICAL CARE FIRST HOUR: CPT | Mod: GC,,, | Performed by: INTERNAL MEDICINE

## 2017-08-06 PROCEDURE — 80053 COMPREHEN METABOLIC PANEL: CPT

## 2017-08-06 PROCEDURE — 25000003 PHARM REV CODE 250: Performed by: STUDENT IN AN ORGANIZED HEALTH CARE EDUCATION/TRAINING PROGRAM

## 2017-08-06 PROCEDURE — 25000003 PHARM REV CODE 250: Performed by: PHYSICIAN ASSISTANT

## 2017-08-06 PROCEDURE — 25000003 PHARM REV CODE 250: Performed by: INTERNAL MEDICINE

## 2017-08-06 PROCEDURE — 85730 THROMBOPLASTIN TIME PARTIAL: CPT

## 2017-08-06 PROCEDURE — 94761 N-INVAS EAR/PLS OXIMETRY MLT: CPT

## 2017-08-06 PROCEDURE — 85610 PROTHROMBIN TIME: CPT

## 2017-08-06 PROCEDURE — 63600175 PHARM REV CODE 636 W HCPCS: Performed by: STUDENT IN AN ORGANIZED HEALTH CARE EDUCATION/TRAINING PROGRAM

## 2017-08-06 PROCEDURE — 83050 HGB METHEMOGLOBIN QUAN: CPT

## 2017-08-06 PROCEDURE — 84100 ASSAY OF PHOSPHORUS: CPT

## 2017-08-06 RX ORDER — ACETAMINOPHEN 650 MG/20.3ML
500 LIQUID ORAL EVERY 6 HOURS PRN
Status: DISCONTINUED | OUTPATIENT
Start: 2017-08-06 | End: 2017-08-08 | Stop reason: HOSPADM

## 2017-08-06 RX ORDER — ALPRAZOLAM 0.25 MG/1
0.25 TABLET ORAL 2 TIMES DAILY PRN
Status: DISCONTINUED | OUTPATIENT
Start: 2017-08-06 | End: 2017-08-08 | Stop reason: HOSPADM

## 2017-08-06 RX ORDER — OXYMETAZOLINE HCL 0.05 %
2 SPRAY, NON-AEROSOL (ML) NASAL 2 TIMES DAILY
Status: DISCONTINUED | OUTPATIENT
Start: 2017-08-06 | End: 2017-08-08 | Stop reason: HOSPADM

## 2017-08-06 RX ADMIN — OXYMETAZOLINE HYDROCHLORIDE 2 SPRAY: 5 SPRAY NASAL at 08:08

## 2017-08-06 RX ADMIN — ESCITALOPRAM 20 MG: 5 TABLET, FILM COATED ORAL at 08:08

## 2017-08-06 RX ADMIN — ACETAMINOPHEN 499.51 MG: 650 SOLUTION ORAL at 02:08

## 2017-08-06 RX ADMIN — ALPRAZOLAM 0.25 MG: 0.25 TABLET ORAL at 10:08

## 2017-08-06 RX ADMIN — PANTOPRAZOLE SODIUM 40 MG: 40 TABLET, DELAYED RELEASE ORAL at 08:08

## 2017-08-06 RX ADMIN — GABAPENTIN 300 MG: 300 CAPSULE ORAL at 10:08

## 2017-08-06 RX ADMIN — ONDANSETRON 4 MG: 2 INJECTION INTRAMUSCULAR; INTRAVENOUS at 07:08

## 2017-08-06 RX ADMIN — ONDANSETRON 4 MG: 2 INJECTION INTRAMUSCULAR; INTRAVENOUS at 02:08

## 2017-08-06 RX ADMIN — Medication 3 ML: at 10:08

## 2017-08-06 RX ADMIN — OXYMETAZOLINE HYDROCHLORIDE 2 SPRAY: 5 SPRAY NASAL at 10:08

## 2017-08-06 RX ADMIN — GABAPENTIN 300 MG: 300 CAPSULE ORAL at 04:08

## 2017-08-06 RX ADMIN — GABAPENTIN 300 MG: 300 CAPSULE ORAL at 06:08

## 2017-08-06 RX ADMIN — FLUTICASONE PROPIONATE 2 SPRAY: 50 SPRAY, METERED NASAL at 08:08

## 2017-08-06 RX ADMIN — ATORVASTATIN CALCIUM 40 MG: 20 TABLET, FILM COATED ORAL at 08:08

## 2017-08-06 NOTE — PLAN OF CARE
Problem: Patient Care Overview  Goal: Plan of Care Review  Outcome: Ongoing (interventions implemented as appropriate)  No acute events throughout day, VS and assessment per flow sheet, heparin gtt continued, no VAD alarms throughout the day flows remain >10, power campos 7-8, pts nose bled intermittently throughout the day, pt continues to experience intermittent N/V and given prn zofran, plan for pt to be discharged home with AIM will begin process tomorrow, plan of care reviewed with Dr. Ackerman and HTS team, Randa Devine and family at the bedside, all concerns addressed, pt very accepting, will continue to monitor.

## 2017-08-06 NOTE — PROGRESS NOTES
LOS: 5 days     24h events and S:  Randa Devine is a 61 y.o. female with bloody nose this morning. She had symptomatic AFL with RVR yesterday; thus, epinephrine was discontinued. RVR resolved with epinephrine off.    O:  Vitals:  Temp: 98.1 °F (36.7 °C) (08/06/17 1100)  Pulse: 69 (08/06/17 1400)  Resp: 13 (08/06/17 1400)  BP: (!) 73/51 (08/06/17 1400)  SpO2: (!) 88 % (08/06/17 1400)    Ins/Outs:    Intake/Output Summary (Last 24 hours) at 08/06/17 1436  Last data filed at 08/06/17 1400   Gross per 24 hour   Intake              891 ml   Output             2600 ml   Net            -1709 ml       Physical Exam:  General: alert and oriented, conversant  Heart: RRR, no r/g appreciated  Lungs: CTAB  Abdomen: soft, NT, ND, hum of the VAD  Vascular: 2+ radial pulse, no edema    Medications:   sodium chloride 0.9%   Intravenous Once    atorvastatin  40 mg Oral Daily    escitalopram oxalate  20 mg Oral QHS    fluticasone  2 spray Each Nare Daily    gabapentin  300 mg Oral TID    oxymetazoline  2 spray Each Nare BID    pantoprazole  40 mg Oral Daily    sodium chloride 0.9%  3 mL Intravenous Q8H      epinephrine infusion (NON-TITRATING) Stopped (08/05/17 1500)    heparin (porcine) in 5 % dex 400 Units/hr (08/06/17 1400)    nitric oxide gas       sodium chloride, sodium chloride, sodium chloride 0.9%, sodium chloride 0.9%, acetaminophen, albumin human 25%, alprazolam, ondansetron    Review of patient's allergies indicates:   Allergen Reactions    Codeine Nausea And Vomiting    Demerol [meperidine] Nausea And Vomiting    Lortab [hydrocodone-acetaminophen] Nausea And Vomiting       Labs:  CBC with Diff:     Recent Labs  Lab 08/05/17  1923 08/06/17  0415 08/06/17  0838   WBC 4.65 4.82 4.14   HGB 8.4* 8.0* 8.4*   HCT 24.7* 23.9* 25.2*   PLT 50* 48* 45*   LYMPH 7.7*  0.4* 6.6*  0.3* 11.4*  0.5*   MONO 15.9*  0.7 12.2  0.6 11.4  0.5   EOSINOPHIL 1.1 2.5 1.9       COAG:    Recent Labs  Lab 08/04/17  0353  08/05/17  0452 08/06/17  0415   APTT 25.8 25.6 28.3   INR 1.3* 1.4* 1.5*       CMP:   Recent Labs  Lab 08/03/17  0517 08/04/17  0359 08/04/17  1721 08/05/17  0017 08/05/17  1339 08/05/17  1923 08/06/17  0415   GLU 72 59* 122* 145* 106 96 115*   CALCIUM 8.3* 8.2* 8.2* 7.9* 8.6* 8.7 9.0   ALBUMIN 3.0* 3.1*  3.1* 3.0* 2.9*  --  3.0* 2.8*   PROT 5.8* 5.5*  5.5* 5.7* 5.9*  --  6.5 6.2    141 141 139 140 140 140   K 4.4 4.4 5.0 4.9 5.3* 4.1 4.6   CO2 24 24 25 22* 20* 23 23    107 107 107 107 107 107   BUN 38* 14 21* 28* 37* 16 23*   CREATININE 7.2* 3.8* 4.9* 5.3* 6.1* 3.2* 4.1*   ALKPHOS 121 104  103 105 97  --  98 96   ALT 18 15  16 15 16  --  22 21   AST 34 28  28 32 57*  --  114* 113*   BILITOT 0.6 0.6  0.6 0.6 0.8  --  1.6* 1.3*   MG 2.1 2.0 2.0  --  2.6  --  2.3   PHOS 3.6 3.0  --   --   --   --  3.6     Estimated Creatinine Clearance: 11.9 mL/min (based on Cr of 4.1).    .  Recent Labs  Lab 08/01/17  1551  08/04/17  0359   TROPONINI 0.101*  --   --    BNP  --   < > 1,956*   < > = values in this interval not displayed.      Diagnostic Results:  Ejection Fractions   EF   Date Value Ref Range Status   03/20/2017 10 (A) 55 - 65    02/01/2017 10 (A) 55 - 65    01/11/2017 15 (A) 55 - 65         Infectious disease labs:  Microbiology Results (last 7 days)     Procedure Component Value Units Date/Time    Blood culture #2 [[id number]] Collected:  08/01/17 1710    Order Status:  Completed Specimen:  Blood from Peripheral, Forearm, Right Updated:  08/05/17 2012     Blood Culture, Routine No Growth to date     Blood Culture, Routine No Growth to date     Blood Culture, Routine No Growth to date     Blood Culture, Routine No Growth to date     Blood Culture, Routine No Growth to date    Narrative:       Blood Culture #2    Blood culture #1 [[id number]] Collected:  08/01/17 1636    Order Status:  Completed Specimen:  Blood from Peripheral, Antecubital, Right Updated:  08/05/17 1812     Blood Culture, Routine No  Growth to date     Blood Culture, Routine No Growth to date     Blood Culture, Routine No Growth to date     Blood Culture, Routine No Growth to date     Blood Culture, Routine No Growth to date    Narrative:       Blood Culture #1          Assessment and Plan:  Randa Devine is a 61 y.o. female with PMH of LVAD and ESRD ([facility] HD) who presented with anemia with GI w/u in process who was transferred to the CMICU on 8/4 for power spike and concern for LVAD thrombosis.      LVAD thrombosis  - LDH demonstrated to be >1400  - resumed AC on 8/5 with low dose IV UFH (benefits > risks given mortality of pump thrombosis)  - discussion between patient and attending: patient DNR, goal is to discharge patient with AIM  - lactate normal; daily lactate  - continue NO for now  - daily INR     GIB, anemia, thrombocytopenia, hemolysis  - concern for hemolysis (pump thrombosis) in addition to GIB  - prelim VCE read: blood in the colon  - GI plans for CScope Monday have been cancelled  - continue to monitor stools     ESRD  - dialysis [facility]  - will ask NEPHRO to dialyze patient tomorrow     FEN/PPX:  IVFs: no cIVFs  Replete electrolytes prn  Nutrition: diet as above  PPX: IV UFH     Chuck Estrada MD

## 2017-08-06 NOTE — PLAN OF CARE
Problem: Patient Care Overview  Goal: Plan of Care Review  Pt remains on non-titrating heparin ggt at 400u/hr. Intermittent nausea/vomiting throughout shift treated with PRN zofran. No VAD alarms overnight flows >10 for duration of shift. Power 7-8 campos consistently. Doppler pressures 60s-70s. Pt remains afebrile, epi turned off on prior shift due to dramatic increase in HR and pt discomfort. VSS throughout shift, no acute events. Plan of care discussed with pt and daughter at bedside all questions answered. Continuing to monitor and provide support.

## 2017-08-06 NOTE — TREATMENT PLAN
GI Treatment Plan  08/06/2017  12:58 PM    Patient's last bloody BM was yesterday morning and Hgb is holding this Am, will therefore not proceed with C scope in Am. Will consider C scope later on in the week if patient starts to bleed/Hgb is trending down.    Will continue to follow alongside primary team.    Christiano Berg M.D.  Gastroenterology Fellow, PGY-IV  Pager: [phone]  [facility]

## 2017-08-07 ENCOUNTER — TELEPHONE (OUTPATIENT)
Dept: ELECTROPHYSIOLOGY | Facility: CLINIC | Age: 62
End: 2017-08-07

## 2017-08-07 VITALS
DIASTOLIC BLOOD PRESSURE: 66 MMHG | TEMPERATURE: 98 F | BODY MASS INDEX: 25.5 KG/M2 | RESPIRATION RATE: 18 BRPM | WEIGHT: 129.88 LBS | SYSTOLIC BLOOD PRESSURE: 85 MMHG | OXYGEN SATURATION: 100 % | HEART RATE: 69 BPM | HEIGHT: 60 IN

## 2017-08-07 LAB
ALBUMIN SERPL BCP-MCNC: 2.7 G/DL
ALBUMIN SERPL BCP-MCNC: 2.7 G/DL
ALP SERPL-CCNC: 92 U/L
ALP SERPL-CCNC: 92 U/L
ALT SERPL W/O P-5'-P-CCNC: 17 U/L
ALT SERPL W/O P-5'-P-CCNC: 17 U/L
ANION GAP SERPL CALC-SCNC: 11 MMOL/L
ANISOCYTOSIS BLD QL SMEAR: SLIGHT
ANISOCYTOSIS BLD QL SMEAR: SLIGHT
APTT BLDCRRT: 27 SEC
AST SERPL-CCNC: 101 U/L
AST SERPL-CCNC: 101 U/L
BASOPHILS # BLD AUTO: 0.01 K/UL
BASOPHILS # BLD AUTO: 0.01 K/UL
BASOPHILS # BLD AUTO: 0.02 K/UL
BASOPHILS NFR BLD: 0.3 %
BASOPHILS NFR BLD: 0.4 %
BASOPHILS NFR BLD: 0.5 %
BILIRUB DIRECT SERPL-MCNC: 0.3 MG/DL
BILIRUB SERPL-MCNC: 1.2 MG/DL
BILIRUB SERPL-MCNC: 1.2 MG/DL
BNP SERPL-MCNC: 3023 PG/ML
BUN SERPL-MCNC: 41 MG/DL
CALCIUM SERPL-MCNC: 8.6 MG/DL
CHLORIDE SERPL-SCNC: 107 MMOL/L
CO2 SERPL-SCNC: 21 MMOL/L
CREAT SERPL-MCNC: 5.9 MG/DL
CRP SERPL-MCNC: 10.5 MG/L
DIFFERENTIAL METHOD: ABNORMAL
EOSINOPHIL # BLD AUTO: 0.1 K/UL
EOSINOPHIL NFR BLD: 1.3 %
EOSINOPHIL NFR BLD: 3.4 %
EOSINOPHIL NFR BLD: 3.6 %
ERYTHROCYTE [DISTWIDTH] IN BLOOD BY AUTOMATED COUNT: 17.7 %
EST. GFR  (AFRICAN AMERICAN): 8.3 ML/MIN/1.73 M^2
EST. GFR  (NON AFRICAN AMERICAN): 7.2 ML/MIN/1.73 M^2
GLUCOSE SERPL-MCNC: 83 MG/DL
HCT VFR BLD AUTO: 21.7 %
HCT VFR BLD AUTO: 22.7 %
HCT VFR BLD AUTO: 23.8 %
HGB BLD-MCNC: 7 G/DL
HGB BLD-MCNC: 7.4 G/DL
HGB BLD-MCNC: 7.8 G/DL
HYPOCHROMIA BLD QL SMEAR: ABNORMAL
HYPOCHROMIA BLD QL SMEAR: ABNORMAL
INR PPP: 1.2
LACTATE SERPL-SCNC: 0.7 MMOL/L
LDH SERPL L TO P-CCNC: 1631 U/L
LYMPHOCYTES # BLD AUTO: 0.4 K/UL
LYMPHOCYTES # BLD AUTO: 0.5 K/UL
LYMPHOCYTES # BLD AUTO: 0.5 K/UL
LYMPHOCYTES NFR BLD: 12.4 %
LYMPHOCYTES NFR BLD: 13.7 %
LYMPHOCYTES NFR BLD: 15.1 %
MAGNESIUM SERPL-MCNC: 2 MG/DL
MCH RBC QN AUTO: 31.2 PG
MCH RBC QN AUTO: 31.4 PG
MCH RBC QN AUTO: 31.8 PG
MCHC RBC AUTO-ENTMCNC: 32.3 G/DL
MCHC RBC AUTO-ENTMCNC: 32.6 G/DL
MCHC RBC AUTO-ENTMCNC: 32.8 G/DL
MCV RBC AUTO: 96 FL
MCV RBC AUTO: 97 FL
MCV RBC AUTO: 97 FL
MONOCYTES # BLD AUTO: 0.3 K/UL
MONOCYTES # BLD AUTO: 0.5 K/UL
MONOCYTES # BLD AUTO: 0.6 K/UL
MONOCYTES NFR BLD: 11.2 %
MONOCYTES NFR BLD: 12.7 %
MONOCYTES NFR BLD: 14.2 %
NEUTROPHILS # BLD AUTO: 1.9 K/UL
NEUTROPHILS # BLD AUTO: 2.7 K/UL
NEUTROPHILS # BLD AUTO: 2.8 K/UL
NEUTROPHILS NFR BLD: 69.7 %
NEUTROPHILS NFR BLD: 69.7 %
NEUTROPHILS NFR BLD: 71.8 %
PHOSPHATE SERPL-MCNC: 4.2 MG/DL
PLATELET # BLD AUTO: 40 K/UL
PLATELET # BLD AUTO: 44 K/UL
PLATELET # BLD AUTO: 45 K/UL
PLATELET BLD QL SMEAR: ABNORMAL
PMV BLD AUTO: 10.9 FL
PMV BLD AUTO: 11.1 FL
PMV BLD AUTO: 11.1 FL
POTASSIUM SERPL-SCNC: 5 MMOL/L
PREALB SERPL-MCNC: 20 MG/DL
PROT SERPL-MCNC: 5.7 G/DL
PROT SERPL-MCNC: 5.7 G/DL
PROTHROMBIN TIME: 12.8 SEC
RBC # BLD AUTO: 2.23 M/UL
RBC # BLD AUTO: 2.37 M/UL
RBC # BLD AUTO: 2.45 M/UL
SODIUM SERPL-SCNC: 139 MMOL/L
WBC # BLD AUTO: 2.78 K/UL
WBC # BLD AUTO: 3.86 K/UL
WBC # BLD AUTO: 3.87 K/UL

## 2017-08-07 PROCEDURE — 27000248 HC VAD-ADDITIONAL DAY

## 2017-08-07 PROCEDURE — 63600175 PHARM REV CODE 636 W HCPCS: Performed by: STUDENT IN AN ORGANIZED HEALTH CARE EDUCATION/TRAINING PROGRAM

## 2017-08-07 PROCEDURE — A4216 STERILE WATER/SALINE, 10 ML: HCPCS | Performed by: STUDENT IN AN ORGANIZED HEALTH CARE EDUCATION/TRAINING PROGRAM

## 2017-08-07 PROCEDURE — 80100014 HC HEMODIALYSIS 1:1

## 2017-08-07 PROCEDURE — 93750 INTERROGATION VAD IN PERSON: CPT | Mod: ,,, | Performed by: THORACIC SURGERY (CARDIOTHORACIC VASCULAR SURGERY)

## 2017-08-07 PROCEDURE — 83605 ASSAY OF LACTIC ACID: CPT

## 2017-08-07 PROCEDURE — 25000003 PHARM REV CODE 250: Performed by: STUDENT IN AN ORGANIZED HEALTH CARE EDUCATION/TRAINING PROGRAM

## 2017-08-07 PROCEDURE — 86140 C-REACTIVE PROTEIN: CPT

## 2017-08-07 PROCEDURE — 85025 COMPLETE CBC W/AUTO DIFF WBC: CPT | Mod: 91

## 2017-08-07 PROCEDURE — 25000003 PHARM REV CODE 250: Performed by: INTERNAL MEDICINE

## 2017-08-07 PROCEDURE — 85730 THROMBOPLASTIN TIME PARTIAL: CPT

## 2017-08-07 PROCEDURE — 25000003 PHARM REV CODE 250: Performed by: EMERGENCY MEDICINE

## 2017-08-07 PROCEDURE — 93750 INTERROGATION VAD IN PERSON: CPT | Mod: ,,, | Performed by: INTERNAL MEDICINE

## 2017-08-07 PROCEDURE — 25000003 PHARM REV CODE 250: Performed by: PHYSICIAN ASSISTANT

## 2017-08-07 PROCEDURE — 83735 ASSAY OF MAGNESIUM: CPT

## 2017-08-07 PROCEDURE — 99233 SBSQ HOSP IP/OBS HIGH 50: CPT | Mod: 24,,, | Performed by: THORACIC SURGERY (CARDIOTHORACIC VASCULAR SURGERY)

## 2017-08-07 PROCEDURE — 80053 COMPREHEN METABOLIC PANEL: CPT

## 2017-08-07 PROCEDURE — 20000000 HC ICU ROOM

## 2017-08-07 PROCEDURE — 83050 HGB METHEMOGLOBIN QUAN: CPT

## 2017-08-07 PROCEDURE — 93750 INTERROGATION VAD IN PERSON: CPT | Performed by: PHYSICIAN ASSISTANT

## 2017-08-07 PROCEDURE — 83880 ASSAY OF NATRIURETIC PEPTIDE: CPT

## 2017-08-07 PROCEDURE — 84134 ASSAY OF PREALBUMIN: CPT

## 2017-08-07 PROCEDURE — 99232 SBSQ HOSP IP/OBS MODERATE 35: CPT | Mod: ,,, | Performed by: INTERNAL MEDICINE

## 2017-08-07 PROCEDURE — 85610 PROTHROMBIN TIME: CPT

## 2017-08-07 PROCEDURE — 80076 HEPATIC FUNCTION PANEL: CPT

## 2017-08-07 PROCEDURE — 83615 LACTATE (LD) (LDH) ENZYME: CPT

## 2017-08-07 PROCEDURE — 84100 ASSAY OF PHOSPHORUS: CPT

## 2017-08-07 PROCEDURE — 63600175 PHARM REV CODE 636 W HCPCS: Performed by: INTERNAL MEDICINE

## 2017-08-07 RX ORDER — WARFARIN 2 MG/1
TABLET ORAL
Qty: 102 TABLET | Refills: 3
Start: 2017-08-07

## 2017-08-07 RX ADMIN — HEPARIN SODIUM AND DEXTROSE 400 UNITS/HR: 10000; 5 INJECTION INTRAVENOUS at 05:08

## 2017-08-07 RX ADMIN — ALPRAZOLAM 0.25 MG: 0.25 TABLET ORAL at 01:08

## 2017-08-07 RX ADMIN — ONDANSETRON 4 MG: 2 INJECTION INTRAMUSCULAR; INTRAVENOUS at 08:08

## 2017-08-07 RX ADMIN — ATORVASTATIN CALCIUM 40 MG: 20 TABLET, FILM COATED ORAL at 08:08

## 2017-08-07 RX ADMIN — GABAPENTIN 300 MG: 300 CAPSULE ORAL at 05:08

## 2017-08-07 RX ADMIN — ONDANSETRON 4 MG: 2 INJECTION INTRAMUSCULAR; INTRAVENOUS at 01:08

## 2017-08-07 RX ADMIN — ACETAMINOPHEN 499.51 MG: 650 SOLUTION ORAL at 01:08

## 2017-08-07 RX ADMIN — GABAPENTIN 300 MG: 300 CAPSULE ORAL at 01:08

## 2017-08-07 RX ADMIN — PANTOPRAZOLE SODIUM 40 MG: 40 TABLET, DELAYED RELEASE ORAL at 08:08

## 2017-08-07 RX ADMIN — Medication 3 ML: at 01:08

## 2017-08-07 NOTE — PATIENT INSTRUCTIONS
Discharge Summary/Instructions for after Video Capsule Endoscopy  Patient Name: Randa Devine  Patient MRN: [id number]  Patient YOB: 1955 Friday, August 04, 2017  Aquilino Ravi MD  1.  Do Not eat or drink anything for 1 hour.  Try sips of water first.  If   tolerated, resume your regular diet or one recommended by your physician.  2.  Do not drive, operate machinery, make critical decisions, or do   activities that require coordination or balance for 24 hours.  3.   You may experience a sore throat for 24 to 48 hours.  You may use   throat lozenges or gargle with warm salt water to relieve the discomfort.  4.  Because air was put into your stomach during the procedure, you may   experience some belching.  5.  Do not use any medication containing aspirin for 10 days.  6.  Go directly to the emergency room if you notice any of the following:   Chills and/or fever over 101 F   Persistent vomiting or vomiting with blood/nasal regurgitation   Severe abdominal pain, other than gas cramps   Severe chest pain   Black, tarry stools     Your doctor recommends these additional instructions:  Advance your diet as tolerated today.   Your physician has recommended a colonoscopy at appointment to be scheduled.     The findings and recommendations were discussed with your referring   physician.  If you have any questions or problems, please call your physician.  EMERGENCY PHONE NUMBER: [phone]  LAB RESULTS: [phone]  Aquilino Ravi MD  8/7/2017 7:33:57 AM  This report has been verified and signed electronically.

## 2017-08-07 NOTE — ASSESSMENT & PLAN NOTE
ESRD on HD [facility]  -Becky in [place].    -Her last HD treatment was on Saturday with 2L Net fluid removal.  She appears volume overloaded on exam today, plans for discharge to hospice today as patient is clotting her LVAD pump and no intervention can be done at this time.  She would still like to continue outpatient dialysis for volume management.  -will provide 3 hour HD treatment today.  UF 2-3L as tolerated.

## 2017-08-07 NOTE — PT/OT/SLP DISCHARGE
Physical Therapy Discharge Summary    Randa Devine  MRN: [id number]   LVAD (left ventricular assist device) present   Patient Discharged from acute Physical Therapy on .       Assessment:   Patient has not met goals.  GOALS:    Physical Therapy Goals        Problem: Physical Therapy Goal    Goal Priority Disciplines Outcome Goal Variances Interventions   Physical Therapy Goal     PT/OT, PT Ongoing (interventions implemented as appropriate)     Description:  Goals to be met by: 17     Patient will increase functional independence with mobility by performin. Supine to sit with Modified [place]  2. Sit to stand transfer with Supervision  3. Gait  x 300 feet with Supervision using Rolling Walker or appropriate AD.   4. Lower extremity exercise program x15 reps, with supervision, in order to increase LE strength and (I) with functional mobility.                    Reasons for Discontinuation of Therapy Services  Transfer to alternate level of care.      Plan:  Patient Discharged to: Home with Home Health Service.

## 2017-08-07 NOTE — HPI
61 y.o. female with significant past medical listed below who was sent from her dialysis to hospital for shortness of breath and hypotension found to have severe anemia on admit due to recurrent GI bleed. Recent admission 7/18-7/26 for also GIB found no source of bleeding on EGD/CTA/Colonoscopy. Plan for VCE outpatient on 8/3/17. On admit, Hgb 4.8>8.1 after blood transfusions. Nephrology consult for HD at bedside due to LVAD. Patient receives HD at TTS at [facility] in [place] on [facility].

## 2017-08-07 NOTE — HOSPITAL COURSE
61 y.o. woman admitted for fatigue for 1 week found to have low Hgb at 4.8.     Patient mebtioed 1 week Hx of fatigue and some SOB she has maroon/dark stool. She denied any chest pain and denied any other complains. She missed her dialysis today as her BP was low.     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 She underwent EGD/CTA/Colonoscopy which did not reveal an active source of bleeding. Her coumadin was held since admission and once her INR became subtherapeutic, her GIB resolved. GI planned for an inpatient VCE however the patient refused to undergo prep for it. Her coumadin was resumed and is set to follow up with GI outpatient on 8/3/17 for outpatient VCE.     She was admitted to [facility] and transfused 2 units PRBC. GI performed VCE and active bleeding in colon found. Plan was for colonoscopy, however she developed acute pump thrombosis 8/4. She was moved to CMICU and fixed dose heparin was given (no other options as she has history of bleeding). She was made DNR per her wishes and asked to be sent home. Of note, she was started on epinephrine but went into atrila flutter with RVR with chest pain after it was started. She will dc home with AIM, DNR fully understanding her LVAD may stop and death would occur. She will continue coumadin 2 mg daily for goal INR 1.3-2.3.

## 2017-08-07 NOTE — SUBJECTIVE & OBJECTIVE
Interval History: patient feeling tired this morning, wanting to go home. Knows pump will likely stop soon    Continuous Infusions:   heparin (porcine) in 5 % dex 400 Units/hr (08/07/17 1100)     Scheduled Meds:   sodium chloride 0.9%   Intravenous Once    atorvastatin  40 mg Oral Daily    escitalopram oxalate  20 mg Oral QHS    fluticasone  2 spray Each Nare Daily    gabapentin  300 mg Oral TID    oxymetazoline  2 spray Each Nare BID    pantoprazole  40 mg Oral Daily    sodium chloride 0.9%  3 mL Intravenous Q8H     PRN Meds:sodium chloride, sodium chloride, sodium chloride 0.9%, sodium chloride 0.9%, acetaminophen, albumin human 25%, alprazolam, ondansetron    Review of patient's allergies indicates:   Allergen Reactions    Codeine Nausea And Vomiting    Demerol [meperidine] Nausea And Vomiting    Lortab [hydrocodone-acetaminophen] Nausea And Vomiting     Objective:     Vital Signs (Most Recent):  Temp: 98.2 °F (36.8 °C) (08/07/17 0700)  Pulse: 70 (08/07/17 1100)  Resp: 19 (08/07/17 1100)  BP: (!) 85/62 (08/07/17 1100)  SpO2: (!) 92 % (08/07/17 1100) Vital Signs (24h Range):  Temp:  [98 °F (36.7 °C)-98.3 °F (36.8 °C)] 98.2 °F (36.8 °C)  Pulse:  [69-76] 70  Resp:  [10-29] 19  SpO2:  [83 %-100 %] 92 %  BP: ()/(0-71) 85/62     Weight: 58.9 kg (129 lb 13.6 oz)  Body mass index is 25.36 kg/m².      Intake/Output Summary (Last 24 hours) at 08/07/17 1118  Last data filed at 08/07/17 1100   Gross per 24 hour   Intake              146 ml   Output                0 ml   Net              146 ml       Hemodynamic Parameters:           Physical Exam   Constitutional: She is oriented to person, place, and time. She appears well-developed and well-nourished.   HENT:   Head: Normocephalic and atraumatic.   Eyes: Conjunctivae are normal. No scleral icterus.   Neck: Normal range of motion. Neck supple.   Cardiovascular:   No murmur heard.  VAD static    Pulmonary/Chest: Effort normal and breath sounds normal.    Abdominal: Soft. Bowel sounds are normal. She exhibits no distension. There is no tenderness. There is no rebound and no guarding.   Neurological: She is alert and oriented to person, place, and time.   Skin: Skin is warm and dry.   Psychiatric: She has a normal mood and affect. Her behavior is normal.       Significant Labs:  CBC:    Recent Labs  Lab 08/07/17  0750   WBC 3.87*   RBC 2.45*   HGB 7.8*   HCT 23.8*   PLT 45*   MCV 97   MCH 31.8*   MCHC 32.8     BNP:    Recent Labs  Lab 08/07/17  0400   BNP 3,023*     CMP:    Recent Labs  Lab 08/07/17  0400   GLU 83   CALCIUM 8.6*   ALBUMIN 2.7*  2.7*   PROT 5.7*  5.7*      K 5.0   CO2 21*      BUN 41*   CREATININE 5.9*   ALKPHOS 92  92   ALT 17 17   *  101*   BILITOT 1.2*  1.2*      Coagulation:     Recent Labs  Lab 08/07/17  0400   INR 1.2   APTT 27.0     LDH:    Recent Labs  Lab 08/04/17  1730 08/05/17  1923 08/06/17  0415 08/07/17  0400   * 1,470* 1,513* 1,631*     Microbiology:  Microbiology Results (last 7 days)     Procedure Component Value Units Date/Time    Blood culture #2 [[id number]] Collected:  08/01/17 1710    Order Status:  Completed Specimen:  Blood from Peripheral, Forearm, Right Updated:  08/06/17 2012     Blood Culture, Routine No growth after 5 days.    Narrative:       Blood Culture #2    Blood culture #1 [[id number]] Collected:  08/01/17 1636    Order Status:  Completed Specimen:  Blood from Peripheral, Antecubital, Right Updated:  08/06/17 1812     Blood Culture, Routine No growth after 5 days.    Narrative:       Blood Culture #1          I have reviewed all pertinent labs within the past 24 hours.    Estimated Creatinine Clearance: 8.2 mL/min (based on Cr of 5.9).    Diagnostic Results:  I have reviewed and interpreted all pertinent imaging results/findings within the past 24 hours.

## 2017-08-07 NOTE — PROGRESS NOTES
DISCHARGE    SW to pt's room this morning for discharge today.  Pt presents as sitting up in bed with dgtr Constance and son-in-law at bedside.  Pt and family all present as aao x4, calm, cooperative, and asking and answering questions appropriately.  Pt verbalizes understanding and agreement with plan for d/c to home today with HH AIM.  Pt reports she would like to continue dialysis at this time and is in agreement with AIM program with HH.  Pt reports she plans to transition to hospice care when she decides to stop dialysis.  SW explained AIM partnership/transition to pt and family, who verbalize understanding.  Pt and dgtr both report coping adequately at this time.    Pt's dgtr asking about hospital bed at home.  Dgtr states they plan to lower pt's bed at home to make it easier for pt.  Pt wishes to hold off on ordering HB for now and will try getting in and out of her bed at home.  BETHANIE instructed pt and dgtr to call clinic after d/c if pt decides she would like a hospital bed.  Pt and dgtr verbalize understanding.  Pt reports she plans to transport home today via ambulance due to debility.  BETHANIE set up will call transportation with [facility] ([phone]) and instructed bedside nurse to call [facility] when pt is ready for pickup after HD.    BETHANIE notified [facility] (ph: [phone], f: [phone]) of d/c today and faxed HH orders and hospital records.  BETHANIE also spoke with Brittany in intake at [facility] (ph: [phone], f: [phone]) to confirm whether they would be able to accept pt with LVAD once pt is ready for hospice.  Brittany requested that BETHANIE fax pt's records for their director to review.  BETHANIE faxed records and is awaiting response from [facility].  BETHANIE providing ongoing psychosocial and counseling support, education, resources, assistance, and discharge planning as indicated.  BETHANIE remains available.      UPDATE -- 8/9, 13:00: BETHANIE received call back from Brittany with [facility].  Brittany reports  their director reviewed pt's records and they will be able to accept pt for hospice when pt is ready, as long as pt's family is trained on LVAD dressing change.  BETHANIE advised Brittany that family is trained.  Brittany reports LVAD nurse coord. can call their office to speak with her re: LVAD education for hospice nurses.  BETHANIE notified LVAD coord. TREY Forde, who will f/u with Brittany.  BETHANIE also notified pt of decision from [facility].  SW remains available.

## 2017-08-07 NOTE — PLAN OF CARE
[facility]   Heart Transplant/VAD Clinic   [address] [place] [address]   [phone] after hours          [phone] fax     VAD HOME  HEALTH ORDERS      Admit to [facility] Home Health    Diagnosis:   Patient Active Problem List   Diagnosis    Heart replaced by heart assist device    End stage renal disease on dialysis    Chronic anticoagulation    Left ventricular assist device present    Femoral neck fracture    Normocytic anemia due to blood loss    Chronic combined systolic and diastolic heart failure    Fracture of lumbar spine without cord injury    TIA (transient ischemic attack)    ICD (implantable cardioverter-defibrillator), biventricular, in situ    Paroxysmal atrial fibrillation    Atrial tachycardia, paroxysmal    HIT (heparin-induced thrombocytopenia)    Gait disorder    Chronic LBP    Right leg numbness    Bilateral foot-drop    Physical deconditioning    Secondary hyperparathyroidism (of renal origin)    History of stroke without residual deficits    Syncope    Faintness    Nausea    Essential hypertension    Chronic low back pain    Cognitive impairment    Cellulitis    Fall    Erythema    Spontaneous hematoma of forearm    Hallucinations    Embolic stroke involving left posterior cerebral artery    Migraine with aura and without status migrainosus, not intractable    Cytotoxic cerebral edema    Acute ischemic vertebrobasilar artery thalamic stroke involving left-sided vessel    Cardiomegaly    Hypervolemia    Congestive heart failure    History of GI bleed    GI bleed    AICD discharge    Gastrointestinal hemorrhage    LVAD (left ventricular assist device) present    Shortness of breath       Patient is homebound due to:  CHF    Diet: Low Fat, Low cholesterol, 2Gm Na, Coumadin restrictions.    Acitivities: No Swimming, bathing, vacuuming, contact sports.    Fresh implants= Sternal  Precautions    Nursing:   SN to complete comprehensive assessment including routine vital signs. Instruct on disease process and s/s of complications to report to MD. Review/verify medication list sent home with the patient at time of discharge  and instruct patient/caregiver as needed. Frequency may be adjusted depending on start of care date.    **LVAD driveline exit site dressing change is to be completed per LVAD patient/caregiver only**.    Notify MD if SBP > 160 or < 90; DBP > 90 or < 50; HR > 120 or < 50; Temp > 101; Weight gain >3lbs in 1 day or 5lbs in 1 week.  Other:         LABS:  SN to perform labs: PT/INR per Coumadin clinic [phone].   Follow up INR date: 8/10/2017  No Finger Sticks               to evaluate for community resources/long-range planning.     Aide to provide assistance with personal care, ADLs, and vital signs    Send initial Home Health orders to HTS attending physician on call.  Send follow up questions to VAD clinic MD [phone] or [phone].

## 2017-08-07 NOTE — SUBJECTIVE & OBJECTIVE
Interval History:   NAEON.  Primary team planning on discharge today as patient would like to proceed with hospice, wants to continue dialysis for volume management.  Her last HD treatment was on Saturday, she tolerated well with 2L net fluid removal.  Appears volume overloaded on exam today with SOB on exertion.  Crackles bilaterally.    Review of patient's allergies indicates:   Allergen Reactions    Codeine Nausea And Vomiting    Demerol [meperidine] Nausea And Vomiting    Lortab [hydrocodone-acetaminophen] Nausea And Vomiting     Current Facility-Administered Medications   Medication Frequency    0.9%  NaCl infusion (for blood administration) Q24H PRN    0.9%  NaCl infusion (for blood administration) Q24H PRN    0.9%  NaCl infusion PRN    0.9%  NaCl infusion PRN    0.9%  NaCl infusion Once    acetaminophen oral solution 499.5074 mg Q6H PRN    albumin human 25% bottle 12.5 g PRN    alprazolam tablet 0.25 mg BID PRN    atorvastatin tablet 40 mg Daily    escitalopram oxalate tablet 20 mg QHS    fluticasone 50 mcg/actuation nasal spray 2 spray Daily    gabapentin capsule 300 mg TID    heparin 25,000 units in dextrose 5% 250 mL (100 units/mL) infusion (heparin infusion) Continuous    ondansetron injection 4 mg Q4H PRN    oxymetazoline 0.05 % nasal spray 2 spray BID    pantoprazole EC tablet 40 mg Daily    sodium chloride 0.9% flush 3 mL Q8H       Objective:     Vital Signs (Most Recent):  Temp: 97.6 °F (36.4 °C) (08/07/17 1100)  Pulse: 70 (08/07/17 1630)  Resp: 14 (08/07/17 1630)  BP: (!) 74/51 (08/07/17 1630)  SpO2: 97 % (08/07/17 1630)  O2 Device (Oxygen Therapy): nasal cannula (08/07/17 1100) Vital Signs (24h Range):  Temp:  [97.6 °F (36.4 °C)-98.2 °F (36.8 °C)] 97.6 °F (36.4 °C)  Pulse:  [69-76] 70  Resp:  [8-26] 14  SpO2:  [83 %-100 %] 97 %  BP: ()/(0-65) 74/51     Weight: 58.9 kg (129 lb 13.6 oz) (08/02/17 6513)  Body mass index is 25.36 kg/m².  Body surface area is 1.58 meters  squared.    I/O last 3 completed shifts:  In: 393 [P.O.:245; I.V.:148]  Out: -     Physical Exam   Constitutional: She is oriented to person, place, and time. She appears well-developed and well-nourished.   HENT:   Head: Normocephalic and atraumatic.   Eyes: Conjunctivae are normal. No scleral icterus.   Neck: Normal range of motion. Neck supple.   Cardiovascular:   No murmur heard.  LVAD Hum   Pulmonary/Chest: No respiratory distress. She has no wheezes. She has rales.   Abdominal: Soft. Bowel sounds are normal. She exhibits no distension. There is no tenderness. There is no rebound and no guarding.   Neurological: She is alert and oriented to person, place, and time.   Skin: Skin is warm and dry.   Psychiatric: She has a normal mood and affect. Her behavior is normal.       Significant Labs:  CBC:   Recent Labs  Lab 08/07/17  0750   WBC 3.87*   RBC 2.45*   HGB 7.8*   HCT 23.8*   PLT 45*   MCV 97   MCH 31.8*   MCHC 32.8     CMP:   Recent Labs  Lab 08/07/17  0400   GLU 83   CALCIUM 8.6*   ALBUMIN 2.7*  2.7*   PROT 5.7*  5.7*      K 5.0   CO2 21*      BUN 41*   CREATININE 5.9*   ALKPHOS 92  92   ALT 17  17   *  101*   BILITOT 1.2*  1.2*

## 2017-08-07 NOTE — PLAN OF CARE
Problem: Patient Care Overview  Goal: Plan of Care Review  Outcome: Ongoing (interventions implemented as appropriate)  No acute events throughout shift. Pt remains on heparin at 400 units/hr non-titrating. Intermittent nausea continues, treated successfully with zofran. No alarms on VAD overnight. Flows remain >10 at 2600rpm, power 8-9 campos. Nosebleeds continued intermittently, resolved with addition of humification to O2 per NC. Afebrile. Plan of care discussed with pt regarding transfer home to hospice today. All questions answered, continuing to monitor closely and provide support.

## 2017-08-07 NOTE — TELEPHONE ENCOUNTER
Spoke with pt's daughter and informed them that they did not have to come in for upcoming in clinic device check, and that it would be pulled remotely. Pt's daughter verbalized understanding

## 2017-08-07 NOTE — DISCHARGE SUMMARY
[facility]  Heart Transplant  Discharge Summary      Patient Name: Randa Devine  MRN: [id number]  Admission Date: 8/1/2017  Hospital Length of Stay: 6 days  Discharge Date and Time: 08/07/2017 4:08 PM  Attending Physician: Tosin Fajardo MD   Discharging Provider: KAREN Torres  Primary Care Provider: Laura Garvin DO     HPI: 61 y.o. woman admitted for fatigue for 1 week found to have low Hgb at 4.8.    Procedure(s) (LRB):  CAPSULE ENDOSCOPY (N/A)     Hospital Course: 61 y.o. woman admitted for fatigue for 1 week found to have low Hgb at 4.8.     Patient mebtioed 1 week Hx of fatigue and some SOB she has maroon/dark stool. She denied any chest pain and denied any other complains. She missed her dialysis today as her BP was low.     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 She underwent EGD/CTA/Colonoscopy which did not reveal an active source of bleeding. Her coumadin was held since admission and once her INR became subtherapeutic, her GIB resolved. GI planned for an inpatient VCE however the patient refused to undergo prep for it. Her coumadin was resumed and is set to follow up with GI outpatient on 8/3/17 for outpatient VCE.     She was admitted to [facility] and transfused 2 units PRBC. GI performed VCE and active bleeding in colon found. Plan was for colonoscopy, however she developed acute pump thrombosis 8/4. She was moved to CMICU and fixed dose heparin was given (no other options as she has history of bleeding). She was made DNR per her wishes and asked to be sent home. Of note, she was started on epinephrine but went into atrila flutter with  RVR with chest pain after it was started. She will dc home with AIM, DNR fully understanding her LVAD may stop and death would occur. She will continue coumadin 2 mg daily for goal INR 1.3-2.3.        Consults         Status Ordering Provider     Inpatient consult to Dietary  Once     Provider:  (Not yet assigned)    Completed WILLIAM WILSON     Inpatient consult to Gastroenterology  Once     Provider:  (Not yet assigned)    Completed ANDREA TORREZ     Inpatient consult to Midline team  Once     Provider:  (Not yet assigned)    Completed CLIFFORD RUDOLPH     Inpatient consult to Nephrology  Once     Provider:  (Not yet assigned)    Completed WILLIAM WILSON          Significant Diagnostic Studies: Labs:   BMP:   Recent Labs  Lab 08/05/17  1923 08/06/17  0415 08/07/17  0400   GLU 96 115* 83    140 139   K 4.1 4.6 5.0    107 107   CO2 23 23 21*   BUN 16 23* 41*   CREATININE 3.2* 4.1* 5.9*   CALCIUM 8.7 9.0 8.6*   MG  --  2.3 2.0       Pending Diagnostic Studies:     None        Final Active Diagnoses:    Diagnosis Date Noted POA    PRINCIPAL PROBLEM:  LVAD (left ventricular assist device) present [Z95.811] 08/01/2017 Not Applicable    Shortness of breath [R06.02] 08/04/2017 Yes    GI bleed [K92.2] 07/18/2017 Yes    End stage renal disease on dialysis [N18.6, Z99.2] 11/07/2012 Not Applicable      Problems Resolved During this Admission:    Diagnosis Date Noted Date Resolved POA      Discharged Condition: good    Disposition:   AIM       Follow Up:  as needed with AIM    Patient Instructions:   No discharge procedures on file.  Medications:  Reconciled Home Medications:   Current Discharge Medication List      CONTINUE these medications which have CHANGED    Details   warfarin (COUMADIN) 2 MG tablet 2 mg daily  Qty: 102 tablet, Refills: 3    Comments: Rx request was called in to Jaimee at [facility] ph. # [phone]     (03) day supply  Dr. Charanjit Sawant  Associated Diagnoses: Left  ventricular assist device present; Long term current use of anticoagulant therapy         CONTINUE these medications which have NOT CHANGED    Details   acetaminophen (TYLENOL) 325 MG tablet Take 2 tablets (650 mg total) by mouth every 6 (six) hours as needed for Pain (mild pain).  Refills: 0      atorvastatin (LIPITOR) 40 MG tablet Take 1 tablet (40 mg total) by mouth once daily.  Qty: 90 tablet, Refills: 3      CALCIUM CARBONATE (ANTACID CALCIUM ORAL) Take 1 tablet by mouth 4 (four) times daily.       cetirizine (ZYRTEC) 5 MG tablet Take 1 tablet (5 mg total) by mouth once daily.  Qty: 30 tablet, Refills: 0      escitalopram oxalate (LEXAPRO) 20 MG tablet Take 1 tablet (20 mg total) by mouth every evening.  Qty: 90 tablet, Refills: 3    Associated Diagnoses: Heart replaced by heart assist device      gabapentin (NEURONTIN) 300 MG capsule Take 1 capsule (300 mg total) by mouth every evening.  Qty: 180 capsule, Refills: 3    Associated Diagnoses: Right leg numbness      ondansetron (ZOFRAN) 4 MG tablet Take 1 tablet (4 mg total) by mouth every 8 (eight) hours as needed for Nausea.  Qty: 30 tablet, Refills: 3    Associated Diagnoses: Nausea      pantoprazole (PROTONIX) 40 MG tablet Take 1 tablet (40 mg total) by mouth once daily.  Qty: 60 tablet, Refills: 10             KAREN Torres  Heart Transplant  [facility]

## 2017-08-07 NOTE — PROCEDURES
Patient AAO withfamily at bedside. VAD interrogation completed this AM in the event changes needed to be made. Will continue to monitor for further issues.     Pulsatile: Yes   VAD Sounds: Static  Problems / Issues / Alarms with VAD if any: High Power  HCT: 23  Waveforms: flat waveform >10      VAD Interrogation:  TXP LVAD INTERROGATIONS 8/7/2017 8/7/2017 8/7/2017 8/7/2017 8/7/2017 8/7/2017 8/7/2017   Type Heartware Heartware Heartware Heartware Heartware Heartware Heartware   Flow >10 >10 >10 >10 >10 >10 >10   Speed 2600 2600 2600 2600 2600 2600 2600   PI - - - - - - -   Power (Kim) 10.3 10.4 9.7 9.5 9.4 9.5 9.3   Low Flow Alarm - - - - - - -   High Power Alarm - - - - - - -   Pulsatility Intermittent pulse Intermittent pulse Intermittent pulse Intermittent pulse Intermittent pulse Intermittent pulse Intermittent pulse   }

## 2017-08-07 NOTE — PROGRESS NOTES
[facility]  Nephrology  Progress Note    Patient Name: Randa Devine  MRN: [id number]  Admission Date: 8/1/2017  Hospital Length of Stay: 6 days  Attending Provider: Tosin Fajardo MD   Primary Care Physician: Laura Garvin DO  Principal Problem:LVAD (left ventricular assist device) present    Subjective:     HPI: 61 y.o. female with significant past medical listed below who was sent from her dialysis to hospital for shortness of breath and hypotension found to have severe anemia on admit due to recurrent GI bleed. Recent admission 7/18-7/26 for also GIB found no source of bleeding on EGD/CTA/Colonoscopy. Plan for VCE outpatient on 8/3/17. On admit, Hgb 4.8>8.1 after blood transfusions. Nephrology consult for HD at bedside due to LVAD. Patient receives HD at TTS at [facility] in [place] on [address].     Interval History:   NAEON.  Primary team planning on discharge today as patient would like to proceed with hospice, wants to continue dialysis for volume management.  Her last HD treatment was on Saturday, she tolerated well with 2L net fluid removal.  Appears volume overloaded on exam today with SOB on exertion.  Crackles bilaterally.    Review of patient's allergies indicates:   Allergen Reactions    Codeine Nausea And Vomiting    Demerol [meperidine] Nausea And Vomiting    Lortab [hydrocodone-acetaminophen] Nausea And Vomiting     Current Facility-Administered Medications   Medication Frequency    0.9%  NaCl infusion (for blood administration) Q24H PRN    0.9%  NaCl infusion (for blood administration) Q24H PRN    0.9%  NaCl infusion PRN    0.9%  NaCl infusion PRN    0.9%  NaCl infusion Once    acetaminophen oral solution 499.5074 mg Q6H PRN    albumin human 25% bottle 12.5 g PRN    alprazolam tablet 0.25 mg BID PRN    atorvastatin tablet 40 mg Daily    escitalopram oxalate tablet 20 mg QHS    fluticasone 50 mcg/actuation nasal spray 2 spray Daily    gabapentin capsule 300 mg TID     heparin 25,000 units in dextrose 5% 250 mL (100 units/mL) infusion (heparin infusion) Continuous    ondansetron injection 4 mg Q4H PRN    oxymetazoline 0.05 % nasal spray 2 spray BID    pantoprazole EC tablet 40 mg Daily    sodium chloride 0.9% flush 3 mL Q8H       Objective:     Vital Signs (Most Recent):  Temp: 97.6 °F (36.4 °C) (08/07/17 1100)  Pulse: 70 (08/07/17 1630)  Resp: 14 (08/07/17 1630)  BP: (!) 74/51 (08/07/17 1630)  SpO2: 97 % (08/07/17 1630)  O2 Device (Oxygen Therapy): nasal cannula (08/07/17 1100) Vital Signs (24h Range):  Temp:  [97.6 °F (36.4 °C)-98.2 °F (36.8 °C)] 97.6 °F (36.4 °C)  Pulse:  [69-76] 70  Resp:  [8-26] 14  SpO2:  [83 %-100 %] 97 %  BP: ()/(0-65) 74/51     Weight: 58.9 kg (129 lb 13.6 oz) (08/02/17 1513)  Body mass index is 25.36 kg/m².  Body surface area is 1.58 meters squared.    I/O last 3 completed shifts:  In: 393 [P.O.:245; I.V.:148]  Out: -     Physical Exam   Constitutional: She is oriented to person, place, and time. She appears well-developed and well-nourished.   HENT:   Head: Normocephalic and atraumatic.   Eyes: Conjunctivae are normal. No scleral icterus.   Neck: Normal range of motion. Neck supple.   Cardiovascular:   No murmur heard.  LVAD Hum   Pulmonary/Chest: No respiratory distress. She has no wheezes. She has rales.   Abdominal: Soft. Bowel sounds are normal. She exhibits no distension. There is no tenderness. There is no rebound and no guarding.   Neurological: She is alert and oriented to person, place, and time.   Skin: Skin is warm and dry.   Psychiatric: She has a normal mood and affect. Her behavior is normal.       Significant Labs:  CBC:   Recent Labs  Lab 08/07/17  0750   WBC 3.87*   RBC 2.45*   HGB 7.8*   HCT 23.8*   PLT 45*   MCV 97   MCH 31.8*   MCHC 32.8     CMP:   Recent Labs  Lab 08/07/17  0400   GLU 83   CALCIUM 8.6*   ALBUMIN 2.7*  2.7*   PROT 5.7*  5.7*      K 5.0   CO2 21*      BUN 41*   CREATININE 5.9*   ALKPHOS 92   92   ALT 17  17   *  101*   BILITOT 1.2*  1.2*     Assessment/Plan:     End stage renal disease on dialysis    ESRD on HD [facility]  -[facility] in [place].    -Her last HD treatment was on Saturday with 2L Net fluid removal.  She appears volume overloaded on exam today, plans for discharge to hospice today as patient is clotting her LVAD pump and no intervention can be done at this time.  She would still like to continue outpatient dialysis for volume management.  -will provide 3 hour HD treatment today.  UF 2-3L as tolerated.          Ishmael Vera NP  Nephrology  [facility]  Pager:  [phone]

## 2017-08-07 NOTE — PROGRESS NOTES
3hr maintenance bedside HD treatment initiated via SCOTT graft without difficulty. Primary nurse notified.

## 2017-08-07 NOTE — PLAN OF CARE
Problem: Patient Care Overview  Goal: Plan of Care Review  Outcome: Ongoing (interventions implemented as appropriate)  No acute events throughout day, VS and assessment per flow sheet, Pt to be discharged home with AIM after HD completed, plan of care reviewed with Randa Devine and family, all concerns addressed, will continue to monitor.

## 2017-08-07 NOTE — PROGRESS NOTES
[facility]  Heart Transplant  Progress Note    Patient Name: Randa Devine  MRN: [id number]  Admission Date: 8/1/2017  Hospital Length of Stay: 6 days  Attending Physician: Tosin Fajardo MD  Primary Care Provider: Laura Garvin DO  Principal Problem:<principal problem not specified>    Subjective:     Interval History: patient feeling tired this morning, wanting to go home. Knows pump will likely stop soon    Continuous Infusions:   heparin (porcine) in 5 % dex 400 Units/hr (08/07/17 1100)     Scheduled Meds:   sodium chloride 0.9%   Intravenous Once    atorvastatin  40 mg Oral Daily    escitalopram oxalate  20 mg Oral QHS    fluticasone  2 spray Each Nare Daily    gabapentin  300 mg Oral TID    oxymetazoline  2 spray Each Nare BID    pantoprazole  40 mg Oral Daily    sodium chloride 0.9%  3 mL Intravenous Q8H     PRN Meds:sodium chloride, sodium chloride, sodium chloride 0.9%, sodium chloride 0.9%, acetaminophen, albumin human 25%, alprazolam, ondansetron    Review of patient's allergies indicates:   Allergen Reactions    Codeine Nausea And Vomiting    Demerol [meperidine] Nausea And Vomiting    Lortab [hydrocodone-acetaminophen] Nausea And Vomiting     Objective:     Vital Signs (Most Recent):  Temp: 98.2 °F (36.8 °C) (08/07/17 0700)  Pulse: 70 (08/07/17 1100)  Resp: 19 (08/07/17 1100)  BP: (!) 85/62 (08/07/17 1100)  SpO2: (!) 92 % (08/07/17 1100) Vital Signs (24h Range):  Temp:  [98 °F (36.7 °C)-98.3 °F (36.8 °C)] 98.2 °F (36.8 °C)  Pulse:  [69-76] 70  Resp:  [10-29] 19  SpO2:  [83 %-100 %] 92 %  BP: ()/(0-71) 85/62     Weight: 58.9 kg (129 lb 13.6 oz)  Body mass index is 25.36 kg/m².      Intake/Output Summary (Last 24 hours) at 08/07/17 1118  Last data filed at 08/07/17 1100   Gross per 24 hour   Intake              146 ml   Output                0 ml   Net              146 ml       Hemodynamic Parameters:           Physical Exam   Constitutional: She is oriented to person,  place, and time. She appears well-developed and well-nourished.   HENT:   Head: Normocephalic and atraumatic.   Eyes: Conjunctivae are normal. No scleral icterus.   Neck: Normal range of motion. Neck supple.   Cardiovascular:   No murmur heard.  VAD static    Pulmonary/Chest: Effort normal and breath sounds normal.   Abdominal: Soft. Bowel sounds are normal. She exhibits no distension. There is no tenderness. There is no rebound and no guarding.   Neurological: She is alert and oriented to person, place, and time.   Skin: Skin is warm and dry.   Psychiatric: She has a normal mood and affect. Her behavior is normal.       Significant Labs:  CBC:    Recent Labs  Lab 08/07/17  0750   WBC 3.87*   RBC 2.45*   HGB 7.8*   HCT 23.8*   PLT 45*   MCV 97   MCH 31.8*   MCHC 32.8     BNP:    Recent Labs  Lab 08/07/17  0400   BNP 3,023*     CMP:    Recent Labs  Lab 08/07/17  0400   GLU 83   CALCIUM 8.6*   ALBUMIN 2.7*  2.7*   PROT 5.7*  5.7*      K 5.0   CO2 21*      BUN 41*   CREATININE 5.9*   ALKPHOS 92  92   ALT 17  17   *  101*   BILITOT 1.2*  1.2*      Coagulation:     Recent Labs  Lab 08/07/17  0400   INR 1.2   APTT 27.0     LDH:    Recent Labs  Lab 08/04/17  1730 08/05/17  1923 08/06/17  0415 08/07/17  0400   * 1,470* 1,513* 1,631*     Microbiology:  Microbiology Results (last 7 days)     Procedure Component Value Units Date/Time    Blood culture #2 [[id number]] Collected:  08/01/17 1710    Order Status:  Completed Specimen:  Blood from Peripheral, Forearm, Right Updated:  08/06/17 2012     Blood Culture, Routine No growth after 5 days.    Narrative:       Blood Culture #2    Blood culture #1 [[id number]] Collected:  08/01/17 1636    Order Status:  Completed Specimen:  Blood from Peripheral, Antecubital, Right Updated:  08/06/17 1812     Blood Culture, Routine No growth after 5 days.    Narrative:       Blood Culture #1          I have reviewed all pertinent labs within the past 24  hours.    Estimated Creatinine Clearance: 8.2 mL/min (based on Cr of 5.9).    Diagnostic Results:  I have reviewed and interpreted all pertinent imaging results/findings within the past 24 hours.    Assessment and Plan:     LVAD (left ventricular assist device) present    -[employer] HVAD Implanted 5/9/12 as DT now with pump thrombosis, DNR, going home with AIM today.   -HTS Primary  -resume coumadin, stop heparin, no aspirin secondary to bleeds    -Interrogation notable for high flows and power  -Not listed for OHTx          GI bleed    -no intervention further at this time due to thrombosis. Will resume coumadin and stop fixed heparin when she discharges. Patient made aware of likelihood of pump stopping.                 KAREN Torres  Heart Transplant  [facility]

## 2017-08-07 NOTE — PROGRESS NOTES
Daily E and M and VAD Interrogation Note    Reason for Visit: Low H&H/GIB  Patient is seen in follow up for management of:  [] HeartMate II  [x] Heartware [] Total artificial heart       [] ECMO           [] Other     Interval History:  [] Interval history unobtainable due to intubation.  The [x] implant 05/2012 as DT  No Events overnight.      Review of Systems:  Denies CP, SOB, abd pain, N/V  All other systems reviewed and negative    Medications:  Current Facility-Administered Medications   Medication Dose Route Frequency Provider Last Rate Last Dose    0.9%  NaCl infusion (for blood administration)   Intravenous Q24H PRN Noman Arguello MD        0.9%  NaCl infusion (for blood administration)   Intravenous Q24H PRN Terese Jaramillo MD        0.9%  NaCl infusion   Intravenous PRN Lita Remy NP        0.9%  NaCl infusion   Intravenous PRN Tello Garcia MD        0.9%  NaCl infusion   Intravenous Once Tello Garcia MD        acetaminophen oral solution 499.5074 mg  499.5074 mg Oral Q6H PRN Paula Slater MD   499.5074 mg at 08/07/17 1344    albumin human 25% bottle 12.5 g  12.5 g Intravenous PRN Lita Remy NP   12.5 g at 08/03/17 1817    alprazolam tablet 0.25 mg  0.25 mg Oral BID PRN Chuck Estrada MD   0.25 mg at 08/07/17 1347    atorvastatin tablet 40 mg  40 mg Oral Daily KAREN Carroll   40 mg at 08/07/17 0846    escitalopram oxalate tablet 20 mg  20 mg Oral QHS KAERN Carroll   20 mg at 08/06/17 2000    fluticasone 50 mcg/actuation nasal spray 2 spray  2 spray Each Nare Daily Morena Grace MD   2 spray at 08/06/17 0834    gabapentin capsule 300 mg  300 mg Oral TID Noman Arguello MD   300 mg at 08/07/17 1345    heparin 25,000 units in dextrose 5% 250 mL (100 units/mL) infusion (heparin infusion)  400 Units/hr Intravenous Continuous Caitlyn Ackerman MD 4 mL/hr at 08/07/17 1300 400 Units/hr at 08/07/17 1300    ondansetron  injection 4 mg  4 mg Intravenous Q4H PRN Paulo Varghese MD   4 mg at 08/07/17 1347    oxymetazoline 0.05 % nasal spray 2 spray  2 spray Each Nare BID Chuck Estrada MD   2 spray at 08/06/17 2000    pantoprazole EC tablet 40 mg  40 mg Oral Daily Caitlyn Ackerman MD   40 mg at 08/07/17 0846    sodium chloride 0.9% flush 3 mL  3 mL Intravenous Q8H Terese Jaramillo MD   3 mL at 08/07/17 1348       Physical Examination:  Vital Signs:   Vitals:    08/07/17 1300   BP: (!) 77/61   Pulse: 69   Resp: (!) 22   Temp:      Cardiovascular:  [x] Regular rate and rhythm. VAD sounds smooth  [x]  No edema []  Edema present  [x]  Clear to auscultation  []  Rales to  []  Coarse  []  No rales but   [] Pedal Pulses absent  []  Pulses + throughout  Skin:  Incision is [x]  Clean, dry and intact.    Sternum:  [x]  Stable []  Unstable  Driveline(s):   [x]  Clean, dry and intact.       Labs:  CBC, CMP, LDH and Coags    X-Rays:  []  I reviewed today's Chest x-ray    Procedure:  Device Interrogation including analysis of device parameters.  Current Settings  [x]  Ventricular Assist Device  []  Total Artificial Heart interrogated  Review of device function is [x]  Stable     TXP LVAD INTERROGATIONS 8/7/2017 8/7/2017 8/7/2017 8/7/2017 8/7/2017 8/7/2017 8/7/2017   Type Heartware Heartware Heartware Heartware Heartware Heartware Heartware   Flow >10 >10 >10 >10 >10 >10 >10   Speed 2600 2600 2600 2600 2600 2600 2600   PI - - - - - - -   Power (Kim) 9.5 9.4 9.5 9.3 9.6 10.6 9.7   Low Flow Alarm - - - - - - -   High Power Alarm - - - - - - -   Pulsatility Intermittent pulse Intermittent pulse Intermittent pulse Intermittent pulse Intermittent pulse Intermittent pulse Intermittent pulse       Assessment:  []  Primary Cardiomyopathy []  Congestive Heart Failure   []  Atrial Fibrillation []  Ventricular Tachycardia   []  Aftercare cardiac device []  Long term (current) use of anticoagulants   []  Ventilator-associated pneumonia []  Pneumonia  viral, unspecified   []  Pneumonia, bacterial, unspecified []  Pneumonia, organism unspecified   [x]  Hemorrhage of GI tract, unspecified    []  Nosebleed []  Driveline infection   []  Infection VAD device          Plan:  [x]  Interval history obtained from HTS attending team member during rounding today  [x]  VAD/GIN teaching performed with patient  [x]  Mobilization / Physical Therapy ongoing  [x]  Anticoagulation [x]  Ongoing []  Held  [x]  Studies ordered-VCE  - pts LDH 1631 with increased flows and power  - discussions of hospice care have begun  - Dr. Vasquez to discuss with HTS regarding home inotrope infusion.       Total time spent was 32 minutes.  Of which more than 50 percent of the care dominated counseling and coordinating care with different team members. The VAD was interrogated and all parameters were WNL and no significant findings were found in the history. All these findings are documented in the note above.      Date of Service: 08/07/2017       Cardiac Surgery Attending E and M (VAD) Note along with VAD Interrogation    I have seen and examined the patient and agree with the findings above    I also reviewed the patients clinical course and:  [x]  Hemodynamic & Respiratory paramters  [x]  Laboratory Data  [x]  Radiological studies     VAD Interrogated [x]      VAD Function is normal. Changes made []  None [x]        Interrogation of Ventricular assist device was performed with physician analysis of device parameters and review of device function. I have personally reviewed the interrogation findings and agree with findings as stated

## 2017-08-08 ENCOUNTER — TELEPHONE (OUTPATIENT)
Dept: TRANSPLANT | Facility: CLINIC | Age: 62
End: 2017-08-08

## 2017-08-08 LAB — POCT GLUCOSE: 103 MG/DL (ref 70–110)

## 2017-08-08 NOTE — PROGRESS NOTES
3hr HD treatment completed 0.6L of fluid removed due to low bp and map in the 50's pt very sleepy. Both needles removed from a SCOTT graft and pressure held until hemostasis achieved dressing applied. Report given to primary nurse.

## 2017-08-08 NOTE — PROGRESS NOTES
called to inform RN that he has arranged a wheelchair accessible van to transport pt. Pt states she would rather ride with family in personal vehicle. However, family does not have access to O2 tank. Pt states she is ok to ride w/out O2. Risk of riding w/out O2 explained to pt. States understanding and still wants to ride in personal vehicle. Discharge instructions explained to pt and family. All questions and concerns addressed. Pt transported by daughter in wheelchair to personal vehicle in no apparent distress.

## 2017-08-08 NOTE — PROGRESS NOTES
[facility] here for transfer. EMTs imformed pt's family of the $740 charge. Family unable to pay sum at the moment.  contacted about stipen to pay for transport. States he will investigate and relay imformation to charge nurse. Pt currently sitting in wheelchair. L fem TLC removed prior to ambulance arrival. All questions and concerns addressed.

## 2017-08-08 NOTE — TELEPHONE ENCOUNTER
Randa called to ask why I didn't come say good bye yesterday. I explained I was at jury duty. She then explained that do I know that she is dying, she went home with [facility].  She tells me she is continuing to bleed and wants to have the bleeding stopped.  Talked with her, asked if I could call her back after talking with ailyn and Eneida.  Also talked with Alison Miranda and Dr. Ackerman.  Called pt back to explained about her going to [facility] and GI feeling there is not anything they can do for the bleeding.  Randa also explained that her campos are elevated to 14-16.  I explained I can call Kate to see if she see her to increase the HPA.  Called Kate, left her a message to call me back. Also tried to call Randa back to talk with her again this, no answer, left a message for her to call me back.  I wanted to make sure she is aware that Dr. Ackerman called HD last week and they agreed to give her blood while she is at HD.  Will follow up with her this week.

## 2017-08-09 ENCOUNTER — TELEPHONE (OUTPATIENT)
Dept: INTENSIVE CARE | Facility: HOSPITAL | Age: 62
End: 2017-08-09

## 2017-08-09 ENCOUNTER — PATIENT OUTREACH (OUTPATIENT)
Dept: ADMINISTRATIVE | Facility: CLINIC | Age: 62
End: 2017-08-09

## 2017-08-09 NOTE — PROGRESS NOTES
No information sent on patient discharge 8/7. Sent JULIÁN Miranda to verify monitoring as per her note: She will dc home with AIM, DNR fully understanding her LVAD may stop and death would occur. She will continue coumadin 2 mg daily for goal INR 1.3-2.3.   Per Allegra, INR 8/10 via point of care. Left message for patient.

## 2017-08-09 NOTE — PROGRESS NOTES
Admitted 8/1 to [facility] regarding Left ventricular assist device (LVAD) complication and GI Bleed . Than discharged 8/07.  Medication changes regarding warfarin 2 mg one tablet daily.

## 2017-08-09 NOTE — TELEPHONE ENCOUNTER
----- Message from Flip Orta sent at 8/9/2017  3:01 PM CDT -----  Contact: [facility]  Please call Yvonne at [phone] until 4 pm. Is patient strong enough to dialyze on tomorrow at 615 am?    Thank you

## 2017-08-10 ENCOUNTER — TELEPHONE (OUTPATIENT)
Dept: TRANSPLANT | Facility: CLINIC | Age: 62
End: 2017-08-10

## 2017-08-10 ENCOUNTER — ANTI-COAG VISIT (OUTPATIENT)
Dept: CARDIOLOGY | Facility: CLINIC | Age: 62
End: 2017-08-10

## 2017-08-10 ENCOUNTER — TELEPHONE (OUTPATIENT)
Dept: TRANSPLANT | Facility: HOSPITAL | Age: 62
End: 2017-08-10

## 2017-08-10 DIAGNOSIS — Z95.811 LEFT VENTRICULAR ASSIST DEVICE PRESENT: ICD-10-CM

## 2017-08-10 LAB — INR PPP: 1.2

## 2017-08-10 NOTE — TELEPHONE ENCOUNTER
BETHANIE received call from Shakira ([phone]) with [facility] reporting that pt is ready to transition to hospice care.  Shakira is requesting hospice orders.  BETHANIE obtained orders and faxed them to [facility] office (ph: [phone], f: [phone]).  BETHANIE notified LVAD nurse brooke Forde that pt will be transitioning to hospice care.  BETHANIE remains available.

## 2017-08-10 NOTE — PROGRESS NOTES
Verbal result taken from _Valentina________. PT/INR ___1.2____ Date drawn_____8/10___ Hardcopy to be faxed.  Per Valentina, patient may transfer to [facility]. Will send order for INR 8/14 in case she is not placed by then.

## 2017-08-10 NOTE — PLAN OF CARE
[facility]     Department of Hospital Medicine     [address] [place] [address]     [phone] after hours  [phone] fax                                   HOSPICE  ORDERS     08/10/2017    Admit to Hospice:  Home Service     Diagnoses:  Active Hospital Problems    Diagnosis  POA    *LVAD (left ventricular assist device) present [Z95.811]  Not Applicable    Shortness of breath [R06.02]  Yes    GI bleed [K92.2]  Yes    End stage renal disease on dialysis [N18.6, Z99.2]  Not Applicable      Resolved Hospital Problems    Diagnosis Date Resolved POA   No resolved problems to display.       Hospice Qualifying Diagnoses: CHF with LVAD pump thrombosis       Patient has a life expectancy < 6 months due to these conditions.    Vital Signs: Routine per Hospice Protocol.    Allergies:  Review of patient's allergies indicates:   Allergen Reactions    Codeine Nausea And Vomiting    Demerol [meperidine] Nausea And Vomiting    Lortab [hydrocodone-acetaminophen] Nausea And Vomiting       Diet: normal    Activities: As tolerated    Nursing: Per Hospice Routine    Future Orders:  Hospice Medical Director may dictate new orders for comfortable care measures & sign death certificate.    Medications:          Randa Devine   Home Medication Instructions EDIE:[id number]    Printed on:08/10/17 3737   Medication Information                      acetaminophen (TYLENOL) 325 MG tablet  Take 2 tablets (650 mg total) by mouth every 6 (six) hours as needed for Pain (mild pain).             atorvastatin (LIPITOR) 40 MG tablet  Take 1 tablet (40 mg total) by mouth once daily.             CALCIUM CARBONATE (ANTACID CALCIUM ORAL)  Take 1 tablet by mouth 4 (four) times daily.              cetirizine (ZYRTEC) 5 MG tablet  Take 1 tablet (5 mg total) by mouth once daily.             escitalopram oxalate (LEXAPRO) 20 MG tablet  Take 1 tablet (20 mg total) by mouth every evening.              gabapentin (NEURONTIN) 300 MG capsule  Take 1 capsule (300 mg total) by mouth every evening.             ondansetron (ZOFRAN) 4 MG tablet  Take 1 tablet (4 mg total) by mouth every 8 (eight) hours as needed for Nausea.             pantoprazole (PROTONIX) 40 MG tablet  Take 1 tablet (40 mg total) by mouth once daily.             warfarin (COUMADIN) 2 MG tablet  2 mg daily                             _________________________________  KAREN Torres  08/10/2017

## 2017-08-11 ENCOUNTER — ANTI-COAG VISIT (OUTPATIENT)
Dept: CARDIOLOGY | Facility: CLINIC | Age: 62
End: 2017-08-11

## 2017-08-11 ENCOUNTER — SOCIAL WORK (OUTPATIENT)
Dept: TRANSPLANT | Facility: CLINIC | Age: 62
End: 2017-08-11

## 2017-08-11 DIAGNOSIS — Z95.811 LEFT VENTRICULAR ASSIST DEVICE PRESENT: ICD-10-CM

## 2017-08-11 NOTE — TELEPHONE ENCOUNTER
8/11/17 1515:  Called to talk with Torie again. She reports they now have a hospital bed, mom has been sleeping a lot.  Family is there heloing her.  Mom is DNR and she asked the hospice nurse to turn her ICD off so she is waiting for that.  She told me that her mom is peaceful and sleeping, but wakes every now and then to ask for something to drink or eat.  I explained if she needs anything to page us.  Torie verbalized understanding and agreement.

## 2017-08-11 NOTE — TELEPHONE ENCOUNTER
Torie called to talk about her mom. SHe tells me that Randa is very weak today after HD, they removed 1500.  She also thinks that her mom fell at some point when she was trying to get up, but Randa is not sure.  I asked Torie if they wanted to go to [facility] to have the HPA turned all the way up to 25 to give they relief from the alarms.  She said that might be good if she can get her in the car.  Gave her Kate's number to call for directions. I asked how her mom was doing and if she was awake.  Yes so I talked with her for a moment. Randa told me she felt good, just very tired.  She told me she was ready for hospice.  I asked what I could do for her, she asked me to talk with Torie and help her understand. I explained that Torie is ready and she and I have been talking about this already today.  She said thank you and good bye.  Talked with Torie again who reports the nurse is coming soon and they will talk with her about possibly transferring to hospice.

## 2017-08-14 ENCOUNTER — OUTPATIENT CASE MANAGEMENT (OUTPATIENT)
Dept: ADMINISTRATIVE | Facility: OTHER | Age: 62
End: 2017-08-14

## 2017-08-14 NOTE — PROGRESS NOTES
8/14/17 - Observed in EMR that pt's status has been changed to DNR and she has been admitted to [facility] hospice care following d/c from most recent hospitalization. CM will d/c today. Ina Lovell RN

## 2017-08-15 ENCOUNTER — TELEPHONE (OUTPATIENT)
Dept: TRANSPLANT | Facility: CLINIC | Age: 62
End: 2017-08-15

## 2017-08-15 NOTE — TELEPHONE ENCOUNTER
8/15/17 0800:  Voicemail on phone from Anila from Saturday morning that VAD had stopped and she is asking what to do.  I asked Jaylin if she received a page from her over the weekend, no.  Called Anila, left a message for her to call me back if possible.   8/15/17 0910: Anila called me back to tell me that her pump stopped Saturday morning and mom passed immediately.  She expressed she has equipment and dressing supplies there and asked what to do with it all. I explained she can drop it all off to us if she is coming this way.  No further questions.

## 2019-08-13 NOTE — PROGRESS NOTES
Pt confirms 2mg daily. States INR drawn via fingerstick and was 2.7. Carmen to have Allegra call CC with INR/   Yes

## 2019-12-23 NOTE — PROGRESS NOTES
Heart Failure Progress Note  Attending Physician: Caitlyn Ackerman MD  Hospital Day: 6    Subjective:   Interval History: Patient seen and examined, no events overnight, denied complaints. Hgb 7.1 today with no BM so far after colonoscopy will give 2 units of blood   Medications:   Continuous Infusions:     Scheduled Meds:   sodium chloride 0.9%   Intravenous Once    amlodipine  10 mg Oral Daily    atorvastatin  40 mg Oral Daily    calcium carbonate  500 mg Oral TID PC    cetirizine  5 mg Oral Daily    escitalopram oxalate  20 mg Oral QHS    gabapentin  300 mg Oral QHS    hydrALAZINE  50 mg Oral Q8H    pantoprazole  40 mg Oral Daily    warfarin  3 mg Oral Daily     PRN Meds:sodium chloride 0.9%, sodium chloride 0.9%, acetaminophen, hydrALAZINE, ondansetron, oxycodone-acetaminophen, oxymetazoline  Objective:     Vitals:  Temp:  [98 °F (36.7 °C)-99 °F (37.2 °C)]   Pulse:  [72-88]   Resp:  [17-20]   BP: (68-91)/(0-67)   SpO2:  [95 %-100 %]  on  I/O's:    Intake/Output Summary (Last 24 hours) at 04/05/17 0829  Last data filed at 04/05/17 0500   Gross per 24 hour   Intake              568 ml   Output                0 ml   Net              568 ml        Constitutional: NAD, conversant  HEENT: Sclera anicteric, PERRLA, EOMI  Neck: No JVD, no carotid bruits  CV: RRR, no murmur, normal S1/S2  Pulm: CTAB, no wheezes, rales, or ronchi  GI: Abdomen soft, NTND, +BS  Extremities: No LE edema, warm and well perfused  Skin: No ecchymosis, erythema, or ulcers  Psych: AOx3, appropriate affect  Neuro: CNII-XII intact, no focal deficits    Labs:       Recent Labs  Lab 04/03/17  0625 04/04/17  0657 04/05/17  0411    140 140   K 4.2 4.0 4.3    105 107   CO2 23 22* 22*   BUN 37* 23* 39*   CREATININE 6.7* 4.7* 7.0*   GLU 77 73 85   ANIONGAP 15 13 11       Recent Labs  Lab 03/31/17  1618  04/03/17  0625 04/05/17  0411   AST 31  --  20 22   ALT 21  --  15 13   ALKPHOS 143*  --  129 121   BILITOT 0.6  --  0.6 0.4    BILIDIR  --   < > 0.3 0.2   ALBUMIN 3.5  --  3.5 3.2*   < > = values in this interval not displayed.     Recent Labs  Lab 04/03/17  0626 04/04/17  0657 04/05/17  0411   WBC 4.62 5.02 3.73*   HGB 7.6* 8.0* 7.1*   HCT 24.4* 24.6* 21.6*   * 130* 98*   GRAN 72.3  3.3 74.9*  3.8 68.3  2.6       Recent Labs  Lab 04/03/17  0626 04/04/17  0657 04/05/17  0411   INR 2.5* 2.0* 1.4*       Recent Labs  Lab 04/03/17  0625 04/05/17  0411   BNP 1743* 2095*      Micro:   Blood Cultures  Lab Results   Component Value Date    LABBLOO No growth after 5 days. 05/30/2016    LABBLOO No growth after 5 days. 05/30/2016    LABBLOO No growth after 5 days. 05/22/2016    LABBLOO No growth after 5 days. 05/22/2016    LABBLOO No growth after 5 days. 12/10/2015     Urine Cultures  Lab Results   Component Value Date    LABURIN No significant growth 10/17/2015    LABURIN NO SIGNIFICANT GROWTH 05/08/2012       Imaging:     EF   Date Value Ref Range Status   03/20/2017 10 (A) 55 - 65    02/01/2017 10 (A) 55 - 65    01/11/2017 15 (A) 55 - 65    05/24/2016 15 (A) 55 - 65    06/08/2015 20 (A) 55 - 65          ASSESSMENT/PLAN:      61 y.o. female presents with SOB. PMH of CAD s/p CABG, chronic combined CHF / ICM (EF 10-15%) s/p Heartware as DT (05/2012) and SJM CRT-D (DDR ), ESRD on HD via left arm AVF (TTS), Lower GIB secondary to cecal AVM s/p APC (04/2016), Paroxysmal AF, HTN, Embolic thalamic CVA (01/2017) with residual visual deficits who presents SOB     PLAN:      S/P LVAD HeartWare HVAD Implanted   -Coumadin at 2 mg  Will resume she had colonoscopy 4/3/2017 showing bleeding medium size angiodysplasia in descending colon s/p clip , Goal INR 2.0-3.0. Today 2.0  -  -Antiplatelets d/c 6/25/14 likely secondary to GI loss  -Speed set at 2600  -Interrogation notable for n/a  -Implanted as desination therapy   -volume overloaded on exam: nephrology consulted, HD today with 2 units of blood     Sub thearputic INR:    -Increase  Coumadin to 3   -No bridge in setting of Hx of GI bleeding dn H/H drop     Epistaxis:   Resolved     Anemia  -hx lower GIB secondary to cecal AVM s/p APC 4/2016  -reporting bright red blood. No BM after colonoscopy   -Transfuse accordingly  -GI was on board s/p colonoscopy 4/3/2017 showing bleeding medium size angiodysplasia in descending colon s/p clip  -Will give 2 units of blood   -Protonix 40 po daily      ESRD- on HD  -Nephrology consulted for HD     Acute on Chronic Systolic/Diastolic/ICM EF 10-15%  -volume management with HD  -Spoke to HD center regarding her BP and the necessity to pull out fluid even if they do it slower and discussed with them that 60/0 is ok for her while on dialysis as long as patient feeling fine ([phone])    Hypertension  -Will continue home antihypertensives for now: Hydralazine and Norvasc  -Will monitor and hold if BP low     Paroxysmal Atrial Fib  -Anticoagulated on hold for now  -Rate controlled 70's      Hx CVA  -PT/OT consulted     Depression  -continued home dose of Lexapro      Attending addendum to follow       Terese Jaramillo MD  Cardiology Fellow  Pager: [phone]       General

## 2020-03-27 NOTE — PROGRESS NOTES
Called [facility]  (Phone: [phone] spoke with nurse beryl whom stated she did not receive any results 6/22 from patient she will check and give clinic a call back. Was provided with coumadin contact information.  
details…

## 2023-09-12 NOTE — PLAN OF CARE
Problem: Physical Therapy Goal  Goal: Physical Therapy Goal  Goals to be met by: 17     Patient will increase functional independence with mobility by performin. Supine to sit with Modified [place]  2. Sit to supine with Modified [place]  3. Sit to stand transfer with Modified [place]  4. Gait x 150 feet with Modified [place] using Rolling Walker.   5. Lower extremity exercise program x15 reps, with independence  Outcome: Ongoing (interventions implemented as appropriate)  PT eval completed. Goals initiated. Will continue to progress as tolerated.        show

## 2024-04-30 NOTE — SUBJECTIVE & OBJECTIVE
Interval Hx  NAEON.  Patient neurologically stable and going for dialysis today.      Past Medical History   Diagnosis Date    Anticoagulant long-term use     Anxiety     Atrial tachycardia, paroxysmal 2013    Blood transfusion     Cardiomyopathy     CHF (congestive heart failure)     Chronic kidney disease     Coronary artery disease     End stage renal disease     Hypertension      pulmonary    ICD (implantable cardioverter-defibrillator) battery depletion 2014    Myocardial infarction     Presence of left ventricular assist device (LVAD)     Pulmonary hypertension     Stroke      Past Surgical History   Procedure Laterality Date    Left ventricular assist device  2012    Tonsillectomy      Cardiac surgery      Coronary artery bypass graft      Hip surgery       RTHA    Fracture surgery      Vascular surgery      Cardiac defibrillator placement       section      Colonoscopy N/A 2016     Procedure: COLONOSCOPY;  Surgeon: Mark Currie MD;  Location: [facility] ([address] [place] [address]);  Service: Endoscopy;  Laterality: N/A;     Family History   Problem Relation Age of Onset    Arthritis Mother     Heart disease Father     Hypertension Father     Cancer Maternal Grandmother     Breast cancer Neg Hx     Colon cancer Neg Hx     Ovarian cancer Neg Hx      Social History   Substance Use Topics    Smoking status: Former Smoker     Packs/day: 2.00     Years: 30.00     Quit date: 10/29/2009    Smokeless tobacco: None    Alcohol use Yes      Comment: 1 glass of wine a month     Review of patient's allergies indicates:   Allergen Reactions    Codeine Nausea And Vomiting    Demerol [meperidine] Nausea And Vomiting    Lortab [hydrocodone-acetaminophen] Nausea And Vomiting     Medications: I have reviewed the current medication administration record.    Prescriptions Prior to Admission   Medication Sig Dispense Refill Last Dose    acetaminophen (TYLENOL) 325 MG  Follow-up to 4/24/24    Echocardiogram in one month.  Follow up one week later.  In clinic preferred by provider.    tablet Take 2 tablets (650 mg total) by mouth every 6 (six) hours as needed for Pain (mild pain).  0 1/29/2017    CALCIUM CARBONATE (ANTACID CALCIUM ORAL) Take 1 tablet by mouth 4 (four) times daily.    1/29/2017    cetirizine (ZYRTEC) 5 MG tablet Take 1 tablet (5 mg total) by mouth once daily. 30 tablet 0 Unknown    escitalopram oxalate (LEXAPRO) 20 MG tablet Take 1 tablet (20 mg total) by mouth every evening. 90 tablet 3 1/29/2017    GABAPENTIN (CMPD PAIN MANAGEMENT COMPOUND OINTMNENT THREE) Apply small amount to painful joints once or twice a day 30 g 3 Unknown    gabapentin (NEURONTIN) 300 MG capsule Take 1 capsule (300 mg total) by mouth every evening. 120 capsule 2 1/29/2017    hydrALAZINE (APRESOLINE) 25 MG tablet Take 1 tablet (25 mg total) by mouth every 8 (eight) hours. 90 tablet 6 1/30/2017    ondansetron (ZOFRAN) 4 MG tablet Take 1 tablet (4 mg total) by mouth every 8 (eight) hours as needed for Nausea. 30 tablet 3 Unknown    pantoprazole (PROTONIX) 40 MG tablet Take 1 tablet (40 mg total) by mouth once daily. 60 tablet 10 1/30/2017    warfarin (COUMADIN) 2 MG tablet Take 1 tablet (2mg) by mouth daily, except 1 1/2 tablet (3mg) on Mondays, Wednesdays, and Fridays, Or as directed by [facility] 40 tablet 4 1/29/2017       Review of Systems   Eyes: Positive for photophobia and visual disturbance.   Cardiovascular: Positive for palpitations (occasional).   Gastrointestinal: Negative for diarrhea and vomiting.   Genitourinary: Negative for dysuria.   Neurological: Positive for weakness and headaches. Negative for syncope, speech difficulty and numbness.   Hematological: Bruises/bleeds easily.     Objective:     Vital Signs (Most Recent):  Temp: 97.8 °F (36.6 °C) (02/02/17 1014)  Pulse: 74 (02/02/17 1000)  Resp: (!) 21 (02/02/17 1000)  BP: 134/85 (02/02/17 1000)  SpO2: 96 % (02/02/17 1000)    Vital Signs Range (Last 24H):  Temp:  [97.7 °F (36.5 °C)-98.3 °F (36.8 °C)]   Pulse:  [69-88]   Resp:   [11-29]   BP: ()/(51-85)   SpO2:  [96 %-100 %]     Physical Exam   Constitutional: She appears well-developed and well-nourished. No distress.   Hat and sunglasses in place   Eyes: EOM are normal.   Pulmonary/Chest: Effort normal. No respiratory distress.   Musculoskeletal: Normal range of motion.   Neurological: GCS eye subscore is 4 - spontaneous. GCS verbal subscore is 5 - oriented. GCS motor subscore is 6 - obeys commands.   Skin: She is not diaphoretic.   Nursing note and vitals reviewed.      Neurological Exam:   LOC: alert and follows requests  Language: No aphasia  Speech: No dysarthria  Memory: Recent memory intact, Remote memory intact, Age correct, Month correct  EOM (CN III, IV, VI): Full/intact  Facial Sensation (CN V): Symmetric  Facial Movement (CN VII): symmetric facial expression  Hearing (CN VIII): intact bilaterally  Tongue (CN XII): to midline  Cerebellar*: Tremulous throughout, worse distally, and with movement.  Seen most prominantly in the LUE.    Sensation: mild numbness to distal RLE, which patient states is chronic    NIH Stroke Scale:  Interval: baseline (upon arrival/admit)  Level of Consciousness: 0 - alert  LOC Questions: 0 - answers both correctly  LOC Commands: 0 - performs both correctly  Best Gaze: 0 - normal  Visual: 0 - no visual loss (difficulty seeing fingers, L>R, LUQ>LLQ, but able to accurately state when hand is moving in all four quadrants)  Facial Palsy: 0 - normal  Motor Left Arm: 0 - no drift  Motor Right Arm: 0 - no drift  Motor Left Le - no drift  Motor Right Le - no drift  Limb Ataxia: 1 - present in one limb (RLE, tremor in LUE )  Sensory: 0 - normal  Best Language: 0 - no aphasia  Dysarthria: 0 - normal articulation  Extinction and Inattention: 0 - no neglect  NIH Stroke Scale Total: 1  Sincere Coma Scale:  Best Eye Response: 4 - spontaneous  Best Motor Response: 6 - obeys commands  Best Verbal Response: 5 - oriented  Sincere Coma Scale Total:  15      Laboratory:  CMP:     Recent Labs  Lab 02/01/17  1351 02/02/17  0421   CALCIUM 8.7 8.6*   ALBUMIN 3.2*  --     138   K 4.3 4.9   CO2 21* 19*    105   BUN 67* 81*   CREATININE 7.9* 9.1*     BMP:     Recent Labs  Lab 02/02/17  0421      K 4.9      CO2 19*   BUN 81*   CREATININE 9.1*   CALCIUM 8.6*     CBC:     Recent Labs  Lab 02/02/17 0421   WBC 4.19   RBC 2.86*   HGB 9.3*   HCT 28.6*   *   *   MCH 32.5*   MCHC 32.5     Lipid Panel: No results for input(s): CHOL, LDLCALC, HDL, TRIG in the last 168 hours.  Coagulation:     Recent Labs  Lab 02/02/17 0421   INR 2.9*   APTT 33.2*     Hgb A1C: No results for input(s): HGBA1C in the last 168 hours.  TSH: No results for input(s): TSH in the last 168 hours.    Diagnostic Results:  CT Head 01/30/17  - Hypodensity in left thalamus   -cytotoxic cerebral edema